# Patient Record
Sex: FEMALE | Race: WHITE | NOT HISPANIC OR LATINO | Employment: OTHER | URBAN - METROPOLITAN AREA
[De-identification: names, ages, dates, MRNs, and addresses within clinical notes are randomized per-mention and may not be internally consistent; named-entity substitution may affect disease eponyms.]

---

## 2019-06-26 ENCOUNTER — DOCUMENTATION ONLY (OUTPATIENT)
Dept: GYNECOLOGIC ONCOLOGY | Facility: CLINIC | Age: 74
End: 2019-06-26

## 2019-06-26 ENCOUNTER — TELEPHONE (OUTPATIENT)
Dept: GYNECOLOGIC ONCOLOGY | Facility: CLINIC | Age: 74
End: 2019-06-26

## 2019-06-26 DIAGNOSIS — C54.1 ENDOMETRIAL CA: ICD-10-CM

## 2019-06-26 PROCEDURE — 88321 CONSLTJ&REPRT SLD PREP ELSWR: CPT | Mod: ,,, | Performed by: PATHOLOGY

## 2019-06-26 PROCEDURE — 88321 TISSUE SPECIMEN TO PATHOLOGY: ICD-10-PCS | Mod: ,,, | Performed by: PATHOLOGY

## 2019-06-26 RX ORDER — RALOXIFENE HYDROCHLORIDE 60 MG/1
60 TABLET, FILM COATED ORAL NIGHTLY
COMMUNITY
Start: 2019-04-19

## 2019-06-26 RX ORDER — BISOPROLOL FUMARATE AND HYDROCHLOROTHIAZIDE 10; 6.25 MG/1; MG/1
1 TABLET ORAL DAILY
COMMUNITY
Start: 2019-04-19 | End: 2024-03-07 | Stop reason: DRUGHIGH

## 2019-06-26 RX ORDER — OMEPRAZOLE 40 MG/1
40 CAPSULE, DELAYED RELEASE ORAL DAILY
COMMUNITY
End: 2021-12-27 | Stop reason: CLARIF

## 2019-06-26 RX ORDER — ALPRAZOLAM 2 MG/1
0.25 TABLET ORAL NIGHTLY PRN
COMMUNITY
End: 2019-06-27 | Stop reason: CLARIF

## 2019-06-26 NOTE — PROGRESS NOTES
Referring physician: Kurtis Abdul MD  Reason for referral: endometrial cancer     June 18 2019:  Patient underwent diagnostic hysteroscopy MyoSure removal of endometrial polyps and curettage for postmenopausal bleeding. Uterus sounded to 7.5 cm.  This was performed by the referring physician.    Pathology shows high-grade serous endometrial carcinoma.

## 2019-06-26 NOTE — TELEPHONE ENCOUNTER
Left voice mail message for patient to call office to schedule appointment for consult. Per Dr. Carias patient can be seen 6/27 @ 8:45, 9:00, or 10:30 am. HARINDER

## 2019-06-27 ENCOUNTER — HOSPITAL ENCOUNTER (OUTPATIENT)
Dept: CARDIOLOGY | Facility: CLINIC | Age: 74
Discharge: HOME | End: 2019-06-27
Payer: MEDICARE

## 2019-06-27 ENCOUNTER — RESEARCH ENCOUNTER (OUTPATIENT)
Dept: RESEARCH | Facility: HOSPITAL | Age: 74
End: 2019-06-27

## 2019-06-27 ENCOUNTER — INITIAL CONSULT (OUTPATIENT)
Dept: GYNECOLOGIC ONCOLOGY | Facility: CLINIC | Age: 74
End: 2019-06-27
Payer: MEDICARE

## 2019-06-27 ENCOUNTER — HOSPITAL ENCOUNTER (OUTPATIENT)
Dept: RADIOLOGY | Facility: HOSPITAL | Age: 74
Discharge: HOME | End: 2019-06-27
Attending: OBSTETRICS & GYNECOLOGY
Payer: MEDICARE

## 2019-06-27 VITALS
DIASTOLIC BLOOD PRESSURE: 73 MMHG | SYSTOLIC BLOOD PRESSURE: 158 MMHG | HEIGHT: 65 IN | BODY MASS INDEX: 23.88 KG/M2 | HEART RATE: 58 BPM | WEIGHT: 143.31 LBS

## 2019-06-27 DIAGNOSIS — C54.1 ENDOMETRIAL CA: ICD-10-CM

## 2019-06-27 DIAGNOSIS — C54.1 ENDOMETRIAL CA: Primary | ICD-10-CM

## 2019-06-27 DIAGNOSIS — I10 ESSENTIAL HYPERTENSION: ICD-10-CM

## 2019-06-27 PROCEDURE — 3077F SYST BP >= 140 MM HG: CPT | Mod: CPTII,S$GLB,, | Performed by: OBSTETRICS & GYNECOLOGY

## 2019-06-27 PROCEDURE — 25500020 PHARM REV CODE 255: Performed by: OBSTETRICS & GYNECOLOGY

## 2019-06-27 PROCEDURE — 74177 CT ABD & PELVIS W/CONTRAST: CPT | Mod: TC

## 2019-06-27 PROCEDURE — 99205 OFFICE O/P NEW HI 60 MIN: CPT | Mod: S$GLB,,, | Performed by: OBSTETRICS & GYNECOLOGY

## 2019-06-27 PROCEDURE — 93005 EKG 12-LEAD: ICD-10-PCS | Mod: S$GLB,,, | Performed by: OBSTETRICS & GYNECOLOGY

## 2019-06-27 PROCEDURE — 3078F PR MOST RECENT DIASTOLIC BLOOD PRESSURE < 80 MM HG: ICD-10-PCS | Mod: CPTII,S$GLB,, | Performed by: OBSTETRICS & GYNECOLOGY

## 2019-06-27 PROCEDURE — 99999 PR PBB SHADOW E&M-EST. PATIENT-LVL III: CPT | Mod: PBBFAC,,, | Performed by: OBSTETRICS & GYNECOLOGY

## 2019-06-27 PROCEDURE — 71260 CT CHEST ABDOMEN PELVIS WITH CONTRAST (XPD): ICD-10-PCS | Mod: 26,,, | Performed by: RADIOLOGY

## 2019-06-27 PROCEDURE — 99205 PR OFFICE/OUTPT VISIT, NEW, LEVL V, 60-74 MIN: ICD-10-PCS | Mod: S$GLB,,, | Performed by: OBSTETRICS & GYNECOLOGY

## 2019-06-27 PROCEDURE — 99999 PR PBB SHADOW E&M-EST. PATIENT-LVL III: ICD-10-PCS | Mod: PBBFAC,,, | Performed by: OBSTETRICS & GYNECOLOGY

## 2019-06-27 PROCEDURE — 74177 CT ABD & PELVIS W/CONTRAST: CPT | Mod: 26,,, | Performed by: RADIOLOGY

## 2019-06-27 PROCEDURE — 71260 CT THORAX DX C+: CPT | Mod: 26,,, | Performed by: RADIOLOGY

## 2019-06-27 PROCEDURE — 93010 ELECTROCARDIOGRAM REPORT: CPT | Mod: S$GLB,,, | Performed by: INTERNAL MEDICINE

## 2019-06-27 PROCEDURE — 3077F PR MOST RECENT SYSTOLIC BLOOD PRESSURE >= 140 MM HG: ICD-10-PCS | Mod: CPTII,S$GLB,, | Performed by: OBSTETRICS & GYNECOLOGY

## 2019-06-27 PROCEDURE — 93005 ELECTROCARDIOGRAM TRACING: CPT | Mod: S$GLB,,, | Performed by: OBSTETRICS & GYNECOLOGY

## 2019-06-27 PROCEDURE — 1101F PT FALLS ASSESS-DOCD LE1/YR: CPT | Mod: CPTII,S$GLB,, | Performed by: OBSTETRICS & GYNECOLOGY

## 2019-06-27 PROCEDURE — 74177 CT CHEST ABDOMEN PELVIS WITH CONTRAST (XPD): ICD-10-PCS | Mod: 26,,, | Performed by: RADIOLOGY

## 2019-06-27 PROCEDURE — 93010 EKG 12-LEAD: ICD-10-PCS | Mod: S$GLB,,, | Performed by: INTERNAL MEDICINE

## 2019-06-27 PROCEDURE — 3078F DIAST BP <80 MM HG: CPT | Mod: CPTII,S$GLB,, | Performed by: OBSTETRICS & GYNECOLOGY

## 2019-06-27 PROCEDURE — 1101F PR PT FALLS ASSESS DOC 0-1 FALLS W/OUT INJ PAST YR: ICD-10-PCS | Mod: CPTII,S$GLB,, | Performed by: OBSTETRICS & GYNECOLOGY

## 2019-06-27 RX ORDER — LIDOCAINE HYDROCHLORIDE 10 MG/ML
1 INJECTION, SOLUTION EPIDURAL; INFILTRATION; INTRACAUDAL; PERINEURAL ONCE
Status: CANCELLED | OUTPATIENT
Start: 2019-06-27 | End: 2019-06-27

## 2019-06-27 RX ORDER — MUPIROCIN 20 MG/G
OINTMENT TOPICAL
Status: CANCELLED | OUTPATIENT
Start: 2019-06-27

## 2019-06-27 RX ORDER — ALPRAZOLAM 0.25 MG/1
0.25 TABLET ORAL 2 TIMES DAILY PRN
COMMUNITY

## 2019-06-27 RX ORDER — SODIUM CHLORIDE 0.9 % (FLUSH) 0.9 %
10 SYRINGE (ML) INJECTION
Status: CANCELLED | OUTPATIENT
Start: 2019-06-27

## 2019-06-27 RX ADMIN — IOHEXOL 75 ML: 350 INJECTION, SOLUTION INTRAVENOUS at 04:06

## 2019-06-27 NOTE — H&P (VIEW-ONLY)
Subjective:       Patient ID: Gita García is a 74 y.o. female.    Chief Complaint: Advice Only (Dr. Marshall) and Endometrial Cancer    HPI   Recent onset of PMB.   Referring physician: Kurtis Abdul MD  Reason for referral: endometrial cancer      June 18 2019:  Patient underwent diagnostic hysteroscopy MyoSure removal of endometrial polyps and curettage for postmenopausal bleeding. Uterus sounded to 7.5 cm.  This was performed by the referring physician.     Pathology shows high-grade serous endometrial carcinoma.    Colonoscopy: 2016: normal.  MMG: July 2018: normla     Past Medical History:   Diagnosis Date    Acid reflux     Endometrial ca 6/26/2019    Estrogen deficiency     Hormone deficiency     Hypertension     Osteopenia     Seizures     post partal. several days after delivery     Past Surgical History:   Procedure Laterality Date    DILATION AND CURETTAGE OF UTERUS      GI scope  2016     Family History   Problem Relation Age of Onset    Colon cancer Sister 53    Prostate cancer Brother     Breast cancer Sister 64    Kidney cancer Sister     Breast cancer Sister         in her 60s    Skin cancer Sister     Stroke Brother     Prostate cancer Brother     Heart disease Brother     Prostate cancer Brother     Heart disease Brother     Ovarian cancer Neg Hx     Uterine cancer Neg Hx      Social History     Tobacco Use    Smoking status: Never Smoker   Substance Use Topics    Alcohol use: Never     Frequency: Never    Drug use: Never     A.ll    Current Outpatient Medications:     ALPRAZolam (XANAX) 0.25 MG tablet, Take 0.25 mg by mouth as needed for Anxiety., Disp: , Rfl:     bisoprolol-hydrochlorothiazide (ZIAC) 10-6.25 mg per tablet, , Disp: , Rfl:     calcium carbonate/vitamin D3 (CALCIUM 600 + D,3, ORAL), Take by mouth., Disp: , Rfl:     multivitamin/iron/folic acid (CENTRUM ORAL), Take by mouth., Disp: , Rfl:     omeprazole (PRILOSEC) 40 MG capsule, Take 40 mg by mouth once  "daily., Disp: , Rfl:     raloxifene (EVISTA) 60 mg tablet, , Disp: , Rfl:     mv-min/FA/vit K/lycop/lut/zeax (OCUVITE EYE PLUS MULTI ORAL), Take by mouth., Disp: , Rfl:     Review of Systems   Constitutional: Negative for chills, fatigue and fever.   Eyes: Negative for visual disturbance.   Respiratory: Negative for cough, shortness of breath and wheezing.    Cardiovascular: Negative for chest pain, palpitations and leg swelling.   Gastrointestinal: Negative for abdominal distention, abdominal pain, constipation, diarrhea, nausea and vomiting.   Genitourinary: Negative for difficulty urinating, dysuria, frequency, genital sores, hematuria, pelvic pain, urgency, vaginal bleeding, vaginal discharge and vaginal pain.   Musculoskeletal: Positive for neck pain (arthritis). Negative for gait problem and neck stiffness.   Skin: Negative for rash.   Neurological: Negative for seizures and weakness.   Hematological: Negative for adenopathy. Does not bruise/bleed easily.   Psychiatric/Behavioral: The patient is not nervous/anxious.        Objective:   BP (!) 158/73   Pulse (!) 58   Ht 5' 5" (1.651 m)   Wt 65 kg (143 lb 4.8 oz)   BMI 23.85 kg/m²      Physical Exam   Constitutional: She is oriented to person, place, and time. She appears well-developed and well-nourished.   HENT:   Head: Normocephalic and atraumatic.   Eyes: No scleral icterus.   Neck: Neck supple. No tracheal deviation present. No thyroid mass and no thyromegaly present.   Cardiovascular: Normal rate and regular rhythm.   Pulmonary/Chest: Effort normal and breath sounds normal. She has no wheezes.   Abdominal: Soft. She exhibits no distension and no mass. There is no hepatosplenomegaly. There is no tenderness. There is no rebound and no guarding.   Genitourinary:   Genitourinary Comments: Bimanual exam:  Vulva: no lesions. Normal appearance  Urethra: Normal size and location. No lesions  Bladder: No masses or tenderness.  Vagina: normal mucosa. No " lesion  Cervix: normal   Uterus: normal   Adnexa: no masses.  Rectovaginal: No posterior cul de sac thickening or nodularity.  Rectal: no masses. Nontender. Normal tone.      Musculoskeletal: She exhibits no edema or tenderness.   Lymphadenopathy:     She has no cervical adenopathy.     She has no axillary adenopathy.        Right: No inguinal and no supraclavicular adenopathy present.        Left: No inguinal and no supraclavicular adenopathy present.   Neurological: She is alert and oriented to person, place, and time.   Skin: Skin is warm and dry.   Psychiatric: She has a normal mood and affect. Her behavior is normal. Judgment and thought content normal.       Assessment:       1. Endometrial ca        Plan:   Endometrial ca  Proceed with RALH/BSO/ LND and omenectomy for HG endometrial cancer.   Slides being reviewed-     ; Future; Expected date: 06/27/2019  -     CBC auto differential; Future; Expected date: 06/27/2019  -     Basic metabolic panel; Future; Expected date: 06/27/2019  -     EKG 12-lead; Future  -     CT Chest Abdomen Pelvis With Contrast; Future; Expected date: 06/27/2019    Consent forms were reviewed with patient. Questions were answered. Patient voiced understanding. Consents were signed.  Preop orders placed.

## 2019-06-27 NOTE — LETTER
June 27, 2019      Kurtis Abdul MD  [address]  [place] [address]           [facility] - GYN Oncology  [address]  [place] [address]  Phone: [phone]          Patient: Gita García   MR Number: [id number]   YOB: 1945   Date of Visit: 6/27/2019       Dear Dr. Kurtis Abdul:    Thank you for referring Gita García to me for evaluation. Attached you will find relevant portions of my assessment and plan of care.    If you have questions, please do not hesitate to call me. I look forward to following Gita García along with you.    Sincerely,    Lokesh Carias MD    Enclosure  CC:  No Recipients    If you would like to receive this communication electronically, please contact [email or web address] or [phone] to request more information on [facility] access.    For providers and/or their staff who would like to refer a patient to [facility], please contact us through our one-stop-shop provider referral line, [facility] , at [phone].    If you feel you have received this communication in error or would no longer like to receive these types of communications, please e-mail [email or web address]

## 2019-06-27 NOTE — PROGRESS NOTES
Pt was approached in Gyn Onc clinic regarding participation in IRB protocol #2015.101.C, endometrial cancer study. Pt was agreeable.     The Informed Consent Form (ICF) was reviewed with pt. The discussion included:   - participation is voluntary  - pt can change her mind about participating  - pt was informed that participation in this study would not preclude her from participating in any other research if offered  - specimens may be used by [facility] researchers, community researchers or research companies  - specimens collected include only those discussed with the patient at the time of consent and are indicated on the ICF   - specimens may be used for DNA, RNA or protein studies investigating biomarkers for diagnostic, prognostic or treatment purposes  - blood specimen will be collected from [facility] after routine collections have been conducted  - excess endometrial tumor tissue will be collected from Pathology after their approval  - participation in Biobank study will not change amount of tissue removed  - all specimens released to researchers will be stripped of identifiers  - no personal medical information will be released to any parties outside of this research study  - there will be no other physical risks outside of those involved in standard of care procedure     Dr. Carias approved of patient's participation in the Biobank study.  Pt did not have any questions. Pt willingly and independently signed ICF.    A copy of signed ICF was given to pt with instructions to call with any questions that may arise or if she should change her mind regarding participation in Biobank study.

## 2019-06-30 ENCOUNTER — PATIENT MESSAGE (OUTPATIENT)
Dept: GYNECOLOGIC ONCOLOGY | Facility: CLINIC | Age: 74
End: 2019-06-30

## 2019-07-01 ENCOUNTER — TELEPHONE (OUTPATIENT)
Dept: GYNECOLOGIC ONCOLOGY | Facility: CLINIC | Age: 74
End: 2019-07-01

## 2019-07-01 ENCOUNTER — HOSPITAL ENCOUNTER (OUTPATIENT)
Dept: RADIOLOGY | Facility: HOSPITAL | Age: 74
Discharge: HOME | End: 2019-07-01
Attending: OBSTETRICS & GYNECOLOGY
Payer: MEDICARE

## 2019-07-01 DIAGNOSIS — C54.1 ENDOMETRIAL CANCER: Primary | ICD-10-CM

## 2019-07-01 DIAGNOSIS — R16.0 LIVER MASS: ICD-10-CM

## 2019-07-01 PROCEDURE — 74160 CT ABDOMEN WITH CONTRAST: ICD-10-PCS | Mod: 26,,, | Performed by: RADIOLOGY

## 2019-07-01 PROCEDURE — 74160 CT ABDOMEN W/CONTRAST: CPT | Mod: 26,,, | Performed by: RADIOLOGY

## 2019-07-01 PROCEDURE — 25500020 PHARM REV CODE 255: Performed by: OBSTETRICS & GYNECOLOGY

## 2019-07-01 PROCEDURE — 74160 CT ABDOMEN W/CONTRAST: CPT | Mod: TC

## 2019-07-01 RX ADMIN — IOHEXOL 100 ML: 350 INJECTION, SOLUTION INTRAVENOUS at 07:07

## 2019-07-01 NOTE — TELEPHONE ENCOUNTER
----- Message from Lokesh Carias MD sent at 7/1/2019 10:57 AM CDT -----  Sudhir:   Please schedule for triple phase CT of liver/abdomen. Needs to be done this week. Has surgery on 7/8. Order has been placed. Please call patient with time/date. Thank you.

## 2019-07-01 NOTE — TELEPHONE ENCOUNTER
I called and discussed the CT scan findings with the patient and her daughter.  I have ordered a triple phase CT scan of the abdomen.

## 2019-07-02 ENCOUNTER — TELEPHONE (OUTPATIENT)
Dept: GYNECOLOGIC ONCOLOGY | Facility: CLINIC | Age: 74
End: 2019-07-02

## 2019-07-02 NOTE — TELEPHONE ENCOUNTER
Spoke with patient daughter informing her that the results from her mother test on yesterday is not resulted at this time. I inform her once resulted Dr. Carias will give them a call. Daughter voiced understanding. KJ

## 2019-07-02 NOTE — TELEPHONE ENCOUNTER
----- Message from Mauricio Barron sent at 7/2/2019  8:41 AM CDT -----  Contact: BRENT MERA [[id number]]  Name of Who is Calling: Pt  Daughter      What is the request in detail: Pt Daughter is requesting to speak with clinical staff.Pt is calling in regards to liver scan of patient .Please contact to further discuss and advise.       Can the clinic reply by [facility]:       What Number to Call Back if not in [facility]: [phone]

## 2019-07-05 ENCOUNTER — TELEPHONE (OUTPATIENT)
Dept: GYNECOLOGIC ONCOLOGY | Facility: CLINIC | Age: 74
End: 2019-07-05

## 2019-07-05 RX ORDER — POLYETHYLENE GLYCOL 3350 17 G/17G
POWDER, FOR SOLUTION ORAL EVERY MORNING
COMMUNITY
End: 2023-05-05

## 2019-07-05 NOTE — TELEPHONE ENCOUNTER
Reminder call to patient about surgery on 7/8 with Dr. Carias at [facility] with arrival time of 7:30 am. Patient voiced understanding. KJ

## 2019-07-07 ENCOUNTER — ANESTHESIA EVENT (OUTPATIENT)
Dept: SURGERY | Facility: HOSPITAL | Age: 74
DRG: 741 | End: 2019-07-07
Payer: MEDICARE

## 2019-07-07 NOTE — ANESTHESIA PREPROCEDURE EVALUATION
[facility]  Anesthesia Pre-Operative Evaluation         Patient Name: Gita García  YOB: 1945  MRN: [id number]    SUBJECTIVE:     Pre-operative evaluation for Procedure(s) (LRB):  XI ROBOTIC SALPINGO-OOPHORECTOMY (Bilateral)  XI ROBOTIC HYSTERECTOMY (N/A)  LYMPHADENECTOMY (N/A)  OMENTECTOMY (N/A)     07/07/2019    Gita García is a 74 y.o. female w/ a significant PMHx of endometrial cancer, seizures, osteopenia, hypertension, acid refulx.    June 18 2019:  Patient underwent diagnostic hysteroscopy MyoSure removal of endometrial polyps and curettage for postmenopausal bleeding on June 18, 2019. Uterus thickness was 7.5 cm.       Pathology shows high-grade serous endometrial carcinoma    Patient now presents for the above procedure(s).      LDA: None documented.       Peripheral IV - Single Lumen 06/27/19 1456 22 G Left Antecubital (Active)   Number of days: 10            Peripheral IV - Single Lumen 07/01/19 1831 20 G Left Wrist (Active)   Number of days: 5       Prev airway: None documented.    Drips: None documented.      Patient Active Problem List   Diagnosis    Endometrial ca    Essential hypertension    Liver mass       Review of patient's allergies indicates:   Allergen Reactions    Asa [aspirin] Other (See Comments)     Stomach upset    Sulfa (sulfonamide antibiotics) Itching       Current Inpatient Medications:      No current facility-administered medications on file prior to encounter.      Current Outpatient Medications on File Prior to Encounter   Medication Sig Dispense Refill    ALPRAZolam (XANAX) 0.25 MG tablet Take 0.25 mg by mouth as needed for Anxiety.      bisoprolol-hydrochlorothiazide (ZIAC) 10-6.25 mg per tablet       calcium carbonate/vitamin D3 (CALCIUM 600 + D,3, ORAL) Take by mouth.      multivitamin/iron/folic acid (CENTRUM ORAL) Take by mouth.      mv-min/FA/vit K/lycop/lut/zeax (OCUVITE EYE PLUS MULTI ORAL) Take by mouth.      omeprazole  (PRILOSEC) 40 MG capsule Take 40 mg by mouth once daily.      polyethylene glycol (MIRALAX) 17 gram PwPk Take by mouth every morning.      raloxifene (EVISTA) 60 mg tablet          Past Surgical History:   Procedure Laterality Date    DILATION AND CURETTAGE OF UTERUS      GI scope  2016       Social History     Socioeconomic History    Marital status:      Spouse name: Not on file    Number of children: Not on file    Years of education: Not on file    Highest education level: Not on file   Occupational History    Not on file   Social Needs    Financial resource strain: Not on file    Food insecurity:     Worry: Not on file     Inability: Not on file    Transportation needs:     Medical: Not on file     Non-medical: Not on file   Tobacco Use    Smoking status: Never Smoker   Substance and Sexual Activity    Alcohol use: Never     Frequency: Never    Drug use: Never    Sexual activity: Not on file   Lifestyle    Physical activity:     Days per week: Not on file     Minutes per session: Not on file    Stress: Not on file   Relationships    Social connections:     Talks on phone: Not on file     Gets together: Not on file     Attends Alevism service: Not on file     Active member of club or organization: Not on file     Attends meetings of clubs or organizations: Not on file     Relationship status: Not on file   Other Topics Concern    Not on file   Social History Narrative    Not on file       OBJECTIVE:     Vital Signs Range (Last 24H):         Significant Labs:  Lab Results   Component Value Date    WBC 9.16 06/27/2019    HGB 12.3 06/27/2019    HCT 37.6 06/27/2019     06/27/2019     06/27/2019    K 3.8 06/27/2019     06/27/2019    CREATININE 0.7 06/27/2019    BUN 12 06/27/2019    CO2 28 06/27/2019       Diagnostic Studies: Endometrial biopsy pathology shows high-grade serous endometrial carcinoma.    EKG:     Sinus bradycardia with occasional Premature ventricular  complexes  Cannot rule out Septal infarct  Abnormal ECG  No previous ECGs available  Confirmed by ELENI CEJA MD ([id number]) on 6/27/2019 6:03:03 PM    2D ECHO:  No results found for this or any previous visit.      ASSESSMENT/PLAN:                                                                                                                  07/07/2019  Gita García is a 74 y.o., female.    Anesthesia Evaluation    I have reviewed the Patient Summary Reports.    I have reviewed the Nursing Notes.   I have reviewed the Medications.     Review of Systems  Anesthesia Hx:  No problems with previous Anesthesia    Social:  Non-Smoker    Pulmonary:  Pulmonary Normal        Physical Exam  General:  Well nourished    Airway/Jaw/Neck:  Airway Findings: Mouth Opening: Normal Tongue: Normal  General Airway Assessment: Good  Mallampati: I  TM Distance: Normal, at least 6 cm  Jaw/Neck Findings:  Neck ROM: Normal ROM      Dental:  Dental Findings: In tact   Chest/Lungs:  Chest/Lungs Findings: Clear to auscultation, Normal Respiratory Rate     Heart/Vascular:  Heart Findings:       Mental Status:  Mental Status Findings:  Cooperative, Alert and Oriented         Anesthesia Plan  Type of Anesthesia, risks & benefits discussed:  Anesthesia Type:  general  Patient's Preference:   Intra-op Monitoring Plan: standard ASA monitors  Intra-op Monitoring Plan Comments:   Post Op Pain Control Plan: multimodal analgesia, IV/PO Opioids PRN and per primary service following discharge from PACU  Post Op Pain Control Plan Comments:   Induction:   IV  Beta Blocker:  Patient is not currently on a Beta-Blocker (No further documentation required).       Informed Consent: Patient understands risks and agrees with Anesthesia plan.  Questions answered.   ASA Score: 2     Day of Surgery Review of History & Physical:            Ready For Surgery From Anesthesia Perspective.

## 2019-07-08 ENCOUNTER — HOSPITAL ENCOUNTER (INPATIENT)
Facility: HOSPITAL | Age: 74
LOS: 2 days | Discharge: HOME | DRG: 741 | End: 2019-07-10
Attending: OBSTETRICS & GYNECOLOGY | Admitting: OBSTETRICS & GYNECOLOGY
Payer: MEDICARE

## 2019-07-08 ENCOUNTER — ANESTHESIA (OUTPATIENT)
Dept: SURGERY | Facility: HOSPITAL | Age: 74
DRG: 741 | End: 2019-07-08
Payer: MEDICARE

## 2019-07-08 DIAGNOSIS — C54.1 ENDOMETRIAL CA: ICD-10-CM

## 2019-07-08 DIAGNOSIS — Z98.890 S/P ROBOT-ASSISTED SURGICAL PROCEDURE: Primary | ICD-10-CM

## 2019-07-08 LAB
ABO + RH BLD: NORMAL
BLD GP AB SCN CELLS X3 SERPL QL: NORMAL

## 2019-07-08 PROCEDURE — 49255 PR REMOVAL OF OMENTUM: ICD-10-PCS | Mod: 59,,, | Performed by: OBSTETRICS & GYNECOLOGY

## 2019-07-08 PROCEDURE — 25000003 PHARM REV CODE 250: Performed by: STUDENT IN AN ORGANIZED HEALTH CARE EDUCATION/TRAINING PROGRAM

## 2019-07-08 PROCEDURE — 63600175 PHARM REV CODE 636 W HCPCS: Performed by: OBSTETRICS & GYNECOLOGY

## 2019-07-08 PROCEDURE — 58571 PR LAPAROSCOPY W TOT HYSTERECTUTERUS <=250 GRAM  W TUBE/OVARY: ICD-10-PCS | Mod: AS,51,, | Performed by: NURSE PRACTITIONER

## 2019-07-08 PROCEDURE — 88309 TISSUE EXAM BY PATHOLOGIST: CPT | Mod: 26,,, | Performed by: PATHOLOGY

## 2019-07-08 PROCEDURE — 37000008 HC ANESTHESIA 1ST 15 MINUTES: Performed by: OBSTETRICS & GYNECOLOGY

## 2019-07-08 PROCEDURE — 37000009 HC ANESTHESIA EA ADD 15 MINS: Performed by: OBSTETRICS & GYNECOLOGY

## 2019-07-08 PROCEDURE — 58571 TLH W/T/O 250 G OR LESS: CPT | Mod: 51,,, | Performed by: OBSTETRICS & GYNECOLOGY

## 2019-07-08 PROCEDURE — 20600001 HC STEP DOWN PRIVATE ROOM

## 2019-07-08 PROCEDURE — 25000003 PHARM REV CODE 250: Performed by: OBSTETRICS & GYNECOLOGY

## 2019-07-08 PROCEDURE — 88305 TISSUE EXAM BY PATHOLOGIST: CPT | Mod: 26,,, | Performed by: PATHOLOGY

## 2019-07-08 PROCEDURE — 58571 TLH W/T/O 250 G OR LESS: CPT | Mod: AS,51,, | Performed by: NURSE PRACTITIONER

## 2019-07-08 PROCEDURE — 27201423 OPTIME MED/SURG SUP & DEVICES STERILE SUPPLY: Performed by: OBSTETRICS & GYNECOLOGY

## 2019-07-08 PROCEDURE — 38572 LAPAROSCOPY LYMPHADENECTOMY: CPT | Mod: ,,, | Performed by: OBSTETRICS & GYNECOLOGY

## 2019-07-08 PROCEDURE — 63600175 PHARM REV CODE 636 W HCPCS: Performed by: STUDENT IN AN ORGANIZED HEALTH CARE EDUCATION/TRAINING PROGRAM

## 2019-07-08 PROCEDURE — 63600175 PHARM REV CODE 636 W HCPCS

## 2019-07-08 PROCEDURE — 36000712 HC OR TIME LEV V 1ST 15 MIN: Performed by: OBSTETRICS & GYNECOLOGY

## 2019-07-08 PROCEDURE — 88342 IMHCHEM/IMCYTCHM 1ST ANTB: CPT | Mod: 26,,, | Performed by: PATHOLOGY

## 2019-07-08 PROCEDURE — 88342 TISSUE SPECIMEN TO PATHOLOGY - SURGERY: ICD-10-PCS | Mod: 26,,, | Performed by: PATHOLOGY

## 2019-07-08 PROCEDURE — 49255 PR REMOVAL OF OMENTUM: ICD-10-PCS | Mod: AS,59,, | Performed by: NURSE PRACTITIONER

## 2019-07-08 PROCEDURE — 36000713 HC OR TIME LEV V EA ADD 15 MIN: Performed by: OBSTETRICS & GYNECOLOGY

## 2019-07-08 PROCEDURE — 88341 PR IHC OR ICC EACH ADD'L SINGLE ANTIBODY  STAINPR: ICD-10-PCS | Mod: 26,,, | Performed by: PATHOLOGY

## 2019-07-08 PROCEDURE — 88305 TISSUE EXAM BY PATHOLOGIST: CPT | Performed by: PATHOLOGY

## 2019-07-08 PROCEDURE — 38572 PR LAP,PELVIC LYMPHADENECTOMY/BX: ICD-10-PCS | Mod: AS,,, | Performed by: NURSE PRACTITIONER

## 2019-07-08 PROCEDURE — 88307 TISSUE EXAM BY PATHOLOGIST: CPT | Mod: 26,,, | Performed by: PATHOLOGY

## 2019-07-08 PROCEDURE — 88307 TISSUE SPECIMEN TO PATHOLOGY - SURGERY: ICD-10-PCS | Mod: 26,,, | Performed by: PATHOLOGY

## 2019-07-08 PROCEDURE — 27201040 HC RC 50 FILTER

## 2019-07-08 PROCEDURE — 58571 PR LAPAROSCOPY W TOT HYSTERECTUTERUS <=250 GRAM  W TUBE/OVARY: ICD-10-PCS | Mod: 51,,, | Performed by: OBSTETRICS & GYNECOLOGY

## 2019-07-08 PROCEDURE — D9220A PRA ANESTHESIA: Mod: ,,, | Performed by: ANESTHESIOLOGY

## 2019-07-08 PROCEDURE — 88341 IMHCHEM/IMCYTCHM EA ADD ANTB: CPT | Mod: 26,,, | Performed by: PATHOLOGY

## 2019-07-08 PROCEDURE — 38572 PR LAP,PELVIC LYMPHADENECTOMY/BX: ICD-10-PCS | Mod: ,,, | Performed by: OBSTETRICS & GYNECOLOGY

## 2019-07-08 PROCEDURE — 71000039 HC RECOVERY, EACH ADD'L HOUR: Performed by: OBSTETRICS & GYNECOLOGY

## 2019-07-08 PROCEDURE — 38572 LAPAROSCOPY LYMPHADENECTOMY: CPT | Mod: AS,,, | Performed by: NURSE PRACTITIONER

## 2019-07-08 PROCEDURE — 71000033 HC RECOVERY, INTIAL HOUR: Performed by: OBSTETRICS & GYNECOLOGY

## 2019-07-08 PROCEDURE — D9220A PRA ANESTHESIA: ICD-10-PCS | Mod: ,,, | Performed by: ANESTHESIOLOGY

## 2019-07-08 PROCEDURE — 86920 COMPATIBILITY TEST SPIN: CPT

## 2019-07-08 PROCEDURE — 88112 CYTOPATH CELL ENHANCE TECH: CPT | Mod: 26,,, | Performed by: PATHOLOGY

## 2019-07-08 PROCEDURE — 88112 PR  CYTOPATH, CELL ENHANCE TECH: ICD-10-PCS | Mod: 26,,, | Performed by: PATHOLOGY

## 2019-07-08 PROCEDURE — 86901 BLOOD TYPING SEROLOGIC RH(D): CPT

## 2019-07-08 PROCEDURE — 49255 REMOVAL OF OMENTUM: CPT | Mod: AS,59,, | Performed by: NURSE PRACTITIONER

## 2019-07-08 PROCEDURE — 94799 UNLISTED PULMONARY SVC/PX: CPT

## 2019-07-08 PROCEDURE — 49255 REMOVAL OF OMENTUM: CPT | Mod: 59,,, | Performed by: OBSTETRICS & GYNECOLOGY

## 2019-07-08 PROCEDURE — 88309 TISSUE SPECIMEN TO PATHOLOGY - SURGERY: ICD-10-PCS | Mod: 26,,, | Performed by: PATHOLOGY

## 2019-07-08 PROCEDURE — 88305 TISSUE SPECIMEN TO PATHOLOGY - SURGERY: ICD-10-PCS | Mod: 26,,, | Performed by: PATHOLOGY

## 2019-07-08 RX ORDER — PROPOFOL 10 MG/ML
VIAL (ML) INTRAVENOUS
Status: DISCONTINUED | OUTPATIENT
Start: 2019-07-08 | End: 2019-07-08

## 2019-07-08 RX ORDER — PANTOPRAZOLE SODIUM 40 MG/1
40 TABLET, DELAYED RELEASE ORAL DAILY
Status: DISCONTINUED | OUTPATIENT
Start: 2019-07-09 | End: 2019-07-10 | Stop reason: HOSPADM

## 2019-07-08 RX ORDER — FENTANYL CITRATE 50 UG/ML
25 INJECTION, SOLUTION INTRAMUSCULAR; INTRAVENOUS EVERY 5 MIN PRN
Status: COMPLETED | OUTPATIENT
Start: 2019-07-08 | End: 2019-07-08

## 2019-07-08 RX ORDER — SODIUM CHLORIDE 9 MG/ML
INJECTION, SOLUTION INTRAVENOUS CONTINUOUS
Status: DISCONTINUED | OUTPATIENT
Start: 2019-07-08 | End: 2019-07-10 | Stop reason: HOSPADM

## 2019-07-08 RX ORDER — ONDANSETRON 2 MG/ML
INJECTION INTRAMUSCULAR; INTRAVENOUS
Status: DISCONTINUED | OUTPATIENT
Start: 2019-07-08 | End: 2019-07-08

## 2019-07-08 RX ORDER — SUCCINYLCHOLINE CHLORIDE 20 MG/ML
INJECTION INTRAMUSCULAR; INTRAVENOUS
Status: DISCONTINUED | OUTPATIENT
Start: 2019-07-08 | End: 2019-07-08

## 2019-07-08 RX ORDER — SODIUM CHLORIDE 0.9 % (FLUSH) 0.9 %
10 SYRINGE (ML) INJECTION
Status: DISCONTINUED | OUTPATIENT
Start: 2019-07-08 | End: 2019-07-08 | Stop reason: HOSPADM

## 2019-07-08 RX ORDER — HYDROMORPHONE HYDROCHLORIDE 1 MG/ML
0.5 INJECTION, SOLUTION INTRAMUSCULAR; INTRAVENOUS; SUBCUTANEOUS ONCE
Status: COMPLETED | OUTPATIENT
Start: 2019-07-08 | End: 2019-07-08

## 2019-07-08 RX ORDER — OXYCODONE AND ACETAMINOPHEN 10; 325 MG/1; MG/1
1 TABLET ORAL EVERY 4 HOURS PRN
Status: DISCONTINUED | OUTPATIENT
Start: 2019-07-08 | End: 2019-07-09

## 2019-07-08 RX ORDER — HYDRALAZINE HYDROCHLORIDE 25 MG/1
25 TABLET, FILM COATED ORAL EVERY 8 HOURS PRN
Status: DISCONTINUED | OUTPATIENT
Start: 2019-07-08 | End: 2019-07-10 | Stop reason: HOSPADM

## 2019-07-08 RX ORDER — CEFAZOLIN SODIUM 1 G/3ML
2 INJECTION, POWDER, FOR SOLUTION INTRAMUSCULAR; INTRAVENOUS
Status: COMPLETED | OUTPATIENT
Start: 2019-07-08 | End: 2019-07-08

## 2019-07-08 RX ORDER — BISOPROLOL FUMARATE 5 MG/1
10 TABLET, FILM COATED ORAL DAILY
Status: DISCONTINUED | OUTPATIENT
Start: 2019-07-08 | End: 2019-07-08

## 2019-07-08 RX ORDER — LIDOCAINE HYDROCHLORIDE 10 MG/ML
1 INJECTION, SOLUTION EPIDURAL; INFILTRATION; INTRACAUDAL; PERINEURAL ONCE
Status: COMPLETED | OUTPATIENT
Start: 2019-07-08 | End: 2019-07-08

## 2019-07-08 RX ORDER — POLYETHYLENE GLYCOL 3350 17 G/17G
17 POWDER, FOR SOLUTION ORAL DAILY
Status: DISCONTINUED | OUTPATIENT
Start: 2019-07-09 | End: 2019-07-10 | Stop reason: HOSPADM

## 2019-07-08 RX ORDER — ONDANSETRON 8 MG/1
8 TABLET, ORALLY DISINTEGRATING ORAL EVERY 8 HOURS PRN
Status: DISCONTINUED | OUTPATIENT
Start: 2019-07-08 | End: 2019-07-10 | Stop reason: HOSPADM

## 2019-07-08 RX ORDER — MIDAZOLAM HYDROCHLORIDE 1 MG/ML
INJECTION, SOLUTION INTRAMUSCULAR; INTRAVENOUS
Status: DISCONTINUED | OUTPATIENT
Start: 2019-07-08 | End: 2019-07-08

## 2019-07-08 RX ORDER — MUPIROCIN 20 MG/G
OINTMENT TOPICAL
Status: DISCONTINUED | OUTPATIENT
Start: 2019-07-08 | End: 2019-07-08

## 2019-07-08 RX ORDER — IBUPROFEN 600 MG/1
600 TABLET ORAL EVERY 6 HOURS
Status: DISCONTINUED | OUTPATIENT
Start: 2019-07-08 | End: 2019-07-09

## 2019-07-08 RX ORDER — ACETAMINOPHEN 10 MG/ML
INJECTION, SOLUTION INTRAVENOUS
Status: DISCONTINUED | OUTPATIENT
Start: 2019-07-08 | End: 2019-07-08

## 2019-07-08 RX ORDER — GLYCOPYRROLATE 0.2 MG/ML
INJECTION INTRAMUSCULAR; INTRAVENOUS
Status: DISCONTINUED | OUTPATIENT
Start: 2019-07-08 | End: 2019-07-08

## 2019-07-08 RX ORDER — SODIUM CHLORIDE 0.9 % (FLUSH) 0.9 %
10 SYRINGE (ML) INJECTION
Status: DISCONTINUED | OUTPATIENT
Start: 2019-07-08 | End: 2019-07-08

## 2019-07-08 RX ORDER — HYDROMORPHONE HYDROCHLORIDE 1 MG/ML
1 INJECTION, SOLUTION INTRAMUSCULAR; INTRAVENOUS; SUBCUTANEOUS EVERY 4 HOURS PRN
Status: DISCONTINUED | OUTPATIENT
Start: 2019-07-08 | End: 2019-07-10 | Stop reason: HOSPADM

## 2019-07-08 RX ORDER — FENTANYL CITRATE 50 UG/ML
INJECTION, SOLUTION INTRAMUSCULAR; INTRAVENOUS
Status: DISCONTINUED | OUTPATIENT
Start: 2019-07-08 | End: 2019-07-08

## 2019-07-08 RX ORDER — EPHEDRINE SULFATE 50 MG/ML
INJECTION, SOLUTION INTRAVENOUS
Status: DISCONTINUED | OUTPATIENT
Start: 2019-07-08 | End: 2019-07-08

## 2019-07-08 RX ORDER — METOPROLOL TARTRATE 50 MG/1
100 TABLET ORAL 2 TIMES DAILY
Status: COMPLETED | OUTPATIENT
Start: 2019-07-08 | End: 2019-07-09

## 2019-07-08 RX ORDER — PHENYLEPHRINE HYDROCHLORIDE 10 MG/ML
INJECTION INTRAVENOUS
Status: DISCONTINUED | OUTPATIENT
Start: 2019-07-08 | End: 2019-07-08

## 2019-07-08 RX ORDER — KETAMINE HCL IN 0.9 % NACL 50 MG/5 ML
SYRINGE (ML) INTRAVENOUS
Status: DISCONTINUED | OUTPATIENT
Start: 2019-07-08 | End: 2019-07-08

## 2019-07-08 RX ORDER — OXYCODONE AND ACETAMINOPHEN 5; 325 MG/1; MG/1
1 TABLET ORAL EVERY 4 HOURS PRN
Status: DISCONTINUED | OUTPATIENT
Start: 2019-07-08 | End: 2019-07-09

## 2019-07-08 RX ORDER — SIMETHICONE 80 MG
80 TABLET,CHEWABLE ORAL EVERY 4 HOURS PRN
Status: DISCONTINUED | OUTPATIENT
Start: 2019-07-08 | End: 2019-07-10 | Stop reason: HOSPADM

## 2019-07-08 RX ORDER — LIDOCAINE HCL/PF 100 MG/5ML
SYRINGE (ML) INTRAVENOUS
Status: DISCONTINUED | OUTPATIENT
Start: 2019-07-08 | End: 2019-07-08

## 2019-07-08 RX ORDER — DIPHENHYDRAMINE HCL 25 MG
25 CAPSULE ORAL EVERY 4 HOURS PRN
Status: DISCONTINUED | OUTPATIENT
Start: 2019-07-08 | End: 2019-07-10 | Stop reason: HOSPADM

## 2019-07-08 RX ORDER — DEXAMETHASONE SODIUM PHOSPHATE 4 MG/ML
INJECTION, SOLUTION INTRA-ARTICULAR; INTRALESIONAL; INTRAMUSCULAR; INTRAVENOUS; SOFT TISSUE
Status: DISCONTINUED | OUTPATIENT
Start: 2019-07-08 | End: 2019-07-08

## 2019-07-08 RX ORDER — ALPRAZOLAM 0.25 MG/1
0.25 TABLET ORAL DAILY PRN
Status: DISCONTINUED | OUTPATIENT
Start: 2019-07-08 | End: 2019-07-10 | Stop reason: HOSPADM

## 2019-07-08 RX ORDER — NEOSTIGMINE METHYLSULFATE 1 MG/ML
INJECTION, SOLUTION INTRAVENOUS
Status: DISCONTINUED | OUTPATIENT
Start: 2019-07-08 | End: 2019-07-08

## 2019-07-08 RX ORDER — ROCURONIUM BROMIDE 10 MG/ML
INJECTION, SOLUTION INTRAVENOUS
Status: DISCONTINUED | OUTPATIENT
Start: 2019-07-08 | End: 2019-07-08

## 2019-07-08 RX ORDER — SODIUM CHLORIDE 9 MG/ML
INJECTION, SOLUTION INTRAVENOUS CONTINUOUS
Status: DISCONTINUED | OUTPATIENT
Start: 2019-07-08 | End: 2019-07-08

## 2019-07-08 RX ADMIN — ROCURONIUM BROMIDE 10 MG: 10 INJECTION, SOLUTION INTRAVENOUS at 12:07

## 2019-07-08 RX ADMIN — FENTANYL CITRATE 25 MCG: 50 INJECTION INTRAMUSCULAR; INTRAVENOUS at 02:07

## 2019-07-08 RX ADMIN — ONDANSETRON 8 MG: 8 TABLET, ORALLY DISINTEGRATING ORAL at 08:07

## 2019-07-08 RX ADMIN — LIDOCAINE HYDROCHLORIDE 50 MG: 20 INJECTION, SOLUTION INTRAVENOUS at 10:07

## 2019-07-08 RX ADMIN — ONDANSETRON 4 MG: 2 INJECTION INTRAMUSCULAR; INTRAVENOUS at 01:07

## 2019-07-08 RX ADMIN — SODIUM CHLORIDE, SODIUM GLUCONATE, SODIUM ACETATE, POTASSIUM CHLORIDE, MAGNESIUM CHLORIDE, SODIUM PHOSPHATE, DIBASIC, AND POTASSIUM PHOSPHATE: .53; .5; .37; .037; .03; .012; .00082 INJECTION, SOLUTION INTRAVENOUS at 10:07

## 2019-07-08 RX ADMIN — HYDROMORPHONE HYDROCHLORIDE 0.5 MG: 1 INJECTION, SOLUTION INTRAMUSCULAR; INTRAVENOUS; SUBCUTANEOUS at 05:07

## 2019-07-08 RX ADMIN — EPHEDRINE SULFATE 5 MG: 50 INJECTION, SOLUTION INTRAMUSCULAR; INTRAVENOUS; SUBCUTANEOUS at 12:07

## 2019-07-08 RX ADMIN — ROCURONIUM BROMIDE 50 MG: 10 INJECTION, SOLUTION INTRAVENOUS at 10:07

## 2019-07-08 RX ADMIN — OXYCODONE AND ACETAMINOPHEN 1 TABLET: 10; 325 TABLET ORAL at 02:07

## 2019-07-08 RX ADMIN — PROPOFOL 140 MG: 10 INJECTION, EMULSION INTRAVENOUS at 10:07

## 2019-07-08 RX ADMIN — FENTANYL CITRATE 50 MCG: 50 INJECTION, SOLUTION INTRAMUSCULAR; INTRAVENOUS at 10:07

## 2019-07-08 RX ADMIN — PHENYLEPHRINE HYDROCHLORIDE 100 MCG: 10 INJECTION INTRAVENOUS at 10:07

## 2019-07-08 RX ADMIN — Medication 10 MG: at 12:07

## 2019-07-08 RX ADMIN — MUPIROCIN: 20 OINTMENT TOPICAL at 08:07

## 2019-07-08 RX ADMIN — EPHEDRINE SULFATE 5 MG: 50 INJECTION, SOLUTION INTRAMUSCULAR; INTRAVENOUS; SUBCUTANEOUS at 01:07

## 2019-07-08 RX ADMIN — ACETAMINOPHEN 1000 MG: 10 INJECTION, SOLUTION INTRAVENOUS at 11:07

## 2019-07-08 RX ADMIN — HYDROMORPHONE HYDROCHLORIDE 0.5 MG: 1 INJECTION, SOLUTION INTRAMUSCULAR; INTRAVENOUS; SUBCUTANEOUS at 08:07

## 2019-07-08 RX ADMIN — GLYCOPYRROLATE 0.6 MG: 0.2 INJECTION, SOLUTION INTRAMUSCULAR; INTRAVENOUS at 01:07

## 2019-07-08 RX ADMIN — METOPROLOL TARTRATE 100 MG: 50 TABLET ORAL at 08:07

## 2019-07-08 RX ADMIN — DEXAMETHASONE SODIUM PHOSPHATE 4 MG: 4 INJECTION, SOLUTION INTRAMUSCULAR; INTRAVENOUS at 11:07

## 2019-07-08 RX ADMIN — CEFAZOLIN 2 G: 330 INJECTION, POWDER, FOR SOLUTION INTRAMUSCULAR; INTRAVENOUS at 10:07

## 2019-07-08 RX ADMIN — Medication 20 MG: at 10:07

## 2019-07-08 RX ADMIN — NEOSTIGMINE METHYLSULFATE 5 MG: 1 INJECTION INTRAVENOUS at 01:07

## 2019-07-08 RX ADMIN — SUCCINYLCHOLINE CHLORIDE 140 MG: 20 INJECTION, SOLUTION INTRAMUSCULAR; INTRAVENOUS at 10:07

## 2019-07-08 RX ADMIN — LIDOCAINE HYDROCHLORIDE 2 MG: 10 INJECTION, SOLUTION EPIDURAL; INFILTRATION; INTRACAUDAL; PERINEURAL at 08:07

## 2019-07-08 RX ADMIN — MIDAZOLAM HYDROCHLORIDE 1 MG: 1 INJECTION, SOLUTION INTRAMUSCULAR; INTRAVENOUS at 10:07

## 2019-07-08 RX ADMIN — OXYCODONE AND ACETAMINOPHEN 1 TABLET: 10; 325 TABLET ORAL at 08:07

## 2019-07-08 RX ADMIN — SODIUM CHLORIDE 1000 ML: 0.9 INJECTION, SOLUTION INTRAVENOUS at 08:07

## 2019-07-08 RX ADMIN — SODIUM CHLORIDE: 0.9 INJECTION, SOLUTION INTRAVENOUS at 02:07

## 2019-07-08 RX ADMIN — ROCURONIUM BROMIDE 5 MG: 10 INJECTION, SOLUTION INTRAVENOUS at 12:07

## 2019-07-08 NOTE — BRIEF OP NOTE
[facility]  Brief Operative Note    SUMMARY     Surgery Date: 7/8/2019     Surgeon(s) and Role:     * Lokesh Carias MD - Primary       Imelda Gallardo [facility],(no qualified resident for her part)     * Pamela Cruz MD - Resident - Assisting on console       Nu Tyler MD Resident Assisting.        Pre-op Diagnosis:  Endometrial cancer [C54.1]    Post-op Diagnosis:  Post-Op Diagnosis Codes:     * Endometrial cancer [C54.1]    Procedure(s) (LRB):  XI ROBOTIC SALPINGO-OOPHORECTOMY (Bilateral)  XI ROBOTIC HYSTERECTOMY (N/A)  LYMPHADENECTOMY (N/A)  OMENTECTOMY (N/A)    Anesthesia: General    Description of Procedure:  Removal of uterus, cervix, bilateral tubes and ovaries. Bilateral pelvic and PA nodes, omentectomy        Description of the findings of the procedure: no gross tumor in uterus. No suspicious nodes.    Estimated Blood Loss: 50 ml   Total Fluids: 1000 ml  Urine Output: 400 ml   Specimens:   Specimen (12h ago, onward)    Start     Ordered    07/08/19 1211  Specimen to Pathology - Surgery  Once     Comments:  Pre-op Diagnosis: Endometrial cancer [C54.1]Procedure(s):XI ROBOTIC SALPINGO-OOPHORECTOMYXI ROBOTIC HYSTERECTOMYLYMPHADENECTOMYOMENTECTOMY Number of specimens: 9Name of specimens: 1. Uterus, cervix, bilateral tubes and ovaries - permanent2. Right pelvic and obturator lymph nodes - permanent3. Left pelvic and obturator lymph nodes - permanent4. Aortocaval lymph nodes - permanent5. Omentum A - permanent6. Omentum B - permanent7. Omentum C - permanent8. Omentum D - permanent9. Omentum E - permanent     Start Status     07/08/19 1211 Needs to be Collected Order ID: [id number]       07/08/19 1309

## 2019-07-08 NOTE — INTERVAL H&P NOTE
The patient has been examined and the H&P has been reviewed:    I concur with the findings and no changes have occurred since H&P was written.    Surgery risks, benefits and alternative options discussed and understood by patient/family.          Active Hospital Problems    Diagnosis  POA    Endometrial ca [C54.1]  Yes      Resolved Hospital Problems   No resolved problems to display.

## 2019-07-08 NOTE — ANESTHESIA POSTPROCEDURE EVALUATION
Anesthesia Post Evaluation    Patient: Gita García    Procedure(s) Performed: Procedure(s) (LRB):  XI ROBOTIC SALPINGO-OOPHORECTOMY (Bilateral)  XI ROBOTIC HYSTERECTOMY (N/A)  XI ROBOTIC LYMPHADENECTOMY, PELVIC (Bilateral)  OMENTECTOMY (N/A)  XI ROBOTIC LYMPHADENECTOMY, RETROPERITONEUM (N/A)  LAPAROTOMY (N/A)    Final Anesthesia Type: general  Patient location during evaluation: PACU  Patient participation: Yes- Able to Participate  Level of consciousness: awake and alert and oriented  Post-procedure vital signs: reviewed and stable  Pain management: adequate  Airway patency: patent  PONV status at discharge: No PONV  Anesthetic complications: no      Cardiovascular status: hemodynamically stable  Respiratory status: unassisted, spontaneous ventilation and room air  Hydration status: euvolemic  Follow-up not needed.          Vitals Value Taken Time   /68 7/8/2019  3:47 PM   Temp 36.1 °C (97 °F) 7/8/2019  1:50 PM   Pulse 60 7/8/2019  4:00 PM   Resp 12 7/8/2019  4:00 PM   SpO2 100 % 7/8/2019  4:00 PM   Vitals shown include unvalidated device data.      No case tracking events are documented in the log.      Pain/Salma Score: Pain Rating Prior to Med Admin: 8 (7/8/2019  2:41 PM)

## 2019-07-08 NOTE — OP NOTE
[facility]  Surgery Department  Operative Note    SUMMARY     Patient: Gita García    Medical Record: [id number]    Date of Procedure: 7/8/2019     Surgeon: Surgeon(s) and Role:     * Lokesh Carias MD - Primary  Imelda Gallardo [facility],(no qualified resident for her part)     * Pamela Cruz MD - Resident - Assisting on console       Nu Tyler MD Resident Assisting.      Pre-Operative Diagnosis: Endometrial cancer [C54.1]    Post-Operative Diagnosis: Post-Op Diagnosis Codes:     * Endometrial cancer [C54.1]    Procedure: Procedure(s) (LRB):  XI ROBOTIC SALPINGO-OOPHORECTOMY (Bilateral)  XI ROBOTIC HYSTERECTOMY (N/A)  XI ROBOTIC LYMPHADENECTOMY, PELVIC (Bilateral)  OMENTECTOMY (N/A)  XI ROBOTIC LYMPHADENECTOMY, RETROPERITONEUM (N/A)  LAPAROTOMY (N/A)    EBL: 50 ml      Total Fluid: 1000 ml      Urine Output: 400 ml    Drains: none    Operative History: patient with PMB. 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 CT scan showed liver hemangioma    Operative Findings: normal uterus, cervix, tubes and ovaries. No suspicious nodes. Normal appearing omentum.    Procedure in Detail: The patient was brought to the Operating Room after induction of general anesthesia.  The arms were secured to the patient's side. A chest strap was placed.  The patient was placed in steep Trendelenburg without any movements.     The legs were placed in Shilo stirrups.  The vulva and vagina were then prepped with Betadine scrub and solution.  The abdomen was prepped with ChloraPrep and the patient was sterilely draped.     After timeout identifying patient and procedure, attention was then directed to the peritoneum.  The cervix was exposed with two right angle retractors.  Cervix was grasped with single-tooth tenaculum.  Uterus and cervix sounded 3.5 inches. The cervix was easily dilated to a 20 Hanks dilator.     We then placed a 0 Vicryl stitch at 6 o'clock and 12 o'clock.  A large VCare uterine  manipulator was inserted into the endometrial cavity.  Cervical cup was advanced over cervical sutures.  Vaginal occluder was advanced and the entire system was locked in place.     We changed gloves and attention was now directed to the abdomen.     Pneumoperitoneum was obtained with first pass of the Veress needle.  Opening pressure was -2.  The abdomen was insufflated to 15 mmHg.  An incision was made approximately 22 cm above the pubic symphysis.  The Xi trocar was introduced followed by the camera.  We had entered into the peritoneal cavity without injury.     We then placed 2 robotic arm trocar(s) on the left.  One approximately 10 cm lateral and slightly below the camera port.  The other just above the iliac crest.  A 3rd robotic arm was then placed on the right side 10 cm lateral and slightly below the camera port.  An 8 mm AirSeal trocar was placed in the right upper quadrant.  These four trocars were placed under direct visualization.     The patient was then placed in steep Trendelenburg and the robot was docked.     I began on the left hand side.  The left round ligament was transected with monopolar scissors.  The anterior leaf of the broad ligament was opened.  The infundibulopelvic ligament was identified.  The left ureter was identified.  We then began the salpingo-oophorectomy by identifying infundibulopelvic ligament which was cauterized by 3 and cut.      The anterior cul-de-sac peritoneum was opened.  The bladder was dissected downward off of the cervix.  The uterine vessels were cauterized and cut.        Attention was now directed to the right hand side. The right round ligament was transected with monopolar scissors.  The anterior leaf of the broad ligament was opened.  The infundibulopelvic ligament was identified.  The right ureter was identified.  We then began the salpingo-oophorectomy by identifying infundibulopelvic ligament which was cauterized by 3 and cut.          Attention was now  directed back anteriorly.  The bladder had been dissected downward off of the cervix.  A colpotomy was made at the cervical cup and followed circumferentially around the cervix.  Uterus, cervix, and bilateral tubes and ovaries were removed through the vagina.     The vaginal cuff was dry.     A lymphadenectomy was now begun on the right hand side.  The lymph nodes along the right external iliac artery, hypogastric artery and external iliac vein were removed from the bifurcation of the common iliac artery to the point where the deep circumflex iliac vein crosses over the external iliac artery.  The obturator space was developed and those nodes anterior to the obturator nerve were removed.    A lymphadenectomy was now begun on the left hand side.  The lymph nodes along the left external iliac artery, hypogastric artery and external iliac vein were removed from the bifurcation of the common iliac artery to the point where the deep circumflex iliac vein crosses over the external iliac artery.  The obturator space was developed and those nodes anterior to the obturator nerve were removed.    The periaortic lymphadenectomy was next undertaken.  The peritoneum overlying the bifurcation of the aorta was incised. The inferior mesenteric artery was identified. The nodes inferior to the VALENTIN were taken. The briana pad at the aortocaval area was removed. Sussy was sprayed in this area.     At this point, the vagina was then closed with interrupted 0 Vicryl sutures.  A Quentin stitch was placed on each side of each angle of the vagina and the intervening vagina was closed with interrupted 0 Vicryl suture.     The pelvis was irrigated.  There was no evidence of any bleeding.  The abdomen was desufflated and reinsufflated with no evidence for bleeding.     Sussy was now sprayed into the pelvis.     The robotic instruments were removed.  The robot was undocked.      While maintaining a pneumoperitoneum the camera port skin incision  was extended. The subcutaneous fat was incised with the Bovie. The trocar was removed and a Jada clamp was inserted and the fascial incision was extended. The pneumoperitoneum was released and the omentum and transverse colon was delivered. The omentum was dissected from the transverse colon and individual pedicles were taken with the Enseal device.   The fascia was closed with a number 1 look PDS. The subcutaneous tissues were irrigated and reapproximated with 2-0 plain cat gut.       The skin incisions were then closed with a running 4-0 Monocryl suture in a subcuticular closure.     The patient was then awakened and taken to Recovery Room in stable condition.   Lap, needle, sponge, and instrument count was correct.

## 2019-07-08 NOTE — TRANSFER OF CARE
"Anesthesia Transfer of Care Note    Patient: Gita García    Procedure(s) Performed: Procedure(s) (LRB):  XI ROBOTIC SALPINGO-OOPHORECTOMY (Bilateral)  XI ROBOTIC HYSTERECTOMY (N/A)  XI ROBOTIC LYMPHADENECTOMY, PELVIC (Bilateral)  OMENTECTOMY (N/A)  XI ROBOTIC LYMPHADENECTOMY, RETROPERITONEUM (N/A)  LAPAROTOMY (N/A)    Patient [facility]: [facility]    Anesthesia Type: general    Transport from OR: Transported from OR on 6-10 L/min O2 by face mask with adequate spontaneous ventilation    Post pain: adequate analgesia    Post assessment: no apparent anesthetic complications    Post vital signs: stable    Level of consciousness: awake    Nausea/Vomiting: no nausea/vomiting    Complications: none    Transfer of care protocol was followed      Last vitals:   Visit Vitals  BP (!) 158/74 (BP Location: Right arm, Patient Position: Lying)   Pulse 69   Temp 36.1 °C (97 °F) (Temporal)   Resp 20   Ht 5' 5" (1.651 m)   Wt 64.9 kg (143 lb)   SpO2 98%   BMI 23.80 kg/m²     "

## 2019-07-08 NOTE — OPERATIVE NOTE ADDENDUM
Certification of Assistant at Surgery       Surgery Date: 7/8/2019     Participating Surgeons:  Surgeon(s) and Role:     * Lokesh Carias MD - Primary     * Pamela Cruz MD - Resident - Assisting    Procedures:  Procedure(s) (LRB):  XI ROBOTIC SALPINGO-OOPHORECTOMY (Bilateral)  XI ROBOTIC HYSTERECTOMY (N/A)  LYMPHADENECTOMY (N/A)  OMENTECTOMY (N/A)    Assistant Surgeon's Certification of Necessity:  I understand that section 1842 (b) (6) (d) of the Social Security Act generally prohibits Medicare Part B reasonable charge payment for the services of assistants at surgery in teaching hospitals when qualified residents are available to furnish such services. I certify that the services for which payment is claimed were medically necessary, and that no qualified resident was available to perform the services. I further understand that these services are subject to post-payment review by the Medicare carrier.      Imelda Gallardo NP    07/08/2019  1:02 PM

## 2019-07-08 NOTE — NURSING TRANSFER
Nursing Transfer Note      7/8/2019     Transfer To: 821    Transfer via stretcher    Transfer with 02 2 liters    Transported by PCT    Medicines sent: None    Chart send with patient: Yes    Notified: spouse    Patient reassessed at: 7/8/19 (date, time)    Upon arrival to floor: patient oriented to room, call bell in reach and bed in lowest position

## 2019-07-08 NOTE — PLAN OF CARE
All DaVinci instruments inspected after case by Eulogio Waldron.  All Instruments appear intact.  Lives checked by Mena Toure

## 2019-07-08 NOTE — PLAN OF CARE
Robot time out completed with pre incision time out.  All DaVinci instruments inspected before case by  Elizabeth Waldron.  All instruments appear to be intact.

## 2019-07-09 LAB
BASOPHILS # BLD AUTO: 0.02 K/UL (ref 0–0.2)
BASOPHILS NFR BLD: 0.2 % (ref 0–1.9)
DIFFERENTIAL METHOD: ABNORMAL
EOSINOPHIL # BLD AUTO: 0 K/UL (ref 0–0.5)
EOSINOPHIL NFR BLD: 0 % (ref 0–8)
ERYTHROCYTE [DISTWIDTH] IN BLOOD BY AUTOMATED COUNT: 12.9 % (ref 11.5–14.5)
HCT VFR BLD AUTO: 33.5 % (ref 37–48.5)
HGB BLD-MCNC: 10.7 G/DL (ref 12–16)
IMM GRANULOCYTES # BLD AUTO: 0.05 K/UL (ref 0–0.04)
IMM GRANULOCYTES NFR BLD AUTO: 0.4 % (ref 0–0.5)
LYMPHOCYTES # BLD AUTO: 1.2 K/UL (ref 1–4.8)
LYMPHOCYTES NFR BLD: 10.4 % (ref 18–48)
MCH RBC QN AUTO: 30 PG (ref 27–31)
MCHC RBC AUTO-ENTMCNC: 31.9 G/DL (ref 32–36)
MCV RBC AUTO: 94 FL (ref 82–98)
MONOCYTES # BLD AUTO: 1 K/UL (ref 0.3–1)
MONOCYTES NFR BLD: 8.3 % (ref 4–15)
NEUTROPHILS # BLD AUTO: 9.4 K/UL (ref 1.8–7.7)
NEUTROPHILS NFR BLD: 80.7 % (ref 38–73)
NRBC BLD-RTO: 0 /100 WBC
PLATELET # BLD AUTO: 177 K/UL (ref 150–350)
PMV BLD AUTO: 10.8 FL (ref 9.2–12.9)
RBC # BLD AUTO: 3.57 M/UL (ref 4–5.4)
WBC # BLD AUTO: 11.6 K/UL (ref 3.9–12.7)

## 2019-07-09 PROCEDURE — 25000003 PHARM REV CODE 250: Performed by: STUDENT IN AN ORGANIZED HEALTH CARE EDUCATION/TRAINING PROGRAM

## 2019-07-09 PROCEDURE — 20600001 HC STEP DOWN PRIVATE ROOM

## 2019-07-09 PROCEDURE — 63600175 PHARM REV CODE 636 W HCPCS: Performed by: STUDENT IN AN ORGANIZED HEALTH CARE EDUCATION/TRAINING PROGRAM

## 2019-07-09 PROCEDURE — 99024 PR POST-OP FOLLOW-UP VISIT: ICD-10-PCS | Mod: GC,,, | Performed by: OBSTETRICS & GYNECOLOGY

## 2019-07-09 PROCEDURE — 99024 POSTOP FOLLOW-UP VISIT: CPT | Mod: GC,,, | Performed by: OBSTETRICS & GYNECOLOGY

## 2019-07-09 PROCEDURE — 36415 COLL VENOUS BLD VENIPUNCTURE: CPT

## 2019-07-09 PROCEDURE — 85025 COMPLETE CBC W/AUTO DIFF WBC: CPT

## 2019-07-09 RX ORDER — ACETAMINOPHEN 325 MG/1
650 TABLET ORAL EVERY 6 HOURS PRN
Status: DISCONTINUED | OUTPATIENT
Start: 2019-07-09 | End: 2019-07-10 | Stop reason: HOSPADM

## 2019-07-09 RX ORDER — OXYCODONE HYDROCHLORIDE 5 MG/1
5 TABLET ORAL EVERY 6 HOURS PRN
Status: DISCONTINUED | OUTPATIENT
Start: 2019-07-09 | End: 2019-07-10 | Stop reason: HOSPADM

## 2019-07-09 RX ADMIN — HYDROMORPHONE HYDROCHLORIDE 1 MG: 1 INJECTION, SOLUTION INTRAMUSCULAR; INTRAVENOUS; SUBCUTANEOUS at 03:07

## 2019-07-09 RX ADMIN — HYDROMORPHONE HYDROCHLORIDE 1 MG: 1 INJECTION, SOLUTION INTRAMUSCULAR; INTRAVENOUS; SUBCUTANEOUS at 01:07

## 2019-07-09 RX ADMIN — OXYCODONE HYDROCHLORIDE 5 MG: 5 TABLET ORAL at 10:07

## 2019-07-09 RX ADMIN — POLYETHYLENE GLYCOL 3350 17 G: 17 POWDER, FOR SOLUTION ORAL at 08:07

## 2019-07-09 RX ADMIN — OXYCODONE AND ACETAMINOPHEN 1 TABLET: 10; 325 TABLET ORAL at 01:07

## 2019-07-09 RX ADMIN — METOPROLOL TARTRATE 100 MG: 50 TABLET ORAL at 08:07

## 2019-07-09 RX ADMIN — SODIUM CHLORIDE: 0.9 INJECTION, SOLUTION INTRAVENOUS at 08:07

## 2019-07-09 RX ADMIN — ONDANSETRON 8 MG: 8 TABLET, ORALLY DISINTEGRATING ORAL at 03:07

## 2019-07-09 RX ADMIN — PANTOPRAZOLE SODIUM 40 MG: 40 TABLET, DELAYED RELEASE ORAL at 08:07

## 2019-07-09 RX ADMIN — ACETAMINOPHEN 650 MG: 325 TABLET ORAL at 12:07

## 2019-07-09 RX ADMIN — OXYCODONE HYDROCHLORIDE 5 MG: 5 TABLET ORAL at 09:07

## 2019-07-09 RX ADMIN — SODIUM CHLORIDE: 0.9 INJECTION, SOLUTION INTRAVENOUS at 07:07

## 2019-07-09 RX ADMIN — ACETAMINOPHEN 650 MG: 325 TABLET ORAL at 07:07

## 2019-07-09 RX ADMIN — SIMETHICONE CHEW TAB 80 MG 80 MG: 80 TABLET ORAL at 11:07

## 2019-07-09 RX ADMIN — ONDANSETRON 8 MG: 8 TABLET, ORALLY DISINTEGRATING ORAL at 07:07

## 2019-07-09 NOTE — PROGRESS NOTES
Progress Note  Gynecology Oncology     Admit Date: 7/8/2019  LOS: 1    Reason for Admission:  S/P robot-assisted surgical procedure    SUBJECTIVE:     Gita García is a 74 y.o.  who is POD#1 s/p robotic assisted hysterectomy, BSO, lymphadenectomy and omentectomy for the treatment of endometrial cancer.    Pt doing well this morning. Pain is well controlled. Patient refused ibuprofen as it upsets her stomach. Her pain is now controlled on narcotic pain medication. Ambulating slowly. Jama removed and patient voiding. Passing flatus. She is not yet having bowel movements. Patient complaining of some nausea and emesis this morning after getting up to the bathroom.     OBJECTIVE:     Vital Signs   Temp:  [97 °F (36.1 °C)-98.7 °F (37.1 °C)] 98.7 °F (37.1 °C)  Pulse:  [52-78] 62  Resp:  [10-20] 20  SpO2:  [94 %-100 %] 95 %  BP: (117-158)/(55-74) 117/57      Intake/Output Summary (Last 24 hours) at 7/9/2019 0703  Last data filed at 7/9/2019 0600  Gross per 24 hour   Intake 950.8 ml   Output 1600 ml   Net -649.2 ml       Physical Exam:  Gen: A&Ox3, NAD  CV: RRR  Pulm: LCTAB, normal respiratory effort  Abd: active bowel sounds, soft, non-distended, non-tender to palpation without rebound or guarding  Inc: robotic port sites intact with steris in place; mini lap site with steris in place   Ext: PPP, no peripheral edema, TEDs/SCDs in place      Laboratory:  Recent Results (from the past 24 hour(s))   Prepare RBC 1 Unit    Collection Time: 07/08/19  7:44 AM   Result Value Ref Range    UNIT NUMBER [id number]     Product Code [id number]     DISPENSE STATUS CROSSMATCHED     CODING SYSTEM VNJO188     Unit Blood Type Code 6200     Unit Blood Type A POS     Unit Expiration [id number]    Type & Screen    Collection Time: 07/08/19  8:22 AM   Result Value Ref Range    Group & Rh A POS     Indirect Gino NEG    CBC auto differential    Collection Time: 07/09/19  3:59 AM   Result Value Ref Range    WBC 11.60 3.90 - 12.70 K/uL     RBC 3.57 (L) 4.00 - 5.40 M/uL    Hemoglobin 10.7 (L) 12.0 - 16.0 g/dL    Hematocrit 33.5 (L) 37.0 - 48.5 %    Mean Corpuscular Volume 94 82 - 98 fL    Mean Corpuscular Hemoglobin 30.0 27.0 - 31.0 pg    Mean Corpuscular Hemoglobin Conc 31.9 (L) 32.0 - 36.0 g/dL    RDW 12.9 11.5 - 14.5 %    Platelets 177 150 - 350 K/uL    MPV 10.8 9.2 - 12.9 fL    Immature Granulocytes 0.4 0.0 - 0.5 %    Gran # (ANC) 9.4 (H) 1.8 - 7.7 K/uL    Immature Grans (Abs) 0.05 (H) 0.00 - 0.04 K/uL    Lymph # 1.2 1.0 - 4.8 K/uL    Mono # 1.0 0.3 - 1.0 K/uL    Eos # 0.0 0.0 - 0.5 K/uL    Baso # 0.02 0.00 - 0.20 K/uL    nRBC 0 0 /100 WBC    Gran% 80.7 (H) 38.0 - 73.0 %    Lymph% 10.4 (L) 18.0 - 48.0 %    Mono% 8.3 4.0 - 15.0 %    Eosinophil% 0.0 0.0 - 8.0 %    Basophil% 0.2 0.0 - 1.9 %    Differential Method Automated            ASSESSMENT/PLAN:     Active Hospital Problems    Diagnosis  POA    *s/p RA-TLH/BSO/BPLD, omentectomy [Z98.890]  Not Applicable    Endometrial ca [C54.1]  Yes      Resolved Hospital Problems   No resolved problems to display.       Assessment: 74 y.o. female POD#1 s/p RA-TLH/BSO/LNB/omentecomy    Plan:   1. Post-op   - Routine post-op advances  - Continue PRN pain medications  - voiding this am after hidalgo removed   - Encourage ambulation   - Encourage IS  - CBC stable at 10/33  - advance diet as tolerated  - Antiemetics prn nausea/vomiting  - ibuprofen discontinued; patient to continue on tylenol for mild pain and oxycodone IR for moderate to severe pain    2. HTN   - on viac combo pill at home   - continue beta blocker in house   - BP: (117-158)/(55-74) 117/57      Dispo: As patient meets appropriate post-op milestones, plan for discharge to home marcia.      Nu Tyler MD  OBGYN, PGY-2

## 2019-07-09 NOTE — PLAN OF CARE
Problem: Adult Inpatient Plan of Care  Goal: Plan of Care Review  Outcome: Ongoing (interventions implemented as appropriate)  Patient post-op day 1, AAOx4, VSS, afebrile, and without injury. Fall precautions maintained. Patient instructed on how to contact the nurse. Daughters at bedside. Patient on room air without distress; patient on regular diet with poor but improving tolerance for food. Jama catheter removed this AM. Patient up to void in the bathroom. Nausea and pain addressed with PRNs. Abdominal incision dressed with steri-strips; minimal dried drainage. Belching but no flatus. IVF continued.  Questions and concerns have been addressed; will continue to monitor.

## 2019-07-09 NOTE — PLAN OF CARE
Problem: Adult Inpatient Plan of Care  Goal: Plan of Care Review  Outcome: Ongoing (interventions implemented as appropriate)  POC reviewed w patient and daughters at beginning of shift, questions answered. 5 lap sites w steri strips C/D/I. IVF infusing to L arm. Patient refuses ibuprofen. Pain managed w PRN PO and IV  meds . Pt opiod naive and nauseous w pain meds.  Nausea managed w zofran w therapeutic effect. Evita Oglesby'karine this a.m . Ambulated to BR and voided. (-) for flatus, hypoactive BS, belching frequently. Bed locked in low position call light in reach. Will CTM

## 2019-07-09 NOTE — PLAN OF CARE
"POD#1 s/p robotic assisted hysterectomy, BSO, lymphadenectomy and omentectomy for the treatment of endometrial cancer. CM met with the patient and the patient's daughter at the bedside. Patient resting quietly in bed. Patient denies pain. Patient denies nausea. Patient reports ambulating to the bathroom and feeling "light headed" but steady on her feet. Patient denies any discharge needs. Patient states she has good family support. CM completed discharge assessment and planning with patient and family. Patient and family verbalized understanding. All questions and concerns addressed. White board updated. SW and CM will continue to follow for any additional needs.     Future Appointments   Date Time Provider Department Center   8/9/2019 10:30 AM Lokesh Carias MD [facility] GYN ONC [facility]        07/09/19 1350   Discharge Assessment   Assessment Type Discharge Planning Assessment   Confirmed/corrected address and phone number on facesheet? Yes   Assessment information obtained from? Patient;Caregiver;Medical Record   Expected Length of Stay (days) 3   Communicated expected length of stay with patient/caregiver yes   Prior to hospitilization cognitive status: Alert/Oriented   Prior to hospitalization functional status: Independent   Current cognitive status: Alert/Oriented   Current Functional Status: Independent   Lives With spouse   Able to Return to Prior Arrangements yes   Is patient able to care for self after discharge? Yes   Who are your caregiver(s) and their phone number(s)? Margarita Cooney (Daughter) #[phone]      Patient's perception of discharge disposition home or selfcare   Readmission Within the Last 30 Days no previous admission in last 30 days   Patient currently being followed by outpatient case management? No   Patient currently receives any other outside agency services? No   Equipment Currently Used at Home none   Do you have any problems affording any of your prescribed medications? TBD   Is " the patient taking medications as prescribed? yes   Does the patient have transportation home? Yes   Transportation Anticipated family or friend will provide   Does the patient receive services at the [facility]madin Clinic? No   Discharge Plan A Home with family   Discharge Plan B Home Health;Home with family   DME Needed Upon Discharge  none   Patient/Family in Agreement with Plan yes   Does the patient have transportation to healthcare appointments? Yes

## 2019-07-10 VITALS
BODY MASS INDEX: 23.82 KG/M2 | TEMPERATURE: 98 F | OXYGEN SATURATION: 94 % | HEART RATE: 76 BPM | DIASTOLIC BLOOD PRESSURE: 78 MMHG | HEIGHT: 65 IN | WEIGHT: 143 LBS | SYSTOLIC BLOOD PRESSURE: 160 MMHG | RESPIRATION RATE: 16 BRPM

## 2019-07-10 PROCEDURE — 99024 PR POST-OP FOLLOW-UP VISIT: ICD-10-PCS | Mod: GC,,, | Performed by: OBSTETRICS & GYNECOLOGY

## 2019-07-10 PROCEDURE — 25000003 PHARM REV CODE 250: Performed by: STUDENT IN AN ORGANIZED HEALTH CARE EDUCATION/TRAINING PROGRAM

## 2019-07-10 PROCEDURE — 99024 POSTOP FOLLOW-UP VISIT: CPT | Mod: GC,,, | Performed by: OBSTETRICS & GYNECOLOGY

## 2019-07-10 RX ORDER — ACETAMINOPHEN 325 MG/1
650 TABLET ORAL EVERY 6 HOURS PRN
Qty: 30 TABLET | Refills: 2 | COMMUNITY
Start: 2019-07-10 | End: 2019-07-10

## 2019-07-10 RX ORDER — ACETAMINOPHEN 325 MG/1
650 TABLET ORAL EVERY 6 HOURS PRN
Qty: 30 TABLET | Refills: 2 | COMMUNITY
Start: 2019-07-10 | End: 2021-03-31

## 2019-07-10 RX ORDER — OXYCODONE HYDROCHLORIDE 5 MG/1
5 TABLET ORAL EVERY 6 HOURS PRN
Qty: 15 TABLET | Refills: 0 | Status: SHIPPED | OUTPATIENT
Start: 2019-07-10 | End: 2021-03-31

## 2019-07-10 RX ADMIN — PANTOPRAZOLE SODIUM 40 MG: 40 TABLET, DELAYED RELEASE ORAL at 09:07

## 2019-07-10 RX ADMIN — ACETAMINOPHEN 650 MG: 325 TABLET ORAL at 01:07

## 2019-07-10 RX ADMIN — OXYCODONE HYDROCHLORIDE 5 MG: 5 TABLET ORAL at 10:07

## 2019-07-10 RX ADMIN — SIMETHICONE CHEW TAB 80 MG 80 MG: 80 TABLET ORAL at 01:07

## 2019-07-10 RX ADMIN — ALPRAZOLAM 0.25 MG: 0.25 TABLET ORAL at 08:07

## 2019-07-10 RX ADMIN — OXYCODONE HYDROCHLORIDE 5 MG: 5 TABLET ORAL at 04:07

## 2019-07-10 RX ADMIN — SODIUM CHLORIDE: 0.9 INJECTION, SOLUTION INTRAVENOUS at 06:07

## 2019-07-10 RX ADMIN — POLYETHYLENE GLYCOL 3350 17 G: 17 POWDER, FOR SOLUTION ORAL at 09:07

## 2019-07-10 RX ADMIN — ACETAMINOPHEN 650 MG: 325 TABLET ORAL at 08:07

## 2019-07-10 NOTE — PLAN OF CARE
Problem: Adult Inpatient Plan of Care  Goal: Plan of Care Review  Outcome: Ongoing (interventions implemented as appropriate)  Pt pain controlled with tylenol and oxycodone. IVF continued. No nausea overnight. Voiding, pt reports passing flatus. TEDS/SCDS on. Lap sites intact. Instructed patient to call for assistance, bed low and locked, call bell within reach, nonskid socks on, patient verbalized understanding.

## 2019-07-10 NOTE — DISCHARGE SUMMARY
[facility]  Obstetrics & Gynecology  Discharge Summary    Patient Name: Gita García  MRN: [id number]  Admission Date: 7/8/2019  Hospital Length of Stay: 2 days  Discharge Date and Time:  07/10/2019 8:17 AM  Attending Physician: Lokesh Carias MD   Discharging Provider: Nu Tyler MD  Primary Care Provider: ZAINAB Natarajan MD      Hospital Course:   Patient presented for scheduled procedure. Patient was passed back to OR for below procedure. Please see OP note for further details. Tolerated procedure well and patient was taken to recovery in a stable condition. Prior to discharge patient was able to void, ambulate, tolerate PO and pain was well controlled with PO meds. Patient was given routine post-op instructions for which patient voiced understanding. Patient was subsequently discharged home.      Procedure(s) (LRB):  XI ROBOTIC SALPINGO-OOPHORECTOMY (Bilateral)  XI ROBOTIC HYSTERECTOMY (N/A)  XI ROBOTIC LYMPHADENECTOMY, PELVIC (Bilateral)  OMENTECTOMY (N/A)  XI ROBOTIC LYMPHADENECTOMY, RETROPERITONEUM (N/A)  LAPAROTOMY (N/A)     Consults:     Significant Diagnostic Studies: Labs:   CBC   Recent Labs   Lab 07/09/19  0359   WBC 11.60   HGB 10.7*   HCT 33.5*          Pending Diagnostic Studies:     None        Final Active Diagnoses:    Diagnosis Date Noted POA    PRINCIPAL PROBLEM:  s/p RA-TLH/BSO/BPLD, omentectomy [Z98.890] 07/08/2019 Not Applicable    Endometrial ca [C54.1] 06/26/2019 Yes      Problems Resolved During this Admission:        Discharged Condition: good    Disposition: Home or Self Care    Follow Up:  Follow-up Information     Lokesh Carias MD.    Specialty:  Gynecologic Oncology  Why:  Follow-Up Appointment as scheduled by the clinic  Contact information:  [address] [facility] [address]  [phone]                 Patient Instructions:      Other restrictions (specify):   Order Comments: Nothing in the vagina for six weeks     Notify  your health care provider if you experience any of the following:  temperature >100.4     Notify your health care provider if you experience any of the following:  persistent nausea and vomiting or diarrhea     Notify your health care provider if you experience any of the following:  severe uncontrolled pain     Notify your health care provider if you experience any of the following:  redness, tenderness, or signs of infection (pain, swelling, redness, odor or green/yellow discharge around incision site)     Notify your health care provider if you experience any of the following:  difficulty breathing or increased cough     Notify your health care provider if you experience any of the following:  worsening rash     Notify your health care provider if you experience any of the following:  persistent dizziness, light-headedness, or visual disturbances     Notify your health care provider if you experience any of the following:  increased confusion or weakness     Notify your health care provider if you experience any of the following:   Order Comments: Heavy vaginal bleeding >1 pad/hour for greater than 2 hours     Activity as tolerated     Medications:  Reconciled Home Medications:      Medication List      START taking these medications    acetaminophen 325 MG tablet  Commonly known as:  TYLENOL  Take 2 tablets (650 mg total) by mouth every 6 (six) hours as needed.     oxyCODONE 5 MG immediate release tablet  Commonly known as:  ROXICODONE  Take 1 tablet (5 mg total) by mouth every 6 (six) hours as needed.        CONTINUE taking these medications    ALPRAZolam 0.25 MG tablet  Commonly known as:  XANAX  Take 0.25 mg by mouth as needed for Anxiety.     bisoprolol-hydrochlorothiazide 10-6.25 mg per tablet  Commonly known as:  ZIAC     CALCIUM 600 + D(3) ORAL  Take by mouth.     CENTRUM ORAL  Take by mouth.     MIRALAX 17 gram Pwpk  Generic drug:  polyethylene glycol  Take by mouth every morning.     OCUVITE EYE PLUS  MULTI ORAL  Take by mouth.     omeprazole 40 MG capsule  Commonly known as:  PRILOSEC  Take 40 mg by mouth once daily.     raloxifene 60 mg tablet  Commonly known as:  TURNER Tyler MD  Obstetrics & Gynecology  [facility]

## 2019-07-10 NOTE — PROGRESS NOTES
Progress Note  Gynecology Oncology     Admit Date: 7/8/2019  LOS: 2    Reason for Admission:  S/P robot-assisted surgical procedure    SUBJECTIVE:     Gita García is a 74 y.o.  who is POD#2 s/p robotic assisted hysterectomy, BSO, lymphadenectomy and omentectomy for the treatment of endometrial cancer.    Pt doing well this morning. Pain is well controlled on tylenol and oxy IR medication. Ambulating slowly. Jama removed and patient voiding without issue. Passing flatus. She is not yet having bowel movements. She has tolerated PO intake without continued nausea. She reports dilaudid medication yesterday was trigger for her nausea.     OBJECTIVE:     Vital Signs   Temp:  [97.7 °F (36.5 °C)-98.7 °F (37.1 °C)] 98.6 °F (37 °C)  Pulse:  [67-78] 78  Resp:  [16-18] 16  SpO2:  [91 %-96 %] 93 %  BP: (134-170)/(60-77) 170/77      Intake/Output Summary (Last 24 hours) at 7/10/2019 0622  Last data filed at 7/10/2019 0402  Gross per 24 hour   Intake 1443 ml   Output 1125 ml   Net 318 ml       Physical Exam:  Gen: A&Ox3, NAD  CV: RRR  Pulm: LCTAB, normal respiratory effort  Abd: active bowel sounds, soft, non-distended, non-tender to palpation without rebound or guarding  Inc: robotic port sites intact with steris in place; mini lap site with steris in place   Ext: PPP, no peripheral edema, TEDs/SCDs in place      Laboratory:  No results found for this or any previous visit (from the past 24 hour(s)).        ASSESSMENT/PLAN:     Active Hospital Problems    Diagnosis  POA    *s/p RA-TLH/BSO/BPLD, omentectomy [Z98.890]  Not Applicable    Endometrial ca [C54.1]  Yes      Resolved Hospital Problems   No resolved problems to display.       Assessment: 74 y.o. female POD#1 s/p RA-TLH/BSO/LNB/omentecomy    Plan:   1. Post-op   - Routine post-op advances  - Continue PRN pain medications  - voiding without issue  - Encourage ambulation   - Encourage IS  - CBC stable at 10/33  - tolerating diet  - Antiemetics prn nausea/vomiting  -  pain well controlled on current regimen with tylenol and oxy IR    2. HTN   - on viac combo pill at home   - continue beta blocker in house   - BP: (134-170)/(60-77) 170/77  - resume viac on discharge        Dispo: As patient meets appropriate post-op milestones, plan for discharge to home today.       Nu Tyler MD  OBGYN, PGY-2

## 2019-07-10 NOTE — PLAN OF CARE
POD#2 s/p robotic assisted hysterectomy, BSO, lymphadenectomy and omentectomy for the treatment of endometrial cancer. Plans for patient to discharge home with family today. Follow-up appt scheduled as listed below. No other discharge needs identified. Discharge and follow-up instructions to be completed by the bedside nurse.    Future Appointments   Date Time Provider Department Center   8/9/2019 10:30 AM Lkoesh Carias MD [facility] GYN ONC [facility]        07/10/19 0989   Final Note   Assessment Type Final Discharge Note   Anticipated Discharge Disposition Home   What phone number can be called within the next 1-3 days to see how you are doing after discharge?   ([phone])   Hospital Follow Up  Appt(s) scheduled? Yes   Discharge plans and expectations educations in teach back method with documentation complete? Yes  (per bedside nurse)

## 2019-07-10 NOTE — PROGRESS NOTES
Road Test  Oxygen-Patient tolerating room air, no distress.  Ambulation-Patient up with no assistance.  Devices-Patient going home with no devices  Tolerating-Regular diet.  Elimination-Patient voiding without difficulty.  Self Care-Performs self care without assistance.  Teaching-Verbal and written discharge teaching given.     Patient tolerates room air, ambulates with no assistance, regular diet, voiding without difficulty, and is going home with no devices. Peripheral IV removed, catheter intact, dressed with dry gauze and coban. No bleeding present. Patient going home. Discharge paperwork discussed. Medications reviewed. Patient has all belongings and has no questions at this time.

## 2019-07-12 LAB
BLD PROD TYP BPU: NORMAL
BLOOD UNIT EXPIRATION DATE: NORMAL
BLOOD UNIT TYPE CODE: 6200
BLOOD UNIT TYPE: NORMAL
CODING SYSTEM: NORMAL
DISPENSE STATUS: NORMAL
TRANS ERYTHROCYTES VOL PATIENT: NORMAL ML

## 2019-07-18 ENCOUNTER — TELEPHONE (OUTPATIENT)
Dept: GYNECOLOGIC ONCOLOGY | Facility: CLINIC | Age: 74
End: 2019-07-18

## 2019-07-18 DIAGNOSIS — C54.1 ENDOMETRIAL CA: Primary | ICD-10-CM

## 2019-07-18 NOTE — TELEPHONE ENCOUNTER
----- Message from Kathrine Webb sent at 7/18/2019 11:37 AM CDT -----  Contact: Margarita Cooney (Daughter)            Name of Who is Calling: Margarita Cooney (Daughter)      What is the request in detail: Pt's daughter is requesting a call back to discuss the pt's treatment plan details. Please contact to further discuss and advise.        Can the clinic reply by [facility]: N      What Number to Call Back if not in [facility]: [phone]

## 2019-07-18 NOTE — TELEPHONE ENCOUNTER
Spoke with patient daughter inform that I will mail out information on treatment. Patient daughter states her mother wants treatment in [place] inform her that I will speak with Dr. Carias regarding treatment. HARINDER

## 2019-07-31 ENCOUNTER — OFFICE VISIT (OUTPATIENT)
Dept: GYNECOLOGIC ONCOLOGY | Facility: CLINIC | Age: 74
End: 2019-07-31
Payer: MEDICARE

## 2019-07-31 ENCOUNTER — TELEPHONE (OUTPATIENT)
Dept: GYNECOLOGIC ONCOLOGY | Facility: CLINIC | Age: 74
End: 2019-07-31

## 2019-07-31 VITALS
SYSTOLIC BLOOD PRESSURE: 174 MMHG | BODY MASS INDEX: 23.32 KG/M2 | HEART RATE: 63 BPM | DIASTOLIC BLOOD PRESSURE: 83 MMHG | HEIGHT: 65 IN | WEIGHT: 140 LBS

## 2019-07-31 DIAGNOSIS — C54.1 ENDOMETRIAL CA: Primary | ICD-10-CM

## 2019-07-31 PROCEDURE — 99999 PR PBB SHADOW E&M-EST. PATIENT-LVL III: ICD-10-PCS | Mod: PBBFAC,,, | Performed by: OBSTETRICS & GYNECOLOGY

## 2019-07-31 PROCEDURE — 99999 PR PBB SHADOW E&M-EST. PATIENT-LVL III: CPT | Mod: PBBFAC,,, | Performed by: OBSTETRICS & GYNECOLOGY

## 2019-07-31 PROCEDURE — 99024 POSTOP FOLLOW-UP VISIT: CPT | Mod: S$GLB,,, | Performed by: OBSTETRICS & GYNECOLOGY

## 2019-07-31 PROCEDURE — 99024 PR POST-OP FOLLOW-UP VISIT: ICD-10-PCS | Mod: S$GLB,,, | Performed by: OBSTETRICS & GYNECOLOGY

## 2019-07-31 NOTE — TELEPHONE ENCOUNTER
----- Message from Chey Steen sent at 7/31/2019  9:38 AM CDT -----  Contact: Margarita ( daughter )  Name of Who is Calling: Margarita ( daughter )       What is the request in detail: Margarita ( daughter ) is requesting a call from staff states when the patient woke up this morning she is experiencing bleeding from surgery  .....Please contact to further discuss and advise.     Can the clinic reply by [facility]:  yes     What Number to Call Back if not in [facility]: [phone]

## 2019-07-31 NOTE — PROGRESS NOTES
"Subjective:       Patient ID: Gita García is a 74 y.o. female.    Chief Complaint: Follow-up (Vag bleeding this morning)    HPI   Patient comes in today because of vaginal bleeding.  She has undergone robotic assisted laparoscopic hysterectomy with staging on July 8, 2019.  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 Negative cytology.     She awoke this morning and felt that she had to have a bowel movement and passed blood from the vagina.  Since that time she has had no further bleeding.  Review of Systems   Constitutional: Negative for chills, fatigue and fever.   Gastrointestinal: Negative for abdominal pain.   Genitourinary: Positive for vaginal bleeding.   Neurological: Negative for weakness.       Objective:   BP (!) 174/83   Pulse 63   Ht 5' 5" (1.651 m)   Wt 63.5 kg (139 lb 15.9 oz)   BMI 23.30 kg/m²      Physical Exam   Constitutional: She is oriented to person, place, and time. She appears well-developed and well-nourished.   HENT:   Head: Normocephalic and atraumatic.   Eyes: No scleral icterus.   Abdominal: Soft. She exhibits no distension and no mass. There is no tenderness. There is no rebound and no guarding.   Healing trocar sites    Genitourinary:   Genitourinary Comments: Bimanual exam:  Vulva: no lesions. Normal appearance  Urethra: Normal size and location. No lesions  Bladder: No masses or tenderness.  Vagina: normal mucosa. No lesion. Cuff intact. No blood is in the vagina.  Cervix: absent.   Uterus: absent.  Adnexa: no masses.  Rectovaginal: No posterior cul de sac thickening or nodularity.  Rectal: no masses. Nontender. Normal tone.      Musculoskeletal: She exhibits no edema or tenderness.   Neurological: She is alert and oriented to person, place, and time.   Skin: Skin is warm and dry.   Psychiatric: She has a normal mood and affect. Her behavior is normal. Judgment and thought content normal.       Assessment:       1. Endometrial ca        Plan: "   Endometrial ca    I discussed with the patient and her daughter that she will need 6 cycles of Taxol and carboplatin and vaginal brachytherapy.    Patient would prefer to receive this in [facility].    Records have been faxed [facility].      Keep follow up appt with me.

## 2019-07-31 NOTE — TELEPHONE ENCOUNTER
Spoke with patient daughter Margarita inform her per Imelda MIN she should bring her mother in for evaluation. Margarita voiced understanding. HARINDER

## 2019-08-05 ENCOUNTER — TELEPHONE (OUTPATIENT)
Dept: GYNECOLOGIC ONCOLOGY | Facility: CLINIC | Age: 74
End: 2019-08-05

## 2019-08-05 NOTE — TELEPHONE ENCOUNTER
Spoke with Cheri at [facility] Dr. Cui will be seeing patient this morning at 10 am and would like office notes faxed over. [phone]. Inform her that I will speak with a provider regarding office notes and have them faxed. Cheri voiced understanding. KJ

## 2019-08-05 NOTE — TELEPHONE ENCOUNTER
----- Message from Miranda Dale sent at 8/5/2019  8:41 AM CDT -----  Contact: Leonela ([facility])  Name of Who is Calling: Leonela ([facility])    What is the request in detail: Would like to speak with staff in regards to receiving clinic notes for patient. The patient has an appointment at 10am so they are needed as soon as possible. Please contact to further discuss and advise      Can the clinic reply by [facility]: no    What Number [facility] Back [facility]:  [phone], fax: [phone]

## 2019-08-08 ENCOUNTER — TELEPHONE (OUTPATIENT)
Dept: ADMINISTRATIVE | Facility: OTHER | Age: 74
End: 2019-08-08

## 2019-08-09 ENCOUNTER — OFFICE VISIT (OUTPATIENT)
Dept: GYNECOLOGIC ONCOLOGY | Facility: CLINIC | Age: 74
End: 2019-08-09
Payer: MEDICARE

## 2019-08-09 VITALS
WEIGHT: 140.63 LBS | BODY MASS INDEX: 23.43 KG/M2 | DIASTOLIC BLOOD PRESSURE: 69 MMHG | SYSTOLIC BLOOD PRESSURE: 140 MMHG | HEIGHT: 65 IN | HEART RATE: 60 BPM

## 2019-08-09 DIAGNOSIS — C54.1 ENDOMETRIAL CA: Primary | ICD-10-CM

## 2019-08-09 DIAGNOSIS — R11.2 CHEMOTHERAPY INDUCED NAUSEA AND VOMITING: ICD-10-CM

## 2019-08-09 DIAGNOSIS — Z98.890 S/P ROBOT-ASSISTED SURGICAL PROCEDURE: ICD-10-CM

## 2019-08-09 DIAGNOSIS — T45.1X5A CHEMOTHERAPY INDUCED NAUSEA AND VOMITING: ICD-10-CM

## 2019-08-09 PROCEDURE — 99024 PR POST-OP FOLLOW-UP VISIT: ICD-10-PCS | Mod: S$GLB,,, | Performed by: OBSTETRICS & GYNECOLOGY

## 2019-08-09 PROCEDURE — 99024 POSTOP FOLLOW-UP VISIT: CPT | Mod: S$GLB,,, | Performed by: OBSTETRICS & GYNECOLOGY

## 2019-08-09 PROCEDURE — 99999 PR PBB SHADOW E&M-EST. PATIENT-LVL III: ICD-10-PCS | Mod: PBBFAC,,, | Performed by: OBSTETRICS & GYNECOLOGY

## 2019-08-09 PROCEDURE — 99999 PR PBB SHADOW E&M-EST. PATIENT-LVL III: CPT | Mod: PBBFAC,,, | Performed by: OBSTETRICS & GYNECOLOGY

## 2019-08-09 RX ORDER — ONDANSETRON HYDROCHLORIDE 8 MG/1
8 TABLET, FILM COATED ORAL EVERY 12 HOURS PRN
Qty: 30 TABLET | Refills: 2 | Status: SHIPPED | OUTPATIENT
Start: 2019-08-09 | End: 2020-08-08

## 2019-08-09 NOTE — LETTER
August 9, 2019        Stacey Cui MD  [address]  [facility] [address]             [facility] - GYN Oncology  [address]  [place] [address]  Phone: [phone]   Patient: Gita García   MR Number: [id number]   YOB: 1945   Date of Visit: 8/9/2019       Dear Dr. Cui:    Mrs. Friend has returned today for her postoperative visit.  The vaginal cuff is healing nicely.  She is now 4 weeks out from surgery.    I understand that she has been to see you.  I explained to her that I will leave the timing of the vaginal cuff radiation to your discretion in regards to the systemic chemotherapy that she will be receiving.  She will require 6 cycles of Taxol and carboplatin every 21 days because of a serous carcinoma of the endometrium.    I explained to her that the vaginal cuff radiation can be given between cycle 2 and 3 but that some radiation oncologist would wait until completion of chemotherapy.  I told her that I would leave the timing to your discretion.     I explained to her that I would like to see her back after she has completed all of her therapy.    If I can provide you with any additional information, please let me know.    Sincerely,      MD WILLIAM Jacome MD    [facility]

## 2019-08-09 NOTE — PROGRESS NOTES
Subjective:       Patient ID: Gita García is a 74 y.o. female.    Chief Complaint: Endometrial Cancer; Post-op Evaluation (RALH/BSO); Follow-up (4 wk ); and Vaginal Discharge (lite/clear )    HPI     Patient comes in today for her post op visit after robotic assisted laparoscopic hysterectomy with staging on July 8, 2019.  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 Negative cytology.     Saw Dr. Cui 8/5/2019 to discuss VBT.   Has appt with Dr. Simental on Aug 13.     Her oncologic history is:  She has undergone robotic assisted laparoscopic hysterectomy with staging on July 8, 2019.  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 Negative cytology.      Past Medical History:   Diagnosis Date    Acid reflux     Arthritis     Chemotherapy induced nausea and vomiting 8/9/2019    Endometrial ca 6/26/2019    Essential hypertension 6/27/2019    Estrogen deficiency     Hormone deficiency     Hypertension     Liver mass 7/1/2019    Osteopenia     Seizures     post partal. several days after delivery     Past Surgical History:   Procedure Laterality Date    DILATION AND CURETTAGE OF UTERUS      GI scope  2016    LAPAROTOMY N/A 7/8/2019    [facility] 2ND FLR    OMENTECTOMY N/A 7/8/2019    [facility] OR 2ND FLR    XI ROBOTIC HYSTERECTOMY N/A 7/8/2019    [facility] OR 2ND FLR    XI ROBOTIC LYMPHADENECTOMY, PELVIC Bilateral 7/8/2019    [facility] OR 2ND FLR    XI ROBOTIC LYMPHADENECTOMY, RETROPERITONEUM N/A 7/8/2019    [facility] OR 2ND FLR    XI ROBOTIC SALPINGO-OOPHORECTOMY Bilateral 7/8/2019    [facility]formed by [facility] at [facility] OR 2ND FLR     Family History   Problem Relation Age of Onset    Colon cancer Sister 53    Prostate cancer Brother     Breast cancer Sister 64    Kidney cancer Sister      Breast cancer Sister         in her 60s    Skin cancer Sister     Stroke Brother     Prostate cancer Brother     Heart disease Brother     Prostate cancer Brother     Heart disease Brother     Ovarian cancer Neg Hx     Uterine cancer Neg Hx      Social History     Tobacco Use    Smoking status: Never Smoker    Smokeless tobacco: Never Used   Substance Use Topics    Alcohol use: Never     Frequency: Never    Drug use: Never     Review of patient's allergies indicates:   Allergen Reactions    Asa [aspirin] Other (See Comments)     Stomach upset    Dilaudid (pf) [hydromorphone (pf)] Nausea Only    Sulfa (sulfonamide antibiotics) Itching       Current Outpatient Medications:     acetaminophen (TYLENOL) 325 MG tablet, Take 2 tablets (650 mg total) by mouth every 6 (six) hours as needed., Disp: 30 tablet, Rfl: 2    ALPRAZolam (XANAX) 0.25 MG tablet, Take 0.25 mg by mouth 2 (two) times daily as needed for Anxiety. , Disp: , Rfl:     bisoprolol-hydrochlorothiazide (ZIAC) 10-6.25 mg per tablet, Take 1 tablet by mouth once daily. , Disp: , Rfl:     calcium carbonate/vitamin D3 (CALCIUM 600 + D,3, ORAL), Take by mouth once daily. , Disp: , Rfl:     multivitamin/iron/folic acid (CENTRUM ORAL), Take by mouth once daily. , Disp: , Rfl:     mv-min/FA/vit K/lycop/lut/zeax (OCUVITE EYE PLUS MULTI ORAL), Take 1 tablet by mouth once daily. , Disp: , Rfl:     omeprazole (PRILOSEC) 40 MG capsule, Take 40 mg by mouth once daily., Disp: , Rfl:     oxyCODONE (ROXICODONE) 5 MG immediate release tablet, Take 1 tablet (5 mg total) by mouth every 6 (six) hours as needed., Disp: 15 tablet, Rfl: 0    polyethylene glycol (MIRALAX) 17 gram PwPk, Take by mouth every morning., Disp: , Rfl:     raloxifene (EVISTA) 60 mg tablet, Take 60 mg by mouth once daily. , Disp: , Rfl:     ondansetron (ZOFRAN) 8 MG tablet, Take 1 tablet (8 mg total) by mouth every 12 (twelve) hours as needed for Nausea., Disp: 30 tablet, Rfl:  "2    Review of Systems   Constitutional: Negative for chills, fatigue and fever.   Gastrointestinal: Negative for abdominal pain.   Genitourinary: Negative for vaginal bleeding.   Neurological: Negative for weakness.       Objective:   BP (!) 140/69   Pulse 60   Ht 5' 5" (1.651 m)   Wt 63.8 kg (140 lb 10.5 oz)   BMI 23.41 kg/m²      Physical Exam   Constitutional: She is oriented to person, place, and time. She appears well-developed and well-nourished.   HENT:   Head: Normocephalic and atraumatic.   Eyes: No scleral icterus.   Abdominal: Soft. She exhibits no distension and no mass. There is no tenderness. There is no rebound and no guarding.   Healing trocar sites    Genitourinary:   Genitourinary Comments: Bimanual exam:  Vulva: no lesions. Normal appearance  Urethra: Normal size and location. No lesions  Bladder: No masses or tenderness.  Vagina: normal mucosa. No lesion. Cuff intact.   Cervix: absent.   Uterus: absent.  Adnexa: no masses.  Rectovaginal: No posterior cul de sac thickening or nodularity.  Rectal: no masses. Nontender. Normal tone.      Musculoskeletal: She exhibits no edema or tenderness.   Neurological: She is alert and oriented to person, place, and time.   Skin: Skin is warm and dry.   Psychiatric: She has a normal mood and affect. Her behavior is normal. Judgment and thought content normal.       Assessment:       1. Endometrial ca    2. Chemotherapy induced nausea and vomiting    3. s/p RA-TLH/BSO/BPLD, omentectomy        Plan:   Endometrial ca  I discussed with the patient and her son the need for postoperative chemotherapy.  This should consist of 6 cycles of IV Taxol and carboplatin every 21 days.    I had also previously discussed the need for chemotherapy and vaginal brachytherapy given the serous histology.    She has seen Dr. Cui in regards to radiation therapy.  She will be seeing Dr. Simental for chemotherapy.    Patient is to return to see me after she has completed her " chemotherapy and radiation.    This note has been faxed to Dr. Cui and Dr. Simental.       Chemotherapy induced nausea and vomiting  -     ondansetron (ZOFRAN) 8 MG tablet; Take 1 tablet (8 mg total) by mouth every 12 (twelve) hours as needed for Nausea.  Dispense: 30 tablet; Refill: 2    s/p RA-TLH/BSO/BPLD, omentectomy

## 2020-01-06 ENCOUNTER — DOCUMENTATION ONLY (OUTPATIENT)
Dept: GYNECOLOGIC ONCOLOGY | Facility: CLINIC | Age: 75
End: 2020-01-06

## 2020-01-06 ENCOUNTER — TELEPHONE (OUTPATIENT)
Dept: ADMINISTRATIVE | Facility: OTHER | Age: 75
End: 2020-01-06

## 2020-01-06 NOTE — PROGRESS NOTES
Records received from Dr. Ruddy Simental.  Patient is completing 6 cycles of Taxol and carboplatin in December 2019.

## 2020-02-20 ENCOUNTER — TELEPHONE (OUTPATIENT)
Dept: GYNECOLOGIC ONCOLOGY | Facility: CLINIC | Age: 75
End: 2020-02-20

## 2020-02-20 NOTE — TELEPHONE ENCOUNTER
Spoke with our patient about her schedule appointment in gyn oncology she agreed she voiced understanding of the date, time and location. All questions answered appointment mail. MA/MARY BETH /Preceptor Sudhir LUGO

## 2020-02-20 NOTE — PROGRESS NOTES
"Subjective:       Patient ID: Gita García is a 74 y.o. female.    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    Her oncologic history is:  She has undergone robotic assisted laparoscopic hysterectomy with staging on July 8, 2019.  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 Negative cytology.   Aug 2019: VBT with Dr. Cui  Dec 2019: Records received from Dr. Ruddy Simental. copleted  6 cycles of Taxol and carboplatin.  Review of Systems   Constitutional: Negative for chills, fatigue and fever.   Respiratory: Negative for cough, shortness of breath and wheezing.    Cardiovascular: Negative for chest pain, palpitations and leg swelling.   Gastrointestinal: Negative for abdominal pain, constipation, diarrhea, nausea and vomiting.   Genitourinary: Negative for difficulty urinating, dysuria, frequency, genital sores, hematuria, urgency, vaginal bleeding, vaginal discharge and vaginal pain.   Neurological: Positive for numbness. Negative for weakness. Seizures: feet. grade 1.   Hematological: Negative for adenopathy. Does not bruise/bleed easily.   Psychiatric/Behavioral: The patient is not nervous/anxious.        Objective:   BP (!) 170/76 (BP Location: Left arm, Patient Position: Sitting, BP Method: Medium (Automatic))   Pulse (!) 58   Ht 5' 5" (1.651 m)   Wt 69.5 kg (153 lb 3.5 oz)   BMI 25.50 kg/m²      Physical Exam   Constitutional: She is oriented to person, place, and time. She appears well-developed and well-nourished.   HENT:   Head: Normocephalic and atraumatic.   Eyes: No scleral icterus.   Neck: Neck supple. No tracheal deviation present. No thyroid mass and no thyromegaly present.   Cardiovascular: Normal rate and regular rhythm.   Pulmonary/Chest: Effort normal and breath sounds normal. She has no wheezes.   Abdominal: Soft. She exhibits no distension and no mass. There is no " hepatosplenomegaly. There is no tenderness. There is no rebound and no guarding.   Genitourinary:   Genitourinary Comments: Bimanual exam:  Vulva: no lesions. Normal appearance  Urethra: Normal size and location. No lesions  Bladder: No masses or tenderness.  Vagina: normal mucosa. No lesion  Cervix: absent.   Uterus: absent.  Adnexa: no masses.  Rectovaginal: thickened area posterior vagina. Smooths out with palpation of RV septum. No vaginal mucosal lesion seen. Grade 1 rectocele. Nothing palpable on rectal mucosa.      Musculoskeletal: She exhibits no edema or tenderness.   Lymphadenopathy:     She has no cervical adenopathy.     She has no axillary adenopathy.        Right: No inguinal and no supraclavicular adenopathy present.        Left: No inguinal and no supraclavicular adenopathy present.   Neurological: She is alert and oriented to person, place, and time.   Skin: Skin is warm and dry.   Psychiatric: She has a normal mood and affect. Her behavior is normal. Judgment and thought content normal.       Assessment:       1. 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 If RV septum exam is normal then q 3 month follow up.    from today is normal.   I called with results of .     -     ; Future; Expected date: 02/21/2020  -     ; Future; Expected date: 05/21/2020    Neuropathy due to chemotherapeutic drug

## 2020-02-21 ENCOUNTER — TELEPHONE (OUTPATIENT)
Dept: GYNECOLOGIC ONCOLOGY | Facility: CLINIC | Age: 75
End: 2020-02-21

## 2020-02-21 ENCOUNTER — OFFICE VISIT (OUTPATIENT)
Dept: GYNECOLOGIC ONCOLOGY | Facility: CLINIC | Age: 75
End: 2020-02-21
Payer: MEDICARE

## 2020-02-21 ENCOUNTER — LAB VISIT (OUTPATIENT)
Dept: LAB | Facility: HOSPITAL | Age: 75
End: 2020-02-21
Payer: MEDICARE

## 2020-02-21 VITALS
WEIGHT: 153.25 LBS | BODY MASS INDEX: 25.53 KG/M2 | DIASTOLIC BLOOD PRESSURE: 76 MMHG | HEART RATE: 58 BPM | SYSTOLIC BLOOD PRESSURE: 170 MMHG | HEIGHT: 65 IN

## 2020-02-21 DIAGNOSIS — C54.1 ENDOMETRIAL CA: Primary | ICD-10-CM

## 2020-02-21 DIAGNOSIS — T45.1X5A NEUROPATHY DUE TO CHEMOTHERAPEUTIC DRUG: ICD-10-CM

## 2020-02-21 DIAGNOSIS — C54.1 ENDOMETRIAL CA: ICD-10-CM

## 2020-02-21 DIAGNOSIS — G62.0 NEUROPATHY DUE TO CHEMOTHERAPEUTIC DRUG: ICD-10-CM

## 2020-02-21 LAB — CANCER AG125 SERPL-ACNC: 17 U/ML (ref 0–30)

## 2020-02-21 PROCEDURE — 1126F PR PAIN SEVERITY QUANTIFIED, NO PAIN PRESENT: ICD-10-PCS | Mod: S$GLB,,, | Performed by: OBSTETRICS & GYNECOLOGY

## 2020-02-21 PROCEDURE — 1159F PR MEDICATION LIST DOCUMENTED IN MEDICAL RECORD: ICD-10-PCS | Mod: S$GLB,,, | Performed by: OBSTETRICS & GYNECOLOGY

## 2020-02-21 PROCEDURE — 86304 IMMUNOASSAY TUMOR CA 125: CPT

## 2020-02-21 PROCEDURE — 99999 PR PBB SHADOW E&M-EST. PATIENT-LVL III: ICD-10-PCS | Mod: PBBFAC,,, | Performed by: OBSTETRICS & GYNECOLOGY

## 2020-02-21 PROCEDURE — 3077F SYST BP >= 140 MM HG: CPT | Mod: CPTII,S$GLB,, | Performed by: OBSTETRICS & GYNECOLOGY

## 2020-02-21 PROCEDURE — 99214 PR OFFICE/OUTPT VISIT, EST, LEVL IV, 30-39 MIN: ICD-10-PCS | Mod: S$GLB,,, | Performed by: OBSTETRICS & GYNECOLOGY

## 2020-02-21 PROCEDURE — 3078F PR MOST RECENT DIASTOLIC BLOOD PRESSURE < 80 MM HG: ICD-10-PCS | Mod: CPTII,S$GLB,, | Performed by: OBSTETRICS & GYNECOLOGY

## 2020-02-21 PROCEDURE — 1126F AMNT PAIN NOTED NONE PRSNT: CPT | Mod: S$GLB,,, | Performed by: OBSTETRICS & GYNECOLOGY

## 2020-02-21 PROCEDURE — 99999 PR PBB SHADOW E&M-EST. PATIENT-LVL III: CPT | Mod: PBBFAC,,, | Performed by: OBSTETRICS & GYNECOLOGY

## 2020-02-21 PROCEDURE — 1159F MED LIST DOCD IN RCRD: CPT | Mod: S$GLB,,, | Performed by: OBSTETRICS & GYNECOLOGY

## 2020-02-21 PROCEDURE — 1101F PT FALLS ASSESS-DOCD LE1/YR: CPT | Mod: CPTII,S$GLB,, | Performed by: OBSTETRICS & GYNECOLOGY

## 2020-02-21 PROCEDURE — 1101F PR PT FALLS ASSESS DOC 0-1 FALLS W/OUT INJ PAST YR: ICD-10-PCS | Mod: CPTII,S$GLB,, | Performed by: OBSTETRICS & GYNECOLOGY

## 2020-02-21 PROCEDURE — 3077F PR MOST RECENT SYSTOLIC BLOOD PRESSURE >= 140 MM HG: ICD-10-PCS | Mod: CPTII,S$GLB,, | Performed by: OBSTETRICS & GYNECOLOGY

## 2020-02-21 PROCEDURE — 36415 COLL VENOUS BLD VENIPUNCTURE: CPT

## 2020-02-21 PROCEDURE — 3078F DIAST BP <80 MM HG: CPT | Mod: CPTII,S$GLB,, | Performed by: OBSTETRICS & GYNECOLOGY

## 2020-02-21 PROCEDURE — 99214 OFFICE O/P EST MOD 30 MIN: CPT | Mod: S$GLB,,, | Performed by: OBSTETRICS & GYNECOLOGY

## 2020-02-21 RX ORDER — GABAPENTIN 100 MG/1
CAPSULE ORAL
COMMUNITY
Start: 2020-01-21 | End: 2021-03-31

## 2020-02-21 RX ORDER — FLUCONAZOLE 150 MG/1
TABLET ORAL
COMMUNITY
Start: 2020-02-04 | End: 2020-02-21

## 2020-02-21 RX ORDER — ASCORBIC ACID 1000 MG
175 TABLET ORAL DAILY
COMMUNITY
End: 2023-05-05

## 2020-02-21 RX ORDER — PROPRANOLOL HYDROCHLORIDE AND HYDROCHLOROTHIAZIDE 40; 25 MG/1; MG/1
TABLET ORAL
COMMUNITY
End: 2021-03-31

## 2020-02-21 RX ORDER — UBIDECARENONE 30 MG
30 CAPSULE ORAL 3 TIMES DAILY
COMMUNITY
End: 2023-05-05

## 2020-02-21 RX ORDER — PREGABALIN 50 MG/1
CAPSULE ORAL
COMMUNITY
Start: 2020-02-18 | End: 2021-03-31

## 2020-04-17 ENCOUNTER — TELEPHONE (OUTPATIENT)
Dept: GYNECOLOGIC ONCOLOGY | Facility: CLINIC | Age: 75
End: 2020-04-17

## 2020-04-17 NOTE — TELEPHONE ENCOUNTER
Spoke with pt. Informed her that Dr Carias was ordering a ca125. Pt states she will talk with her insurance and denies any other needs.   ----- Message from Sailaja Ferreira sent at 4/17/2020  1:14 PM CDT -----  Contact: Pt  Pt called and wants someone from your office to call her back to know what kind of blood work she is having to tell the insurance     Pt said the insurance is asking her for a code for the blood work     Pt can be reached at cell: [phone] or  House phone: [phone]    Pt said either phone works for her

## 2020-05-13 ENCOUNTER — LAB VISIT (OUTPATIENT)
Dept: LAB | Facility: HOSPITAL | Age: 75
End: 2020-05-13
Attending: GENERAL PRACTICE
Payer: MEDICARE

## 2020-05-13 DIAGNOSIS — C54.1 ENDOMETRIAL CA: ICD-10-CM

## 2020-05-13 PROCEDURE — 36415 COLL VENOUS BLD VENIPUNCTURE: CPT | Mod: PO

## 2020-05-13 PROCEDURE — 86304 IMMUNOASSAY TUMOR CA 125: CPT | Mod: PO

## 2020-05-13 NOTE — PROGRESS NOTES
Subjective:       Patient ID: Gita García is a 75 y.o. female.    Chief Complaint: Endometrial Cancer (follow up)    HPI   Patient comes in today for follow-up for papillary serous carcinoma of the endometrium.  Last seen in February 2020.  Slight thickening in the rectovaginal septum that smoothed out with palpation.    Complains of fatigue at times. Took her BP this morning and it is low. See VS.     No other complaints.     May 2020: : 16.     Her oncologic history is:  She has undergone robotic assisted laparoscopic hysterectomy with staging on July 8, 2019.  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 Negative cytology.   Aug 2019: VBT with Dr. Cui  Dec 2019: Records received from Dr. Ruddy Simental. copleted  6 cycles of Taxol and carboplatin.  Review of Systems   Constitutional: Positive for fatigue. Negative for chills and fever.   Respiratory: Negative for cough, shortness of breath and wheezing.    Cardiovascular: Negative for chest pain, palpitations and leg swelling.   Gastrointestinal: Negative for abdominal pain, constipation, diarrhea, nausea and vomiting.   Genitourinary: Negative for difficulty urinating, dysuria, frequency, genital sores, hematuria, urgency, vaginal bleeding, vaginal discharge and vaginal pain.   Neurological: Positive for numbness (toes of both feet. ). Negative for weakness.   Hematological: Negative for adenopathy. Does not bruise/bleed easily.   Psychiatric/Behavioral: The patient is not nervous/anxious.        Objective:   /68   Pulse 70   Wt 69.4 kg (153 lb)   BMI 25.46 kg/m²      Physical Exam   Constitutional: She is oriented to person, place, and time. She appears well-developed and well-nourished.   HENT:   Head: Normocephalic and atraumatic.   Eyes: EOM are normal.   Neck: Normal range of motion.   Pulmonary/Chest: Effort normal.   Neurological: She is alert and oriented to person, place, and time.   Skin: Skin is dry.    Psychiatric: She has a normal mood and affect. Her behavior is normal. Judgment and thought content normal.       Assessment:       1. Endometrial ca        Plan:   Endometrial ca    According to the  it would appear that the patient is IKE.  I have asked her to return to see me in 1 month for a pelvic exam.    I also reviewed her blood pressure medicines with her.  Her blood pressure is low at home and she is taking 2 blood pressure medicines both of which have a beta blocker and diuretic in them.  I have asked her to contact her primary care physician so that her blood pressure medicines can be reviewed and adjusted.  I told her that this may be part of why she is fatigued.

## 2020-05-14 ENCOUNTER — OFFICE VISIT (OUTPATIENT)
Dept: GYNECOLOGIC ONCOLOGY | Facility: CLINIC | Age: 75
End: 2020-05-14
Payer: MEDICARE

## 2020-05-14 ENCOUNTER — TELEPHONE (OUTPATIENT)
Dept: GYNECOLOGIC ONCOLOGY | Facility: CLINIC | Age: 75
End: 2020-05-14

## 2020-05-14 ENCOUNTER — TELEPHONE (OUTPATIENT)
Dept: ADMINISTRATIVE | Facility: OTHER | Age: 75
End: 2020-05-14

## 2020-05-14 VITALS
BODY MASS INDEX: 25.46 KG/M2 | HEART RATE: 70 BPM | SYSTOLIC BLOOD PRESSURE: 102 MMHG | DIASTOLIC BLOOD PRESSURE: 68 MMHG | WEIGHT: 153 LBS

## 2020-05-14 DIAGNOSIS — C54.1 ENDOMETRIAL CA: Primary | ICD-10-CM

## 2020-05-14 PROBLEM — D18.03 LIVER HEMANGIOMA: Status: ACTIVE | Noted: 2019-07-01

## 2020-05-14 LAB — CANCER AG125 SERPL-ACNC: 16 U/ML (ref 0–30)

## 2020-05-14 PROCEDURE — 3078F PR MOST RECENT DIASTOLIC BLOOD PRESSURE < 80 MM HG: ICD-10-PCS | Mod: CPTII,95,, | Performed by: OBSTETRICS & GYNECOLOGY

## 2020-05-14 PROCEDURE — 1101F PT FALLS ASSESS-DOCD LE1/YR: CPT | Mod: CPTII,95,, | Performed by: OBSTETRICS & GYNECOLOGY

## 2020-05-14 PROCEDURE — 3074F PR MOST RECENT SYSTOLIC BLOOD PRESSURE < 130 MM HG: ICD-10-PCS | Mod: CPTII,95,, | Performed by: OBSTETRICS & GYNECOLOGY

## 2020-05-14 PROCEDURE — 1101F PR PT FALLS ASSESS DOC 0-1 FALLS W/OUT INJ PAST YR: ICD-10-PCS | Mod: CPTII,95,, | Performed by: OBSTETRICS & GYNECOLOGY

## 2020-05-14 PROCEDURE — 3078F DIAST BP <80 MM HG: CPT | Mod: CPTII,95,, | Performed by: OBSTETRICS & GYNECOLOGY

## 2020-05-14 PROCEDURE — 99214 PR OFFICE/OUTPT VISIT, EST, LEVL IV, 30-39 MIN: ICD-10-PCS | Mod: 95,,, | Performed by: OBSTETRICS & GYNECOLOGY

## 2020-05-14 PROCEDURE — 1159F MED LIST DOCD IN RCRD: CPT | Mod: 95,,, | Performed by: OBSTETRICS & GYNECOLOGY

## 2020-05-14 PROCEDURE — 3074F SYST BP LT 130 MM HG: CPT | Mod: CPTII,95,, | Performed by: OBSTETRICS & GYNECOLOGY

## 2020-05-14 PROCEDURE — 1159F PR MEDICATION LIST DOCUMENTED IN MEDICAL RECORD: ICD-10-PCS | Mod: 95,,, | Performed by: OBSTETRICS & GYNECOLOGY

## 2020-05-14 PROCEDURE — 99214 OFFICE O/P EST MOD 30 MIN: CPT | Mod: 95,,, | Performed by: OBSTETRICS & GYNECOLOGY

## 2020-06-17 ENCOUNTER — TELEPHONE (OUTPATIENT)
Dept: ADMINISTRATIVE | Facility: OTHER | Age: 75
End: 2020-06-17

## 2020-06-26 ENCOUNTER — OFFICE VISIT (OUTPATIENT)
Dept: GYNECOLOGIC ONCOLOGY | Facility: CLINIC | Age: 75
End: 2020-06-26
Payer: MEDICARE

## 2020-06-26 VITALS
HEART RATE: 63 BPM | DIASTOLIC BLOOD PRESSURE: 74 MMHG | SYSTOLIC BLOOD PRESSURE: 182 MMHG | WEIGHT: 152 LBS | BODY MASS INDEX: 25.29 KG/M2

## 2020-06-26 DIAGNOSIS — G62.0 NEUROPATHY DUE TO CHEMOTHERAPEUTIC DRUG: ICD-10-CM

## 2020-06-26 DIAGNOSIS — C54.1 ENDOMETRIAL CA: Primary | ICD-10-CM

## 2020-06-26 DIAGNOSIS — T45.1X5A NEUROPATHY DUE TO CHEMOTHERAPEUTIC DRUG: ICD-10-CM

## 2020-06-26 PROCEDURE — 1101F PR PT FALLS ASSESS DOC 0-1 FALLS W/OUT INJ PAST YR: ICD-10-PCS | Mod: CPTII,S$GLB,, | Performed by: OBSTETRICS & GYNECOLOGY

## 2020-06-26 PROCEDURE — 1126F PR PAIN SEVERITY QUANTIFIED, NO PAIN PRESENT: ICD-10-PCS | Mod: S$GLB,,, | Performed by: OBSTETRICS & GYNECOLOGY

## 2020-06-26 PROCEDURE — 99999 PR PBB SHADOW E&M-EST. PATIENT-LVL III: CPT | Mod: PBBFAC,,, | Performed by: OBSTETRICS & GYNECOLOGY

## 2020-06-26 PROCEDURE — 1159F PR MEDICATION LIST DOCUMENTED IN MEDICAL RECORD: ICD-10-PCS | Mod: S$GLB,,, | Performed by: OBSTETRICS & GYNECOLOGY

## 2020-06-26 PROCEDURE — 3078F PR MOST RECENT DIASTOLIC BLOOD PRESSURE < 80 MM HG: ICD-10-PCS | Mod: CPTII,S$GLB,, | Performed by: OBSTETRICS & GYNECOLOGY

## 2020-06-26 PROCEDURE — 1126F AMNT PAIN NOTED NONE PRSNT: CPT | Mod: S$GLB,,, | Performed by: OBSTETRICS & GYNECOLOGY

## 2020-06-26 PROCEDURE — 1101F PT FALLS ASSESS-DOCD LE1/YR: CPT | Mod: CPTII,S$GLB,, | Performed by: OBSTETRICS & GYNECOLOGY

## 2020-06-26 PROCEDURE — 3077F PR MOST RECENT SYSTOLIC BLOOD PRESSURE >= 140 MM HG: ICD-10-PCS | Mod: CPTII,S$GLB,, | Performed by: OBSTETRICS & GYNECOLOGY

## 2020-06-26 PROCEDURE — 3078F DIAST BP <80 MM HG: CPT | Mod: CPTII,S$GLB,, | Performed by: OBSTETRICS & GYNECOLOGY

## 2020-06-26 PROCEDURE — 99999 PR PBB SHADOW E&M-EST. PATIENT-LVL III: ICD-10-PCS | Mod: PBBFAC,,, | Performed by: OBSTETRICS & GYNECOLOGY

## 2020-06-26 PROCEDURE — 99214 PR OFFICE/OUTPT VISIT, EST, LEVL IV, 30-39 MIN: ICD-10-PCS | Mod: S$GLB,,, | Performed by: OBSTETRICS & GYNECOLOGY

## 2020-06-26 PROCEDURE — 1159F MED LIST DOCD IN RCRD: CPT | Mod: S$GLB,,, | Performed by: OBSTETRICS & GYNECOLOGY

## 2020-06-26 PROCEDURE — 3077F SYST BP >= 140 MM HG: CPT | Mod: CPTII,S$GLB,, | Performed by: OBSTETRICS & GYNECOLOGY

## 2020-06-26 PROCEDURE — 99214 OFFICE O/P EST MOD 30 MIN: CPT | Mod: S$GLB,,, | Performed by: OBSTETRICS & GYNECOLOGY

## 2020-06-26 NOTE — PROGRESS NOTES
Subjective:       Patient ID: Gita García is a 75 y.o. female.    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  Last seen in February 2020.  Slight thickening in the rectovaginal septum that smoothed out with palpation.  VV with me in May 2020.       Doing well, no complaints. Reports her blood pressures have been normal. Denies vaginal bleeding.      May 2020: : 16.      Her oncologic history is:  She has undergone robotic assisted laparoscopic hysterectomy with staging on July 8, 2019.  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 Negative cytology. 2 mm invasion.   Aug 2019: VBT with Dr. Cui  Dec 2019: Records received from Dr. Ruddy Simental. copleted  6 cycles of Taxol and carboplatin.  Review of Systems   Constitutional: Positive for fatigue. Negative for chills and fever.   Respiratory: Negative for cough, shortness of breath and wheezing.    Cardiovascular: Negative for chest pain, palpitations and leg swelling.   Gastrointestinal: Negative for abdominal pain, constipation, diarrhea, nausea and vomiting.   Genitourinary: Negative for difficulty urinating, dysuria, frequency, genital sores, hematuria, urgency, vaginal bleeding, vaginal discharge and vaginal pain.   Neurological: Positive for numbness (feet). Negative for weakness.   Hematological: Negative for adenopathy. Does not bruise/bleed easily.   Psychiatric/Behavioral: The patient is not nervous/anxious.        Objective:   BP (!) 182/74   Pulse 63   Wt 68.9 kg (152 lb)   BMI 25.29 kg/m²      Physical Exam  Vitals signs and nursing note reviewed.   Constitutional:       General: She is not in acute distress.     Appearance: She is well-developed. She is not diaphoretic.   HENT:      Head: Normocephalic and atraumatic.   Eyes:      Conjunctiva/sclera: Conjunctivae normal.      Pupils: Pupils are equal, round, and  reactive to light.   Neck:      Musculoskeletal: Normal range of motion and neck supple.   Cardiovascular:      Rate and Rhythm: Normal rate and regular rhythm.      Heart sounds: Normal heart sounds. No murmur.   Pulmonary:      Effort: Pulmonary effort is normal. No respiratory distress.      Breath sounds: Normal breath sounds.   Abdominal:      General: Bowel sounds are normal. There is no distension.      Palpations: Abdomen is soft. There is no mass.      Tenderness: There is no abdominal tenderness. There is no guarding or rebound.   Genitourinary:     Comments: Bimanual exam:  Vulva: no lesions. Normal appearance  Urethra: Normal size and location. No lesions  Bladder: No masses or tenderness.  Vagina: normal mucosa. No lesion  Cervix: absent.   Uterus: absent.  Adnexa: no masses.  Rectovaginal: No posterior cul de sac thickening or nodularity.  Rectal: no masses. Nontender. Normal tone.     Musculoskeletal: Normal range of motion.         General: No deformity.   Lymphadenopathy:      Lower Body: No right inguinal adenopathy. No left inguinal adenopathy.   Skin:     General: Skin is warm and dry.      Findings: No erythema or rash.   Neurological:      Mental Status: She is alert and oriented to person, place, and time.   Psychiatric:         Thought Content: Thought content normal.         Assessment:       1. Endometrial ca    2. Neuropathy due to chemotherapeutic drug        Plan:   Endometrial ca  IKE on today's exam.  RTC in 3 months with  for surveillance. She may get  with Dr. Simental. If she does, then she will bring the result with her.    -     ; Future; Expected date: 09/26/2020    Neuropathy due to chemotherapeutic drug  Stable

## 2020-09-29 NOTE — PROGRESS NOTES
Subjective:       Patient ID: Gita García is a 75 y.o. female.    Chief Complaint: Endometrial Cancer (3mth f/u)    HPI     Patient comes in today for follow-up for papillary serous carcinoma of the endometrium.       Doing well, no complaints. Reports her blood pressures have been normal. Denies vaginal bleeding.        Aug 2020: : 8.1.     MMG: Aug 2019: normal     Her oncologic history is:  Jul 2019: She has undergone robotic assisted laparoscopic hysterectomy with staging on July 8, 2019.  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 Negative cytology. 2 mm invasion.   Aug 2019: VBT with Dr. Cui  Dec 2019: Records received from Dr. Ruddy Simental. copleted  6 cycles of Taxol and carboplatin.  Review of Systems   Constitutional: Negative for chills, fatigue and fever.   Respiratory: Negative for cough, shortness of breath and wheezing.    Cardiovascular: Negative for chest pain, palpitations and leg swelling.   Gastrointestinal: Negative for abdominal pain, constipation, diarrhea, nausea and vomiting.   Genitourinary: Negative for difficulty urinating, dysuria, frequency, genital sores, hematuria, urgency, vaginal bleeding, vaginal discharge and vaginal pain.        1 episode of vaginal spotting.    Neurological: Positive for numbness (bilateral feet. Grade 1. ). Negative for weakness.   Hematological: Negative for adenopathy. Does not bruise/bleed easily.   Psychiatric/Behavioral: The patient is not nervous/anxious.        Objective:   BP (!) 168/73   Pulse 70   Wt 69.9 kg (154 lb)   BMI 25.63 kg/m²      Physical Exam  Constitutional:       Appearance: She is well-developed.   HENT:      Head: Normocephalic and atraumatic.   Eyes:      General: No scleral icterus.  Neck:      Musculoskeletal: Neck supple.      Thyroid: No thyroid mass or thyromegaly.      Trachea: No tracheal deviation.   Cardiovascular:      Rate and Rhythm: Normal rate and regular rhythm.   Pulmonary:       Effort: Pulmonary effort is normal.      Breath sounds: Normal breath sounds. No wheezing.   Abdominal:      General: There is no distension.      Palpations: Abdomen is soft. There is no mass.      Tenderness: There is no abdominal tenderness. There is no guarding or rebound.   Genitourinary:     Comments: Bimanual exam:  Vulva: no lesions. Normal appearance  Urethra: Normal size and location.appears to be a polyp at the meatus.   Bladder: No masses or tenderness.  Vagina: normal mucosa. No lesion  Cervix: absent.   Uterus: absent.  Adnexa: no masses.  Rectovaginal: No posterior cul de sac thickening or nodularity.  Rectal: no masses. Nontender. Normal tone.     Musculoskeletal:         General: No tenderness.   Lymphadenopathy:      Cervical: No cervical adenopathy.      Upper Body:      Right upper body: No supraclavicular adenopathy.      Left upper body: No supraclavicular adenopathy.   Skin:     General: Skin is warm and dry.   Neurological:      Mental Status: She is alert and oriented to person, place, and time.   Psychiatric:         Behavior: Behavior normal.         Thought Content: Thought content normal.         Judgment: Judgment normal.         Assessment:       1. Endometrial ca        Plan:   Endometrial ca      She will see Dr. Abdul for CBE and MMG  She will see a urologist in [place] for urethral polyp.   RTC in 3 months  She will bring  with her which will be done in Nov 2020.

## 2020-09-30 ENCOUNTER — OFFICE VISIT (OUTPATIENT)
Dept: GYNECOLOGIC ONCOLOGY | Facility: CLINIC | Age: 75
End: 2020-09-30
Payer: MEDICARE

## 2020-09-30 ENCOUNTER — TELEPHONE (OUTPATIENT)
Dept: GYNECOLOGIC ONCOLOGY | Facility: CLINIC | Age: 75
End: 2020-09-30

## 2020-09-30 VITALS
BODY MASS INDEX: 25.63 KG/M2 | DIASTOLIC BLOOD PRESSURE: 73 MMHG | SYSTOLIC BLOOD PRESSURE: 168 MMHG | HEART RATE: 70 BPM | WEIGHT: 154 LBS

## 2020-09-30 DIAGNOSIS — C54.1 ENDOMETRIAL CA: Primary | ICD-10-CM

## 2020-09-30 DIAGNOSIS — N36.2 URETHRAL POLYP: Primary | ICD-10-CM

## 2020-09-30 PROBLEM — R11.2 CHEMOTHERAPY INDUCED NAUSEA AND VOMITING: Status: RESOLVED | Noted: 2019-08-09 | Resolved: 2020-09-30

## 2020-09-30 PROBLEM — T45.1X5A CHEMOTHERAPY INDUCED NAUSEA AND VOMITING: Status: RESOLVED | Noted: 2019-08-09 | Resolved: 2020-09-30

## 2020-09-30 PROCEDURE — 99214 OFFICE O/P EST MOD 30 MIN: CPT | Mod: S$GLB,,, | Performed by: OBSTETRICS & GYNECOLOGY

## 2020-09-30 PROCEDURE — 1126F AMNT PAIN NOTED NONE PRSNT: CPT | Mod: S$GLB,,, | Performed by: OBSTETRICS & GYNECOLOGY

## 2020-09-30 PROCEDURE — 1159F PR MEDICATION LIST DOCUMENTED IN MEDICAL RECORD: ICD-10-PCS | Mod: S$GLB,,, | Performed by: OBSTETRICS & GYNECOLOGY

## 2020-09-30 PROCEDURE — 1101F PR PT FALLS ASSESS DOC 0-1 FALLS W/OUT INJ PAST YR: ICD-10-PCS | Mod: CPTII,S$GLB,, | Performed by: OBSTETRICS & GYNECOLOGY

## 2020-09-30 PROCEDURE — 99214 PR OFFICE/OUTPT VISIT, EST, LEVL IV, 30-39 MIN: ICD-10-PCS | Mod: S$GLB,,, | Performed by: OBSTETRICS & GYNECOLOGY

## 2020-09-30 PROCEDURE — 3077F SYST BP >= 140 MM HG: CPT | Mod: CPTII,S$GLB,, | Performed by: OBSTETRICS & GYNECOLOGY

## 2020-09-30 PROCEDURE — 1101F PT FALLS ASSESS-DOCD LE1/YR: CPT | Mod: CPTII,S$GLB,, | Performed by: OBSTETRICS & GYNECOLOGY

## 2020-09-30 PROCEDURE — 1159F MED LIST DOCD IN RCRD: CPT | Mod: S$GLB,,, | Performed by: OBSTETRICS & GYNECOLOGY

## 2020-09-30 PROCEDURE — 3078F PR MOST RECENT DIASTOLIC BLOOD PRESSURE < 80 MM HG: ICD-10-PCS | Mod: CPTII,S$GLB,, | Performed by: OBSTETRICS & GYNECOLOGY

## 2020-09-30 PROCEDURE — 99999 PR PBB SHADOW E&M-EST. PATIENT-LVL III: CPT | Mod: PBBFAC,,, | Performed by: OBSTETRICS & GYNECOLOGY

## 2020-09-30 PROCEDURE — 99999 PR PBB SHADOW E&M-EST. PATIENT-LVL III: ICD-10-PCS | Mod: PBBFAC,,, | Performed by: OBSTETRICS & GYNECOLOGY

## 2020-09-30 PROCEDURE — 3078F DIAST BP <80 MM HG: CPT | Mod: CPTII,S$GLB,, | Performed by: OBSTETRICS & GYNECOLOGY

## 2020-09-30 PROCEDURE — 3077F PR MOST RECENT SYSTOLIC BLOOD PRESSURE >= 140 MM HG: ICD-10-PCS | Mod: CPTII,S$GLB,, | Performed by: OBSTETRICS & GYNECOLOGY

## 2020-09-30 PROCEDURE — 1126F PR PAIN SEVERITY QUANTIFIED, NO PAIN PRESENT: ICD-10-PCS | Mod: S$GLB,,, | Performed by: OBSTETRICS & GYNECOLOGY

## 2020-09-30 RX ORDER — PANTOPRAZOLE SODIUM 40 MG/1
40 TABLET, DELAYED RELEASE ORAL 2 TIMES DAILY
COMMUNITY

## 2020-09-30 NOTE — TELEPHONE ENCOUNTER
----- Message from Eliza Pacheco sent at 9/30/2020 12:12 PM CDT -----  Regarding: Patient Advice  Name of Who is Calling:  Gita García      What is the request in detail:  Patient called back with the requested info where to fax referral. Please fax referral to Dr. Jennifer Carrion @ [phone] Attn : Celestina      Reply by [facility]: no      Call Back:  [phone]  or  [phone]

## 2020-12-31 ENCOUNTER — OFFICE VISIT (OUTPATIENT)
Dept: GYNECOLOGIC ONCOLOGY | Facility: CLINIC | Age: 75
End: 2020-12-31
Payer: MEDICARE

## 2020-12-31 VITALS
BODY MASS INDEX: 26.05 KG/M2 | WEIGHT: 156.5 LBS | SYSTOLIC BLOOD PRESSURE: 161 MMHG | HEART RATE: 60 BPM | DIASTOLIC BLOOD PRESSURE: 71 MMHG

## 2020-12-31 DIAGNOSIS — C54.1 ENDOMETRIAL CA: Primary | ICD-10-CM

## 2020-12-31 PROCEDURE — 1126F PR PAIN SEVERITY QUANTIFIED, NO PAIN PRESENT: ICD-10-PCS | Mod: S$GLB,,, | Performed by: OBSTETRICS & GYNECOLOGY

## 2020-12-31 PROCEDURE — 1101F PT FALLS ASSESS-DOCD LE1/YR: CPT | Mod: CPTII,S$GLB,, | Performed by: OBSTETRICS & GYNECOLOGY

## 2020-12-31 PROCEDURE — 1101F PR PT FALLS ASSESS DOC 0-1 FALLS W/OUT INJ PAST YR: ICD-10-PCS | Mod: CPTII,S$GLB,, | Performed by: OBSTETRICS & GYNECOLOGY

## 2020-12-31 PROCEDURE — 1126F AMNT PAIN NOTED NONE PRSNT: CPT | Mod: S$GLB,,, | Performed by: OBSTETRICS & GYNECOLOGY

## 2020-12-31 PROCEDURE — 3288F PR FALLS RISK ASSESSMENT DOCUMENTED: ICD-10-PCS | Mod: CPTII,S$GLB,, | Performed by: OBSTETRICS & GYNECOLOGY

## 2020-12-31 PROCEDURE — 99214 OFFICE O/P EST MOD 30 MIN: CPT | Mod: S$GLB,,, | Performed by: OBSTETRICS & GYNECOLOGY

## 2020-12-31 PROCEDURE — 3078F PR MOST RECENT DIASTOLIC BLOOD PRESSURE < 80 MM HG: ICD-10-PCS | Mod: CPTII,S$GLB,, | Performed by: OBSTETRICS & GYNECOLOGY

## 2020-12-31 PROCEDURE — 1159F MED LIST DOCD IN RCRD: CPT | Mod: S$GLB,,, | Performed by: OBSTETRICS & GYNECOLOGY

## 2020-12-31 PROCEDURE — 3288F FALL RISK ASSESSMENT DOCD: CPT | Mod: CPTII,S$GLB,, | Performed by: OBSTETRICS & GYNECOLOGY

## 2020-12-31 PROCEDURE — 3078F DIAST BP <80 MM HG: CPT | Mod: CPTII,S$GLB,, | Performed by: OBSTETRICS & GYNECOLOGY

## 2020-12-31 PROCEDURE — 1159F PR MEDICATION LIST DOCUMENTED IN MEDICAL RECORD: ICD-10-PCS | Mod: S$GLB,,, | Performed by: OBSTETRICS & GYNECOLOGY

## 2020-12-31 PROCEDURE — 3077F PR MOST RECENT SYSTOLIC BLOOD PRESSURE >= 140 MM HG: ICD-10-PCS | Mod: CPTII,S$GLB,, | Performed by: OBSTETRICS & GYNECOLOGY

## 2020-12-31 PROCEDURE — 99214 PR OFFICE/OUTPT VISIT, EST, LEVL IV, 30-39 MIN: ICD-10-PCS | Mod: S$GLB,,, | Performed by: OBSTETRICS & GYNECOLOGY

## 2020-12-31 PROCEDURE — 99999 PR PBB SHADOW E&M-EST. PATIENT-LVL III: ICD-10-PCS | Mod: PBBFAC,,, | Performed by: OBSTETRICS & GYNECOLOGY

## 2020-12-31 PROCEDURE — 99999 PR PBB SHADOW E&M-EST. PATIENT-LVL III: CPT | Mod: PBBFAC,,, | Performed by: OBSTETRICS & GYNECOLOGY

## 2020-12-31 PROCEDURE — 3077F SYST BP >= 140 MM HG: CPT | Mod: CPTII,S$GLB,, | Performed by: OBSTETRICS & GYNECOLOGY

## 2020-12-31 NOTE — PROGRESS NOTES
Subjective:       Patient ID: Gita García is a 75 y.o. female.    Chief Complaint: Endometrial Cancer (3mth f/u)    HPI   Patient comes in today for follow-up for papillary serous carcinoma of the endometrium.       Denies vaginal bleeding.        Nov 2020: : 8.8 in [place].   MMG: July 2019. Knows she is to get one scheduled.   Colonoscopy: doesn't remember but will call her GI doctor as she gets one every 5 years due FH colon ca in sister.      Her oncologic history is:  Jul 2019: She has undergone robotic assisted laparoscopic hysterectomy with staging on July 8, 2019.  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 Negative cytology. 2 mm invasion.   Aug 2019: VBT with Dr. Cui  Dec 2019: Records received from Dr. Ruddy Simental. Completed  6 cycles of Taxol and carboplatin.    Review of Systems   Constitutional: Negative for chills, fatigue and fever.   Respiratory: Negative for cough, shortness of breath and wheezing.    Cardiovascular: Negative for chest pain, palpitations and leg swelling.   Gastrointestinal: Negative for abdominal pain, constipation, diarrhea, nausea and vomiting.   Genitourinary: Negative for difficulty urinating, dysuria, frequency, genital sores, hematuria, urgency, vaginal bleeding, vaginal discharge and vaginal pain.   Neurological: Positive for numbness. Negative for weakness. Light-headedness: grade 1 bilateral feet.    Hematological: Negative for adenopathy. Does not bruise/bleed easily.   Psychiatric/Behavioral: The patient is not nervous/anxious.        Objective:   BP (!) 161/71   Pulse 60   Wt 71 kg (156 lb 8.4 oz)   BMI 26.05 kg/m²      Physical Exam  Constitutional:       Appearance: She is well-developed.   HENT:      Head: Normocephalic and atraumatic.   Eyes:      General: No scleral icterus.  Neck:      Musculoskeletal: Neck supple.      Thyroid: No thyroid mass or thyromegaly.      Trachea: No tracheal deviation.   Cardiovascular:       Rate and Rhythm: Normal rate and regular rhythm.   Pulmonary:      Effort: Pulmonary effort is normal.      Breath sounds: Normal breath sounds. No wheezing.   Abdominal:      General: There is no distension.      Palpations: Abdomen is soft. There is no mass.      Tenderness: There is no abdominal tenderness. There is no guarding or rebound.   Genitourinary:     Comments: Bimanual exam:  Vulva: no lesions. Normal appearance  Urethra: Normal size and location. No lesions  Bladder: No masses or tenderness.  Vagina: normal mucosa. No lesion  Cervix: absent.   Uterus: absent.  Adnexa: no masses.  Rectovaginal: Not performed    Musculoskeletal:         General: No tenderness.   Lymphadenopathy:      Cervical: No cervical adenopathy.      Upper Body:      Right upper body: No supraclavicular adenopathy.      Left upper body: No supraclavicular adenopathy.   Skin:     General: Skin is warm and dry.   Neurological:      Mental Status: She is alert and oriented to person, place, and time.   Psychiatric:         Behavior: Behavior normal.         Thought Content: Thought content normal.         Judgment: Judgment normal.         Assessment:       1. Endometrial ca        Plan:   Endometrial ca     IKE    RTC in 3 months. She will get  with Dr. Simental.

## 2021-03-31 ENCOUNTER — OFFICE VISIT (OUTPATIENT)
Dept: GYNECOLOGIC ONCOLOGY | Facility: CLINIC | Age: 76
End: 2021-03-31
Payer: MEDICARE

## 2021-03-31 VITALS
BODY MASS INDEX: 25.31 KG/M2 | WEIGHT: 152.13 LBS | DIASTOLIC BLOOD PRESSURE: 67 MMHG | HEART RATE: 61 BPM | SYSTOLIC BLOOD PRESSURE: 147 MMHG

## 2021-03-31 DIAGNOSIS — G62.0 NEUROPATHY DUE TO CHEMOTHERAPEUTIC DRUG: ICD-10-CM

## 2021-03-31 DIAGNOSIS — T45.1X5A NEUROPATHY DUE TO CHEMOTHERAPEUTIC DRUG: ICD-10-CM

## 2021-03-31 DIAGNOSIS — C54.1 ENDOMETRIAL CA: Primary | ICD-10-CM

## 2021-03-31 PROCEDURE — 3078F PR MOST RECENT DIASTOLIC BLOOD PRESSURE < 80 MM HG: ICD-10-PCS | Mod: CPTII,S$GLB,, | Performed by: OBSTETRICS & GYNECOLOGY

## 2021-03-31 PROCEDURE — 1126F AMNT PAIN NOTED NONE PRSNT: CPT | Mod: S$GLB,,, | Performed by: OBSTETRICS & GYNECOLOGY

## 2021-03-31 PROCEDURE — 3077F SYST BP >= 140 MM HG: CPT | Mod: CPTII,S$GLB,, | Performed by: OBSTETRICS & GYNECOLOGY

## 2021-03-31 PROCEDURE — 1101F PR PT FALLS ASSESS DOC 0-1 FALLS W/OUT INJ PAST YR: ICD-10-PCS | Mod: CPTII,S$GLB,, | Performed by: OBSTETRICS & GYNECOLOGY

## 2021-03-31 PROCEDURE — 3078F DIAST BP <80 MM HG: CPT | Mod: CPTII,S$GLB,, | Performed by: OBSTETRICS & GYNECOLOGY

## 2021-03-31 PROCEDURE — 1159F MED LIST DOCD IN RCRD: CPT | Mod: S$GLB,,, | Performed by: OBSTETRICS & GYNECOLOGY

## 2021-03-31 PROCEDURE — 3288F FALL RISK ASSESSMENT DOCD: CPT | Mod: CPTII,S$GLB,, | Performed by: OBSTETRICS & GYNECOLOGY

## 2021-03-31 PROCEDURE — 99999 PR PBB SHADOW E&M-EST. PATIENT-LVL III: CPT | Mod: PBBFAC,,, | Performed by: OBSTETRICS & GYNECOLOGY

## 2021-03-31 PROCEDURE — 3288F PR FALLS RISK ASSESSMENT DOCUMENTED: ICD-10-PCS | Mod: CPTII,S$GLB,, | Performed by: OBSTETRICS & GYNECOLOGY

## 2021-03-31 PROCEDURE — 99213 OFFICE O/P EST LOW 20 MIN: CPT | Mod: S$GLB,,, | Performed by: OBSTETRICS & GYNECOLOGY

## 2021-03-31 PROCEDURE — 3077F PR MOST RECENT SYSTOLIC BLOOD PRESSURE >= 140 MM HG: ICD-10-PCS | Mod: CPTII,S$GLB,, | Performed by: OBSTETRICS & GYNECOLOGY

## 2021-03-31 PROCEDURE — 1159F PR MEDICATION LIST DOCUMENTED IN MEDICAL RECORD: ICD-10-PCS | Mod: S$GLB,,, | Performed by: OBSTETRICS & GYNECOLOGY

## 2021-03-31 PROCEDURE — 99999 PR PBB SHADOW E&M-EST. PATIENT-LVL III: ICD-10-PCS | Mod: PBBFAC,,, | Performed by: OBSTETRICS & GYNECOLOGY

## 2021-03-31 PROCEDURE — 99213 PR OFFICE/OUTPT VISIT, EST, LEVL III, 20-29 MIN: ICD-10-PCS | Mod: S$GLB,,, | Performed by: OBSTETRICS & GYNECOLOGY

## 2021-03-31 PROCEDURE — 1101F PT FALLS ASSESS-DOCD LE1/YR: CPT | Mod: CPTII,S$GLB,, | Performed by: OBSTETRICS & GYNECOLOGY

## 2021-03-31 PROCEDURE — 1126F PR PAIN SEVERITY QUANTIFIED, NO PAIN PRESENT: ICD-10-PCS | Mod: S$GLB,,, | Performed by: OBSTETRICS & GYNECOLOGY

## 2021-07-16 ENCOUNTER — TELEPHONE (OUTPATIENT)
Dept: GYNECOLOGIC ONCOLOGY | Facility: CLINIC | Age: 76
End: 2021-07-16

## 2021-08-03 ENCOUNTER — DOCUMENTATION ONLY (OUTPATIENT)
Dept: GYNECOLOGIC ONCOLOGY | Facility: CLINIC | Age: 76
End: 2021-08-03

## 2021-08-03 ENCOUNTER — OFFICE VISIT (OUTPATIENT)
Dept: GYNECOLOGIC ONCOLOGY | Facility: CLINIC | Age: 76
End: 2021-08-03
Payer: MEDICARE

## 2021-08-03 VITALS
WEIGHT: 152.75 LBS | SYSTOLIC BLOOD PRESSURE: 161 MMHG | HEART RATE: 55 BPM | DIASTOLIC BLOOD PRESSURE: 77 MMHG | BODY MASS INDEX: 25.42 KG/M2

## 2021-08-03 DIAGNOSIS — C54.1 ENDOMETRIAL CA: Primary | ICD-10-CM

## 2021-08-03 PROCEDURE — 3077F SYST BP >= 140 MM HG: CPT | Mod: CPTII,S$GLB,, | Performed by: OBSTETRICS & GYNECOLOGY

## 2021-08-03 PROCEDURE — 99999 PR PBB SHADOW E&M-EST. PATIENT-LVL III: ICD-10-PCS | Mod: PBBFAC,,, | Performed by: OBSTETRICS & GYNECOLOGY

## 2021-08-03 PROCEDURE — 1159F MED LIST DOCD IN RCRD: CPT | Mod: CPTII,S$GLB,, | Performed by: OBSTETRICS & GYNECOLOGY

## 2021-08-03 PROCEDURE — 1159F PR MEDICATION LIST DOCUMENTED IN MEDICAL RECORD: ICD-10-PCS | Mod: CPTII,S$GLB,, | Performed by: OBSTETRICS & GYNECOLOGY

## 2021-08-03 PROCEDURE — 3078F DIAST BP <80 MM HG: CPT | Mod: CPTII,S$GLB,, | Performed by: OBSTETRICS & GYNECOLOGY

## 2021-08-03 PROCEDURE — 3288F FALL RISK ASSESSMENT DOCD: CPT | Mod: CPTII,S$GLB,, | Performed by: OBSTETRICS & GYNECOLOGY

## 2021-08-03 PROCEDURE — 1160F PR REVIEW ALL MEDS BY PRESCRIBER/CLIN PHARMACIST DOCUMENTED: ICD-10-PCS | Mod: CPTII,S$GLB,, | Performed by: OBSTETRICS & GYNECOLOGY

## 2021-08-03 PROCEDURE — 99999 PR PBB SHADOW E&M-EST. PATIENT-LVL III: CPT | Mod: PBBFAC,,, | Performed by: OBSTETRICS & GYNECOLOGY

## 2021-08-03 PROCEDURE — 3288F PR FALLS RISK ASSESSMENT DOCUMENTED: ICD-10-PCS | Mod: CPTII,S$GLB,, | Performed by: OBSTETRICS & GYNECOLOGY

## 2021-08-03 PROCEDURE — 1126F PR PAIN SEVERITY QUANTIFIED, NO PAIN PRESENT: ICD-10-PCS | Mod: CPTII,S$GLB,, | Performed by: OBSTETRICS & GYNECOLOGY

## 2021-08-03 PROCEDURE — 3078F PR MOST RECENT DIASTOLIC BLOOD PRESSURE < 80 MM HG: ICD-10-PCS | Mod: CPTII,S$GLB,, | Performed by: OBSTETRICS & GYNECOLOGY

## 2021-08-03 PROCEDURE — 3077F PR MOST RECENT SYSTOLIC BLOOD PRESSURE >= 140 MM HG: ICD-10-PCS | Mod: CPTII,S$GLB,, | Performed by: OBSTETRICS & GYNECOLOGY

## 2021-08-03 PROCEDURE — 99214 PR OFFICE/OUTPT VISIT, EST, LEVL IV, 30-39 MIN: ICD-10-PCS | Mod: S$GLB,,, | Performed by: OBSTETRICS & GYNECOLOGY

## 2021-08-03 PROCEDURE — 1101F PR PT FALLS ASSESS DOC 0-1 FALLS W/OUT INJ PAST YR: ICD-10-PCS | Mod: CPTII,S$GLB,, | Performed by: OBSTETRICS & GYNECOLOGY

## 2021-08-03 PROCEDURE — 99214 OFFICE O/P EST MOD 30 MIN: CPT | Mod: S$GLB,,, | Performed by: OBSTETRICS & GYNECOLOGY

## 2021-08-03 PROCEDURE — 1126F AMNT PAIN NOTED NONE PRSNT: CPT | Mod: CPTII,S$GLB,, | Performed by: OBSTETRICS & GYNECOLOGY

## 2021-08-03 PROCEDURE — 1101F PT FALLS ASSESS-DOCD LE1/YR: CPT | Mod: CPTII,S$GLB,, | Performed by: OBSTETRICS & GYNECOLOGY

## 2021-08-03 PROCEDURE — 1160F RVW MEDS BY RX/DR IN RCRD: CPT | Mod: CPTII,S$GLB,, | Performed by: OBSTETRICS & GYNECOLOGY

## 2021-11-05 ENCOUNTER — TELEPHONE (OUTPATIENT)
Dept: UROLOGY | Facility: CLINIC | Age: 76
End: 2021-11-05
Payer: MEDICARE

## 2021-11-08 ENCOUNTER — TELEPHONE (OUTPATIENT)
Dept: UROLOGY | Facility: CLINIC | Age: 76
End: 2021-11-08
Payer: MEDICARE

## 2021-11-11 ENCOUNTER — HOSPITAL ENCOUNTER (OUTPATIENT)
Dept: RADIOLOGY | Facility: HOSPITAL | Age: 76
Discharge: HOME | End: 2021-11-11
Attending: UROLOGY
Payer: MEDICARE

## 2021-11-11 ENCOUNTER — OFFICE VISIT (OUTPATIENT)
Dept: UROLOGY | Facility: CLINIC | Age: 76
End: 2021-11-11
Payer: MEDICARE

## 2021-11-11 VITALS
WEIGHT: 154.31 LBS | HEIGHT: 64 IN | HEART RATE: 63 BPM | DIASTOLIC BLOOD PRESSURE: 74 MMHG | BODY MASS INDEX: 26.34 KG/M2 | SYSTOLIC BLOOD PRESSURE: 144 MMHG

## 2021-11-11 DIAGNOSIS — C64.1 UROTHELIAL CARCINOMA OF KIDNEY, RIGHT: ICD-10-CM

## 2021-11-11 DIAGNOSIS — C64.1 UROTHELIAL CARCINOMA OF KIDNEY, RIGHT: Primary | ICD-10-CM

## 2021-11-11 PROCEDURE — 1101F PR PT FALLS ASSESS DOC 0-1 FALLS W/OUT INJ PAST YR: ICD-10-PCS | Mod: CPTII,S$GLB,, | Performed by: UROLOGY

## 2021-11-11 PROCEDURE — 1159F MED LIST DOCD IN RCRD: CPT | Mod: CPTII,S$GLB,, | Performed by: UROLOGY

## 2021-11-11 PROCEDURE — 3077F PR MOST RECENT SYSTOLIC BLOOD PRESSURE >= 140 MM HG: ICD-10-PCS | Mod: CPTII,S$GLB,, | Performed by: UROLOGY

## 2021-11-11 PROCEDURE — 3288F PR FALLS RISK ASSESSMENT DOCUMENTED: ICD-10-PCS | Mod: CPTII,S$GLB,, | Performed by: UROLOGY

## 2021-11-11 PROCEDURE — 1160F RVW MEDS BY RX/DR IN RCRD: CPT | Mod: CPTII,S$GLB,, | Performed by: UROLOGY

## 2021-11-11 PROCEDURE — 1160F PR REVIEW ALL MEDS BY PRESCRIBER/CLIN PHARMACIST DOCUMENTED: ICD-10-PCS | Mod: CPTII,S$GLB,, | Performed by: UROLOGY

## 2021-11-11 PROCEDURE — 1125F PR PAIN SEVERITY QUANTIFIED, PAIN PRESENT: ICD-10-PCS | Mod: CPTII,S$GLB,, | Performed by: UROLOGY

## 2021-11-11 PROCEDURE — 99204 OFFICE O/P NEW MOD 45 MIN: CPT | Mod: S$GLB,,, | Performed by: UROLOGY

## 2021-11-11 PROCEDURE — 3078F DIAST BP <80 MM HG: CPT | Mod: CPTII,S$GLB,, | Performed by: UROLOGY

## 2021-11-11 PROCEDURE — 99999 PR PBB SHADOW E&M-EST. PATIENT-LVL IV: CPT | Mod: PBBFAC,,, | Performed by: UROLOGY

## 2021-11-11 PROCEDURE — 71046 XR CHEST PA AND LATERAL: ICD-10-PCS | Mod: 26,,, | Performed by: RADIOLOGY

## 2021-11-11 PROCEDURE — 99204 PR OFFICE/OUTPT VISIT, NEW, LEVL IV, 45-59 MIN: ICD-10-PCS | Mod: S$GLB,,, | Performed by: UROLOGY

## 2021-11-11 PROCEDURE — 71046 X-RAY EXAM CHEST 2 VIEWS: CPT | Mod: 26,,, | Performed by: RADIOLOGY

## 2021-11-11 PROCEDURE — 71046 X-RAY EXAM CHEST 2 VIEWS: CPT | Mod: TC

## 2021-11-11 PROCEDURE — 3078F PR MOST RECENT DIASTOLIC BLOOD PRESSURE < 80 MM HG: ICD-10-PCS | Mod: CPTII,S$GLB,, | Performed by: UROLOGY

## 2021-11-11 PROCEDURE — 1101F PT FALLS ASSESS-DOCD LE1/YR: CPT | Mod: CPTII,S$GLB,, | Performed by: UROLOGY

## 2021-11-11 PROCEDURE — 3288F FALL RISK ASSESSMENT DOCD: CPT | Mod: CPTII,S$GLB,, | Performed by: UROLOGY

## 2021-11-11 PROCEDURE — 3077F SYST BP >= 140 MM HG: CPT | Mod: CPTII,S$GLB,, | Performed by: UROLOGY

## 2021-11-11 PROCEDURE — 1125F AMNT PAIN NOTED PAIN PRSNT: CPT | Mod: CPTII,S$GLB,, | Performed by: UROLOGY

## 2021-11-11 PROCEDURE — 1159F PR MEDICATION LIST DOCUMENTED IN MEDICAL RECORD: ICD-10-PCS | Mod: CPTII,S$GLB,, | Performed by: UROLOGY

## 2021-11-11 PROCEDURE — 99999 PR PBB SHADOW E&M-EST. PATIENT-LVL IV: ICD-10-PCS | Mod: PBBFAC,,, | Performed by: UROLOGY

## 2021-11-12 ENCOUNTER — TELEPHONE (OUTPATIENT)
Dept: HEMATOLOGY/ONCOLOGY | Facility: CLINIC | Age: 76
End: 2021-11-12
Payer: MEDICARE

## 2021-11-15 ENCOUNTER — TELEPHONE (OUTPATIENT)
Dept: UROLOGY | Facility: CLINIC | Age: 76
End: 2021-11-15
Payer: MEDICARE

## 2021-11-22 ENCOUNTER — PATIENT MESSAGE (OUTPATIENT)
Dept: UROLOGY | Facility: CLINIC | Age: 76
End: 2021-11-22
Payer: MEDICARE

## 2021-11-26 ENCOUNTER — TELEPHONE (OUTPATIENT)
Dept: UROLOGY | Facility: CLINIC | Age: 76
End: 2021-11-26
Payer: MEDICARE

## 2021-11-26 DIAGNOSIS — C64.1 UROTHELIAL CARCINOMA OF KIDNEY, RIGHT: Primary | ICD-10-CM

## 2021-12-27 RX ORDER — PHENAZOPYRIDINE HYDROCHLORIDE 100 MG/1
200 TABLET, FILM COATED ORAL 3 TIMES DAILY
COMMUNITY
Start: 2021-09-23 | End: 2021-12-27 | Stop reason: CLARIF

## 2021-12-27 RX ORDER — CIPROFLOXACIN 500 MG/1
500 TABLET ORAL 2 TIMES DAILY
COMMUNITY
Start: 2021-09-13 | End: 2021-12-27 | Stop reason: CLARIF

## 2021-12-27 RX ORDER — SOD SULF/POT CHLORIDE/MAG SULF 1.479 G
TABLET ORAL
COMMUNITY
Start: 2021-10-26 | End: 2021-12-27 | Stop reason: CLARIF

## 2021-12-27 RX ORDER — CIPROFLOXACIN 250 MG/1
250 TABLET, FILM COATED ORAL 2 TIMES DAILY
COMMUNITY
Start: 2021-09-23 | End: 2021-12-27 | Stop reason: CLARIF

## 2021-12-27 RX ORDER — SUCRALFATE 1 G/1
1 TABLET ORAL 3 TIMES DAILY
COMMUNITY
Start: 2021-10-22 | End: 2021-12-27 | Stop reason: CLARIF

## 2021-12-27 NOTE — ANESTHESIA PAT ROS NOTE
12/27/2021  Gita García is a 76 y.o., female.      Pre-op Assessment          Review of Systems  Anesthesia Hx:  No problems with previous Anesthesia  Denies Family Hx of Anesthesia complications.   Denies Personal Hx of Anesthesia complications.   Social:  No Alcohol Use, Non-Smoker    Hematology/Oncology:  Hematology Normal      Current/Recent Cancer. --  Cancer in past history:  Other (see Oncology comments) radiation, surgery and chemotherapy  Oncology Comments: Uterus-2019/ Right Kidney-pending surgery now     EENT/Dental:EENT/Dental Normal   Cardiovascular:   Exercise tolerance: good Hypertension Leg cramps sometimes at bedtime/ Housework/walking daily/gardening   Pulmonary:  Pulmonary Normal    Renal/:   Chronic Renal Disease Recent UTI-9/2021-treated with Cipro & resolved   Hepatic/GI:   GERD Liver Disease, Liver Hemangioma   Musculoskeletal:  Musculoskeletal Normal    Neurological:   Seizures History:x1 seizure after delivery of first born   Endocrine:  Endocrine Normal    Dermatological:  Skin Normal    Psych:  Psychiatric Normal       Mary Ann Calderon RN 12/27/21       Anesthesia Assessment: Preoperative EQUATION    Planned Procedure: Procedure(s) (LRB):  URETEROSCOPY (Right)  ABLATION, NEOPLASM, URETER, USING LASER (Right)  PLACEMENT-STENT (Right)  Requested Anesthesia Type:General  Surgeon: Shane Diaz MD  Service: Urology  Known or anticipated Date of Surgery:1/3/2022    Surgeon notes: reviewed and Urothelial carcinoma of kidney, right     Previous anesthesia records:7/8/19-XI Robotic Bwgqgkhk-Lqoazkgpefjt-Mgbnvhnxj/XI Robotic Hysterectomy/XI Robotic Lymphadenectomy, Pelvic-Bilateral/Omentectomy/XI Robotic Lymphadenectomy, Retroperitoneum/Laparotomy-General    Last PCP note: within 3 months , outside [facility] , [facility] PCP-Dr. JOHN Natarajan  Subspecialty notes:  Hematology/Oncology    Other important co-morbidities: HTN and Urothelial carcinoma of kidney,right    Medical History    Diagnosis Date Comment Source   Acid reflux      Arthritis      Chemotherapy induced nausea and vomiting 8/9/2019     Endometrial ca 6/26/2019     Essential hypertension 6/27/2019     Estrogen deficiency      Hormone deficiency      Hypertension      Liver mass 7/1/2019     Neuropathy due to chemotherapeutic drug 2/21/2020     Osteopenia      Seizures  post partal. 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         Tests already available:  Results have been reviewed.11/11/21-Labs-CBC/CMP/ CXR-11/11/21       Plan: Phone pending     Testing:  EKG      Patient  has previously scheduled Medical Appointment:None prior to the surgery date.    Navigation:Phone Completed                        Tests Scheduled. EKG-?-(LMx3 with OS PCP office requesting recent EKG)-pending CB from [facility] PCP office.               Consults scheduled.None needed at this time.             Results will be tracked by Preop [facility].    Reviewed plan with .              Mary Ann Calderon RN  12/27/21

## 2021-12-28 ENCOUNTER — PATIENT MESSAGE (OUTPATIENT)
Dept: UROLOGY | Facility: CLINIC | Age: 76
End: 2021-12-28
Payer: MEDICARE

## 2021-12-30 ENCOUNTER — TELEPHONE (OUTPATIENT)
Dept: UROLOGY | Facility: CLINIC | Age: 76
End: 2021-12-30
Payer: MEDICARE

## 2022-01-03 ENCOUNTER — ANESTHESIA EVENT (OUTPATIENT)
Dept: SURGERY | Facility: HOSPITAL | Age: 77
End: 2022-01-03
Payer: MEDICARE

## 2022-01-03 ENCOUNTER — HOSPITAL ENCOUNTER (OUTPATIENT)
Facility: HOSPITAL | Age: 77
Discharge: HOME | End: 2022-01-03
Attending: UROLOGY | Admitting: UROLOGY
Payer: MEDICARE

## 2022-01-03 ENCOUNTER — ANESTHESIA (OUTPATIENT)
Dept: SURGERY | Facility: HOSPITAL | Age: 77
End: 2022-01-03
Payer: MEDICARE

## 2022-01-03 VITALS
RESPIRATION RATE: 13 BRPM | BODY MASS INDEX: 25.83 KG/M2 | WEIGHT: 155 LBS | HEART RATE: 75 BPM | DIASTOLIC BLOOD PRESSURE: 70 MMHG | HEIGHT: 65 IN | OXYGEN SATURATION: 99 % | TEMPERATURE: 98 F | SYSTOLIC BLOOD PRESSURE: 159 MMHG

## 2022-01-03 DIAGNOSIS — Z85.50 ENCOUNTER FOR FOLLOW-UP SURVEILLANCE OF UROTHELIAL CARCINOMA OF UPPER URINARY TRACT: ICD-10-CM

## 2022-01-03 DIAGNOSIS — C68.9 UROTHELIAL CARCINOMA: Primary | ICD-10-CM

## 2022-01-03 DIAGNOSIS — Z08 ENCOUNTER FOR FOLLOW-UP SURVEILLANCE OF UROTHELIAL CARCINOMA OF UPPER URINARY TRACT: ICD-10-CM

## 2022-01-03 LAB — SARS-COV-2 RDRP RESP QL NAA+PROBE: NEGATIVE

## 2022-01-03 PROCEDURE — 88112 PR  CYTOPATH, CELL ENHANCE TECH: ICD-10-PCS | Mod: 26,,, | Performed by: PATHOLOGY

## 2022-01-03 PROCEDURE — 52332 CYSTOSCOPY AND TREATMENT: CPT | Mod: 51,RT,, | Performed by: UROLOGY

## 2022-01-03 PROCEDURE — 71000044 HC DOSC ROUTINE RECOVERY FIRST HOUR: Performed by: UROLOGY

## 2022-01-03 PROCEDURE — 63600175 PHARM REV CODE 636 W HCPCS: Performed by: NURSE ANESTHETIST, CERTIFIED REGISTERED

## 2022-01-03 PROCEDURE — 71000015 HC POSTOP RECOV 1ST HR: Performed by: UROLOGY

## 2022-01-03 PROCEDURE — D9220A PRA ANESTHESIA: Mod: ANES,,, | Performed by: ANESTHESIOLOGY

## 2022-01-03 PROCEDURE — 36000707: Performed by: UROLOGY

## 2022-01-03 PROCEDURE — 36000706: Performed by: UROLOGY

## 2022-01-03 PROCEDURE — 25000003 PHARM REV CODE 250: Performed by: NURSE ANESTHETIST, CERTIFIED REGISTERED

## 2022-01-03 PROCEDURE — 88112 CYTOPATH CELL ENHANCE TECH: CPT | Mod: 26,,, | Performed by: PATHOLOGY

## 2022-01-03 PROCEDURE — 27201423 OPTIME MED/SURG SUP & DEVICES STERILE SUPPLY: Performed by: UROLOGY

## 2022-01-03 PROCEDURE — C1769 GUIDE WIRE: HCPCS | Performed by: UROLOGY

## 2022-01-03 PROCEDURE — 88112 CYTOPATH CELL ENHANCE TECH: CPT | Performed by: PATHOLOGY

## 2022-01-03 PROCEDURE — 25000003 PHARM REV CODE 250: Performed by: STUDENT IN AN ORGANIZED HEALTH CARE EDUCATION/TRAINING PROGRAM

## 2022-01-03 PROCEDURE — U0002 COVID-19 LAB TEST NON-CDC: HCPCS | Performed by: UROLOGY

## 2022-01-03 PROCEDURE — 52354 CYSTOURETERO W/BIOPSY: CPT | Mod: RT,,, | Performed by: UROLOGY

## 2022-01-03 PROCEDURE — 52332 PR CYSTOSCOPY,INSERT URETERAL STENT: ICD-10-PCS | Mod: 51,RT,, | Performed by: UROLOGY

## 2022-01-03 PROCEDURE — 52354 PR CYSTO/URETERO/PYELOSC,BX &/OR FULG LESN: ICD-10-PCS | Mod: RT,,, | Performed by: UROLOGY

## 2022-01-03 PROCEDURE — C1758 CATHETER, URETERAL: HCPCS | Performed by: UROLOGY

## 2022-01-03 PROCEDURE — C1894 INTRO/SHEATH, NON-LASER: HCPCS | Performed by: UROLOGY

## 2022-01-03 PROCEDURE — D9220A PRA ANESTHESIA: Mod: CRNA,,, | Performed by: NURSE ANESTHETIST, CERTIFIED REGISTERED

## 2022-01-03 PROCEDURE — 63600175 PHARM REV CODE 636 W HCPCS: Mod: JG | Performed by: STUDENT IN AN ORGANIZED HEALTH CARE EDUCATION/TRAINING PROGRAM

## 2022-01-03 PROCEDURE — C2617 STENT, NON-COR, TEM W/O DEL: HCPCS | Performed by: UROLOGY

## 2022-01-03 PROCEDURE — D9220A PRA ANESTHESIA: ICD-10-PCS | Mod: CRNA,,, | Performed by: NURSE ANESTHETIST, CERTIFIED REGISTERED

## 2022-01-03 PROCEDURE — 71000016 HC POSTOP RECOV ADDL HR: Performed by: UROLOGY

## 2022-01-03 PROCEDURE — D9220A PRA ANESTHESIA: ICD-10-PCS | Mod: ANES,,, | Performed by: ANESTHESIOLOGY

## 2022-01-03 PROCEDURE — 37000008 HC ANESTHESIA 1ST 15 MINUTES: Performed by: UROLOGY

## 2022-01-03 PROCEDURE — 37000009 HC ANESTHESIA EA ADD 15 MINS: Performed by: UROLOGY

## 2022-01-03 DEVICE — STENT URETERAL UNIV 6FR 24CM: Type: IMPLANTABLE DEVICE | Site: URETER | Status: FUNCTIONAL

## 2022-01-03 RX ORDER — ROCURONIUM BROMIDE 10 MG/ML
INJECTION, SOLUTION INTRAVENOUS
Status: DISCONTINUED | OUTPATIENT
Start: 2022-01-03 | End: 2022-01-03

## 2022-01-03 RX ORDER — HYDROCODONE BITARTRATE AND ACETAMINOPHEN 5; 325 MG/1; MG/1
1 TABLET ORAL EVERY 4 HOURS PRN
Status: DISCONTINUED | OUTPATIENT
Start: 2022-01-03 | End: 2022-01-03 | Stop reason: HOSPADM

## 2022-01-03 RX ORDER — TAMSULOSIN HYDROCHLORIDE 0.4 MG/1
0.4 CAPSULE ORAL DAILY
Qty: 30 CAPSULE | Refills: 0 | Status: ON HOLD | OUTPATIENT
Start: 2022-01-03 | End: 2022-03-14

## 2022-01-03 RX ORDER — EPHEDRINE SULFATE 50 MG/ML
INJECTION, SOLUTION INTRAVENOUS
Status: DISCONTINUED | OUTPATIENT
Start: 2022-01-03 | End: 2022-01-03

## 2022-01-03 RX ORDER — PHENYLEPHRINE HCL IN 0.9% NACL 1 MG/10 ML
SYRINGE (ML) INTRAVENOUS
Status: DISCONTINUED | OUTPATIENT
Start: 2022-01-03 | End: 2022-01-03

## 2022-01-03 RX ORDER — CEFAZOLIN SODIUM 2 G/50ML
2 SOLUTION INTRAVENOUS
Status: DISCONTINUED | OUTPATIENT
Start: 2022-01-03 | End: 2022-01-03 | Stop reason: HOSPADM

## 2022-01-03 RX ORDER — FENTANYL CITRATE 50 UG/ML
25 INJECTION, SOLUTION INTRAMUSCULAR; INTRAVENOUS EVERY 5 MIN PRN
Status: DISCONTINUED | OUTPATIENT
Start: 2022-01-03 | End: 2022-01-03 | Stop reason: HOSPADM

## 2022-01-03 RX ORDER — ONDANSETRON 4 MG/1
8 TABLET, ORALLY DISINTEGRATING ORAL EVERY 8 HOURS PRN
Status: DISCONTINUED | OUTPATIENT
Start: 2022-01-03 | End: 2022-01-03 | Stop reason: HOSPADM

## 2022-01-03 RX ORDER — SODIUM CHLORIDE 9 MG/ML
INJECTION, SOLUTION INTRAVENOUS CONTINUOUS
Status: DISCONTINUED | OUTPATIENT
Start: 2022-01-03 | End: 2022-01-03 | Stop reason: HOSPADM

## 2022-01-03 RX ORDER — CEFAZOLIN SODIUM 1 G/3ML
INJECTION, POWDER, FOR SOLUTION INTRAMUSCULAR; INTRAVENOUS
Status: DISCONTINUED | OUTPATIENT
Start: 2022-01-03 | End: 2022-01-03

## 2022-01-03 RX ORDER — LIDOCAINE HYDROCHLORIDE 10 MG/ML
1 INJECTION, SOLUTION EPIDURAL; INFILTRATION; INTRACAUDAL; PERINEURAL ONCE
Status: DISCONTINUED | OUTPATIENT
Start: 2022-01-03 | End: 2022-01-03 | Stop reason: HOSPADM

## 2022-01-03 RX ORDER — PROPOFOL 10 MG/ML
VIAL (ML) INTRAVENOUS
Status: DISCONTINUED | OUTPATIENT
Start: 2022-01-03 | End: 2022-01-03

## 2022-01-03 RX ORDER — HYDROCODONE BITARTRATE AND ACETAMINOPHEN 10; 325 MG/1; MG/1
1 TABLET ORAL EVERY 4 HOURS PRN
Status: DISCONTINUED | OUTPATIENT
Start: 2022-01-03 | End: 2022-01-03 | Stop reason: HOSPADM

## 2022-01-03 RX ORDER — OXYBUTYNIN CHLORIDE 5 MG/1
5 TABLET ORAL 3 TIMES DAILY PRN
Qty: 30 TABLET | Refills: 0 | Status: ON HOLD | OUTPATIENT
Start: 2022-01-03 | End: 2022-03-14

## 2022-01-03 RX ORDER — KETOROLAC TROMETHAMINE 10 MG/1
10 TABLET, FILM COATED ORAL EVERY 6 HOURS PRN
Qty: 12 TABLET | Refills: 0 | Status: ON HOLD | OUTPATIENT
Start: 2022-01-03 | End: 2022-03-14

## 2022-01-03 RX ORDER — FENTANYL CITRATE 50 UG/ML
INJECTION, SOLUTION INTRAMUSCULAR; INTRAVENOUS
Status: DISCONTINUED | OUTPATIENT
Start: 2022-01-03 | End: 2022-01-03

## 2022-01-03 RX ORDER — ONDANSETRON 2 MG/ML
4 INJECTION INTRAMUSCULAR; INTRAVENOUS DAILY PRN
Status: DISCONTINUED | OUTPATIENT
Start: 2022-01-03 | End: 2022-01-03 | Stop reason: HOSPADM

## 2022-01-03 RX ORDER — LIDOCAINE HYDROCHLORIDE 10 MG/ML
INJECTION, SOLUTION INTRAVENOUS
Status: DISCONTINUED | OUTPATIENT
Start: 2022-01-03 | End: 2022-01-03

## 2022-01-03 RX ORDER — ACETAMINOPHEN 325 MG/1
650 TABLET ORAL EVERY 4 HOURS PRN
Status: DISCONTINUED | OUTPATIENT
Start: 2022-01-03 | End: 2022-01-03 | Stop reason: HOSPADM

## 2022-01-03 RX ORDER — HYDROCODONE BITARTRATE AND ACETAMINOPHEN 5; 325 MG/1; MG/1
1 TABLET ORAL EVERY 6 HOURS PRN
Qty: 7 TABLET | Refills: 0 | Status: ON HOLD | OUTPATIENT
Start: 2022-01-03 | End: 2022-03-14

## 2022-01-03 RX ADMIN — SODIUM CHLORIDE: 0.9 INJECTION, SOLUTION INTRAVENOUS at 12:01

## 2022-01-03 RX ADMIN — CEFAZOLIN 2 G: 330 INJECTION, POWDER, FOR SOLUTION INTRAMUSCULAR; INTRAVENOUS at 12:01

## 2022-01-03 RX ADMIN — LIDOCAINE HYDROCHLORIDE 40 MG: 10 INJECTION, SOLUTION INTRAVENOUS at 12:01

## 2022-01-03 RX ADMIN — SODIUM CHLORIDE: 0.9 INJECTION, SOLUTION INTRAVENOUS at 10:01

## 2022-01-03 RX ADMIN — MITOMYCIN 40 MG: 40 INJECTION, POWDER, LYOPHILIZED, FOR SOLUTION INTRAVENOUS at 03:01

## 2022-01-03 RX ADMIN — PROPOFOL 150 MG: 10 INJECTION, EMULSION INTRAVENOUS at 12:01

## 2022-01-03 RX ADMIN — EPHEDRINE SULFATE 10 MG: 50 INJECTION INTRAVENOUS at 12:01

## 2022-01-03 RX ADMIN — FENTANYL CITRATE 75 MCG: 50 INJECTION INTRAMUSCULAR; INTRAVENOUS at 12:01

## 2022-01-03 RX ADMIN — ROCURONIUM BROMIDE 20 MG: 10 INJECTION, SOLUTION INTRAVENOUS at 12:01

## 2022-01-03 RX ADMIN — Medication 100 MCG: at 12:01

## 2022-01-03 NOTE — DISCHARGE INSTRUCTIONS
Post Cystoscopy Instructions  Do not strain to have a bowel movement  No strenuous exercise x 7 days  No driving while you are on narcotic pain medications      Call the doctor if:  · Temperature is greater than 101F  · Persistent vomiting and inability to keep food down             Inability to void          Patient Education       General Anesthesia Discharge Instructions   About this topic   You may need general anesthesia if you need to be asleep during a procedure. Your doctor will use drugs to block the signals that go from your nerves to your brain. Doctors give general anesthesia during a surgery or procedure to:  · Allow you to sleep  · Help your body be still  · Relax your muscles  · Help you to relax and be pain free  · Keep you from remembering the surgery  · Let the doctor manage your airway, breathing, and blood flow  The doctor or nurse anesthetist gives general anesthesia by a shot into your vein. Sometimes, you may breathe in a gas through a mask placed over your face.  What care is needed at home?   · Ask your doctor what you need to do when you go home. Make sure you ask questions if you do not understand what the doctor says.  · Your doctor may give you drugs to prevent or treat an upset stomach from the anesthetic. Take them as ordered.  · If your throat is sore, suck on ice chips or popsicles to ease throat pain.  · Put 2 to 3 pillows under your head and back when you lie down to help you breathe easier.  · For the first 24 to 48 hours:  ? Do not operate heavy or dangerous machinery.  ? Do not make major decisions or sign important papers. You may not be able to think clearly.  ? Avoid beer, wine, or mixed drinks.  · You are at a higher risk of falling for at least 24 hours after general anesthesia.  ? Take extra care when you get up.  ? Do not change positions quickly.  ? Do not rush when you need to go to the bathroom or to answer the phone.  ? Ask for help if you feel unsteady when you try  to walk.  ? Wear shoes with non-slip soles and low heels.  What follow-up care is needed?   · Your doctor may ask you to come back to the office to check on your progress. Be sure to keep these visits.  · If you have stitches that do not dissolve or staples, you will need to have them removed. Your doctor will want to do this in 1 to 2 weeks. If the doctor used skin glue, the glue will fall off on its own.  What drugs may be needed?   The doctor may order drugs to:  · Help with pain  · Treat an upset stomach or throwing up  Will physical activity be limited?   · You will not be allowed to drive right away after the procedure. Ask a family member or a friend to drive you home.  · Avoid trying to get out of bed without help until you are sure of your balance.  · You may have to limit your activity. Talk to your doctor about if you need to limit how much you lift or limit exercise after your procedure.  What changes to diet are needed?   Start with a light diet when you are fully awake. This includes things that are easy to swallow like soups, pudding, jello, toast, and eggs. Slowly progress to your normal diet.  What problems could happen?   · Low blood pressure  · Breathing problems  · Upset stomach or throwing up  · Dizziness  · Blood clots  · Infection  When do I need to call the doctor?   · Trouble breathing  · Upset stomach or throwing up more than 3 times in the next 2 days  · Dizziness  Teach Back: Helping You Understand   The Teach Back Method helps you understand the information we are giving you. After you talk with the staff, tell them in your own words what you learned. This helps to make sure the staff has described each thing clearly. It also helps to explain things that may have been confusing. Before going home, make sure you can do these:  · I can tell you about my procedure.  · I can tell you if I need to follow up with my doctor.  · I can tell you what is good for me to eat and drink the next  day.  · I can tell you what I would do if I have trouble breathing, an upset stomach, or dizziness.  Where can I learn more?   [employer]  https://www.nigms.nih.gov/education/pages/factsheet_Anesthesia.aspx   NHS Choices  http://www.nhs.uk/conditions/Anaesthetic-general/Pages/Definition.aspx   Last Reviewed Date   2020-04-22  Consumer Information Use and Disclaimer   This information is not specific medical advice and does not replace information you receive from your health care provider. This is only a brief summary of general information. It does NOT include all information about conditions, illnesses, injuries, tests, procedures, treatments, therapies, discharge instructions or life-style choices that may apply to you. You must talk with your health care provider for complete information about your health and treatment options. This information should not be used to decide whether or not to accept your health care providers advice, instructions or recommendations. Only your health care provider has the knowledge and training to provide advice that is right for you.  Copyright   Copyright © 2021 [employer] and its affiliates and/or licensors. All rights reserved.

## 2022-01-03 NOTE — DISCHARGE SUMMARY
[facility] - [facility] (1st Fl)  Discharge Note  Short Stay    Procedure(s) (LRB):  URETEROSCOPY (Right)  ABLATION, NEOPLASM, URETER, USING LASER (Right)  PLACEMENT-STENT (Right)  CYSTOSCOPY (N/A)    OUTCOME: Patient tolerated treatment/procedure well without complication and is now ready for discharge.    DISPOSITION: Home or Self Care    FINAL DIAGNOSIS:  <principal problem not specified>    FOLLOWUP: In clinic    DISCHARGE INSTRUCTIONS:    Discharge Procedure Orders   Diet general     Call MD for:  temperature >100.4     Call MD for:  persistent nausea and vomiting     Call MD for:  severe uncontrolled pain     Call MD for:  difficulty breathing, headache or visual disturbances     Call MD for:  hives     Call MD for:  persistent dizziness or light-headedness     Call MD for:  extreme fatigue     Activity as tolerated        TIME SPENT ON DISCHARGE: 25 minutes

## 2022-01-03 NOTE — ANESTHESIA PREPROCEDURE EVALUATION
01/03/2022  Gita García is a 76 y.o., female.  Pre-operative evaluation for Procedure(s) (LRB):  URETEROSCOPY (Right)  ABLATION, NEOPLASM, URETER, USING LASER (Right)  PLACEMENT-STENT (Right)      Prev airway: Present Prior to Hospital Arrival?: Yes; Placement Date: 07/08/19; Placement Time: 1024; Method of Intubation: Direct laryngoscopy; Inserted by: Anesthesia Resident; Airway Device: Endotracheal Tube; Mask Ventilation: Easy - oral; Intubated: Postinduction; Blade: Isrrael #3; Airway Device Size: 7.0; Style: Cuffed; Cuff Inflation: Minimal occlusive pressure; Placement Verified By: Auscultation, Capnometry, Colorimetric EtCO2 device, ETT Condensation; Grade: Grade I; Complicating Factors: None; Intubation Findings: Positive EtCO2, Bilateral breath sounds, Atraumatic/Condition of teeth unchanged; Securment: Lips; Complications: None; Breath Sounds: Equal Bilateral; Insertion Attempts: 1; Removal Date: 07/08/19;  Removal Time: 1345      Patient Active Problem List   Diagnosis    Endometrial ca    Essential hypertension    Liver hemangioma    s/p RA-TLH/BSO/BPLD, omentectomy    Neuropathy due to chemotherapeutic drug    Urothelial carcinoma of kidney, right       Review of patient's allergies indicates:   Allergen Reactions    Asa [aspirin] Other (See Comments)     Stomach upset    Dilaudid (pf) [hydromorphone (pf)] Nausea Only    Sulfa (sulfonamide antibiotics) Itching        No current facility-administered medications on file prior to encounter.     Current Outpatient Medications on File Prior to Encounter   Medication Sig Dispense Refill    bisoprolol-hydrochlorothiazide (ZIAC) 10-6.25 mg per tablet Take 1 tablet by mouth once daily.       co-enzyme Q-10 30 mg capsule Take 30 mg by mouth 3 (three) times daily.      milk thistle 175 mg tablet Take 175 mg by mouth once daily.       multivitamin/iron/folic acid (CENTRUM ORAL) Take by mouth once daily.       mv-min/FA/vit K/lycop/lut/zeax (OCUVITE EYE PLUS MULTI ORAL) Take 1 tablet by mouth once daily.       pantoprazole (PROTONIX) 40 MG tablet Take 40 mg by mouth once daily.      polyethylene glycol (GLYCOLAX) 17 gram PwPk Take by mouth every morning.      raloxifene (EVISTA) 60 mg tablet Take 60 mg by mouth every evening.      ALPRAZolam (XANAX) 0.25 MG tablet Take 0.25 mg by mouth 2 (two) times daily as needed for Anxiety.       calcium carbonate/vitamin D3 (CALCIUM 600 + D,3, ORAL) Take by mouth once daily.          Past Surgical History:   Procedure Laterality Date    DILATION AND CURETTAGE OF UTERUS      GI scope  2016    LAPAROTOMY N/A 7/8/2019    Procedure: LAPAROTOMY;  Surgeon: Lokesh Carias MD;  [facility];  Service: OB/GYN;  Laterality: N/A;  mini    OMENTECTOMY N/A 7/8/2019    Procedure: OMENTECTOMY;  Surgeon: Lokesh Carias MD;  Location: [facility];  Service: OB/GYN;  Laterality: N/A;    ROBOT-ASSISTED LAPAROSCOPIC ABDOMINAL HYSTERECTOMY USING DA SIRISHA XI N/A 7/8/2019    Procedure: XI ROBOTIC HYSTERECTOMY;  Surgeon: Lokesh Carias MD;  Location: [facility];  Service: OB/GYN;  Laterality: N/A;    ROBOT-ASSISTED LAPAROSCOPIC PELVIC LYMPHADENECTOMY USING DA SIRISHA XI Bilateral 7/8/2019    Procedure: XI ROBOTIC LYMPHADENECTOMY, PELVIC;  Surgeon: Lokesh Carias MD;  Location: [facility];  Service: OB/GYN;  Laterality: Bilateral;    ROBOT-ASSISTED LAPAROSCOPIC RETROPERITONEAL LYMPHADENECTOMY USING DA SIRISHA XI N/A 7/8/2019    Procedure: XI ROBOTIC LYMPHADENECTOMY, RETROPERITONEUM;  Surgeon: Lokesh Carias MD;  Location: [facility];  Service: OB/GYN;  Laterality: N/A;    ROBOT-ASSISTED LAPAROSCOPIC SALPINGO-OOPHORECTOMY USING DA SIRISHA XI Bilateral 7/8/2019    Procedure: XI ROBOTIC SALPINGO-OOPHORECTOMY;  Surgeon: Lokesh Carias MD;  Location: [facility];  Service: OB/GYN;   Laterality: Bilateral;       Social History     Socioeconomic History    Marital status:    Tobacco Use    Smoking status: Never Smoker    Smokeless tobacco: Never Used   Substance and Sexual Activity    Alcohol use: Never    Drug use: Never         Vital Signs Range (Last 24H):  Temp:  [36.6 °C (97.9 °F)]   Pulse:  [65]   Resp:  [16]   BP: (182)/(84)   SpO2:  [100 %]       CBC: No results for input(s): WBC, RBC, HGB, HCT, PLT, MCV, MCH, MCHC in the last 72 hours.    CMP: No results for input(s): NA, K, CL, CO2, BUN, CREATININE, GLU, MG, PHOS, CALCIUM, ALBUMIN, PROT, ALKPHOS, ALT, AST, BILITOT in the last 72 hours.    INR  No results for input(s): PT, INR, PROTIME, APTT in the last 72 hours.        Diagnostic Studies:      EKG:    Sinus bradycardia with occasional Premature ventricular complexes   Cannot rule out Septal infarct   Abnormal ECG   No previous ECGs available   Confirmed by ELENI CEJA MD ([id number]) on 6/27/2019 6:03:03 PM     Anesthesia Evaluation    I have reviewed the Patient Summary Reports.    I have reviewed the Nursing Notes. I have reviewed the NPO Status.      Review of Systems  Anesthesia Hx:  No problems with previous Anesthesia  History of prior surgery of interest to airway management or planning: Denies Family Hx of Anesthesia complications.   Denies Personal Hx of Anesthesia complications.   Social:  No Alcohol Use, Non-Smoker    Hematology/Oncology:  Hematology Normal      Current/Recent Cancer. --  Cancer in past history:  Other (see Oncology comments) radiation, surgery and chemotherapy  Oncology Comments: Uterus-2019/ Right Kidney-pending surgery now     EENT/Dental:EENT/Dental Normal   Cardiovascular:   Exercise tolerance: good Hypertension Leg cramps sometimes at bedtime/ Housework/walking daily/gardening   Pulmonary:  Pulmonary Normal    Renal/:   Chronic Renal Disease Recent UTI-9/2021-treated with Cipro & resolved   Hepatic/GI:   GERD Liver Disease, Liver Hemangioma    Musculoskeletal:  Musculoskeletal Normal    Neurological:   Seizures History:x1 seizure after delivery of first born   Endocrine:  Endocrine Normal    Dermatological:  Skin Normal    Psych:  Psychiatric Normal       Mary Ann Calderon RN 12/27/21    Physical Exam  General:  Well nourished    Airway/Jaw/Neck:  Airway Findings: Mouth Opening: Normal Tongue: Normal  General Airway Assessment: Adult  Mallampati: III  Improves to II with phonation.  TM Distance: Normal, at least 6 cm      Dental:  Dental Findings: In tact   Chest/Lungs:  Chest/Lungs Findings: Clear to auscultation, Normal Respiratory Rate     Heart/Vascular:  Heart Findings: Rate: Normal  Rhythm: Regular Rhythm  Sounds: Normal        Mental Status:  Mental Status Findings:  Cooperative, Alert and Oriented         Anesthesia Assessment: Preoperative EQUATION    Planned Procedure: Procedure(s) (LRB):  URETEROSCOPY (Right)  ABLATION, NEOPLASM, URETER, USING LASER (Right)  PLACEMENT-STENT (Right)  Requested Anesthesia Type:General  Surgeon: Shane Diaz MD  Service: Urology  Known or anticipated Date of Surgery:1/3/2022    Surgeon notes: reviewed and Urothelial carcinoma of kidney, right     Previous anesthesia records:7/8/19-XI Robotic Uajlvlqm-Fmiuhmusmnwg-Tbnmurncl/XI Robotic Hysterectomy/XI Robotic Lymphadenectomy, Pelvic-Bilateral/Omentectomy/XI Robotic Lymphadenectomy, Retroperitoneum/Laparotomy-General    Last PCP note: within 3 months , outside [facility] , OS PCP-Dr. JOHN Natarajan  Subspecialty notes: Hematology/Oncology    Other important co-morbidities: HTN and Urothelial carcinoma of kidney,right    Medical History    Diagnosis Date Comment Source   Acid reflux      Arthritis      Chemotherapy induced nausea and vomiting 8/9/2019     Endometrial ca 6/26/2019     Essential hypertension 6/27/2019     Estrogen deficiency      Hormone deficiency      Hypertension      Liver mass 7/1/2019     Neuropathy due to chemotherapeutic drug 2/21/2020      Osteopenia      Seizures  post partal. 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         Tests already available:  Results have been reviewed.11/11/21-Labs-CBC/CMP/ CXR-11/11/21       Plan: Phone pending     Testing:  EKG      Patient  has previously scheduled Medical Appointment:None prior to the surgery date.    Navigation:Phone Completed                        Tests Scheduled. EKG-?-(LMx3 with OS PCP office requesting recent EKG)-pending CB from OS PCP office.               Consults scheduled.None needed at this time.             Results will be tracked by Preop Clinic.    Reviewed plan with .              Mary Ann Calderon RN  12/27/21      Anesthesia Plan  Type of Anesthesia, risks & benefits discussed:  Anesthesia Type:  general    Patient's Preference:   Plan Factors:          Intra-op Monitoring Plan: standard ASA monitors  Intra-op Monitoring Plan Comments:   Post Op Pain Control Plan: multimodal analgesia and per primary service following discharge from PACU  Post Op Pain Control Plan Comments:     Induction:   IV  Beta Blocker:  Patient is not currently on a Beta-Blocker (No further documentation required).       Informed Consent: Patient understands risks and agrees with Anesthesia plan.  Questions answered. Anesthesia consent signed with patient.  ASA Score: 3     Day of Surgery Review of History & Physical:    H&P update referred to the surgeon.         Ready For Surgery From Anesthesia Perspective.

## 2022-01-03 NOTE — ANESTHESIA PROCEDURE NOTES
Intubation    Date/Time: 1/3/2022 12:22 PM  Performed by: Marielena Child CRNA  Authorized by: Monica Romero MD     Intubation:     Induction:  Intravenous    Mask Ventilation:  Easy mask    Attempts:  1    Attempted By:  CRNA (G. St. kenneth CRNA)    Blade:  Badillo 2    Laryngeal View Grade: Grade I - full view of cords      Difficult Airway Encountered?: No      Complications:  None    Airway Device:  Oral endotracheal tube    Airway Device Size:  7.0    Style/Cuff Inflation:  Cuffed    Inflation Amount (mL):  4    Tube secured:  20    Secured at:  The teeth    Placement Verified By:  Capnometry    Complicating Factors:  None    Findings Post-Intubation:  BS equal bilateral

## 2022-01-03 NOTE — TRANSFER OF CARE
"Anesthesia Transfer of Care Note    Patient: Gita García    Procedure(s) Performed: Procedure(s) (LRB):  URETEROSCOPY (Right)  ABLATION, NEOPLASM, URETER, USING LASER (Right)  PLACEMENT-STENT (Right)  CYSTOSCOPY (N/A)    Patient location: OPS    Anesthesia Type: general    Transport from OR: Transported from OR on room air with adequate spontaneous ventilation    Post pain: adequate analgesia    Post assessment: no apparent anesthetic complications and tolerated procedure well    Post vital signs: stable    Level of consciousness: awake, alert and oriented    Nausea/Vomiting: no nausea/vomiting    Complications: none    Transfer of care protocol was followed      Last vitals:   Visit Vitals  BP (!) 182/84 (BP Location: Left arm, Patient Position: Lying)   Pulse 65   Temp 36.6 °C (97.9 °F) (Oral)   Resp 16   Ht 5' 4.5" (1.638 m)   Wt 70.3 kg (155 lb)   SpO2 100%   Breastfeeding No   BMI 26.19 kg/m²     "

## 2022-01-03 NOTE — OP NOTE
[facility]  Operative Note    Date: 01/03/2022    Pre-Op Diagnosis: Right Upper tract urothelial carcinoma    Post-Op Diagnosis: same    Procedure(s) Performed:   1.  Right ureteroscopy  2.  Cystoscopy  3.  Right ureteroscopic tumor ablation  4.  Placement of right JJ ureteral stent  5.  Right ureteral barbotage  6.  Fluoro < 1 h    Specimen(s):   Right ureteral barbotage for cytology    Staff Surgeon: Shane Diaz MD    Assistant Surgeon: Joon Baires MD, Mindi Vizcaino MD    Anesthesia: General endotracheal anesthesia    Indications: Gita García is a 76 y.o. female with a right sided low-grade UTUC, presenting for tumor ablation and evaluation.     Findings:   Large midline cystocele present in bladder  Large volume UTUC present in the pelvis and almost all upper and midpole calyces  Tumor almost entirely ablated using laser, likely some small volume tumor remains due to large volume involving almost all calyces  Renal pelvis barbotage sent for cytology  Right 6 Fr x 24 cm JJ ureteral stent without strings    Estimated Blood Loss: min    Drains: 6 Fr x 24 cm JJ ureteral stent without strings    Procedure in detail:       After informed consent was obtained, the patient was brought the the cystoscopy suite and placed in the supine position.  SCDs were applied and working.  Anesthesia was administered.  The patient was then placed in the dorsal lithotomy position and prepped and draped in the usual sterile fashion.      A rigid cystoscope in a 22 Fr sheath was introduced into the patient's urethra.  This passed easily.  The entire urethra was visualized which showed no strictures or masses.  Formal cystoscopy was performed which revealed no masses or lesions suspicious for malignancy, no bladder stones, no bladder diverticuli, no trabeculations. She had a large cystocele. The ureteral orifices were visualized in the normal anatomic position bilaterally.      A motion wire was passed up the  right ureteral orifice and up into the kidney.  This passed easily and placement was confirmed using fluoro.  A second stiff glide wire was then passed up the right ureteral orifice again confirmed using fluoro.  The cystoscope was removed keeping the guidewires in place.    A 10.7/12.7 ureteral access sheath was then passed over the free wire under fluoroscopic guidance.  Subsequently, the flexible ureteroscope was passed into the patient's ureter through the access sheath under direct vision. Flexible pyeloscopy was performed systematically.  A large papillary renal pelvis tumor was present extending into all the midpole and upper pole calyces. Ureteral barbotage was sent for cytology.    A  laser fiber was passed through the ureteroscope. The tumor was ablated systematically until no obvious resectable papillary tumor remained. This required extensive time and effort due to the size and extent of the tumor. The ureteroscope was removed keeping the wire in place.  The entire course of the ureter was visualized as the ureteroscope and access sheath were simultaneously removed.  There were no tumors present in the ureter.    A 6 Fr x 24 cm JJ ureteral stent without strings was passed over the wire and up into the renal pelvis using fluoro.  When the coil appeared to be in good position in the kidney, the wire was removed under continuous fluoro.  Good coils were seen in the kidney and the bladder using fluoro.      The patient tolerated the procedure well and was transferred to the recovery room in stable condition.      Disposition:  The patient will follow up with Dr. Diaz in 3 weeks to discuss Gelmyto. She will receive intravesical mitomycin in the PACU to prevent bladder recurrence.     [facility]

## 2022-01-03 NOTE — H&P
Clinic Note  1/3/2022      Subjective:         Chief Complaint:   HPI  Gita García is a 76 y.o. female with history of endometrial carcinoma. Recently had hematuria. CT scan 10/18/2021 showed right renal pelvis filling defect.  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 Not a complete ablation per notes.      Sister had kidney cancer 25 years ago. Nephrectomy.  Scoliosis. Hysterectomy, no other surgery.    She presents today for endoscopic ablation of right UTUC.     Past Medical History:   Diagnosis Date    Acid reflux     Arthritis     Chemotherapy induced nausea and vomiting 8/9/2019    Endometrial ca 6/26/2019    Essential hypertension 6/27/2019    Estrogen deficiency     Hormone deficiency     Hypertension     Liver mass 7/1/2019    Neuropathy due to chemotherapeutic drug 2/21/2020    Osteopenia     Seizures     post partal. several days after delivery    Urothelial carcinoma of kidney, right 11/11/2021     Family History   Problem Relation Age of Onset    Colon cancer Sister 53    Prostate cancer Brother     Breast cancer Sister 64    Kidney cancer Sister     Breast cancer Sister         in her 60s    Skin cancer Sister     Stroke Brother     Prostate cancer Brother     Heart disease Brother     Prostate cancer Brother     Heart disease Brother     Ovarian cancer Neg Hx     Uterine cancer Neg Hx     Anesthesia problems Neg Hx      Social History     Socioeconomic History    Marital status:    Tobacco Use    Smoking status: Never Smoker    Smokeless tobacco: Never Used   Substance and Sexual Activity    Alcohol use: Never    Drug use: Never     Past Surgical History:   Procedure Laterality Date    DILATION AND CURETTAGE OF UTERUS      GI scope  2016    LAPAROTOMY N/A 7/8/2019    Procedure: LAPAROTOMY;  Surgeon: Lokesh Carias MD;  Location: [facility] OR [address] [place] [address];  Service: OB/GYN;  Laterality: N/A;  mini    OMENTECTOMY  N/A 7/8/2019    Procedure: OMENTECTOMY;  Surgeon: Lokesh Carias MD;  [facility]: [facility] OR [facility];  Service: OB/GYN;  Laterality: N/A;    ROBOT-ASSISTED LAPAROSCOPIC ABDOMINAL HYSTERECTOMY USING DA SIRISHA XI N/A 7/8/2019    Procedure: XI ROBOTIC HYSTERECTOMY;  Surgeon: Lokesh Carias MD;  Location: [facility] OR [facility];  Service: OB/GYN;  Laterality: N/A;    ROBOT-ASSISTED LAPAROSCOPIC PELVIC LYMPHADENECTOMY USING DA SIRISHA XI Bilateral 7/8/2019    Procedure: XI ROBOTIC LYMPHADENECTOMY, PELVIC;  Surgeon: Lokesh Carias MD;  Location: [facility] OR [facility];  Service: OB/GYN;  Laterality: Bilateral;    ROBOT-ASSISTED LAPAROSCOPIC RETROPERITONEAL LYMPHADENECTOMY USING DA SIRISHA XI N/A 7/8/2019    Procedure: XI ROBOTIC LYMPHADENECTOMY, RETROPERITONEUM;  Surgeon: Lokesh Carias MD;  Location: [facility] OR [facility];  Service: OB/GYN;  Laterality: N/A;    ROBOT-ASSISTED LAPAROSCOPIC SALPINGO-OOPHORECTOMY USING DA SIRISHA XI Bilateral 7/8/2019    Procedure: XI ROBOTIC SALPINGO-OOPHORECTOMY;  Surgeon: Lokesh Carias MD;  [facility];  Service: OB/GYN;  Laterality: Bilateral;     Patient Active Problem List   Diagnosis    Endometrial ca    Essential hypertension    Liver hemangioma    s/p RA-TLH/BSO/BPLD, omentectomy    Neuropathy due to chemotherapeutic drug    Urothelial carcinoma of kidney, right     Review of Systems   Constitutional: Negative.  Negative for chills and fatigue.   HENT: Negative.    Eyes: Negative.    Respiratory: Negative.  Negative for cough and shortness of breath.    Cardiovascular: Negative.  Negative for chest pain.   Gastrointestinal: Negative for abdominal pain, blood in stool, nausea and vomiting.   Genitourinary: Positive for flank pain. Negative for decreased urine volume, difficulty urinating, frequency, hematuria, nocturia, pelvic pain and urgency.   Musculoskeletal: Positive for back pain.   Skin: Negative for color change.   Neurological: Negative.    Psychiatric/Behavioral:  "Negative.          Objective:      BP (!) 182/84 (BP Location: Left arm, Patient Position: Lying)   Pulse 65   Temp 97.9 °F (36.6 °C) (Oral)   Resp 16   Ht 5' 4.5" (1.638 m)   Wt 70.3 kg (155 lb)   SpO2 100%   Breastfeeding No   BMI 26.19 kg/m²   Estimated body mass index is 26.19 kg/m² as calculated from the following:    Height as of this encounter: 5' 4.5" (1.638 m).    Weight as of this encounter: 70.3 kg (155 lb).  Physical Exam  Vitals and nursing note reviewed.   Constitutional:       General: She is not in acute distress.  HENT:      Head: Atraumatic.      Nose: Nose normal.   Eyes:      Extraocular Movements: Extraocular movements intact.   Cardiovascular:      Rate and Rhythm: Normal rate.   Pulmonary:      Effort: Pulmonary effort is normal.   Abdominal:      General: Abdomen is flat. There is no distension.      Tenderness: There is no abdominal tenderness. There is no right CVA tenderness, left CVA tenderness or guarding.      Comments: Laparoscopic incisions well-healed.   Musculoskeletal:         General: Normal range of motion.      Cervical back: Normal range of motion.   Neurological:      General: No focal deficit present.      Mental Status: She is alert. Mental status is at baseline.   Psychiatric:         Mood and Affect: Mood normal.         Behavior: Behavior normal.         Thought Content: Thought content normal.         Judgment: Judgment normal.           Assessment and Plan:   - Discussed UTUC with patient and daughter        Problem List Items Addressed This Visit    None     Visit Diagnoses     Encounter for follow-up surveillance of urothelial carcinoma of upper urinary tract        Urothelial carcinoma        Relevant Orders    SURG FL Surgery Fluoro Usage          Follow up:     To OR today for right ureteroscopy, tumor ablation and possible right stent placement.   Consented and all questions answered.   Urine negative for infection.   On no blood thinners.       Adnan N " Charis

## 2022-01-04 NOTE — ANESTHESIA POSTPROCEDURE EVALUATION
Anesthesia Post Evaluation    Patient: Gita García    Procedure(s) Performed: Procedure(s) (LRB):  URETEROSCOPY (Right)  ABLATION, NEOPLASM, URETER, USING LASER (Right)  PLACEMENT-STENT (Right)  CYSTOSCOPY (N/A)    Final Anesthesia Type: general      Patient location during evaluation: PACU  Patient participation: Yes- Able to Participate  Level of consciousness: awake and alert  Post-procedure vital signs: reviewed and stable  Pain management: adequate  Airway patency: patent    PONV status at discharge: No PONV  Anesthetic complications: no      Cardiovascular status: blood pressure returned to baseline  Respiratory status: unassisted, spontaneous ventilation and room air  Hydration status: euvolemic  Follow-up not needed.          Vitals Value Taken Time   /70 01/03/22 1616   Temp 36.7 °C (98 °F) 01/03/22 1630   Pulse 80 01/03/22 1622   Resp 21 01/03/22 1621   SpO2 99 % 01/03/22 1622   Vitals shown include unvalidated device data.      No case tracking events are documented in the log.      Pain/Salma Score: Salma Score: 10 (1/3/2022  4:40 PM)

## 2022-01-05 LAB
FINAL PATHOLOGIC DIAGNOSIS: ABNORMAL
Lab: ABNORMAL

## 2022-01-24 NOTE — PROGRESS NOTES
Clinic Note  1/24/2022      Subjective:         Chief Complaint:   HPI  Gita García is a 76 y.o. female with his of endometrial carcinoma. Recently had hematuria. CT scan 10/18/2021 showed right renal pelvis filling defect.  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 Not a complete ablatation per notes.   treated for bladder ca by Dr. Diaz [phone].      6 rounds of chemo, 5 rounds of radiation Oct 2019-Jan 2020.     Denies hematuria. Some left abd pain. Urethral caruncle.     Sister had kidney cancer 25 years ago. Nephrectomy.  Scoliosis. Hysterectomy, no other surgery.  Extensive review and interpretation of imaging and laboratory studies. Explained findings to patient and the impact on treatment plan.    Had ureteroscopy 1/3 2022 with ablation of right upper tract tumor. Cytology from barbotage specimen HG.    Past Medical History:   Diagnosis Date    Acid reflux     Arthritis     Chemotherapy induced nausea and vomiting 8/9/2019    Endometrial ca 6/26/2019    Essential hypertension 6/27/2019    Estrogen deficiency     Hormone deficiency     Hypertension     Liver mass 7/1/2019    Neuropathy due to chemotherapeutic drug 2/21/2020    Osteopenia     Seizures     post partal. several days after delivery    Urothelial carcinoma of kidney, right 11/11/2021     Family History   Problem Relation Age of Onset    Colon cancer Sister 53    Prostate cancer Brother     Breast cancer Sister 64    Kidney cancer Sister     Breast cancer Sister         in her 60s    Skin cancer Sister     Stroke Brother     Prostate cancer Brother     Heart disease Brother     Prostate cancer Brother     Heart disease Brother     Ovarian cancer Neg Hx     Uterine cancer Neg Hx     Anesthesia problems Neg Hx      Social History     Socioeconomic History    Marital status:    Tobacco Use    Smoking status: Never Smoker    Smokeless tobacco: Never  Used   Substance and Sexual Activity    Alcohol use: Never    Drug use: Never     Past Surgical History:   Procedure Laterality Date    ABLATION OF NEOPLASM OF URETER USING LASER Right 1/3/2022    Procedure: ABLATION, NEOPLASM, URETER, USING LASER;  Surgeon: Shane Diaz MD;  [facility]ST FLR;  Service: Urology;  Laterality: Right;    CYSTOSCOPY N/A 1/3/2022    Procedure: CYSTOSCOPY;  Surgeon: Shane Diaz MD;  [facility] FLR;  Service: Urology;  Laterality: N/A;    DILATION AND CURETTAGE OF UTERUS      GI scope  2016    LAPAROTOMY N/A 7/8/2019    Procedure: LAPAROTOMY;  Surgeon: Lokesh Carias MD;  Location: [facility] 2ND FLR;  Service: OB/GYN;  Laterality: N/A;  mini    OMENTECTOMY N/A 7/8/2019    Procedure: OMENTECTOMY;  Surgeon: Lokesh Carias MD;  Location: [facility] OR 2ND FLR;  Service: OB/GYN;  Laterality: N/A;    ROBOT-ASSISTED LAPAROSCOPIC ABDOMINAL HYSTERECTOMY USING DA SIRISHA XI N/A 7/8/2019    Procedure: XI ROBOTIC HYSTERECTOMY;  Surgeon: Lokesh Carias MD;  Location: [facility] OR 2ND FLR;  Service: OB/GYN;  Laterality: N/A;    ROBOT-ASSISTED LAPAROSCOPIC PELVIC LYMPHADENECTOMY USING DA SIRISHA XI Bilateral 7/8/2019    Procedure: XI ROBOTIC LYMPHADENECTOMY, PELVIC;  Surgeon: Lokesh Carias MD;  Location: [facility] OR 2ND FLR;  Service: OB/GYN;  Laterality: Bilateral;    ROBOT-ASSISTED LAPAROSCOPIC RETROPERITONEAL LYMPHADENECTOMY USING DA SIRISHA XI N/A 7/8/2019    Procedure: XI ROBOTIC LYMPHADENECTOMY, RETROPERITONEUM;  Surgeon: Lokesh Carias MD;  Location: [facility] OR 2ND FLR;  Service: OB/GYN;  Laterality: N/A;    ROBOT-ASSISTED LAPAROSCOPIC SALPINGO-OOPHORECTOMY USING DA SIRISHA XI Bilateral 7/8/2019    Procedure: XI ROBOTIC SALPINGO-OOPHORECTOMY;  Surgeon: Lokesh Carias MD;  Location: [facility] OR 2ND FLR;  Service: OB/GYN;  Laterality: Bilateral;    URETEROSCOPY Right 1/3/2022    Procedure: URETEROSCOPY;  Surgeon: Shane Diaz MD;  Location: [facility] OR 1ST FLR;   "Service: Urology;  Laterality: Right;  2hrs     Patient Active Problem List   Diagnosis    Endometrial ca    Essential hypertension    Liver hemangioma    s/p RA-TLH/BSO/BPLD, omentectomy    Neuropathy due to chemotherapeutic drug    Urothelial carcinoma of kidney, right     Review of Systems      Objective:      There were no vitals taken for this visit.  Estimated body mass index is 26.19 kg/m² as calculated from the following:    Height as of 1/3/22: 5' 4.5" (1.638 m).    Weight as of 1/3/22: 70.3 kg (155 lb).  Physical Exam      Assessment and Plan:           Problem List Items Addressed This Visit    None         Follow up:     Discussed with HG disease that nephrourterectomy would be her best option for No evidence of disease status. Discussed NAC (neoadjuvant chemotherapy). Reviewed recent imaging studies.  Discussed surgery, recovery.  The patient will meet with our  Ele today to find a date for surgery and begin preparation for surgery. Will also receive our handout on NPO guidelines and preop hydration. Patient will also receive information and education on preoperative skin preparation and postop wound care.   Will review at  TB.  Shane Diaz        "

## 2022-01-25 ENCOUNTER — OFFICE VISIT (OUTPATIENT)
Dept: GYNECOLOGIC ONCOLOGY | Facility: CLINIC | Age: 77
End: 2022-01-25
Payer: MEDICARE

## 2022-01-25 ENCOUNTER — OFFICE VISIT (OUTPATIENT)
Dept: UROLOGY | Facility: CLINIC | Age: 77
End: 2022-01-25
Payer: MEDICARE

## 2022-01-25 ENCOUNTER — LAB VISIT (OUTPATIENT)
Dept: LAB | Facility: HOSPITAL | Age: 77
End: 2022-01-25
Attending: OBSTETRICS & GYNECOLOGY
Payer: MEDICARE

## 2022-01-25 VITALS
DIASTOLIC BLOOD PRESSURE: 70 MMHG | WEIGHT: 156 LBS | HEART RATE: 68 BPM | HEIGHT: 65 IN | RESPIRATION RATE: 15 BRPM | BODY MASS INDEX: 25.99 KG/M2 | SYSTOLIC BLOOD PRESSURE: 147 MMHG

## 2022-01-25 VITALS
BODY MASS INDEX: 26.38 KG/M2 | SYSTOLIC BLOOD PRESSURE: 152 MMHG | WEIGHT: 156.06 LBS | HEART RATE: 60 BPM | DIASTOLIC BLOOD PRESSURE: 67 MMHG

## 2022-01-25 DIAGNOSIS — C54.1 ENDOMETRIAL CA: Primary | ICD-10-CM

## 2022-01-25 DIAGNOSIS — C54.1 ENDOMETRIAL CA: ICD-10-CM

## 2022-01-25 DIAGNOSIS — C64.1 UROTHELIAL CARCINOMA OF KIDNEY, RIGHT: Primary | ICD-10-CM

## 2022-01-25 LAB — CANCER AG125 SERPL-ACNC: 20 U/ML (ref 0–30)

## 2022-01-25 PROCEDURE — 99215 OFFICE O/P EST HI 40 MIN: CPT | Mod: S$GLB,,, | Performed by: UROLOGY

## 2022-01-25 PROCEDURE — 1157F PR ADVANCE CARE PLAN OR EQUIV PRESENT IN MEDICAL RECORD: ICD-10-PCS | Mod: CPTII,S$GLB,, | Performed by: UROLOGY

## 2022-01-25 PROCEDURE — 3078F PR MOST RECENT DIASTOLIC BLOOD PRESSURE < 80 MM HG: ICD-10-PCS | Mod: CPTII,S$GLB,, | Performed by: OBSTETRICS & GYNECOLOGY

## 2022-01-25 PROCEDURE — 1101F PT FALLS ASSESS-DOCD LE1/YR: CPT | Mod: CPTII,S$GLB,, | Performed by: OBSTETRICS & GYNECOLOGY

## 2022-01-25 PROCEDURE — 99999 PR PBB SHADOW E&M-EST. PATIENT-LVL III: CPT | Mod: PBBFAC,,, | Performed by: OBSTETRICS & GYNECOLOGY

## 2022-01-25 PROCEDURE — 1159F PR MEDICATION LIST DOCUMENTED IN MEDICAL RECORD: ICD-10-PCS | Mod: CPTII,S$GLB,, | Performed by: UROLOGY

## 2022-01-25 PROCEDURE — 1157F ADVNC CARE PLAN IN RCRD: CPT | Mod: CPTII,S$GLB,, | Performed by: OBSTETRICS & GYNECOLOGY

## 2022-01-25 PROCEDURE — 99215 PR OFFICE/OUTPT VISIT, EST, LEVL V, 40-54 MIN: ICD-10-PCS | Mod: S$GLB,,, | Performed by: UROLOGY

## 2022-01-25 PROCEDURE — 1126F PR PAIN SEVERITY QUANTIFIED, NO PAIN PRESENT: ICD-10-PCS | Mod: CPTII,S$GLB,, | Performed by: UROLOGY

## 2022-01-25 PROCEDURE — 1159F MED LIST DOCD IN RCRD: CPT | Mod: CPTII,S$GLB,, | Performed by: UROLOGY

## 2022-01-25 PROCEDURE — 99214 PR OFFICE/OUTPT VISIT, EST, LEVL IV, 30-39 MIN: ICD-10-PCS | Mod: S$GLB,,, | Performed by: OBSTETRICS & GYNECOLOGY

## 2022-01-25 PROCEDURE — 86304 IMMUNOASSAY TUMOR CA 125: CPT | Performed by: OBSTETRICS & GYNECOLOGY

## 2022-01-25 PROCEDURE — 3078F DIAST BP <80 MM HG: CPT | Mod: CPTII,S$GLB,, | Performed by: OBSTETRICS & GYNECOLOGY

## 2022-01-25 PROCEDURE — 3078F DIAST BP <80 MM HG: CPT | Mod: CPTII,S$GLB,, | Performed by: UROLOGY

## 2022-01-25 PROCEDURE — 99999 PR PBB SHADOW E&M-EST. PATIENT-LVL III: ICD-10-PCS | Mod: PBBFAC,,, | Performed by: OBSTETRICS & GYNECOLOGY

## 2022-01-25 PROCEDURE — 3288F FALL RISK ASSESSMENT DOCD: CPT | Mod: CPTII,S$GLB,, | Performed by: UROLOGY

## 2022-01-25 PROCEDURE — 3288F FALL RISK ASSESSMENT DOCD: CPT | Mod: CPTII,S$GLB,, | Performed by: OBSTETRICS & GYNECOLOGY

## 2022-01-25 PROCEDURE — 1157F ADVNC CARE PLAN IN RCRD: CPT | Mod: CPTII,S$GLB,, | Performed by: UROLOGY

## 2022-01-25 PROCEDURE — 1126F AMNT PAIN NOTED NONE PRSNT: CPT | Mod: CPTII,S$GLB,, | Performed by: UROLOGY

## 2022-01-25 PROCEDURE — 3078F PR MOST RECENT DIASTOLIC BLOOD PRESSURE < 80 MM HG: ICD-10-PCS | Mod: CPTII,S$GLB,, | Performed by: UROLOGY

## 2022-01-25 PROCEDURE — 1126F AMNT PAIN NOTED NONE PRSNT: CPT | Mod: CPTII,S$GLB,, | Performed by: OBSTETRICS & GYNECOLOGY

## 2022-01-25 PROCEDURE — 3288F PR FALLS RISK ASSESSMENT DOCUMENTED: ICD-10-PCS | Mod: CPTII,S$GLB,, | Performed by: UROLOGY

## 2022-01-25 PROCEDURE — 3077F PR MOST RECENT SYSTOLIC BLOOD PRESSURE >= 140 MM HG: ICD-10-PCS | Mod: CPTII,S$GLB,, | Performed by: UROLOGY

## 2022-01-25 PROCEDURE — 99214 OFFICE O/P EST MOD 30 MIN: CPT | Mod: S$GLB,,, | Performed by: OBSTETRICS & GYNECOLOGY

## 2022-01-25 PROCEDURE — 3077F SYST BP >= 140 MM HG: CPT | Mod: CPTII,S$GLB,, | Performed by: OBSTETRICS & GYNECOLOGY

## 2022-01-25 PROCEDURE — 1101F PR PT FALLS ASSESS DOC 0-1 FALLS W/OUT INJ PAST YR: ICD-10-PCS | Mod: CPTII,S$GLB,, | Performed by: UROLOGY

## 2022-01-25 PROCEDURE — 36415 COLL VENOUS BLD VENIPUNCTURE: CPT | Performed by: OBSTETRICS & GYNECOLOGY

## 2022-01-25 PROCEDURE — 1101F PT FALLS ASSESS-DOCD LE1/YR: CPT | Mod: CPTII,S$GLB,, | Performed by: UROLOGY

## 2022-01-25 PROCEDURE — 3077F SYST BP >= 140 MM HG: CPT | Mod: CPTII,S$GLB,, | Performed by: UROLOGY

## 2022-01-25 PROCEDURE — 1157F PR ADVANCE CARE PLAN OR EQUIV PRESENT IN MEDICAL RECORD: ICD-10-PCS | Mod: CPTII,S$GLB,, | Performed by: OBSTETRICS & GYNECOLOGY

## 2022-01-25 PROCEDURE — 99999 PR PBB SHADOW E&M-EST. PATIENT-LVL III: CPT | Mod: PBBFAC,,, | Performed by: UROLOGY

## 2022-01-25 PROCEDURE — 1126F PR PAIN SEVERITY QUANTIFIED, NO PAIN PRESENT: ICD-10-PCS | Mod: CPTII,S$GLB,, | Performed by: OBSTETRICS & GYNECOLOGY

## 2022-01-25 PROCEDURE — 3077F PR MOST RECENT SYSTOLIC BLOOD PRESSURE >= 140 MM HG: ICD-10-PCS | Mod: CPTII,S$GLB,, | Performed by: OBSTETRICS & GYNECOLOGY

## 2022-01-25 PROCEDURE — 99999 PR PBB SHADOW E&M-EST. PATIENT-LVL III: ICD-10-PCS | Mod: PBBFAC,,, | Performed by: UROLOGY

## 2022-01-25 PROCEDURE — 1101F PR PT FALLS ASSESS DOC 0-1 FALLS W/OUT INJ PAST YR: ICD-10-PCS | Mod: CPTII,S$GLB,, | Performed by: OBSTETRICS & GYNECOLOGY

## 2022-01-25 PROCEDURE — 3288F PR FALLS RISK ASSESSMENT DOCUMENTED: ICD-10-PCS | Mod: CPTII,S$GLB,, | Performed by: OBSTETRICS & GYNECOLOGY

## 2022-01-25 NOTE — PROGRESS NOTES
Subjective:      Patient ID: Gita García is a 76 y.o. female.    Chief Complaint: Follow-up      HPI     Presents today for routine surveillance visit for UPSC. Followed by Dr. Carias and new to me. Recent diagnosis of urothelial carcinoma, followed by Dr. Diaz. S/p ablation and visit with him today to discuss ACT.     today: 20    MMG: Mar 2021: N per patient   CBE with Dr. Abdul  Colonoscopy: 2016: N  Repeat in 5 years due FH colon ca in sister.     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 Negative cytology. 2 mm invasion.   Aug 2019: VBT with Dr. Cui  Dec 2019: Records received from Dr. Ruddy Simental. Completed  6 cycles of Taxol and carboplatin.    2021: : 12.6 in [place].      Aug 2021.   in May. 2 sisters . One from Covid.       Review of Systems   Constitutional: Positive for fatigue. Negative for appetite change, chills, diaphoresis, fever and unexpected weight change.   Respiratory: Negative for cough, chest tightness, shortness of breath and wheezing.    Cardiovascular: Negative for chest pain, palpitations and leg swelling.   Gastrointestinal: Negative for abdominal distention, abdominal pain, blood in stool, constipation, diarrhea, nausea and vomiting.   Genitourinary: Positive for dysuria, frequency and hematuria (resolved). Negative for pelvic pain, vaginal bleeding, vaginal discharge and vaginal pain.   Musculoskeletal: Negative for arthralgias and back pain.   Skin: Negative for color change and rash.   Neurological: Negative for dizziness, weakness, numbness and headaches.   Hematological: Negative for adenopathy.   Psychiatric/Behavioral: Negative for confusion and sleep disturbance. The patient is not nervous/anxious.        Past Medical History:   Diagnosis Date    Acid reflux     Arthritis     Chemotherapy induced nausea and  vomiting 8/9/2019    Endometrial ca 6/26/2019    Essential hypertension 6/27/2019    Estrogen deficiency     Hormone deficiency     Hypertension     Liver mass 7/1/2019    Neuropathy due to chemotherapeutic drug 2/21/2020    Osteopenia     Seizures     post partal. several days after delivery    Urothelial carcinoma of kidney, right 11/11/2021     Past Surgical History:   Procedure Laterality Date    ABLATION OF NEOPLASM OF URETER USING LASER Right 1/3/2022    Procedure: ABLATION, NEOPLASM, URETER, USING LASER;  Surgeon: Shane Diaz MD;  Location: [facility] OR 1ST FLR;  Service: Urology;  Laterality: Right;    CYSTOSCOPY N/A 1/3/2022    Procedure: CYSTOSCOPY;  Surgeon: Shane Diaz MD;  [facility]ST FLR;  Service: Urology;  Laterality: N/A;    DILATION AND CURETTAGE OF UTERUS      GI scope  2016    LAPAROTOMY N/A 7/8/2019    Procedure: LAPAROTOMY;  Surgeon: Lokesh Carias MD;  Location: [facility] 2ND FLR;  Service: OB/GYN;  Laterality: N/A;  mini    OMENTECTOMY N/A 7/8/2019    Procedure: OMENTECTOMY;  Surgeon: Lokesh Carias MD;  Location: [facility] 2ND FLR;  Service: OB/GYN;  Laterality: N/A;    ROBOT-ASSISTED LAPAROSCOPIC ABDOMINAL HYSTERECTOMY USING DA SIRISHA XI N/A 7/8/2019    Procedure: XI ROBOTIC HYSTERECTOMY;  Surgeon: Lokesh Carias MD;  [facility] FLR;  Service: OB/GYN;  Laterality: N/A;    ROBOT-ASSISTED LAPAROSCOPIC PELVIC LYMPHADENECTOMY USING DA SIRISHA XI Bilateral 7/8/2019    Procedure: XI ROBOTIC LYMPHADENECTOMY, PELVIC;  Surgeon: Lokesh Carias MD;  Location: [facility] 2ND FLR;  Service: OB/GYN;  Laterality: Bilateral;    ROBOT-ASSISTED LAPAROSCOPIC RETROPERITONEAL LYMPHADENECTOMY USING DA SIRISHA XI N/A 7/8/2019    Procedure: XI ROBOTIC LYMPHADENECTOMY, RETROPERITONEUM;  Surgeon: Lokesh Carias MD;  [facility]: [facility] 2ND FLR;  Service: OB/GYN;  Laterality: N/A;    ROBOT-ASSISTED LAPAROSCOPIC SALPINGO-OOPHORECTOMY USING DA SIRISHA XI Bilateral 7/8/2019     Procedure: XI ROBOTIC SALPINGO-OOPHORECTOMY;  Surgeon: Lokesh Carias MD;  Location: [facility] OR 2ND FLR;  Service: OB/GYN;  Laterality: Bilateral;    URETEROSCOPY Right 1/3/2022    Procedure: URETEROSCOPY;  Surgeon: Shane Diaz MD;  Location: [facility] OR 1ST FLR;  Service: Urology;  Laterality: Right;  2hrs     Family History   Problem Relation Age of Onset    Colon cancer Sister 53    Prostate cancer Brother     Breast cancer Sister 64    Kidney cancer Sister     Breast cancer Sister         in her 60s    Skin cancer Sister     Stroke Brother     Prostate cancer Brother     Heart disease Brother     Prostate cancer Brother     Heart disease Brother     Ovarian cancer Neg Hx     Uterine cancer Neg Hx     Anesthesia problems Neg Hx      Social History     Socioeconomic History    Marital status:    Tobacco Use    Smoking status: Never Smoker    Smokeless tobacco: Never Used   Substance and Sexual Activity    Alcohol use: Never    Drug use: Never     Current Outpatient Medications   Medication Sig    bisoprolol-hydrochlorothiazide (ZIAC) 10-6.25 mg per tablet Take 1 tablet by mouth once daily.     pantoprazole (PROTONIX) 40 MG tablet Take 40 mg by mouth once daily.    raloxifene (EVISTA) 60 mg tablet Take 60 mg by mouth every evening.    ALPRAZolam (XANAX) 0.25 MG tablet Take 0.25 mg by mouth 2 (two) times daily as needed for Anxiety.     calcium carbonate/vitamin D3 (CALCIUM 600 + D,3, ORAL) Take by mouth once daily.     co-enzyme Q-10 30 mg capsule Take 30 mg by mouth 3 (three) times daily.    HYDROcodone-acetaminophen (NORCO) 5-325 mg per tablet Take 1 tablet by mouth every 6 (six) hours as needed. (Patient not taking: Reported on 1/25/2022)    ketorolac (TORADOL) 10 mg tablet Take 1 tablet (10 mg total) by mouth every 6 (six) hours as needed for Pain. (Patient not taking: Reported on 1/25/2022)    milk thistle 175 mg tablet Take 175 mg by mouth once daily.     multivitamin/iron/folic acid (CENTRUM ORAL) Take by mouth once daily.     mv-min/FA/vit K/lycop/lut/zeax (OCUVITE EYE PLUS MULTI ORAL) Take 1 tablet by mouth once daily.     oxybutynin (DITROPAN) 5 MG Tab Take 1 tablet (5 mg total) by mouth 3 (three) times daily as needed (bladder spasm). (Patient not taking: Reported on 1/25/2022)    polyethylene glycol (GLYCOLAX) 17 gram PwPk Take by mouth every morning.    tamsulosin (FLOMAX) 0.4 mg Cap Take 1 capsule (0.4 mg total) by mouth once daily. (Patient not taking: Reported on 1/25/2022)     No current facility-administered medications for this visit.     Review of patient's allergies indicates:   Allergen Reactions    Asa [aspirin] Other (See Comments)     Stomach upset    Dilaudid (pf) [hydromorphone (pf)] Nausea Only    Sulfa (sulfonamide antibiotics) Itching     BP (!) 152/67   Pulse 60   Wt 70.8 kg (156 lb 1.4 oz)   BMI 26.38 kg/m²     Objective:   Physical Exam:   Constitutional: She is oriented to person, place, and time. She appears well-developed and well-nourished. No distress.    HENT:   Head: Normocephalic and atraumatic.    Eyes: No scleral icterus.     Cardiovascular: Exam reveals no cyanosis and no edema.     Pulmonary/Chest: Effort normal. No respiratory distress. She exhibits no tenderness.        Abdominal: Soft. She exhibits no distension, no fluid wave, no ascites and no mass. There is no abdominal tenderness. There is no rebound and no guarding. No hernia.     Genitourinary:    Vagina and rectum normal.      Pelvic exam was performed with patient supine.   There is no rash or lesion on the right labia. There is no rash or lesion on the left labia. Right adnexum displays no mass, no tenderness and no fullness. Left adnexum displays no mass, no tenderness and no fullness. Vaginal cuff normal.  No  no vaginal discharge, tenderness or bleeding in the vagina. Cervix is absent.Uterus is absent.           Musculoskeletal: Normal range of motion  and moves all extremeties. No edema.      Lymphadenopathy:     She has no cervical adenopathy.        Right: No inguinal adenopathy present.        Left: No inguinal adenopathy present.    Neurological: She is alert and oriented to person, place, and time.    Skin: Skin is warm and dry. No rash noted. No cyanosis or erythema. No pallor.    Psychiatric: She has a normal mood and affect. Thought content normal.       Assessment:     1. Endometrial ca        Plan:       No evidence of disease on today's exam   20, normal and stable.   Plan for continued surveillance. RTC 4 months or sooner if needed.   Under the care of Dr. Diaz for newly diagnosed upper tract urothelial carcinoma.     I spent approximately 30 minutes reviewing the available records and evaluating the patient, out of which over 50% of the time was spent face to face with the patient in counseling and coordinating this patient's care.

## 2022-01-27 ENCOUNTER — PATIENT MESSAGE (OUTPATIENT)
Dept: UROLOGY | Facility: CLINIC | Age: 77
End: 2022-01-27
Payer: MEDICARE

## 2022-01-27 ENCOUNTER — TUMOR BOARD CONFERENCE (OUTPATIENT)
Dept: UROLOGY | Facility: CLINIC | Age: 77
End: 2022-01-27
Payer: MEDICARE

## 2022-02-03 ENCOUNTER — TELEPHONE (OUTPATIENT)
Dept: GYNECOLOGIC ONCOLOGY | Facility: CLINIC | Age: 77
End: 2022-02-03
Payer: MEDICARE

## 2022-02-03 ENCOUNTER — PATIENT MESSAGE (OUTPATIENT)
Dept: UROLOGY | Facility: CLINIC | Age: 77
End: 2022-02-03
Payer: MEDICARE

## 2022-02-03 DIAGNOSIS — C54.1 ENDOMETRIAL CA: Primary | ICD-10-CM

## 2022-02-03 NOTE — TELEPHONE ENCOUNTER
----- Message from Makenzie Neil sent at 2/3/2022  3:18 PM CST -----  Hey patient has a follow up in May. Need a  order. Please, thanks     Makenzie

## 2022-02-09 NOTE — PROGRESS NOTES
Oncology History   Endometrial ca   Urothelial carcinoma of kidney, right   1/27/2022 Tumor Conference    Presenting Hospital / Clinic: [facility] [address]  Virtual Tumor Board Conference: Virtual  Presenter: Edmond Manuel  Date Presented to Tumor Board: 1/27/2022  Specialties Present: Medical Oncology; Radiation Oncology; Pathology; Navigation; Urology  Presentation at Cancer Conference: Prospective  Cancer Type: Other  Other Cancer: right upper tract urothelial  Recommended Plan: Chemotherapy; Surgery  Recommended Plan Note: neoadjuvant split dose MVAC + open nephroureterectomy    Oncology History   Endometrial ca   Urothelial carcinoma of kidney, right   1/27/2022 Tumor Conference    Presenting Hospital / Clinic: [facility] [address]  Virtual Tumor Board Conference: Virtual  Presenter: Edmond Manuel  Date Presented to Tumor Board: 1/27/2022  Specialties Present: Medical Oncology; Radiation Oncology; Pathology; Navigation; Urology  Presentation at Cancer Conference: Prospective  Cancer Type: Other  Other Cancer: right upper tract urothelial  Recommended Plan: Chemotherapy; Surgery  Recommended Plan Note: neoadjuvant split dose MVAC + open nephroureterectomy                  PATIENT SUMMARY:   Gita García is a 76 y.o. female with HG right upper tract UCC. History of endometrial cancer s/p neoadjuvant taxol+cisplatin and vaginal brachytherapy followed by robotic hysterectomy.    DISCUSSION:  Pathology review  Staging review    PERFORMANCE STATUS:  ECOG 0    Estimated GFR/CKD Stage: >60 (cr 0.7)    Clinical/Pathologic Stage (TNM): T1 N0 M0    FACULTY IN ATTENDANCE:    Urologic Oncology: Shane Diaz MD [x], Michael Finley MD [x] , Hever Pollack MD []    CONSULT NEEDED:     [] Urologic Oncology    [] Hem/Onc    [] Rad/Onc     [x] Treatment Guidelines (NCCN and AUA) reviewed and care planned is consistent with guidelines.    TUMOR BOARD RECOMMENDATIONS/PLAN/CONSENSUS:     Case discussed among group. Pathology and  radiologic images were reviewed (if applicable).    Neoadjuvant split-dose DDMVAC + open nephroureterectomy  Referral to genetics           PATIENT SUMMARY:   Gita García is a 76 y.o. female with 77 yo female with history of endometrial cancer treated with robotic hysterectomy, 6 cycles of taxol and carboplatin, and vaginal brachytherapy ([id number]). Presented to urology clinic with hematuria. CT urogram shows right filling defect. Right URS (outside facility) with incomplete resection shows high volume, low grade UCC.    DISCUSSION:  Path review, neoadjuvant treatment, immunotherapy    PERFORMANCE STATUS:  ECOG 0    Estimated GFR/CKD Stage: >60    Clinical/Pathologic Stage (TNM): T1 N0 M0    FACULTY IN ATTENDANCE:    Urologic Oncology: Shane Diaz MD [x], Michael Finley MD [x] , Hever Pollack MD []    CONSULT NEEDED:     [] Urologic Oncology    [] Hem/Onc    [] Rad/Onc     [x] Treatment Guidelines (NCCN and AUA) reviewed and care planned is consistent with guidelines.    TUMOR BOARD RECOMMENDATIONS/PLAN/CONSENSUS:     Case discussed among group. Pathology and radiologic images were reviewed (if applicable).    - Will discuss with patient concern neoadjuvant with nephroureterectomy.  - Needs genetics consultation  - Candidate for keytruda

## 2022-02-16 ENCOUNTER — TELEPHONE (OUTPATIENT)
Dept: GASTROENTEROLOGY | Facility: CLINIC | Age: 77
End: 2022-02-16
Payer: MEDICARE

## 2022-02-16 ENCOUNTER — PATIENT MESSAGE (OUTPATIENT)
Dept: UROLOGY | Facility: CLINIC | Age: 77
End: 2022-02-16
Payer: MEDICARE

## 2022-02-16 ENCOUNTER — TELEPHONE (OUTPATIENT)
Dept: UROLOGY | Facility: CLINIC | Age: 77
End: 2022-02-16
Payer: MEDICARE

## 2022-02-16 DIAGNOSIS — C64.1 UROTHELIAL CARCINOMA OF KIDNEY, RIGHT: Primary | ICD-10-CM

## 2022-02-21 ENCOUNTER — TELEPHONE (OUTPATIENT)
Dept: GASTROENTEROLOGY | Facility: CLINIC | Age: 77
End: 2022-02-21
Payer: MEDICARE

## 2022-02-21 NOTE — TELEPHONE ENCOUNTER
Return call and spoke with patient. Inform patient that she will need to contact Dr. Diaz office to further advice on procedure.     Patient verbalized understanding.

## 2022-02-21 NOTE — TELEPHONE ENCOUNTER
----- Message from Nicole De Souza sent at 2/21/2022  8:50 AM CST -----  Regarding: pt  Pt is returning the nurses phone  call from last week can you please call pt at [phone] or cell phone [phone].    KAYLIN

## 2022-02-23 ENCOUNTER — TELEPHONE (OUTPATIENT)
Dept: PREADMISSION TESTING | Facility: HOSPITAL | Age: 77
End: 2022-02-23
Payer: MEDICARE

## 2022-02-23 NOTE — ANESTHESIA PAT ROS NOTE
02/23/2022  Gita García is a 76 y.o., female.      Pre-op Assessment          Review of Systems  Anesthesia Hx:  No problems with previous Anesthesia  History of prior surgery of interest to airway management or planning: Previous anesthesia: General 1/3/22 ureteroscopy with general anesthesia.    Social:  Non-Smoker, No Alcohol Use    Hematology/Oncology:  Hematology Normal      Current/Recent Cancer. --  Cancer in past history:  Oncology Comments: Urothelial cancer of rt kidney  Endometrial cancer   ? Liver mass     EENT/Dental:EENT/Dental Normal   Cardiovascular:   Exercise tolerance: good Hypertension    Pulmonary:  Pulmonary Normal    Renal/:   Chronic Renal Disease Urothelial carcinoma of rt. Kidney    Hepatic/GI:   GERD Liver Disease, Hx of liver hemangioma   Musculoskeletal:   Arthritis     Neurological:   Has not had any seizures since post partum   Endocrine:   Estrogen deficiency  Hormone deficiency   Dermatological:  Skin Normal    Psych:  Psychiatric Normal              Anesthesia Assessment: Preoperative EQUATION    Planned Procedure: Procedure(s) (LRB):  XI ROBOTIC NEPHROURETERECTOMY/ WITH BLADDER CUFF (Right)  Requested Anesthesia Type:General/Regional  Surgeon: Shane Diaz MD  Service: Urology  Known or anticipated Date of Surgery:3/14/2022    Surgeon notes: reviewed    Electronic QUestionnaire Assessment completed via nurse interview with patient.        Triage considerations:     )    Previous anesthesia records:GETA  1/3/22  Airway Placement Date: 01/03/22 Placement Time: 1222 , created via procedure documentation  Mask Ventilation: Easy Blade: [place] #2 Airway Device Size: 7.0 Placement Verified By: Capnometry Complicating Factors: None Findings Post-Intubation: Bilateral breath sounds Secured at: Teeth Complications: None Removal Date: 01/03/22 Removal Time: 1426      Airway Placement Date: 01/03/22 Placement Time: 1222 , created via procedure documentation  Airway Device Size: 7.0 Placement Verified By: Capnometry Removal          Last PCP note: Rigoberto Natarajan  Subspecialty notes: Gastroenterology, Gyn/ONC, Hematology/Oncology, Urology    Other important co-morbidities: GERD and HTN      Diagnosis Date    Acid reflux      Arthritis      Chemotherapy induced nausea and vomiting 8/9/2019    Endometrial ca 6/26/2019    Essential hypertension 6/27/2019    Estrogen deficiency      Hormone deficiency      Liver mass 7/1/2019    Neuropathy due to chemotherapeutic drug 2/21/2020    Osteopenia      Seizures       post partal. several days after delivery    Urothelial carcinoma of kidney, right        Tests already available:  No recent tests.             Instructions given. (See in Nurse's note)    Optimization:    Medical Opinion Indicated        Plan:    Testing:  CMP, EKG, Hematology Profile, T&S and covid   Pre-anesthesia  visit       Visit focus: none     Consultation:Patient's PCP for a statement of optimization       Navigation: Tests Scheduled.              Consults scheduled.             Results will be tracked by Preop Clinic.

## 2022-02-23 NOTE — TELEPHONE ENCOUNTER
----- Message from Vandana Tilley RN sent at 2/23/2022  3:25 PM CST -----  Please schedule labs & ekg 3/7 -- orders in

## 2022-03-06 NOTE — H&P (VIEW-ONLY)
Urology ([facility]) H&P for upcoming procedure  Staff:  Shane Diaz MD    CC: HG right upper tract UCC.    HPI:  Gita García is a 76 y.o. female with a right renal mass. Hx of HG right upper tract UCC. History of endometrial cancer s/p neoadjuvant taxol+cisplatin and vaginal brachytherapy followed by robotic hysterectomy.    Hx includes endometrial carcinoma. Recently had hematuria. CT scan 10/18/2021 showed right renal pelvis filling defect.  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 Not a complete ablatation per notes.   treated for bladder ca by Dr. Diaz [phone].      6 rounds of chemo, 5 rounds of radiation Oct 2019-Jan 2020.     Denies hematuria. Some left abd pain. Urethral caruncle.     Sister had kidney cancer 25 years ago. Nephrectomy.  Scoliosis. Hysterectomy, no other surgery.  Extensive review and interpretation of imaging and laboratory studies. Explained findings to patient and the impact on treatment plan.   Had ureteroscopy 1/3 2022 with ablation of right upper tract tumor. Cytology from barbotage specimen HG.      ROS: Negative except for as stated above    Past Medical History:   Diagnosis Date    Acid reflux     Arthritis     Chemotherapy induced nausea and vomiting 8/9/2019    Endometrial ca 6/26/2019    Essential hypertension 6/27/2019    Estrogen deficiency     Hormone deficiency     Hypertension     Liver mass 7/1/2019    Neuropathy due to chemotherapeutic drug 2/21/2020    Osteopenia     Seizures     post partal. several days after delivery    Urothelial carcinoma of kidney, right 11/11/2021       Past Surgical History:   Procedure Laterality Date    ABLATION OF NEOPLASM OF URETER USING LASER Right 1/3/2022    Procedure: ABLATION, NEOPLASM, URETER, USING LASER;  Surgeon: Shane Diaz MD;  Location: [facility] OR [address] [place] [address];  Service: Urology;  Laterality: Right;    CYSTOSCOPY N/A 1/3/2022    Procedure:  CYSTOSCOPY;  Surgeon: Shane Diaz MD;  Location: [facility] OR 1ST FLR;  Service: Urology;  Laterality: N/A;    DILATION AND CURETTAGE OF UTERUS      GI scope  2016    LAPAROTOMY N/A 7/8/2019    Procedure: LAPAROTOMY;  Surgeon: Lokesh Carias MD;  Location: [facility] OR 2ND FLR;  Service: OB/GYN;  Laterality: N/A;  mini    OMENTECTOMY N/A 7/8/2019    Procedure: OMENTECTOMY;  Surgeon: Lokesh Carias MD;  Location: [facility] OR 2ND FLR;  Service: OB/GYN;  Laterality: N/A;    ROBOT-ASSISTED LAPAROSCOPIC ABDOMINAL HYSTERECTOMY USING DA SIRISHA XI N/A 7/8/2019    Procedure: XI ROBOTIC HYSTERECTOMY;  Surgeon: Lokesh Carias MD;  Location: [facility] OR 2ND FLR;  Service: OB/GYN;  Laterality: N/A;    ROBOT-ASSISTED LAPAROSCOPIC PELVIC LYMPHADENECTOMY USING DA SIRISHA XI Bilateral 7/8/2019    Procedure: XI ROBOTIC LYMPHADENECTOMY, PELVIC;  Surgeon: Lokesh Carias MD;  Location: [facility] OR 2ND FLR;  Service: OB/GYN;  Laterality: Bilateral;    ROBOT-ASSISTED LAPAROSCOPIC RETROPERITONEAL LYMPHADENECTOMY USING DA SIRISHA XI N/A 7/8/2019    Procedure: XI ROBOTIC LYMPHADENECTOMY, RETROPERITONEUM;  Surgeon: Lokesh Carias MD;  Location: [facility] OR 2ND FLR;  Service: OB/GYN;  Laterality: N/A;    ROBOT-ASSISTED LAPAROSCOPIC SALPINGO-OOPHORECTOMY USING DA SIRISHA XI Bilateral 7/8/2019    Procedure: XI ROBOTIC SALPINGO-OOPHORECTOMY;  Surgeon: Lokesh Carias MD;  Location: [facility] OR 2ND FLR;  Service: OB/GYN;  Laterality: Bilateral;    URETEROSCOPY Right 1/3/2022    Procedure: URETEROSCOPY;  Surgeon: Shane Diaz MD;  Location: [facility] OR 1ST FLR;  Service: Urology;  Laterality: Right;  2hrs       Social History     Socioeconomic History    Marital status:    Tobacco Use    Smoking status: Never Smoker    Smokeless tobacco: Never Used   Substance and Sexual Activity    Alcohol use: Never    Drug use: Never       Family History   Problem Relation Age of Onset    Colon cancer Sister 53    Prostate cancer Brother     Breast cancer  Sister 64    Kidney cancer Sister     Breast cancer Sister         in her 60s    Skin cancer Sister     Stroke Brother     Prostate cancer Brother     Heart disease Brother     Prostate cancer Brother     Heart disease Brother     Ovarian cancer Neg Hx     Uterine cancer Neg Hx     Anesthesia problems Neg Hx        Review of patient's allergies indicates:   Allergen Reactions    Asa [aspirin] Other (See Comments)     Stomach upset    Dilaudid (pf) [hydromorphone (pf)] Nausea Only    Sulfa (sulfonamide antibiotics) Itching       Current Outpatient Medications on File Prior to Visit   Medication Sig Dispense Refill    ALPRAZolam (XANAX) 0.25 MG tablet Take 0.25 mg by mouth 2 (two) times daily as needed for Anxiety.       bisoprolol-hydrochlorothiazide (ZIAC) 10-6.25 mg per tablet Take 1 tablet by mouth once daily.       calcium carbonate/vitamin D3 (CALCIUM 600 + D,3, ORAL) Take by mouth once daily.       co-enzyme Q-10 30 mg capsule Take 30 mg by mouth 3 (three) times daily.      HYDROcodone-acetaminophen (NORCO) 5-325 mg per tablet Take 1 tablet by mouth every 6 (six) hours as needed. (Patient not taking: No sig reported) 7 tablet 0    ketorolac (TORADOL) 10 mg tablet Take 1 tablet (10 mg total) by mouth every 6 (six) hours as needed for Pain. (Patient not taking: No sig reported) 12 tablet 0    milk thistle 175 mg tablet Take 175 mg by mouth once daily.      multivitamin/iron/folic acid (CENTRUM ORAL) Take by mouth once daily.       mv-min/FA/vit K/lycop/lut/zeax (OCUVITE EYE PLUS MULTI ORAL) Take 1 tablet by mouth once daily.       oxybutynin (DITROPAN) 5 MG Tab Take 1 tablet (5 mg total) by mouth 3 (three) times daily as needed (bladder spasm). (Patient not taking: No sig reported) 30 tablet 0    pantoprazole (PROTONIX) 40 MG tablet Take 40 mg by mouth once daily.      polyethylene glycol (GLYCOLAX) 17 gram PwPk Take by mouth every morning.      raloxifene (EVISTA) 60 mg tablet Take 60  "mg by mouth every evening.      tamsulosin (FLOMAX) 0.4 mg Cap Take 1 capsule (0.4 mg total) by mouth once daily. (Patient not taking: No sig reported) 30 capsule 0     No current facility-administered medications on file prior to visit.       Anticoagulation:  No    Physical Exam:  Estimated body mass index is 26.36 kg/m² as calculated from the following:    Height as of 1/25/22: 5' 4.5" (1.638 m).    Weight as of 1/25/22: 70.8 kg (156 lb).     General: No acute distress, well developed. AAOx3  Head: Normocephalic, Atraumatic  Eyes: Extra-occular movements intact, No discharge  Neck: supple, symmetrical, trachea midline  Lungs: normal respiratory effort, no respiratory distress, no wheezes  CV: regular rate, 2+ pulses  Abdomen: soft, non-tender, non-distended, no organomegaly. Robotic port site incisions well healing. 3cm supraumbilical midline incision well healing.   MSK: no edema, no deformities, normal ROM  Skin: skin color, texture, turgor normal.  Neurologic: no focal deficits, sensation intact     Labs:    Urine dipstick today shows negative for all components.    Lab Results   Component Value Date    WBC 6.50 11/11/2021    HGB 12.1 11/11/2021    HCT 36.5 (L) 11/11/2021    MCV 93 11/11/2021     11/11/2021           BMP  Lab Results   Component Value Date     11/11/2021    K 3.8 11/11/2021     11/11/2021    CO2 28 11/11/2021    BUN 11 11/11/2021    CREATININE 0.7 11/11/2021    CALCIUM 9.1 11/11/2021    ANIONGAP 8 11/11/2021    ESTGFRAFRICA >60.0 11/11/2021    EGFRNONAA >60.0 11/11/2021       Imaging:   CT AP from 12/8/2021 -  Single artery and vein R kidney.      Chest imaging: CXR 11/11/21 - No evidence of masses suspicious for mets    Assessment: Gita García is a 76 y.o. female with HG right upper tract UCC.    Plan:      1. To OR on 3/14/22 for right nephroureterectomy with bladder cuff. Discussed the possibility of performing an open and robotic combination approach as previously " counseled by Dr. Diaz. The distal dissection may be challenging due to prior pelvic radiation.    2. Pre-op labs reviewed. WNL. Hbg 11.5  3. Type and screen ordered preoperatively. The risks, benefits, and indications of a blood transfusion were discussed. The patient was given a chance to ask questions and all questions answered to her satisfaction. Consent obtained.   4. PCP Dr. Natarajan cleared patient for surgery.     Richard Pimentel MD

## 2022-03-06 NOTE — PROGRESS NOTES
Urology ([facility]) H&P for upcoming procedure  Staff:  Shane Diaz MD    CC: HG right upper tract UCC.    HPI:  Gita García is a 76 y.o. female with a right renal mass. Hx of HG right upper tract UCC. History of endometrial cancer s/p neoadjuvant taxol+cisplatin and vaginal brachytherapy followed by robotic hysterectomy.    Hx includes endometrial carcinoma. Recently had hematuria. CT scan 10/18/2021 showed right renal pelvis filling defect.  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 Not a complete ablatation per notes.   treated for bladder ca by Dr. Diaz [phone].      6 rounds of chemo, 5 rounds of radiation Oct 2019-Jan 2020.     Denies hematuria. Some left abd pain. Urethral caruncle.     Sister had kidney cancer 25 years ago. Nephrectomy.  Scoliosis. Hysterectomy, no other surgery.  Extensive review and interpretation of imaging and laboratory studies. Explained findings to patient and the impact on treatment plan.   Had ureteroscopy 1/3 2022 with ablation of right upper tract tumor. Cytology from barbotage specimen HG.      ROS: Negative except for as stated above    Past Medical History:   Diagnosis Date    Acid reflux     Arthritis     Chemotherapy induced nausea and vomiting 8/9/2019    Endometrial ca 6/26/2019    Essential hypertension 6/27/2019    Estrogen deficiency     Hormone deficiency     Hypertension     Liver mass 7/1/2019    Neuropathy due to chemotherapeutic drug 2/21/2020    Osteopenia     Seizures     post partal. several days after delivery    Urothelial carcinoma of kidney, right 11/11/2021       Past Surgical History:   Procedure Laterality Date    ABLATION OF NEOPLASM OF URETER USING LASER Right 1/3/2022    Procedure: ABLATION, NEOPLASM, URETER, USING LASER;  Surgeon: Shane Diaz MD;  Location: [facility] OR [address] [place] [address];  Service: Urology;  Laterality: Right;    CYSTOSCOPY N/A 1/3/2022    Procedure:  CYSTOSCOPY;  Surgeon: Shane Diaz MD;  Location: [facility] OR 1ST FLR;  Service: Urology;  Laterality: N/A;    DILATION AND CURETTAGE OF UTERUS      GI scope  2016    LAPAROTOMY N/A 7/8/2019    Procedure: LAPAROTOMY;  Surgeon: Lokesh Carias MD;  Location: [facility] OR 2ND FLR;  Service: OB/GYN;  Laterality: N/A;  mini    OMENTECTOMY N/A 7/8/2019    Procedure: OMENTECTOMY;  Surgeon: Lokesh Carias MD;  Location: [facility] OR 2ND FLR;  Service: OB/GYN;  Laterality: N/A;    ROBOT-ASSISTED LAPAROSCOPIC ABDOMINAL HYSTERECTOMY USING DA SIRISHA XI N/A 7/8/2019    Procedure: XI ROBOTIC HYSTERECTOMY;  Surgeon: Lokesh Carias MD;  Location: [facility] OR 2ND FLR;  Service: OB/GYN;  Laterality: N/A;    ROBOT-ASSISTED LAPAROSCOPIC PELVIC LYMPHADENECTOMY USING DA SIRISHA XI Bilateral 7/8/2019    Procedure: XI ROBOTIC LYMPHADENECTOMY, PELVIC;  Surgeon: Lokesh Carias MD;  Location: [facility] OR 2ND FLR;  Service: OB/GYN;  Laterality: Bilateral;    ROBOT-ASSISTED LAPAROSCOPIC RETROPERITONEAL LYMPHADENECTOMY USING DA SIRISHA XI N/A 7/8/2019    Procedure: XI ROBOTIC LYMPHADENECTOMY, RETROPERITONEUM;  Surgeon: Lokesh Carias MD;  Location: [facility] OR 2ND FLR;  Service: OB/GYN;  Laterality: N/A;    ROBOT-ASSISTED LAPAROSCOPIC SALPINGO-OOPHORECTOMY USING DA SIRISHA XI Bilateral 7/8/2019    Procedure: XI ROBOTIC SALPINGO-OOPHORECTOMY;  Surgeon: Lokesh Carias MD;  Location: [facility] OR 2ND FLR;  Service: OB/GYN;  Laterality: Bilateral;    URETEROSCOPY Right 1/3/2022    Procedure: URETEROSCOPY;  Surgeon: Shane Diaz MD;  Location: [facility] OR 1ST FLR;  Service: Urology;  Laterality: Right;  2hrs       Social History     Socioeconomic History    Marital status:    Tobacco Use    Smoking status: Never Smoker    Smokeless tobacco: Never Used   Substance and Sexual Activity    Alcohol use: Never    Drug use: Never       Family History   Problem Relation Age of Onset    Colon cancer Sister 53    Prostate cancer Brother     Breast cancer  Sister 64    Kidney cancer Sister     Breast cancer Sister         in her 60s    Skin cancer Sister     Stroke Brother     Prostate cancer Brother     Heart disease Brother     Prostate cancer Brother     Heart disease Brother     Ovarian cancer Neg Hx     Uterine cancer Neg Hx     Anesthesia problems Neg Hx        Review of patient's allergies indicates:   Allergen Reactions    Asa [aspirin] Other (See Comments)     Stomach upset    Dilaudid (pf) [hydromorphone (pf)] Nausea Only    Sulfa (sulfonamide antibiotics) Itching       Current Outpatient Medications on File Prior to Visit   Medication Sig Dispense Refill    ALPRAZolam (XANAX) 0.25 MG tablet Take 0.25 mg by mouth 2 (two) times daily as needed for Anxiety.       bisoprolol-hydrochlorothiazide (ZIAC) 10-6.25 mg per tablet Take 1 tablet by mouth once daily.       calcium carbonate/vitamin D3 (CALCIUM 600 + D,3, ORAL) Take by mouth once daily.       co-enzyme Q-10 30 mg capsule Take 30 mg by mouth 3 (three) times daily.      HYDROcodone-acetaminophen (NORCO) 5-325 mg per tablet Take 1 tablet by mouth every 6 (six) hours as needed. (Patient not taking: No sig reported) 7 tablet 0    ketorolac (TORADOL) 10 mg tablet Take 1 tablet (10 mg total) by mouth every 6 (six) hours as needed for Pain. (Patient not taking: No sig reported) 12 tablet 0    milk thistle 175 mg tablet Take 175 mg by mouth once daily.      multivitamin/iron/folic acid (CENTRUM ORAL) Take by mouth once daily.       mv-min/FA/vit K/lycop/lut/zeax (OCUVITE EYE PLUS MULTI ORAL) Take 1 tablet by mouth once daily.       oxybutynin (DITROPAN) 5 MG Tab Take 1 tablet (5 mg total) by mouth 3 (three) times daily as needed (bladder spasm). (Patient not taking: No sig reported) 30 tablet 0    pantoprazole (PROTONIX) 40 MG tablet Take 40 mg by mouth once daily.      polyethylene glycol (GLYCOLAX) 17 gram PwPk Take by mouth every morning.      raloxifene (EVISTA) 60 mg tablet Take 60  "mg by mouth every evening.      tamsulosin (FLOMAX) 0.4 mg Cap Take 1 capsule (0.4 mg total) by mouth once daily. (Patient not taking: No sig reported) 30 capsule 0     No current facility-administered medications on file prior to visit.       Anticoagulation:  No    Physical Exam:  Estimated body mass index is 26.36 kg/m² as calculated from the following:    Height as of 1/25/22: 5' 4.5" (1.638 m).    Weight as of 1/25/22: 70.8 kg (156 lb).     General: No acute distress, well developed. AAOx3  Head: Normocephalic, Atraumatic  Eyes: Extra-occular movements intact, No discharge  Neck: supple, symmetrical, trachea midline  Lungs: normal respiratory effort, no respiratory distress, no wheezes  CV: regular rate, 2+ pulses  Abdomen: soft, non-tender, non-distended, no organomegaly. Robotic port site incisions well healing. 3cm supraumbilical midline incision well healing.   MSK: no edema, no deformities, normal ROM  Skin: skin color, texture, turgor normal.  Neurologic: no focal deficits, sensation intact     Labs:    Urine dipstick today shows negative for all components.    Lab Results   Component Value Date    WBC 6.50 11/11/2021    HGB 12.1 11/11/2021    HCT 36.5 (L) 11/11/2021    MCV 93 11/11/2021     11/11/2021           BMP  Lab Results   Component Value Date     11/11/2021    K 3.8 11/11/2021     11/11/2021    CO2 28 11/11/2021    BUN 11 11/11/2021    CREATININE 0.7 11/11/2021    CALCIUM 9.1 11/11/2021    ANIONGAP 8 11/11/2021    ESTGFRAFRICA >60.0 11/11/2021    EGFRNONAA >60.0 11/11/2021       Imaging:   CT AP from 12/8/2021 -  Single artery and vein R kidney.      Chest imaging: CXR 11/11/21 - No evidence of masses suspicious for mets    Assessment: Gita García is a 76 y.o. female with HG right upper tract UCC.    Plan:      1. To OR on 3/14/22 for right nephroureterectomy with bladder cuff. Discussed the possibility of performing an open and robotic combination approach as previously " counseled by Dr. Diaz. The distal dissection may be challenging due to prior pelvic radiation.    2. Pre-op labs reviewed. WNL. Hbg 11.5  3. Type and screen ordered preoperatively. The risks, benefits, and indications of a blood transfusion were discussed. The patient was given a chance to ask questions and all questions answered to her satisfaction. Consent obtained.   4. PCP Dr. Natarajan cleared patient for surgery.     Richard Pimentel MD

## 2022-03-07 ENCOUNTER — OFFICE VISIT (OUTPATIENT)
Dept: UROLOGY | Facility: CLINIC | Age: 77
End: 2022-03-07
Payer: MEDICARE

## 2022-03-07 ENCOUNTER — HOSPITAL ENCOUNTER (OUTPATIENT)
Dept: CARDIOLOGY | Facility: CLINIC | Age: 77
Discharge: HOME | End: 2022-03-07
Payer: MEDICARE

## 2022-03-07 ENCOUNTER — LAB VISIT (OUTPATIENT)
Dept: LAB | Facility: HOSPITAL | Age: 77
End: 2022-03-07
Attending: ANESTHESIOLOGY
Payer: MEDICARE

## 2022-03-07 DIAGNOSIS — C68.9 UROTHELIAL CARCINOMA: ICD-10-CM

## 2022-03-07 DIAGNOSIS — Z01.818 PRE-OP TESTING: ICD-10-CM

## 2022-03-07 DIAGNOSIS — C64.1 UROTHELIAL CARCINOMA OF KIDNEY, RIGHT: Primary | ICD-10-CM

## 2022-03-07 LAB
ABO + RH BLD: NORMAL
ALBUMIN SERPL BCP-MCNC: 3.6 G/DL (ref 3.5–5.2)
ALP SERPL-CCNC: 52 U/L (ref 55–135)
ALT SERPL W/O P-5'-P-CCNC: 11 U/L (ref 10–44)
ANION GAP SERPL CALC-SCNC: 10 MMOL/L (ref 8–16)
AST SERPL-CCNC: 16 U/L (ref 10–40)
BILIRUB SERPL-MCNC: 0.9 MG/DL (ref 0.1–1)
BLD GP AB SCN CELLS X3 SERPL QL: NORMAL
BUN SERPL-MCNC: 11 MG/DL (ref 8–23)
CALCIUM SERPL-MCNC: 9.2 MG/DL (ref 8.7–10.5)
CHLORIDE SERPL-SCNC: 104 MMOL/L (ref 95–110)
CO2 SERPL-SCNC: 27 MMOL/L (ref 23–29)
CREAT SERPL-MCNC: 0.7 MG/DL (ref 0.5–1.4)
ERYTHROCYTE [DISTWIDTH] IN BLOOD BY AUTOMATED COUNT: 12.5 % (ref 11.5–14.5)
EST. GFR  (AFRICAN AMERICAN): >60 ML/MIN/1.73 M^2
EST. GFR  (NON AFRICAN AMERICAN): >60 ML/MIN/1.73 M^2
GLUCOSE SERPL-MCNC: 93 MG/DL (ref 70–110)
HCT VFR BLD AUTO: 36.4 % (ref 37–48.5)
HGB BLD-MCNC: 11.5 G/DL (ref 12–16)
MCH RBC QN AUTO: 29.1 PG (ref 27–31)
MCHC RBC AUTO-ENTMCNC: 31.6 G/DL (ref 32–36)
MCV RBC AUTO: 92 FL (ref 82–98)
PLATELET # BLD AUTO: 210 K/UL (ref 150–450)
PMV BLD AUTO: 9.6 FL (ref 9.2–12.9)
POTASSIUM SERPL-SCNC: 3.9 MMOL/L (ref 3.5–5.1)
PROT SERPL-MCNC: 6.4 G/DL (ref 6–8.4)
RBC # BLD AUTO: 3.95 M/UL (ref 4–5.4)
SODIUM SERPL-SCNC: 141 MMOL/L (ref 136–145)
WBC # BLD AUTO: 6.79 K/UL (ref 3.9–12.7)

## 2022-03-07 PROCEDURE — 80053 COMPREHEN METABOLIC PANEL: CPT | Performed by: ANESTHESIOLOGY

## 2022-03-07 PROCEDURE — 93010 ELECTROCARDIOGRAM REPORT: CPT | Mod: S$GLB,,, | Performed by: INTERNAL MEDICINE

## 2022-03-07 PROCEDURE — 93005 ELECTROCARDIOGRAM TRACING: CPT | Mod: S$GLB,,, | Performed by: ANESTHESIOLOGY

## 2022-03-07 PROCEDURE — 85027 COMPLETE CBC AUTOMATED: CPT | Performed by: ANESTHESIOLOGY

## 2022-03-07 PROCEDURE — 86850 RBC ANTIBODY SCREEN: CPT | Performed by: ANESTHESIOLOGY

## 2022-03-07 PROCEDURE — 93010 EKG 12-LEAD: ICD-10-PCS | Mod: S$GLB,,, | Performed by: INTERNAL MEDICINE

## 2022-03-07 PROCEDURE — 99499 NO LOS: ICD-10-PCS | Mod: S$GLB,,, | Performed by: UROLOGY

## 2022-03-07 PROCEDURE — 93005 EKG 12-LEAD: ICD-10-PCS | Mod: S$GLB,,, | Performed by: ANESTHESIOLOGY

## 2022-03-07 PROCEDURE — 99499 UNLISTED E&M SERVICE: CPT | Mod: S$GLB,,, | Performed by: UROLOGY

## 2022-03-07 RX ORDER — CEFAZOLIN SODIUM 2 G/50ML
2 SOLUTION INTRAVENOUS
Status: CANCELLED | OUTPATIENT
Start: 2022-03-07

## 2022-03-07 RX ORDER — PREGABALIN 75 MG/1
75 CAPSULE ORAL
Status: CANCELLED | OUTPATIENT
Start: 2022-03-07 | End: 2022-03-07

## 2022-03-07 RX ORDER — HEPARIN SODIUM 5000 [USP'U]/ML
5000 INJECTION, SOLUTION INTRAVENOUS; SUBCUTANEOUS
Status: CANCELLED | OUTPATIENT
Start: 2022-03-07 | End: 2022-03-07

## 2022-03-07 RX ORDER — SODIUM CHLORIDE 9 MG/ML
INJECTION, SOLUTION INTRAVENOUS CONTINUOUS
Status: CANCELLED | OUTPATIENT
Start: 2022-03-07

## 2022-03-07 RX ORDER — ACETAMINOPHEN 500 MG
1000 TABLET ORAL
Status: CANCELLED | OUTPATIENT
Start: 2022-03-07

## 2022-03-07 RX ORDER — LIDOCAINE HYDROCHLORIDE 10 MG/ML
1 INJECTION, SOLUTION EPIDURAL; INFILTRATION; INTRACAUDAL; PERINEURAL ONCE
Status: CANCELLED | OUTPATIENT
Start: 2022-03-07 | End: 2022-03-07

## 2022-03-08 ENCOUNTER — ANESTHESIA EVENT (OUTPATIENT)
Dept: SURGERY | Facility: HOSPITAL | Age: 77
DRG: 655 | End: 2022-03-08
Payer: MEDICARE

## 2022-03-08 NOTE — PRE-PROCEDURE INSTRUCTIONS
Spoke to patient's son states patient was cleared and that is was shown to urology yesterday // calling PCP office again // had to leave a message to please fax anesthesia a copy of clearance

## 2022-03-08 NOTE — PRE-PROCEDURE INSTRUCTIONS
Attempted to check on medical clearance /// dr. Natarajan's office states  Pt had appointment but canceled it// have been attempting to reach patient

## 2022-03-11 RX ORDER — FENTANYL CITRATE 50 UG/ML
25-200 INJECTION, SOLUTION INTRAMUSCULAR; INTRAVENOUS
Status: CANCELLED | OUTPATIENT
Start: 2022-03-11

## 2022-03-13 NOTE — ANESTHESIA PREPROCEDURE EVALUATION
[facility]  Anesthesia Pre-Operative Evaluation         Patient Name: Gita García  YOB: 1945  MRN: [id number]    SUBJECTIVE:     Pre-operative evaluation for Procedure(s) (LRB):  XI ROBOTIC NEPHROURETERECTOMY/ WITH BLADDER CUFF (Right)     03/13/2022    Gita García is a 76 y.o. female w/ a significant PMHx of HTN, GERD, seizures, and endometrial cancer (status post chemo/radiation therapy). Imaging initially identified a right renal pelvic filling defect and subsequent biopsy showed upper tract urothelial carcinoma    Patient now presents for the above procedure(s).       Prev airway: 01/03/22; Placement Time: 1222 (created via procedure documentation); Mask Ventilation: Easy; Blade: Badillo #2; Airway Device Size: 7.0      Patient Active Problem List   Diagnosis    Endometrial ca    Essential hypertension    Liver hemangioma    s/p RA-TLH/BSO/BPLD, omentectomy    Neuropathy due to chemotherapeutic drug    Urothelial carcinoma of kidney, right       Review of patient's allergies indicates:   Allergen Reactions    Asa [aspirin] Other (See Comments)     Stomach upset    Dilaudid (pf) [hydromorphone (pf)] Nausea Only    Sulfa (sulfonamide antibiotics) Itching       Current Inpatient Medications:      No current facility-administered medications on file prior to encounter.     Current Outpatient Medications on File Prior to Encounter   Medication Sig Dispense Refill    ALPRAZolam (XANAX) 0.25 MG tablet Take 0.25 mg by mouth 2 (two) times daily as needed for Anxiety.       bisoprolol-hydrochlorothiazide (ZIAC) 10-6.25 mg per tablet Take 1 tablet by mouth once daily.       calcium carbonate/vitamin D3 (CALCIUM 600 + D,3, ORAL) Take by mouth once daily.       co-enzyme Q-10 30 mg capsule Take 30 mg by mouth 3 (three) times daily.      HYDROcodone-acetaminophen (NORCO) 5-325 mg per tablet Take 1 tablet by mouth every 6 (six) hours as needed. (Patient not taking: No  sig reported) 7 tablet 0    ketorolac (TORADOL) 10 mg tablet Take 1 tablet (10 mg total) by mouth every 6 (six) hours as needed for Pain. (Patient not taking: No sig reported) 12 tablet 0    milk thistle 175 mg tablet Take 175 mg by mouth once daily.      multivitamin/iron/folic acid (CENTRUM ORAL) Take by mouth once daily.       mv-min/FA/vit K/lycop/lut/zeax (OCUVITE EYE PLUS MULTI ORAL) Take 1 tablet by mouth once daily.       oxybutynin (DITROPAN) 5 MG Tab Take 1 tablet (5 mg total) by mouth 3 (three) times daily as needed (bladder spasm). (Patient not taking: No sig reported) 30 tablet 0    pantoprazole (PROTONIX) 40 MG tablet Take 40 mg by mouth once daily.      polyethylene glycol (GLYCOLAX) 17 gram PwPk Take by mouth every morning.      raloxifene (EVISTA) 60 mg tablet Take 60 mg by mouth every evening.      tamsulosin (FLOMAX) 0.4 mg Cap Take 1 capsule (0.4 mg total) by mouth once daily. (Patient not taking: No sig reported) 30 capsule 0       Past Surgical History:   Procedure Laterality Date    ABLATION OF NEOPLASM OF URETER USING LASER Right 1/3/2022    Procedure: ABLATION, NEOPLASM, URETER, USING LASER;  Surgeon: Shane Diaz MD;  Location: [facility] OR [address] [place] [address];  Service: Urology;  Laterality: Right;    CYSTOSCOPY N/A 1/3/2022    Procedure: CYSTOSCOPY;  Surgeon: Shane Diaz MD;  [facility];  Service: Urology;  Laterality: N/A;    DILATION AND CURETTAGE OF UTERUS      GI scope  2016    LAPAROTOMY N/A 7/8/2019    Procedure: LAPAROTOMY;  Surgeon: Lokesh Carias MD;  Location: [facility] OR 2ND FLR;  Service: OB/GYN;  Laterality: N/A;  mini    OMENTECTOMY N/A 7/8/2019    Procedure: OMENTECTOMY;  Surgeon: Lokesh Carias MD;  Location: [facility] OR 2ND FLR;  Service: OB/GYN;  Laterality: N/A;    ROBOT-ASSISTED LAPAROSCOPIC ABDOMINAL HYSTERECTOMY USING DA SIRISHA XI N/A 7/8/2019    Procedure: XI ROBOTIC HYSTERECTOMY;  Surgeon: Lokesh Carias MD;  Location: [facility] OR 2ND FLR;   Service: OB/GYN;  Laterality: N/A;    ROBOT-ASSISTED LAPAROSCOPIC PELVIC LYMPHADENECTOMY USING DA SIRISHA XI Bilateral 7/8/2019    Procedure: XI ROBOTIC LYMPHADENECTOMY, PELVIC;  Surgeon: Lokesh Carias MD;  Location: [facility] OR 2ND FLR;  Service: OB/GYN;  Laterality: Bilateral;    ROBOT-ASSISTED LAPAROSCOPIC RETROPERITONEAL LYMPHADENECTOMY USING DA SIRISHA XI N/A 7/8/2019    Procedure: XI ROBOTIC LYMPHADENECTOMY, RETROPERITONEUM;  Surgeon: Lokesh Carias MD;  Location: [facility] OR 2ND FLR;  Service: OB/GYN;  Laterality: N/A;    ROBOT-ASSISTED LAPAROSCOPIC SALPINGO-OOPHORECTOMY USING DA SIRISHA XI Bilateral 7/8/2019    Procedure: XI ROBOTIC SALPINGO-OOPHORECTOMY;  Surgeon: Lokesh Carias MD;  Location: [facility] OR 2ND FLR;  Service: OB/GYN;  Laterality: Bilateral;    URETEROSCOPY Right 1/3/2022    Procedure: URETEROSCOPY;  Surgeon: Shane Diaz MD;  Location: [facility] OR 1ST FLR;  Service: Urology;  Laterality: Right;  2hrs       Social History:  Tobacco Use: Low Risk     Smoking Tobacco Use: Never Smoker    Smokeless Tobacco Use: Never Used      Alcohol Use: Not on file        OBJECTIVE:     Vital Signs Range (Last 24H):         Significant Labs:  Lab Results   Component Value Date    WBC 6.79 03/07/2022    HGB 11.5 (L) 03/07/2022    HCT 36.4 (L) 03/07/2022     03/07/2022    ALT 11 03/07/2022    AST 16 03/07/2022     03/07/2022    K 3.9 03/07/2022     03/07/2022    CREATININE 0.7 03/07/2022    BUN 11 03/07/2022    CO2 27 03/07/2022       Diagnostic Studies: No relevant studies.    EKG:   Results for orders placed or performed during the hospital encounter of 03/07/22   EKG 12-lead    Collection Time: 03/07/22 12:53 PM    Narrative    Test Reason : Z01.818,    Vent. Rate : 057 BPM     Atrial Rate : 057 BPM     P-R Int : 206 ms          QRS Dur : 068 ms      QT Int : 440 ms       P-R-T Axes : 018 027 021 degrees     QTc Int : 428 ms    Sinus bradycardia with 1st degree A-V block  Nonspecific ST and/or  T wave abnormalities  Abnormal ECG  When compared with ECG of 27-JUN-2019 11:35,  Premature ventricular complexes are no longer Present  Nonspecific ST and/or T wave abnormalities Now present laterally  Confirmed by Scot Ivory MD ([id number]) on 3/7/2022 2:50:24 PM    Referred By: NARINDER WONG           Confirmed By:Scot Ivory MD       2D ECHO:  TTE:  No results found for this or any previous visit.    RICARDO:  No results found for this or any previous visit.    ASSESSMENT/PLAN:           Pre-op Assessment    I have reviewed the Patient Summary Reports.     I have reviewed the Nursing Notes. I have reviewed the NPO Status.   I have reviewed the Medications.     Review of Systems  Anesthesia Hx:  Denies Hx of Anesthetic complications  History of prior surgery of interest to airway management or planning: Denies Family Hx of Anesthesia complications.   Denies Personal Hx of Anesthesia complications.   Social:  Non-Smoker    Hematology/Oncology:        Current/Recent Cancer. chemotherapy and radiation   Cardiovascular:   Hypertension    Pulmonary:  Pulmonary Normal    Renal/:   Chronic Renal Disease    Hepatic/GI:   GERD    Neurological:   Seizures    Endocrine:  Endocrine Normal        Physical Exam  General: Well nourished, Alert and Oriented    Airway:  Mallampati: I   Mouth Opening: Normal  TM Distance: Normal  Tongue: Normal  Neck ROM: Normal ROM    Dental:  Intact    Chest/Lungs:  Clear to auscultation, Normal Respiratory Rate    Heart:  Rate: Normal  Rhythm: Regular Rhythm  Sounds: Normal        Anesthesia Plan  Type of Anesthesia, risks & benefits discussed:    Anesthesia Type: Gen ETT  Intra-op Monitoring Plan: Standard ASA Monitors  Post Op Pain Control Plan: multimodal analgesia and IV/PO Opioids PRN  Induction:  IV  Airway Plan: Direct, Post-Induction  Informed Consent: Informed consent signed with the Patient and all parties understand the risks and agree with anesthesia plan.  All questions answered.    ASA Score: 3  Day of Surgery Review of History & Physical: H&P Update referred to the surgeon/provider.    Ready For Surgery From Anesthesia Perspective.     .

## 2022-03-14 ENCOUNTER — HOSPITAL ENCOUNTER (INPATIENT)
Facility: HOSPITAL | Age: 77
LOS: 1 days | Discharge: HOME | DRG: 655 | End: 2022-03-15
Attending: UROLOGY | Admitting: UROLOGY
Payer: MEDICARE

## 2022-03-14 ENCOUNTER — ANESTHESIA (OUTPATIENT)
Dept: SURGERY | Facility: HOSPITAL | Age: 77
DRG: 655 | End: 2022-03-14
Payer: MEDICARE

## 2022-03-14 DIAGNOSIS — C68.9 UROTHELIAL CARCINOMA: ICD-10-CM

## 2022-03-14 DIAGNOSIS — C64.1 UROTHELIAL CARCINOMA OF KIDNEY, RIGHT: Primary | ICD-10-CM

## 2022-03-14 PROCEDURE — 37000008 HC ANESTHESIA 1ST 15 MINUTES: Performed by: UROLOGY

## 2022-03-14 PROCEDURE — 63600175 PHARM REV CODE 636 W HCPCS: Performed by: STUDENT IN AN ORGANIZED HEALTH CARE EDUCATION/TRAINING PROGRAM

## 2022-03-14 PROCEDURE — 25000003 PHARM REV CODE 250: Performed by: STUDENT IN AN ORGANIZED HEALTH CARE EDUCATION/TRAINING PROGRAM

## 2022-03-14 PROCEDURE — 76942 ECHO GUIDE FOR BIOPSY: CPT | Performed by: STUDENT IN AN ORGANIZED HEALTH CARE EDUCATION/TRAINING PROGRAM

## 2022-03-14 PROCEDURE — 99900035 HC TECH TIME PER 15 MIN (STAT)

## 2022-03-14 PROCEDURE — 50548 LAPARO REMOVE W/URETER: CPT | Mod: RT,,, | Performed by: UROLOGY

## 2022-03-14 PROCEDURE — 88307 TISSUE EXAM BY PATHOLOGIST: CPT | Mod: 26,,, | Performed by: PATHOLOGY

## 2022-03-14 PROCEDURE — 63600175 PHARM REV CODE 636 W HCPCS

## 2022-03-14 PROCEDURE — 88342 IMHCHEM/IMCYTCHM 1ST ANTB: CPT | Performed by: PATHOLOGY

## 2022-03-14 PROCEDURE — D9220A PRA ANESTHESIA: Mod: ,,, | Performed by: ANESTHESIOLOGY

## 2022-03-14 PROCEDURE — 27000221 HC OXYGEN, UP TO 24 HOURS

## 2022-03-14 PROCEDURE — 37000009 HC ANESTHESIA EA ADD 15 MINS: Performed by: UROLOGY

## 2022-03-14 PROCEDURE — 88307 TISSUE EXAM BY PATHOLOGIST: CPT | Performed by: PATHOLOGY

## 2022-03-14 PROCEDURE — 71000033 HC RECOVERY, INTIAL HOUR: Performed by: UROLOGY

## 2022-03-14 PROCEDURE — 25000003 PHARM REV CODE 250

## 2022-03-14 PROCEDURE — 88307 PR  SURG PATH,LEVEL V: ICD-10-PCS | Mod: 26,,, | Performed by: PATHOLOGY

## 2022-03-14 PROCEDURE — D9220A PRA ANESTHESIA: ICD-10-PCS | Mod: ,,, | Performed by: ANESTHESIOLOGY

## 2022-03-14 PROCEDURE — 50548 PR NEPHRECTOMY, W/PART. URETECTOMY: ICD-10-PCS | Mod: RT,,, | Performed by: UROLOGY

## 2022-03-14 PROCEDURE — 64461 PVB THORACIC SINGLE INJ SITE: CPT | Performed by: STUDENT IN AN ORGANIZED HEALTH CARE EDUCATION/TRAINING PROGRAM

## 2022-03-14 PROCEDURE — 25000003 PHARM REV CODE 250: Performed by: ANESTHESIOLOGY

## 2022-03-14 PROCEDURE — 94761 N-INVAS EAR/PLS OXIMETRY MLT: CPT

## 2022-03-14 PROCEDURE — 71000015 HC POSTOP RECOV 1ST HR: Performed by: UROLOGY

## 2022-03-14 PROCEDURE — 88341 IMHCHEM/IMCYTCHM EA ADD ANTB: CPT | Performed by: PATHOLOGY

## 2022-03-14 PROCEDURE — 20600001 HC STEP DOWN PRIVATE ROOM

## 2022-03-14 PROCEDURE — A4216 STERILE WATER/SALINE, 10 ML: HCPCS | Performed by: STUDENT IN AN ORGANIZED HEALTH CARE EDUCATION/TRAINING PROGRAM

## 2022-03-14 PROCEDURE — 27201423 OPTIME MED/SURG SUP & DEVICES STERILE SUPPLY: Performed by: UROLOGY

## 2022-03-14 PROCEDURE — 36000712 HC OR TIME LEV V 1ST 15 MIN: Performed by: UROLOGY

## 2022-03-14 PROCEDURE — 36000713 HC OR TIME LEV V EA ADD 15 MIN: Performed by: UROLOGY

## 2022-03-14 RX ORDER — ONDANSETRON 2 MG/ML
4 INJECTION INTRAMUSCULAR; INTRAVENOUS EVERY 6 HOURS PRN
Status: DISCONTINUED | OUTPATIENT
Start: 2022-03-14 | End: 2022-03-15 | Stop reason: HOSPADM

## 2022-03-14 RX ORDER — NEOSTIGMINE METHYLSULFATE 0.5 MG/ML
INJECTION, SOLUTION INTRAVENOUS
Status: DISCONTINUED | OUTPATIENT
Start: 2022-03-14 | End: 2022-03-14

## 2022-03-14 RX ORDER — KETOROLAC TROMETHAMINE 30 MG/ML
15 INJECTION, SOLUTION INTRAMUSCULAR; INTRAVENOUS EVERY 6 HOURS
Status: DISCONTINUED | OUTPATIENT
Start: 2022-03-14 | End: 2022-03-15 | Stop reason: HOSPADM

## 2022-03-14 RX ORDER — METHOCARBAMOL 500 MG/1
1000 TABLET, FILM COATED ORAL 4 TIMES DAILY
Status: DISCONTINUED | OUTPATIENT
Start: 2022-03-14 | End: 2022-03-15 | Stop reason: HOSPADM

## 2022-03-14 RX ORDER — BUPIVACAINE HYDROCHLORIDE 5 MG/ML
INJECTION, SOLUTION EPIDURAL; INTRACAUDAL
Status: COMPLETED | OUTPATIENT
Start: 2022-03-14 | End: 2022-03-14

## 2022-03-14 RX ORDER — CEFAZOLIN SODIUM/WATER 2 G/20 ML
2 SYRINGE (ML) INTRAVENOUS
Status: COMPLETED | OUTPATIENT
Start: 2022-03-14 | End: 2022-03-14

## 2022-03-14 RX ORDER — HEPARIN SODIUM 5000 [USP'U]/ML
5000 INJECTION, SOLUTION INTRAVENOUS; SUBCUTANEOUS EVERY 8 HOURS
Status: DISCONTINUED | OUTPATIENT
Start: 2022-03-14 | End: 2022-03-15 | Stop reason: HOSPADM

## 2022-03-14 RX ORDER — PHENYLEPHRINE HCL IN 0.9% NACL 1 MG/10 ML
SYRINGE (ML) INTRAVENOUS
Status: DISCONTINUED | OUTPATIENT
Start: 2022-03-14 | End: 2022-03-14

## 2022-03-14 RX ORDER — HEPARIN SODIUM 5000 [USP'U]/ML
5000 INJECTION, SOLUTION INTRAVENOUS; SUBCUTANEOUS
Status: COMPLETED | OUTPATIENT
Start: 2022-03-14 | End: 2022-03-14

## 2022-03-14 RX ORDER — DEXMEDETOMIDINE HYDROCHLORIDE 100 UG/ML
INJECTION, SOLUTION INTRAVENOUS
Status: DISCONTINUED | OUTPATIENT
Start: 2022-03-14 | End: 2022-03-14

## 2022-03-14 RX ORDER — SODIUM CHLORIDE 9 MG/ML
INJECTION, SOLUTION INTRAVENOUS CONTINUOUS
Status: DISCONTINUED | OUTPATIENT
Start: 2022-03-14 | End: 2022-03-14

## 2022-03-14 RX ORDER — FENTANYL CITRATE 50 UG/ML
25 INJECTION, SOLUTION INTRAMUSCULAR; INTRAVENOUS EVERY 5 MIN PRN
Status: DISCONTINUED | OUTPATIENT
Start: 2022-03-14 | End: 2022-03-14 | Stop reason: HOSPADM

## 2022-03-14 RX ORDER — ACETAMINOPHEN 500 MG
500 TABLET ORAL EVERY 6 HOURS PRN
COMMUNITY

## 2022-03-14 RX ORDER — SODIUM CHLORIDE 0.9 % (FLUSH) 0.9 %
10 SYRINGE (ML) INJECTION
Status: DISCONTINUED | OUTPATIENT
Start: 2022-03-14 | End: 2022-03-15 | Stop reason: HOSPADM

## 2022-03-14 RX ORDER — VECURONIUM BROMIDE FOR INJECTION 1 MG/ML
INJECTION, POWDER, LYOPHILIZED, FOR SOLUTION INTRAVENOUS
Status: DISCONTINUED | OUTPATIENT
Start: 2022-03-14 | End: 2022-03-14

## 2022-03-14 RX ORDER — DEXAMETHASONE SODIUM PHOSPHATE 4 MG/ML
INJECTION, SOLUTION INTRA-ARTICULAR; INTRALESIONAL; INTRAMUSCULAR; INTRAVENOUS; SOFT TISSUE
Status: DISCONTINUED | OUTPATIENT
Start: 2022-03-14 | End: 2022-03-14

## 2022-03-14 RX ORDER — LIDOCAINE HYDROCHLORIDE 10 MG/ML
1 INJECTION, SOLUTION EPIDURAL; INFILTRATION; INTRACAUDAL; PERINEURAL ONCE
Status: DISCONTINUED | OUTPATIENT
Start: 2022-03-14 | End: 2022-03-14

## 2022-03-14 RX ORDER — TRAMADOL HYDROCHLORIDE 50 MG/1
50 TABLET ORAL EVERY 4 HOURS PRN
Status: DISCONTINUED | OUTPATIENT
Start: 2022-03-14 | End: 2022-03-15 | Stop reason: HOSPADM

## 2022-03-14 RX ORDER — MIDAZOLAM HYDROCHLORIDE 1 MG/ML
INJECTION, SOLUTION INTRAMUSCULAR; INTRAVENOUS
Status: DISCONTINUED | OUTPATIENT
Start: 2022-03-14 | End: 2022-03-14

## 2022-03-14 RX ORDER — FENTANYL CITRATE 50 UG/ML
25-200 INJECTION, SOLUTION INTRAMUSCULAR; INTRAVENOUS EVERY 5 MIN PRN
Status: DISCONTINUED | OUTPATIENT
Start: 2022-03-14 | End: 2022-03-14

## 2022-03-14 RX ORDER — MIDAZOLAM HYDROCHLORIDE 1 MG/ML
.5-4 INJECTION INTRAMUSCULAR; INTRAVENOUS
Status: DISCONTINUED | OUTPATIENT
Start: 2022-03-14 | End: 2022-03-14

## 2022-03-14 RX ORDER — ACETAMINOPHEN 500 MG
1000 TABLET ORAL
Status: DISCONTINUED | OUTPATIENT
Start: 2022-03-14 | End: 2022-03-15 | Stop reason: HOSPADM

## 2022-03-14 RX ORDER — PROCHLORPERAZINE EDISYLATE 5 MG/ML
5 INJECTION INTRAMUSCULAR; INTRAVENOUS EVERY 6 HOURS PRN
Status: DISCONTINUED | OUTPATIENT
Start: 2022-03-14 | End: 2022-03-15 | Stop reason: HOSPADM

## 2022-03-14 RX ORDER — PROPOFOL 10 MG/ML
VIAL (ML) INTRAVENOUS
Status: DISCONTINUED | OUTPATIENT
Start: 2022-03-14 | End: 2022-03-14

## 2022-03-14 RX ORDER — PREGABALIN 75 MG/1
75 CAPSULE ORAL
Status: COMPLETED | OUTPATIENT
Start: 2022-03-14 | End: 2022-03-14

## 2022-03-14 RX ORDER — ONDANSETRON 2 MG/ML
INJECTION INTRAMUSCULAR; INTRAVENOUS
Status: DISCONTINUED | OUTPATIENT
Start: 2022-03-14 | End: 2022-03-14

## 2022-03-14 RX ORDER — ACETAMINOPHEN 500 MG
1000 TABLET ORAL
Status: COMPLETED | OUTPATIENT
Start: 2022-03-14 | End: 2022-03-14

## 2022-03-14 RX ORDER — ONDANSETRON 2 MG/ML
4 INJECTION INTRAMUSCULAR; INTRAVENOUS DAILY PRN
Status: DISCONTINUED | OUTPATIENT
Start: 2022-03-14 | End: 2022-03-14 | Stop reason: HOSPADM

## 2022-03-14 RX ORDER — OXYBUTYNIN CHLORIDE 5 MG/1
5 TABLET, EXTENDED RELEASE ORAL DAILY
Status: DISCONTINUED | OUTPATIENT
Start: 2022-03-14 | End: 2022-03-15 | Stop reason: HOSPADM

## 2022-03-14 RX ORDER — OXYCODONE HYDROCHLORIDE 5 MG/1
5 TABLET ORAL EVERY 4 HOURS PRN
Status: DISCONTINUED | OUTPATIENT
Start: 2022-03-14 | End: 2022-03-15 | Stop reason: HOSPADM

## 2022-03-14 RX ORDER — LIDOCAINE HYDROCHLORIDE 20 MG/ML
INJECTION, SOLUTION EPIDURAL; INFILTRATION; INTRACAUDAL; PERINEURAL
Status: DISCONTINUED | OUTPATIENT
Start: 2022-03-14 | End: 2022-03-14

## 2022-03-14 RX ORDER — SODIUM CHLORIDE, SODIUM LACTATE, POTASSIUM CHLORIDE, CALCIUM CHLORIDE 600; 310; 30; 20 MG/100ML; MG/100ML; MG/100ML; MG/100ML
INJECTION, SOLUTION INTRAVENOUS CONTINUOUS
Status: DISCONTINUED | OUTPATIENT
Start: 2022-03-14 | End: 2022-03-15

## 2022-03-14 RX ORDER — PANTOPRAZOLE SODIUM 40 MG/1
40 TABLET, DELAYED RELEASE ORAL DAILY
Status: DISCONTINUED | OUTPATIENT
Start: 2022-03-15 | End: 2022-03-15 | Stop reason: HOSPADM

## 2022-03-14 RX ORDER — RALOXIFENE HYDROCHLORIDE 60 MG/1
60 TABLET, FILM COATED ORAL NIGHTLY
Status: DISCONTINUED | OUTPATIENT
Start: 2022-03-14 | End: 2022-03-15 | Stop reason: HOSPADM

## 2022-03-14 RX ORDER — KETAMINE HCL IN 0.9 % NACL 50 MG/5 ML
SYRINGE (ML) INTRAVENOUS
Status: DISCONTINUED | OUTPATIENT
Start: 2022-03-14 | End: 2022-03-14

## 2022-03-14 RX ORDER — HALOPERIDOL 5 MG/ML
0.5 INJECTION INTRAMUSCULAR EVERY 10 MIN PRN
Status: DISCONTINUED | OUTPATIENT
Start: 2022-03-14 | End: 2022-03-14 | Stop reason: HOSPADM

## 2022-03-14 RX ADMIN — PROPOFOL 30 MG: 10 INJECTION, EMULSION INTRAVENOUS at 12:03

## 2022-03-14 RX ADMIN — MIDAZOLAM 0.5 MG: 1 INJECTION INTRAMUSCULAR; INTRAVENOUS at 11:03

## 2022-03-14 RX ADMIN — VECURONIUM BROMIDE 6 MG: 10 INJECTION, POWDER, LYOPHILIZED, FOR SOLUTION INTRAVENOUS at 11:03

## 2022-03-14 RX ADMIN — Medication 200 MCG: at 12:03

## 2022-03-14 RX ADMIN — Medication 100 MCG: at 03:03

## 2022-03-14 RX ADMIN — Medication 10 MG: at 03:03

## 2022-03-14 RX ADMIN — PROPOFOL 100 MG: 10 INJECTION, EMULSION INTRAVENOUS at 11:03

## 2022-03-14 RX ADMIN — VECURONIUM BROMIDE 1 MG: 10 INJECTION, POWDER, LYOPHILIZED, FOR SOLUTION INTRAVENOUS at 02:03

## 2022-03-14 RX ADMIN — SODIUM CHLORIDE: 0.9 INJECTION, SOLUTION INTRAVENOUS at 10:03

## 2022-03-14 RX ADMIN — GLYCOPYRROLATE 0.1 MG: 0.2 INJECTION INTRAMUSCULAR; INTRAVENOUS at 12:03

## 2022-03-14 RX ADMIN — SODIUM CHLORIDE, SODIUM LACTATE, POTASSIUM CHLORIDE, AND CALCIUM CHLORIDE: .6; .31; .03; .02 INJECTION, SOLUTION INTRAVENOUS at 06:03

## 2022-03-14 RX ADMIN — DEXMEDETOMIDINE HYDROCHLORIDE 0.7 MCG/KG/HR: 100 INJECTION, SOLUTION, CONCENTRATE INTRAVENOUS at 11:03

## 2022-03-14 RX ADMIN — DEXMEDETOMIDINE HYDROCHLORIDE 4 MCG: 100 INJECTION, SOLUTION INTRAVENOUS at 12:03

## 2022-03-14 RX ADMIN — ACETAMINOPHEN 1000 MG: 500 TABLET ORAL at 05:03

## 2022-03-14 RX ADMIN — VECURONIUM BROMIDE 1 MG: 10 INJECTION, POWDER, LYOPHILIZED, FOR SOLUTION INTRAVENOUS at 01:03

## 2022-03-14 RX ADMIN — KETOROLAC TROMETHAMINE 15 MG: 30 INJECTION, SOLUTION INTRAMUSCULAR at 05:03

## 2022-03-14 RX ADMIN — BUPIVACAINE HYDROCHLORIDE 30 ML: 5 INJECTION, SOLUTION EPIDURAL; INTRACAUDAL; PERINEURAL at 12:03

## 2022-03-14 RX ADMIN — OXYCODONE 5 MG: 5 TABLET ORAL at 06:03

## 2022-03-14 RX ADMIN — Medication 10 MG: at 01:03

## 2022-03-14 RX ADMIN — ONDANSETRON 4 MG: 2 INJECTION INTRAMUSCULAR; INTRAVENOUS at 04:03

## 2022-03-14 RX ADMIN — NEOSTIGMINE METHYLSULFATE 3 MG: 0.5 INJECTION INTRAVENOUS at 04:03

## 2022-03-14 RX ADMIN — ONDANSETRON 4 MG: 2 INJECTION INTRAMUSCULAR; INTRAVENOUS at 05:03

## 2022-03-14 RX ADMIN — HEPARIN SODIUM 5000 UNITS: 5000 INJECTION INTRAVENOUS; SUBCUTANEOUS at 09:03

## 2022-03-14 RX ADMIN — Medication 100 MCG: at 02:03

## 2022-03-14 RX ADMIN — SODIUM CHLORIDE: 0.9 INJECTION, SOLUTION INTRAVENOUS at 11:03

## 2022-03-14 RX ADMIN — ACETAMINOPHEN 1000 MG: 500 TABLET ORAL at 10:03

## 2022-03-14 RX ADMIN — SODIUM CHLORIDE, SODIUM GLUCONATE, SODIUM ACETATE, POTASSIUM CHLORIDE, MAGNESIUM CHLORIDE, SODIUM PHOSPHATE, DIBASIC, AND POTASSIUM PHOSPHATE: .53; .5; .37; .037; .03; .012; .00082 INJECTION, SOLUTION INTRAVENOUS at 12:03

## 2022-03-14 RX ADMIN — DEXAMETHASONE SODIUM PHOSPHATE 8 MG: 4 INJECTION INTRA-ARTICULAR; INTRALESIONAL; INTRAMUSCULAR; INTRAVENOUS; SOFT TISSUE at 12:03

## 2022-03-14 RX ADMIN — HEPARIN SODIUM 5000 UNITS: 5000 INJECTION INTRAVENOUS; SUBCUTANEOUS at 10:03

## 2022-03-14 RX ADMIN — Medication 100 MCG: at 12:03

## 2022-03-14 RX ADMIN — PROCHLORPERAZINE EDISYLATE 5 MG: 5 INJECTION INTRAMUSCULAR; INTRAVENOUS at 08:03

## 2022-03-14 RX ADMIN — LIDOCAINE HYDROCHLORIDE 80 MG: 20 INJECTION, SOLUTION EPIDURAL; INFILTRATION; INTRACAUDAL at 12:03

## 2022-03-14 RX ADMIN — OXYCODONE 5 MG: 5 TABLET ORAL at 10:03

## 2022-03-14 RX ADMIN — GLYCOPYRROLATE 0.6 MG: 0.2 INJECTION INTRAMUSCULAR; INTRAVENOUS at 04:03

## 2022-03-14 RX ADMIN — Medication 30 MG: at 12:03

## 2022-03-14 RX ADMIN — PREGABALIN 75 MG: 75 CAPSULE ORAL at 10:03

## 2022-03-14 RX ADMIN — METHOCARBAMOL 1000 MG: 500 TABLET ORAL at 05:03

## 2022-03-14 RX ADMIN — Medication 2 G: at 12:03

## 2022-03-14 RX ADMIN — OXYBUTYNIN CHLORIDE 5 MG: 5 TABLET, EXTENDED RELEASE ORAL at 06:03

## 2022-03-14 RX ADMIN — FENTANYL CITRATE 25 MCG: 50 INJECTION INTRAMUSCULAR; INTRAVENOUS at 04:03

## 2022-03-14 RX ADMIN — FENTANYL CITRATE 25 MCG: 50 INJECTION INTRAMUSCULAR; INTRAVENOUS at 05:03

## 2022-03-14 RX ADMIN — DEXMEDETOMIDINE HYDROCHLORIDE 4 MCG: 100 INJECTION, SOLUTION INTRAVENOUS at 11:03

## 2022-03-14 NOTE — TRANSFER OF CARE
Anesthesia Transfer of Care Note    Patient: Gita García    Procedure(s) Performed: Procedure(s) (LRB):  XI ROBOTIC NEPHROURETERECTOMY/ WITH BLADDER CUFF (Right)    Patient location: PACU    Anesthesia Type: general    Transport from OR: Transported from OR on 6-10 L/min O2 by face mask with adequate spontaneous ventilation    Post pain: adequate analgesia    Post assessment: no apparent anesthetic complications and tolerated procedure well    Post vital signs: stable    Level of consciousness: awake    Nausea/Vomiting: no nausea/vomiting    Complications: none    Transfer of care protocol was followed      Last vitals:   Visit Vitals  BP (!) 188/84 (BP Location: Left arm, Patient Position: Lying)   Pulse 65   Temp 36.7 °C (98 °F) (Oral)   Resp 18   Wt 70.3 kg (155 lb)   SpO2 100%   Breastfeeding No   BMI 26.19 kg/m²

## 2022-03-14 NOTE — ANESTHESIA PROCEDURE NOTES
R JACINTO Single Injection    Patient location during procedure: pre-op   Block not for primary anesthetic.  Reason for block: at surgeon's request and post-op pain management   Post-op Pain Location: R abdominal pain   Start time: 3/14/2022 12:05 PM  Timeout: 3/14/2022 12:04 PM   End time: 3/14/2022 12:08 PM    Staffing  Authorizing Provider: Dimple Young MD  Performing Provider: Velvet Kumar MD    Preanesthetic Checklist  Completed: patient identified, IV checked, site marked, risks and benefits discussed, surgical consent, monitors and equipment checked, pre-op evaluation and timeout performed  Peripheral Block  Patient position: sitting  Prep: ChloraPrep  Patient monitoring: heart rate, cardiac monitor, continuous pulse ox, continuous capnometry and frequent blood pressure checks  Block type: erector spinae plane  Laterality: right  Injection technique: single shot  Interspace: T7-8    Needle  Needle type: Tuohy   Needle gauge: 17 G  Needle length: 3.5 in  Needle localization: anatomical landmarks and ultrasound guidance   -ultrasound image captured on disc.  Assessment  Injection assessment: negative aspiration, negative parasthesia and local visualized surrounding nerve  Paresthesia pain: none  Heart rate change: no  Slow fractionated injection: yes  Pain Tolerance: comfortable throughout block and no complaints  Medications:    Medications: bupivacaine (pf) (MARCAINE) injection 0.5% - Perineural   30 mL - 3/14/2022 12:08:00 PM    Additional Notes  Patient tolerated well.  See Anesthesia record for vitals.    30cc of 0.5% Bupivacaine w 1:300k Epi, 50 mcg Clonidine, and 1mg Decadron given.

## 2022-03-14 NOTE — NURSING TRANSFER
Nursing Transfer Note      3/14/2022     Reason patient is being transferred: to room post op    Transfer to 1059    Transfer via stretcher    Transfer with IV fluids    Transported by PCT    Medicines sent: N/A    Any special needs or follow-up needed: N/A    Chart send with patient: Yes    Notified: family    Patient reassessed at: 3/14/22 @ 1800    Upon arrival to floor: WAN[facility] on [facility]

## 2022-03-14 NOTE — OP NOTE
[facility]  Operative Note        Date:  3/14/2022      Pre-Op Diagnosis:   1. Right upper tract urothelial carcinoma     Post-Op Diagnosis: same     Procedure(s) Performed:   1.  Right robotic nephroureterectomy   2.  Intraoperative administration of chemotherapeutic agent      Specimen(s):   1. Right kidney, ureter, and bladder cuff    Surgeon:  Shane Diaz MD     : Eulogio Fernandez MD, Mary Solis MD      Bedside Assistant: KELSEY Ceron (no qualified resident available for bedside assistance)      Anesthesia: General endotracheal anesthesia     Indications:  Gita García is a 76 y.o. female with right upper tract urothelial carcinoma. Initial biopsies showed a large volume low grade tumor, but repeat ureteroscopy demonstrated high grade features. After risks, benefits, and alternatives were discussed, the patient elected to proceed with robotic nephroureterectomy.     Findings:   - Nephrectomy performed in standard fashion  - bladder cuff with good margin to include ureteral orifice  - water-tight closure confirmed with leak test  - JJ stent removed with specimen     EBL: <50 cc     Drains:   1. 18 Fr hidalgo catheter  2.  15 fr Garth drain     Anesthesia: General endotracheal anesthesia      Procedure in Detail:      After discussion of risks and benefits of the procedure, informed consent was obtained. The patient was brought to the operating room and placed supine on the operating table. SCDs were applied and working prior to induction of anesthesia. General anesthesia was administered.  A time out was performed and pre-operative antibiotics were confirmed. An OG tube was placed. The patient was then moved into the modified flank position with the right side up. The patient was appropriately padded and secured to the table. The patient was then prepped and draped in the usual sterile fashion. Timeout was performed and preoperative antibiotics were  confirmed.      A 22 fr 3 way hidalgo catheter was inserted prior to draping but was prepped onto the operative field in sterile fashion. A Veress needle was introduced into the abdomen. Entry into the peritoneal cavity was confirmed with aspiration revealing no blood or succus and subsequent normal drop test. We connected the insufflation and low pressures were noted confirming Veress placement. The four 8 mm robotic ports were placed in a diagonal line starting 8 cm left of midline and 2 finger breadths inferior to the costal margin in the mid-clavicular line, and ending in the midline inferior to the umbilicus approximately care home between the pubis and umbilicus.  A 12 mm assistant port was placed in the midline just superior to the umbilicus.  A 5 mm port was placed just inferior to the xiphoid for a liver retractor.     Dissection began along the white line of Toldt, reflecting the colon medially away from the kidney. A kocher maneuver was performed. The psoas muscle was identified. Once the bowel was reflected, dissection was carried out just below the kidney, and the ureter was identified. The ureter was then freed from its surrounding attachments, clipped distal to the level of the tumor, and elevated. The ureter was elevated and we continued our dissection toward the lower pole of the kidney proximally until we identified the renal hilum. There was one renal artery and one renal vein with an early branch. The hilum was carefully dissected until the vessels were isolated. A robotic stapler was used to divide the artery, and a subsequent load to divide the vein. The remainder of the superior and lateral attachments to the kidney were released.  The resection bed and hilum were inspected and found to be hemostatic.      The ureter was dissected distally until the bladder was identified.  The obliterated umbilical artery was ligated, and divided. The superior vesical artery was spared. The bladder cuff was then  excised.  The bladder was then closed with a 3-0 V-loc suture in a running fashion. The bladder was leak tested to 150 cc, and confirmed water-tight.     The specimen along with the ureteral stent was the retrieved from and placed in an endo-catch bag placed through the assistant port. A 15 fr Garth drain was placed through a lower port incision and secured with 2-0 Nylon after the trocar was removed. The robot was undocked and all remaining trocars were removed. The 12 mm assistant port incision was extended to allow removal of the specimen.      Fascia in the midline was closed with #1 PDS in running fashion. All skin incisions were closed using 4-0 monocryl. Dermabond was applied to the incisions.      The patient tolerated the procedure well and was transferred to the recovery room in stable condition.     Disposition: The patient will remain on the urology service for postoperative monitoring.      Eulogio Fernandez MD

## 2022-03-14 NOTE — ANESTHESIA POSTPROCEDURE EVALUATION
Anesthesia Post Evaluation    Patient: Gita García    Procedure(s) Performed: Procedure(s) (LRB):  XI ROBOTIC NEPHROURETERECTOMY/ WITH BLADDER CUFF (Right)    Final Anesthesia Type: general      Patient location during evaluation: PACU  Patient participation: Yes- Able to Participate  Level of consciousness: awake and alert and oriented  Post-procedure vital signs: reviewed and stable  Pain management: adequate  Airway patency: patent    PONV status at discharge: No PONV  Anesthetic complications: no      Cardiovascular status: hemodynamically stable  Respiratory status: unassisted, spontaneous ventilation and room air  Hydration status: euvolemic  Follow-up not needed.          Vitals Value Taken Time   /58 03/14/22 1819   Temp 36.1 °C (97 °F) 03/14/22 1819   Pulse 61 03/14/22 1819   Resp 16 03/14/22 1838   SpO2 96 % 03/14/22 1819         Event Time   Out of Recovery 17:20:00         Pain/Salma Score: Pain Rating Prior to Med Admin: 8 (3/14/2022  6:38 PM)  Pain Rating Post Med Admin: 4 (3/14/2022  6:00 PM)  Salma Score: 9 (3/14/2022  5:20 PM)

## 2022-03-14 NOTE — INTERVAL H&P NOTE
The patient has been examined and the H&P has been reviewed:    I concur with the findings and no changes have occurred since H&P was written.    Surgery risks, benefits and alternative options discussed and understood by patient/family.      There are no hospital problems to display for this patient.

## 2022-03-14 NOTE — ANESTHESIA PROCEDURE NOTES
Intubation    Date/Time: 3/14/2022 12:15 PM  Performed by: Destin Joel MD  Authorized by: Gerry Dhaliwal MD     Intubation:     Induction:  Intravenous    Intubated:  Postinduction    Mask Ventilation:  Easy mask    Attempts:  1    Attempted By:  Resident anesthesiologist    Method of Intubation:  Direct    Blade:  Isrrael 3    Laryngeal View Grade: Grade I - full view of cords      Difficult Airway Encountered?: No      Complications:  None    Airway Device:  Oral endotracheal tube    Airway Device Size:  7.0    Style/Cuff Inflation:  Cuffed (inflated to minimal occlusive pressure)    Secured at:  The lips    Placement Verified By:  Capnometry    Complicating Factors:  None    Findings Post-Intubation:  BS equal bilateral

## 2022-03-15 ENCOUNTER — TELEPHONE (OUTPATIENT)
Dept: UROLOGY | Facility: CLINIC | Age: 77
End: 2022-03-15
Payer: MEDICARE

## 2022-03-15 VITALS
HEART RATE: 79 BPM | DIASTOLIC BLOOD PRESSURE: 65 MMHG | RESPIRATION RATE: 16 BRPM | BODY MASS INDEX: 26.39 KG/M2 | WEIGHT: 154.56 LBS | TEMPERATURE: 98 F | HEIGHT: 64 IN | SYSTOLIC BLOOD PRESSURE: 145 MMHG | OXYGEN SATURATION: 95 %

## 2022-03-15 LAB
ANION GAP SERPL CALC-SCNC: 9 MMOL/L (ref 8–16)
BASOPHILS # BLD AUTO: 0.03 K/UL (ref 0–0.2)
BASOPHILS NFR BLD: 0.3 % (ref 0–1.9)
BUN SERPL-MCNC: 17 MG/DL (ref 8–23)
CALCIUM SERPL-MCNC: 8.6 MG/DL (ref 8.7–10.5)
CHLORIDE SERPL-SCNC: 106 MMOL/L (ref 95–110)
CO2 SERPL-SCNC: 25 MMOL/L (ref 23–29)
CREAT SERPL-MCNC: 0.8 MG/DL (ref 0.5–1.4)
DIFFERENTIAL METHOD: ABNORMAL
EOSINOPHIL # BLD AUTO: 0 K/UL (ref 0–0.5)
EOSINOPHIL NFR BLD: 0.1 % (ref 0–8)
ERYTHROCYTE [DISTWIDTH] IN BLOOD BY AUTOMATED COUNT: 12.7 % (ref 11.5–14.5)
EST. GFR  (AFRICAN AMERICAN): >60 ML/MIN/1.73 M^2
EST. GFR  (NON AFRICAN AMERICAN): >60 ML/MIN/1.73 M^2
GLUCOSE SERPL-MCNC: 130 MG/DL (ref 70–110)
HCT VFR BLD AUTO: 31.5 % (ref 37–48.5)
HGB BLD-MCNC: 10.4 G/DL (ref 12–16)
IMM GRANULOCYTES # BLD AUTO: 0.05 K/UL (ref 0–0.04)
IMM GRANULOCYTES NFR BLD AUTO: 0.5 % (ref 0–0.5)
LYMPHOCYTES # BLD AUTO: 0.7 K/UL (ref 1–4.8)
LYMPHOCYTES NFR BLD: 6.7 % (ref 18–48)
MCH RBC QN AUTO: 30.7 PG (ref 27–31)
MCHC RBC AUTO-ENTMCNC: 33 G/DL (ref 32–36)
MCV RBC AUTO: 93 FL (ref 82–98)
MONOCYTES # BLD AUTO: 0.4 K/UL (ref 0.3–1)
MONOCYTES NFR BLD: 4.1 % (ref 4–15)
NEUTROPHILS # BLD AUTO: 9.2 K/UL (ref 1.8–7.7)
NEUTROPHILS NFR BLD: 88.3 % (ref 38–73)
NRBC BLD-RTO: 0 /100 WBC
PLATELET # BLD AUTO: 180 K/UL (ref 150–450)
PMV BLD AUTO: 10.1 FL (ref 9.2–12.9)
POTASSIUM SERPL-SCNC: 4.7 MMOL/L (ref 3.5–5.1)
RBC # BLD AUTO: 3.39 M/UL (ref 4–5.4)
SODIUM SERPL-SCNC: 140 MMOL/L (ref 136–145)
WBC # BLD AUTO: 10.37 K/UL (ref 3.9–12.7)

## 2022-03-15 PROCEDURE — 25000003 PHARM REV CODE 250: Performed by: STUDENT IN AN ORGANIZED HEALTH CARE EDUCATION/TRAINING PROGRAM

## 2022-03-15 PROCEDURE — 80048 BASIC METABOLIC PNL TOTAL CA: CPT | Performed by: STUDENT IN AN ORGANIZED HEALTH CARE EDUCATION/TRAINING PROGRAM

## 2022-03-15 PROCEDURE — 36415 COLL VENOUS BLD VENIPUNCTURE: CPT | Performed by: STUDENT IN AN ORGANIZED HEALTH CARE EDUCATION/TRAINING PROGRAM

## 2022-03-15 PROCEDURE — 85025 COMPLETE CBC W/AUTO DIFF WBC: CPT | Performed by: STUDENT IN AN ORGANIZED HEALTH CARE EDUCATION/TRAINING PROGRAM

## 2022-03-15 PROCEDURE — 63600175 PHARM REV CODE 636 W HCPCS: Performed by: STUDENT IN AN ORGANIZED HEALTH CARE EDUCATION/TRAINING PROGRAM

## 2022-03-15 RX ORDER — IBUPROFEN 800 MG/1
800 TABLET ORAL 3 TIMES DAILY
Qty: 90 TABLET | Refills: 0 | Status: SHIPPED | OUTPATIENT
Start: 2022-03-15 | End: 2022-04-14

## 2022-03-15 RX ORDER — POLYETHYLENE GLYCOL 3350 17 G/17G
17 POWDER, FOR SOLUTION ORAL DAILY
Qty: 510 G | Refills: 0 | Status: SHIPPED | OUTPATIENT
Start: 2022-03-15 | End: 2022-04-14

## 2022-03-15 RX ORDER — OXYCODONE HYDROCHLORIDE 5 MG/1
5 TABLET ORAL EVERY 6 HOURS PRN
Qty: 10 TABLET | Refills: 0 | Status: SHIPPED | OUTPATIENT
Start: 2022-03-15 | End: 2023-05-05

## 2022-03-15 RX ORDER — DEXTROMETHORPHAN HYDROBROMIDE, GUAIFENESIN 5; 100 MG/5ML; MG/5ML
650 LIQUID ORAL EVERY 8 HOURS
Qty: 90 TABLET | Refills: 0 | Status: SHIPPED | OUTPATIENT
Start: 2022-03-15 | End: 2022-04-14

## 2022-03-15 RX ADMIN — SODIUM CHLORIDE, SODIUM LACTATE, POTASSIUM CHLORIDE, AND CALCIUM CHLORIDE: .6; .31; .03; .02 INJECTION, SOLUTION INTRAVENOUS at 04:03

## 2022-03-15 RX ADMIN — OXYBUTYNIN CHLORIDE 5 MG: 5 TABLET, EXTENDED RELEASE ORAL at 08:03

## 2022-03-15 RX ADMIN — HEPARIN SODIUM 5000 UNITS: 5000 INJECTION INTRAVENOUS; SUBCUTANEOUS at 04:03

## 2022-03-15 RX ADMIN — KETOROLAC TROMETHAMINE 15 MG: 30 INJECTION, SOLUTION INTRAMUSCULAR at 04:03

## 2022-03-15 RX ADMIN — ACETAMINOPHEN 1000 MG: 500 TABLET ORAL at 04:03

## 2022-03-15 RX ADMIN — KETOROLAC TROMETHAMINE 15 MG: 30 INJECTION, SOLUTION INTRAMUSCULAR at 12:03

## 2022-03-15 RX ADMIN — PANTOPRAZOLE SODIUM 40 MG: 40 TABLET, DELAYED RELEASE ORAL at 08:03

## 2022-03-15 RX ADMIN — METHOCARBAMOL 1000 MG: 500 TABLET ORAL at 08:03

## 2022-03-15 NOTE — DISCHARGE SUMMARY
[facility]  Urology  Discharge Summary      Patient Name: Gita García  MRN: [id number]  Admission Date: 3/14/2022  Hospital Length of Stay: 1 days  Discharge Date and Time: 3/15/2022 10:15 AM  Attending Physician: Shane Diaz MD   Discharging Provider: Richard Pimentel MD  Primary Care Physician: ZAINAB Natarajan MD    HPI:   Gita García is a 76 y.o. female with a right renal mass. Hx of HG right upper tract UCC. Now s/p right nephroureterectomy with bladder cuff on 3/14/22.        Procedure(s) (LRB):  XI ROBOTIC NEPHROURETERECTOMY/ WITH BLADDER CUFF (Right)     Indwelling Lines/Drains at time of discharge:   Lines/Drains/Airways       Drain  Duration                  Closed/Suction Drain 03/14/22 1540 Right RLQ Bulb 15 Fr. <1 day         Urethral Catheter 03/14/22 1220 Latex 18 Fr. <1 day                    Hospital Course (synopsis of major diagnoses, care, treatment, and services provided during the course of the hospital stay):    The patient was admitted to [facility] for the above procedure. Patient tolerated the procedure well in its entirety without issue. For more details, please refer to the complete operative note. she was transferred to recovery post-op and then to the floor. Once on the floor her diet was advanced and she ambulated the night of and day after surgery. On POD 1 the patient was tolerating a regular diet, ambulating without difficulty.Her pain was well controlled.    Physical exam was appropriate for post operative state. The incisions were clean, dry and intact. The hidalgo was draining clear yellow urine. RAÚL drain output was 45. This was removed prior to discharge. The patient was deemed stable for discharge with her hidalgo on 03/15/2022. She will f/u with Dr. Diaz on 3/22 for a VT with prior cystogram.   .    Medications and instructions as below.  For more thorough information, please refer to the hospital record and operative report.    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    Specialty: Urology  Why: Post-op, For hidalgo removal following cystogram.  Contact information:  [address]  [facility] [address] [phone]  Additional information:  Please park in the [facility] surface lot on the [place] side and check in on the 2nd floor             T Rigoberto Natarajan MD Follow up on 3/18/2022.    Specialty: Internal Medicine  Why: hospital follow up appointment 11:30 am  Contact information:  [address]  [address]  [place] [address] [phone]                           Patient Instructions:      FL Cystogram Minimum 3 Views (xpd) - Rad Performed   Standing Status: Future Standing Exp. Date: 03/15/23     Order Specific Question Answer Comments   May the Radiologist modify the order per protocol to meet the clinical needs of the patient? Yes    Is the patient allergic to iodine or contrast? No    Release to patient Immediate      Lifting restrictions   Order Comments: Do not drive while taking pain medications. No strenuous activity or lifting greater than 10 pounds for 4 weeks.     No dressing needed   Order Comments: Do not soak incisions in tub or bath and do not scrub incisions. You may shower over incisions. If you have surgical glue  in place, the glue will come off over the next few weeks.     Notify your health care provider if you experience any of the following:  temperature >100.4     Notify your health care provider if you experience any of the following:  persistent nausea and vomiting or diarrhea     Notify your health care provider if you experience any of the following:  severe uncontrolled pain     Notify your health care provider if you experience any of the following:  redness, tenderness, or signs of infection (pain, swelling, redness, odor or green/yellow discharge around incision site)     Notify your health care provider if you experience any of the following:  difficulty breathing or increased cough     Notify your health care provider if you experience any of the following:  persistent dizziness, light-headedness, or visual disturbances     Medications:  Reconciled Home Medications:      Medication List        START taking these medications      ibuprofen 800 MG tablet  Commonly known as: ADVIL,MOTRIN  Take 1 tablet (800 mg total) by mouth 3 (three) times daily.     oxyCODONE 5 MG immediate release tablet  Commonly known as: ROXICODONE  Take 1 tablet (5 mg total) by mouth every 6 (six) hours as needed for Pain.            CHANGE how you take these medications      * 8 HOUR PAIN RELIEVER 650 MG Tbsr  Generic drug: acetaminophen  Take 1 tablet (650 mg total) by mouth every 8 (eight) hours.  What changed: You were already taking a medication with the same name, and this prescription was added. Make sure you understand how and when to take each.     * acetaminophen 500 MG tablet  Commonly known as: TYLENOL  Take 500 mg by mouth every 6 (six) hours as needed for Pain.  What changed: Another medication with the same name was added. Make sure you understand how and when to take each.     * polyethylene glycol 17 gram Pwpk  Commonly known as: GLYCOLAX  Take by mouth every morning.  What changed: Another medication with the same name  was added. Make sure you understand how and when to take each.     * polyethylene glycol 17 gram/dose powder  Commonly known as: GLYCOLAX  Dissolve one capful (17 g) in liquid and take by mouth once daily.  What changed: You were already taking a medication with the same name, and this prescription was added. Make sure you understand how and when to take each.           * This list has 4 medication(s) that are the same as other medications prescribed for you. Read the directions carefully, and ask your doctor or other care provider to review them with you.                CONTINUE taking these medications      ALPRAZolam 0.25 MG tablet  Commonly known as: XANAX  Take 0.25 mg by mouth 2 (two) times daily as needed for Anxiety.     bisoproloL-hydrochlorothiazide 10-6.25 mg per tablet  Commonly known as: ZIAC  Take 1 tablet by mouth once daily.     CALCIUM 600 + D(3) ORAL  Take by mouth once daily.     CENTRUM ORAL  Take by mouth once daily.     co-enzyme Q-10 30 mg capsule  Take 30 mg by mouth 3 (three) times daily.     milk thistle 175 mg tablet  Take 175 mg by mouth once daily.     OCUVITE EYE PLUS MULTI ORAL  Take 1 tablet by mouth once daily.     pantoprazole 40 MG tablet  Commonly known as: PROTONIX  Take 40 mg by mouth once daily.     raloxifene 60 mg tablet  Commonly known as: EVISTA  Take 60 mg by mouth every evening.            STOP taking these medications      HYDROcodone-acetaminophen 5-325 mg per tablet  Commonly known as: NORCO     ketorolac 10 mg tablet  Commonly known as: TORADOL     oxybutynin 5 MG Tab  Commonly known as: DITROPAN     tamsulosin 0.4 mg Cap  Commonly known as: FLOMAX              Time spent on the discharge of patient: 15 minutes    Richard Pimentel MD  Urology  [facility]

## 2022-03-15 NOTE — PROGRESS NOTES
[facility]  Urology  Progress Note    Patient Name: Gita García  MRN: [id number]  Admission Date: 3/14/2022  Hospital Length of Stay: 1 days  Code Status: Prior   Attending Provider: Shane Diaz MD   Primary Care Physician: ZAINAB Natarajan MD    Subjective:     HPI:  No notes on file    Interval History: NAEON. VSS. OOBAH and tolerating diet. Pain is well controlled. Urine clear yellow without clots. Likely d/c today with hidalgo. Will CTM.       Objective:     Temp:  [97 °F (36.1 °C)-98 °F (36.7 °C)] 97.5 °F (36.4 °C)  Pulse:  [51-77] 77  Resp:  [14-19] 16  SpO2:  [91 %-100 %] 93 %  BP: ()/(51-84) 146/61     Body mass index is 26.53 kg/m².           Drains       Drain  Duration                  Closed/Suction Drain 03/14/22 1540 Right RLQ Bulb 15 Fr. <1 day         Urethral Catheter 03/14/22 1220 Latex 18 Fr. <1 day                    Physical Exam  Vitals and nursing note reviewed.   Constitutional:       General: She is not in acute distress.  HENT:      Head: Atraumatic.      Nose: Nose normal.   Eyes:      Extraocular Movements: Extraocular movements intact.   Cardiovascular:      Rate and Rhythm: Normal rate.   Pulmonary:      Effort: Pulmonary effort is normal.   Abdominal:      General: Abdomen is flat.      Tenderness: There is no right CVA tenderness or left CVA tenderness.      Comments: Incisions c/d/i. Appropriately tender. RAÚL in place in RLQ with SS drainage.    Genitourinary:     Comments: Hidalgo in place, urine clear yellow without clots.   Musculoskeletal:         General: Normal range of motion.      Cervical back: Normal range of motion.   Neurological:      General: No focal deficit present.      Mental Status: She is alert. Mental status is at baseline.   Psychiatric:         Mood and Affect: Mood normal.         Behavior: Behavior normal.         Thought Content: Thought content normal.         Judgment: Judgment normal.       Significant Labs:    BMP:  Recent Labs   Lab  03/15/22  0224      K 4.7      CO2 25   BUN 17   CREATININE 0.8   CALCIUM 8.6*       CBC:   Recent Labs   Lab 03/15/22  0224   WBC 10.37   HGB 10.4*   HCT 31.5*          All pertinent labs results from the past 24 hours have been reviewed.    Significant Imaging:  All pertinent imaging results/findings from the past 24 hours have been reviewed.                    Assessment/Plan:     * Urothelial carcinoma of kidney, right  - Tylenol, Ketorolac, PO oxycodone PRN for pain control  - regular diet  - Ambulate qid  - Drains: Go home with Jama, RLQ RAÚL in place.   - Prophylaxis: IS, SCDs, GI ppx  - Possible d/c today, will CTM        VTE Risk Mitigation (From admission, onward)         Ordered     heparin (porcine) injection 5,000 Units  Every 8 hours         03/14/22 1626     IP VTE HIGH RISK PATIENT  Once         03/14/22 1626     Place LINDA hose  Until discontinued         03/14/22 1626     Place sequential compression device  Until discontinued         03/14/22 1626                Richard Pimentel MD  Urology  [facility]

## 2022-03-15 NOTE — DISCHARGE INSTRUCTIONS
What to expect with your Nephroureterectomy.  [facility]    After surgery  You may or may not have a drain that is shaped like a grenade and put to suction  This drain usually may or may not come out on Post op day 1. If you go home with a drain, the nurses will teach you how to record the output and you will come back 3-5 days after you leave to have the drain removed in clinic.  You will have a catheter after your surgery which you will go home with. You will have a cystogram performed outpatient prior to your post-op clinic visit in order to assess for a bladder leak.  The night of surgery we expect and hope that you will:  Walk - walking helps get the bowels moving. Also after your surgery, you are at a risk for a deep venous thrombosis (which is a clot in the legs that can form by remaining inactive or still for extended periods of time) and this can travel to your lungs and make you feel short of breath. This is a very serious condition. Walking helps prevent a DVT from occurring.  Eat - you do not have to eat a whole meal, but we want to make sure you can tolerate liquid and/or solid food without nausea and vomiting  Use your incentive spirometer - this is the breathing apparatus that helps you expand your lungs. If and when you have pain you will not want to take deep breaths. But if you dont take deep breaths, you are at risk for pneumonia. The incentive spirometer will help prevent this from occurring by expanding your lungs.  Symptoms you may experience immediately post-op:  Bloating and/or shoulder pain if you had a laparoscopic procedure - when we do this operation, we fill up your abdominal cavity with gas to better help us visualize the organs and allow our instruments to fit. After the surgery, not all the air can be removed and your body will eventually absorb this small amount of air. However this can make you feel bloated. In addition, when you sit up, the air can sit right under a muscle  (the diaphragm) which has connecting nerves to the shoulders, which could explain why you have shoulder pain.  Do not expect necessarily to have gas or to have a bowel movement - this goes along with the bloating, you may feel like you want to pass gas or have a bowel movement but you cant. This is normal and you will feel like this for a couple days. There are no pills to help with this. Small walks throughout the day should help with this.  Pain - your pain should be able to be controlled with medicines by mouth that we prescribe. It is important for you to tell us if you are on any pain medications at home before the surgery as you may need stronger pain meds while in the hospital.  You can go home when:  Pain is controlled with medicines by mouth  You are able to walk without difficulty or pain  You are tolerating a regulating diet  When you go home:  Activity  Continue to walk - small walks throughout the day are better than one long walk.   Do not lift anything greater than 10 pounds for 6 weeks - we want your abdominal wall muscles to heal.  Bowel Movements - Do not strain to have a bowel movement - the pain medicines will make you constipated. That is why we also ask you to take colace 2-3 x per day to help keep your bowels regular. If you are still having trouble, then you can also add Miralax once a day. Do not take any stool softeners if you are having diarrhea.  Drain - If you have a drain (not your catheter, but a separate drain) then record the output and bring it with you to your next appointment  Smoking - If you smoke, we encourage you STOP. Smoking interferes with the healing process and your prolong your healing with continued smoking.  Driving - Do not drive while you are on pain meds or with your catheter in place.  Bathing - If you do not have a drain, you can shower 48 hours after your surgery. If you do have a drain, sponge bathe only until the drain is out.  Dressing - you can remove the  dressings if there is no drainage or change them as needed if there is. The little sterile band-aid strips will fall off on their own in 10-14 days. If they have not fallen off then you can remove them yourself.  Restarting medicines -especially blood thinners (Aspirin, Plavix, Coumadin), Fish Oil. Discuss this with your physician prior to discharge.  When to return to the ER  Fever - If you have a fever >101.5, this could be due to a number of reasons such as infection of the urine or incision. If your catheter has been removed, you could possibly have a leak. It would be best to come to the ER so they can better evaluate you.  Severe pain - pain is expected, but severe or new onset of pain is not normal.   Inability to tolerate food or liquid with nausea and vomiting - it would be best to go to the ER for them to better evaluate you.   Catheter stops draining

## 2022-03-15 NOTE — TELEPHONE ENCOUNTER
I spoke with patient's daughter , who confirmed her appt on 3-22-22 for cystogram and voiding trial.    ----- Message from Richrad Pimentel MD sent at 3/15/2022 10:11 AM CDT -----  Regarding: PO f/u  Please schedule Gita García for a 1 week PO f/u with Dr. Diaz for VT with hidalgo removal following cystogram (order placed).     Thank you,   NM

## 2022-03-15 NOTE — NURSING
Patient has a regular diet order. She received a tray at the change of shift. She consumed a small amount of chicken and later experienced nausea and vomiting. Patient had previously received Zofran for nausea. Second line treatment for nausea and vomiting was given at this time. Patient was educated on the importance of walking after surgery once she is feeling better. She was able to perform return demonstration with the incentive spirometer. Pillow splinting education and return demonstration also performed.

## 2022-03-15 NOTE — PLAN OF CARE
Patient vital signs stable during the night. Pain was controlled with prn pain medications. Patient experienced nausea that was resolved with second round of anti-nausea medication. Patient was compliant with plan of care. She demonstrated the proper use of the incentive spirometer, pillow splinting, turn/cough/deep breath, and since her nausea has resolved. Patient was able to ambulate in the hallway with standby assistance this morning. Discussed with patient her next plan is to sit up in the chair for breakfast this morning. She has agreed.       Problem: Adult Inpatient Plan of Care  Goal: Optimal Comfort and Wellbeing  Outcome: Ongoing, Progressing     Problem: Infection  Goal: Absence of Infection Signs and Symptoms  Outcome: Ongoing, Progressing     Problem: Fall Injury Risk  Goal: Absence of Fall and Fall-Related Injury  Outcome: Ongoing, Progressing     Problem: Pain Acute  Goal: Acceptable Pain Control and Functional Ability  Outcome: Ongoing, Progressing     Problem: Impaired Wound Healing  Goal: Optimal Wound Healing  Outcome: Ongoing, Progressing

## 2022-03-15 NOTE — ASSESSMENT & PLAN NOTE
- Tylenol, Ketorolac, PO oxycodone PRN for pain control  - regular diet  - Ambulate qid  - Drains: Go home with MARIBELL Jama JP in place.   - Prophylaxis: IS, SCDs, GI ppx  - Possible d/c today, will CTM

## 2022-03-15 NOTE — NURSING
Patient arrived to unit via stretcher alert/oriented x4 on room air. Plan of care reviewed. Jama patent and draining. Abdominal incisions intact and closed with dermabond. RAÚL drain patent. Patient has no complaints of pain at this time. Will follow plan of care and continue to monitor.

## 2022-03-15 NOTE — SUBJECTIVE & OBJECTIVE
Interval History: NAEON. VSS. OOBAH and tolerating diet. Pain is well controlled. Urine clear yellow without clots. Likely d/c today with hidalgo. Will CTM.       Objective:     Temp:  [97 °F (36.1 °C)-98 °F (36.7 °C)] 97.5 °F (36.4 °C)  Pulse:  [51-77] 77  Resp:  [14-19] 16  SpO2:  [91 %-100 %] 93 %  BP: ()/(51-84) 146/61     Body mass index is 26.53 kg/m².           Drains       Drain  Duration                  Closed/Suction Drain 03/14/22 1540 Right RLQ Bulb 15 Fr. <1 day         Urethral Catheter 03/14/22 1220 Latex 18 Fr. <1 day                    Physical Exam  Vitals and nursing note reviewed.   Constitutional:       General: She is not in acute distress.  HENT:      Head: Atraumatic.      Nose: Nose normal.   Eyes:      Extraocular Movements: Extraocular movements intact.   Cardiovascular:      Rate and Rhythm: Normal rate.   Pulmonary:      Effort: Pulmonary effort is normal.   Abdominal:      General: Abdomen is flat.      Tenderness: There is no right CVA tenderness or left CVA tenderness.      Comments: Incisions c/d/i. Appropriately tender. RAÚL in place in RLQ with SS drainage.    Genitourinary:     Comments: Hidalgo in place, urine clear yellow without clots.   Musculoskeletal:         General: Normal range of motion.      Cervical back: Normal range of motion.   Neurological:      General: No focal deficit present.      Mental Status: She is alert. Mental status is at baseline.   Psychiatric:         Mood and Affect: Mood normal.         Behavior: Behavior normal.         Thought Content: Thought content normal.         Judgment: Judgment normal.       Significant Labs:    BMP:  Recent Labs   Lab 03/15/22  0224      K 4.7      CO2 25   BUN 17   CREATININE 0.8   CALCIUM 8.6*       CBC:   Recent Labs   Lab 03/15/22  0224   WBC 10.37   HGB 10.4*   HCT 31.5*          All pertinent labs results from the past 24 hours have been reviewed.    Significant Imaging:  All pertinent imaging  results/findings from the past 24 hours have been reviewed.

## 2022-03-15 NOTE — PLAN OF CARE
[facility]  Discharge Final Note    Primary Care Provider: ZAINAB Natarajan MD    Expected Discharge Date: 3/15/2022    Final Discharge Note (most recent)     Final Note - 03/15/22 1041        Final Note    Assessment Type Final Discharge Note     Anticipated Discharge Disposition Home or Self Care     Hospital Resources/Appts/Education Provided Appointments scheduled and added to AVS        Post-Acute Status    Post-Acute Authorization Other     Other Status No Post-Acute Service Needs                 Important Message from Medicare  Important Message from Medicare regarding Discharge Appeal Rights: Given to patient/caregiver, Explained to patient/caregiver, Signed/date by patient/caregiver     Date IMM was signed: 03/15/22  Time IMM was signed: 0831    Contact Info     [facility] 2nd Fl   Specialty: Urology    [address]  [facility] [address]   Phone: [phone]       Next Steps: Follow up in 1 week(s)    Instructions: Post-op, For hidalgo removal following cystogram.    ZAINAB Natarajan MD   Specialty: Internal Medicine   Relationship: PCP - [facility]...  [address] [place] [address]  [address]  [place] [address]   Phone: [phone]       Next Steps: Follow up on 3/18/2022    Instructions: hospital follow up appointment 11:30 am        Future Appointments   Date Time Provider Department Center   3/22/2022  8:00 AM [facility] [id number] 350 LB LIMIT [facility] XRAY OP [facility]   3/22/2022 10:30 AM Shane Diaz MD [facility] UROLOG [facility]   4/5/2022 10:00 AM Shane Diaz MD [facility] UROLOG [facility]   4/14/2022  2:30 PM Venu Riggins DNP [facility]   5/24/2022 10:30 AM LAB, HEMONC SAME DAY [facility]   5/24/2022 10:45 AM Edwina Joe MD [facility] GYN ONC [facility] Kramer RN, CM   Ext: [phone]

## 2022-03-15 NOTE — HPI
Gita García is a 76 y.o. female with a right renal mass. Hx of HG right upper tract UCC. Now s/p right nephroureterectomy with bladder cuff on 3/14/22.

## 2022-03-21 LAB
FINAL PATHOLOGIC DIAGNOSIS: NORMAL
GROSS: NORMAL
Lab: NORMAL

## 2022-03-22 ENCOUNTER — HOSPITAL ENCOUNTER (OUTPATIENT)
Dept: RADIOLOGY | Facility: HOSPITAL | Age: 77
Discharge: HOME | End: 2022-03-22
Payer: MEDICARE

## 2022-03-22 ENCOUNTER — OFFICE VISIT (OUTPATIENT)
Dept: UROLOGY | Facility: CLINIC | Age: 77
End: 2022-03-22
Payer: MEDICARE

## 2022-03-22 VITALS
DIASTOLIC BLOOD PRESSURE: 77 MMHG | WEIGHT: 154.31 LBS | BODY MASS INDEX: 26.34 KG/M2 | HEART RATE: 61 BPM | SYSTOLIC BLOOD PRESSURE: 158 MMHG | HEIGHT: 64 IN

## 2022-03-22 DIAGNOSIS — C54.1 ENDOMETRIAL CA: ICD-10-CM

## 2022-03-22 DIAGNOSIS — C64.1 UROTHELIAL CARCINOMA OF KIDNEY, RIGHT: Primary | ICD-10-CM

## 2022-03-22 DIAGNOSIS — C64.1 UROTHELIAL CARCINOMA OF KIDNEY, RIGHT: ICD-10-CM

## 2022-03-22 PROCEDURE — 1159F MED LIST DOCD IN RCRD: CPT | Mod: CPTII,S$GLB,, | Performed by: UROLOGY

## 2022-03-22 PROCEDURE — 1101F PT FALLS ASSESS-DOCD LE1/YR: CPT | Mod: CPTII,S$GLB,, | Performed by: UROLOGY

## 2022-03-22 PROCEDURE — 74430 CONTRAST X-RAY BLADDER: CPT | Mod: 26,,, | Performed by: RADIOLOGY

## 2022-03-22 PROCEDURE — 1125F AMNT PAIN NOTED PAIN PRSNT: CPT | Mod: CPTII,S$GLB,, | Performed by: UROLOGY

## 2022-03-22 PROCEDURE — 51600 INJECTION FOR BLADDER X-RAY: CPT | Mod: ,,, | Performed by: RADIOLOGY

## 2022-03-22 PROCEDURE — 25500020 PHARM REV CODE 255

## 2022-03-22 PROCEDURE — 51600 INJECTION FOR BLADDER X-RAY: CPT

## 2022-03-22 PROCEDURE — 1157F ADVNC CARE PLAN IN RCRD: CPT | Mod: CPTII,S$GLB,, | Performed by: UROLOGY

## 2022-03-22 PROCEDURE — 51600 FL CYSTOGRAM MIN 3 VIEWS RADIOLOGIST PERFORMED: ICD-10-PCS | Mod: ,,, | Performed by: RADIOLOGY

## 2022-03-22 PROCEDURE — 99499 NO LOS: ICD-10-PCS | Mod: S$GLB,,, | Performed by: UROLOGY

## 2022-03-22 PROCEDURE — 1101F PR PT FALLS ASSESS DOC 0-1 FALLS W/OUT INJ PAST YR: ICD-10-PCS | Mod: CPTII,S$GLB,, | Performed by: UROLOGY

## 2022-03-22 PROCEDURE — 99999 PR PBB SHADOW E&M-EST. PATIENT-LVL III: ICD-10-PCS | Mod: PBBFAC,,, | Performed by: UROLOGY

## 2022-03-22 PROCEDURE — 1159F PR MEDICATION LIST DOCUMENTED IN MEDICAL RECORD: ICD-10-PCS | Mod: CPTII,S$GLB,, | Performed by: UROLOGY

## 2022-03-22 PROCEDURE — 3077F SYST BP >= 140 MM HG: CPT | Mod: CPTII,S$GLB,, | Performed by: UROLOGY

## 2022-03-22 PROCEDURE — 3078F PR MOST RECENT DIASTOLIC BLOOD PRESSURE < 80 MM HG: ICD-10-PCS | Mod: CPTII,S$GLB,, | Performed by: UROLOGY

## 2022-03-22 PROCEDURE — 1157F PR ADVANCE CARE PLAN OR EQUIV PRESENT IN MEDICAL RECORD: ICD-10-PCS | Mod: CPTII,S$GLB,, | Performed by: UROLOGY

## 2022-03-22 PROCEDURE — 1125F PR PAIN SEVERITY QUANTIFIED, PAIN PRESENT: ICD-10-PCS | Mod: CPTII,S$GLB,, | Performed by: UROLOGY

## 2022-03-22 PROCEDURE — 99999 PR PBB SHADOW E&M-EST. PATIENT-LVL III: CPT | Mod: PBBFAC,,, | Performed by: UROLOGY

## 2022-03-22 PROCEDURE — 74430 FL CYSTOGRAM MIN 3 VIEWS RADIOLOGIST PERFORMED: ICD-10-PCS | Mod: 26,,, | Performed by: RADIOLOGY

## 2022-03-22 PROCEDURE — 3078F DIAST BP <80 MM HG: CPT | Mod: CPTII,S$GLB,, | Performed by: UROLOGY

## 2022-03-22 PROCEDURE — 99499 UNLISTED E&M SERVICE: CPT | Mod: S$GLB,,, | Performed by: UROLOGY

## 2022-03-22 PROCEDURE — 3288F PR FALLS RISK ASSESSMENT DOCUMENTED: ICD-10-PCS | Mod: CPTII,S$GLB,, | Performed by: UROLOGY

## 2022-03-22 PROCEDURE — 3077F PR MOST RECENT SYSTOLIC BLOOD PRESSURE >= 140 MM HG: ICD-10-PCS | Mod: CPTII,S$GLB,, | Performed by: UROLOGY

## 2022-03-22 PROCEDURE — 3288F FALL RISK ASSESSMENT DOCD: CPT | Mod: CPTII,S$GLB,, | Performed by: UROLOGY

## 2022-03-22 RX ADMIN — IOTHALAMATE MEGLUMINE 400 ML: 172 INJECTION URETERAL at 08:03

## 2022-03-22 NOTE — PROGRESS NOTES
Clinic Note  3/22/2022      Subjective:         Chief Complaint:   HPI  Gita García is a 76 y.o. female with his of endometrial carcinoma. Recently had hematuria. CT scan 10/18/2021 showed right renal pelvis filling defect.  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 Not a complete ablatation per notes.   treated for bladder ca by Dr. Diaz [id number].      6 rounds of chemo, 5 rounds of radiation Oct 2019-Jan 2020.     Denies hematuria. Some left abd pain. Urethral caruncle.     Sister had kidney cancer 25 years ago. Nephrectomy.  Scoliosis. Hysterectomy, no other surgery.  Extensive review and interpretation of imaging and laboratory studies. Explained findings to patient and the impact on treatment plan.    Had ureteroscopy 1/3 2022 with ablation of right upper tract tumor. Cytology from barbotage specimen HG.       3/22/2022- Robotic nephroureterectomy with bladder cuff + intravesical mitomycin.  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 [id number].  Cystogram shows no leak.    Past Medical History:   Diagnosis Date    Acid reflux     Arthritis     Chemotherapy induced nausea and vomiting 8/9/2019    Endometrial ca 6/26/2019    Essential hypertension 6/27/2019    Estrogen deficiency     Hormone deficiency     Hypertension     Liver mass 7/1/2019    Neuropathy due to chemotherapeutic drug 2/21/2020    Osteopenia     Seizures     post partal. several days after delivery    Urothelial carcinoma of kidney, right 11/11/2021     Family History   Problem Relation Age of Onset    Colon cancer Sister 53    Prostate cancer Brother     Breast cancer Sister 64    Kidney cancer Sister     Breast cancer Sister         in her 60s    Skin cancer Sister     Stroke Brother     Prostate cancer Brother     Heart disease Brother     Prostate cancer Brother     Heart disease  Brother     Ovarian cancer Neg Hx     Uterine cancer Neg Hx     Anesthesia problems Neg Hx      Social History     Socioeconomic History    Marital status:    Tobacco Use    Smoking status: Never Smoker    Smokeless tobacco: Never Used   Substance and Sexual Activity    Alcohol use: Never    Drug use: Never     Past Surgical History:   Procedure Laterality Date    ABLATION OF NEOPLASM OF URETER USING LASER Right 1/3/2022    Procedure: ABLATION, NEOPLASM, URETER, USING LASER;  Surgeon: Shane Diaz MD;  Location: [facility] OR 1ST FLR;  Service: Urology;  Laterality: Right;    CYSTOSCOPY N/A 1/3/2022    Procedure: CYSTOSCOPY;  Surgeon: Shane Diaz MD;  Location: [facility] OR 1ST FLR;  Service: Urology;  Laterality: N/A;    DILATION AND CURETTAGE OF UTERUS      GI scope  2016    LAPAROTOMY N/A 7/8/2019    Procedure: LAPAROTOMY;  Surgeon: Lokesh Carias MD;  Location: [facility] OR 2ND FLR;  Service: OB/GYN;  Laterality: N/A;  mini    OMENTECTOMY N/A 7/8/2019    Procedure: OMENTECTOMY;  Surgeon: Lokesh Carias MD;  Location: [facility] OR 2ND FLR;  Service: OB/GYN;  Laterality: N/A;    ROBOT-ASSISTED LAPAROSCOPIC ABDOMINAL HYSTERECTOMY USING DA SIRISHA XI N/A 7/8/2019    Procedure: XI ROBOTIC HYSTERECTOMY;  Surgeon: Lokesh Carias MD;  Location: [facility] OR 2ND FLR;  Service: OB/GYN;  Laterality: N/A;    ROBOT-ASSISTED LAPAROSCOPIC PELVIC LYMPHADENECTOMY USING DA SIRISHA XI Bilateral 7/8/2019    Procedure: XI ROBOTIC LYMPHADENECTOMY, PELVIC;  Surgeon: Lokesh Carias MD;  Location: [facility] OR 2ND FLR;  Service: OB/GYN;  Laterality: Bilateral;    ROBOT-ASSISTED LAPAROSCOPIC RETROPERITONEAL LYMPHADENECTOMY USING DA SIRISHA XI N/A 7/8/2019    Procedure: XI ROBOTIC LYMPHADENECTOMY, RETROPERITONEUM;  Surgeon: Lokesh Carias MD;  Location: [facility] OR 2ND FLR;  Service: OB/GYN;  Laterality: N/A;    ROBOT-ASSISTED LAPAROSCOPIC SALPINGO-OOPHORECTOMY USING DA SIRISHA XI Bilateral 7/8/2019    Procedure: XI ROBOTIC  "SALPINGO-OOPHORECTOMY;  Surgeon: Lokesh Carias MD;  Location: [facility] OR [address] [place] [address];  Service: OB/GYN;  Laterality: Bilateral;    ROBOT-ASSISTED LAPAROSCOPIC SURGICAL REMOVAL OF KIDNEY AND URETER USING DA SIRISHA XI Right 3/14/2022    Procedure: XI ROBOTIC NEPHROURETERECTOMY/ WITH BLADDER CUFF;  Surgeon: Shane Diaz MD;  [facility];  Service: Urology;  Laterality: Right;  4hrs    URETEROSCOPY Right 1/3/2022    Procedure: URETEROSCOPY;  Surgeon: Shane Diaz MD;  Location: [facility] OR 1ST FLR;  Service: Urology;  Laterality: Right;  2hrs     Patient Active Problem List   Diagnosis    Endometrial ca    Essential hypertension    Liver hemangioma    s/p RA-TLH/BSO/BPLD, omentectomy    Neuropathy due to chemotherapeutic drug    Urothelial carcinoma of kidney, right     Review of Systems      Objective:      There were no vitals taken for this visit.  Estimated body mass index is 26.53 kg/m² as calculated from the following:    Height as of 3/14/22: 5' 4" (1.626 m).    Weight as of 3/14/22: 70.1 kg (154 lb 8.7 oz).  Physical Exam      Assessment and Plan:           Problem List Items Addressed This Visit    None         Follow up:   Reviewed path report.  Discussed surveillance, adjuvant platinum-based chemo, nivolumab.  Will review at [facility].  Will need cystoscopy +/- cytology 3 months postop.   Jama removed.    Shane Diaz"

## 2022-03-25 NOTE — ADDENDUM NOTE
Addendum  created 03/25/22 1243 by Dimple Young MD    Attestation recorded in Intraprocedure, Intraprocedure Attestations filed

## 2022-04-05 ENCOUNTER — OFFICE VISIT (OUTPATIENT)
Dept: UROLOGY | Facility: CLINIC | Age: 77
End: 2022-04-05
Payer: MEDICARE

## 2022-04-05 VITALS
HEIGHT: 64 IN | DIASTOLIC BLOOD PRESSURE: 76 MMHG | SYSTOLIC BLOOD PRESSURE: 150 MMHG | HEART RATE: 69 BPM | BODY MASS INDEX: 26.72 KG/M2 | WEIGHT: 156.5 LBS

## 2022-04-05 DIAGNOSIS — C54.1 ENDOMETRIAL CA: ICD-10-CM

## 2022-04-05 DIAGNOSIS — C64.1 UROTHELIAL CARCINOMA OF KIDNEY, RIGHT: Primary | ICD-10-CM

## 2022-04-05 DIAGNOSIS — N28.89 OTHER SPECIFIED DISORDERS OF KIDNEY AND URETER: ICD-10-CM

## 2022-04-05 PROCEDURE — 1126F AMNT PAIN NOTED NONE PRSNT: CPT | Mod: CPTII,S$GLB,, | Performed by: UROLOGY

## 2022-04-05 PROCEDURE — 3077F SYST BP >= 140 MM HG: CPT | Mod: CPTII,S$GLB,, | Performed by: UROLOGY

## 2022-04-05 PROCEDURE — 3078F PR MOST RECENT DIASTOLIC BLOOD PRESSURE < 80 MM HG: ICD-10-PCS | Mod: CPTII,S$GLB,, | Performed by: UROLOGY

## 2022-04-05 PROCEDURE — 1101F PR PT FALLS ASSESS DOC 0-1 FALLS W/OUT INJ PAST YR: ICD-10-PCS | Mod: CPTII,S$GLB,, | Performed by: UROLOGY

## 2022-04-05 PROCEDURE — 3078F DIAST BP <80 MM HG: CPT | Mod: CPTII,S$GLB,, | Performed by: UROLOGY

## 2022-04-05 PROCEDURE — 1157F PR ADVANCE CARE PLAN OR EQUIV PRESENT IN MEDICAL RECORD: ICD-10-PCS | Mod: CPTII,S$GLB,, | Performed by: UROLOGY

## 2022-04-05 PROCEDURE — 3288F FALL RISK ASSESSMENT DOCD: CPT | Mod: CPTII,S$GLB,, | Performed by: UROLOGY

## 2022-04-05 PROCEDURE — 99999 PR PBB SHADOW E&M-EST. PATIENT-LVL II: ICD-10-PCS | Mod: PBBFAC,,, | Performed by: UROLOGY

## 2022-04-05 PROCEDURE — 3077F PR MOST RECENT SYSTOLIC BLOOD PRESSURE >= 140 MM HG: ICD-10-PCS | Mod: CPTII,S$GLB,, | Performed by: UROLOGY

## 2022-04-05 PROCEDURE — 3288F PR FALLS RISK ASSESSMENT DOCUMENTED: ICD-10-PCS | Mod: CPTII,S$GLB,, | Performed by: UROLOGY

## 2022-04-05 PROCEDURE — 99499 NO LOS: ICD-10-PCS | Mod: S$GLB,,, | Performed by: UROLOGY

## 2022-04-05 PROCEDURE — 1126F PR PAIN SEVERITY QUANTIFIED, NO PAIN PRESENT: ICD-10-PCS | Mod: CPTII,S$GLB,, | Performed by: UROLOGY

## 2022-04-05 PROCEDURE — 99999 PR PBB SHADOW E&M-EST. PATIENT-LVL II: CPT | Mod: PBBFAC,,, | Performed by: UROLOGY

## 2022-04-05 PROCEDURE — 1101F PT FALLS ASSESS-DOCD LE1/YR: CPT | Mod: CPTII,S$GLB,, | Performed by: UROLOGY

## 2022-04-05 PROCEDURE — 1157F ADVNC CARE PLAN IN RCRD: CPT | Mod: CPTII,S$GLB,, | Performed by: UROLOGY

## 2022-04-05 PROCEDURE — 99499 UNLISTED E&M SERVICE: CPT | Mod: S$GLB,,, | Performed by: UROLOGY

## 2022-04-05 NOTE — PROGRESS NOTES
Clinic Note  4/5/2022      Subjective:         Chief Complaint:   MAR García is a 76 y.o. female with his of endometrial carcinoma. Recently had hematuria. CT scan 10/18/2021 showed right renal pelvis filling defect.  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 Not a complete ablatation per notes.   treated for bladder ca by Dr. Diaz [id number].      6 rounds of chemo, 5 rounds of radiation Oct 2019-Jan 2020.     Denies hematuria. Some left abd pain. Urethral caruncle.     Sister had kidney cancer 25 years ago. Nephrectomy.  Scoliosis. Hysterectomy, no other surgery.  Extensive review and interpretation of imaging and laboratory studies. Explained findings to patient and the impact on treatment plan.    Had ureteroscopy 1/3 2022 with ablation of right upper tract tumor. Cytology from barbotage specimen HG.        3/22/2022- Robotic nephroureterectomy with bladder cuff + intravesical mitomycin.  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 [id number].  Cystogram shows no leak.    4/5/2022- walking more daily. Appetite good. She can resume driving. No lifting over 15 pounds for 3 more weeks.  Has used no opioids postop.  Past Medical History:   Diagnosis Date    Acid reflux     Arthritis     Chemotherapy induced nausea and vomiting 8/9/2019    Endometrial ca 6/26/2019    Essential hypertension 6/27/2019    Estrogen deficiency     Hormone deficiency     Hypertension     Liver mass 7/1/2019    Neuropathy due to chemotherapeutic drug 2/21/2020    Osteopenia     Seizures     post partal. several days after delivery    Urothelial carcinoma of kidney, right 11/11/2021     Family History   Problem Relation Age of Onset    Colon cancer Sister 53    Prostate cancer Brother     Breast cancer Sister 64    Kidney cancer Sister     Breast cancer Sister         in her 60s    Skin  cancer Sister     Stroke Brother     Prostate cancer Brother     Heart disease Brother     Prostate cancer Brother     Heart disease Brother     Ovarian cancer Neg Hx     Uterine cancer Neg Hx     Anesthesia problems Neg Hx      Social History     Socioeconomic History    Marital status:    Tobacco Use    Smoking status: Never Smoker    Smokeless tobacco: Never Used   Substance and Sexual Activity    Alcohol use: Never    Drug use: Never     Past Surgical History:   Procedure Laterality Date    ABLATION OF NEOPLASM OF URETER USING LASER Right 1/3/2022    Procedure: ABLATION, NEOPLASM, URETER, USING LASER;  Surgeon: Shane Diaz MD;  Location: [facility] OR 1ST FLR;  Service: Urology;  Laterality: Right;    CYSTOSCOPY N/A 1/3/2022    Procedure: CYSTOSCOPY;  Surgeon: Shane Diaz MD;  Location: [facility] OR 1ST FLR;  Service: Urology;  Laterality: N/A;    DILATION AND CURETTAGE OF UTERUS      GI scope  2016    LAPAROTOMY N/A 7/8/2019    Procedure: LAPAROTOMY;  Surgeon: Lokesh Carias MD;  Location: [facility] OR 2ND FLR;  Service: OB/GYN;  Laterality: N/A;  mini    OMENTECTOMY N/A 7/8/2019    Procedure: OMENTECTOMY;  Surgeon: Lokesh Carias MD;  Location: [facility] OR 2ND FLR;  Service: OB/GYN;  Laterality: N/A;    ROBOT-ASSISTED LAPAROSCOPIC ABDOMINAL HYSTERECTOMY USING DA SIRISHA XI N/A 7/8/2019    Procedure: XI ROBOTIC HYSTERECTOMY;  Surgeon: Lokesh Carias MD;  Location: [facility] OR 2ND FLR;  Service: OB/GYN;  Laterality: N/A;    ROBOT-ASSISTED LAPAROSCOPIC PELVIC LYMPHADENECTOMY USING DA SIRISHA XI Bilateral 7/8/2019    Procedure: XI ROBOTIC LYMPHADENECTOMY, PELVIC;  Surgeon: Lokesh Carias MD;  Location: [facility] OR 2ND FLR;  Service: OB/GYN;  Laterality: Bilateral;    ROBOT-ASSISTED LAPAROSCOPIC RETROPERITONEAL LYMPHADENECTOMY USING DA SIRISHA XI N/A 7/8/2019    Procedure: XI ROBOTIC LYMPHADENECTOMY, RETROPERITONEUM;  Surgeon: Lokesh Carias MD;  [facility];  Service: OB/GYN;   "Laterality: N/A;    ROBOT-ASSISTED LAPAROSCOPIC SALPINGO-OOPHORECTOMY USING DA SIRISHA XI Bilateral 7/8/2019    Procedure: XI ROBOTIC SALPINGO-OOPHORECTOMY;  Surgeon: Lokesh Carias MD;  Location: [facility] OR [address] [place] [address];  Service: OB/GYN;  Laterality: Bilateral;    ROBOT-ASSISTED LAPAROSCOPIC SURGICAL REMOVAL OF KIDNEY AND URETER USING DA SIRISHA XI Right 3/14/2022    Procedure: XI ROBOTIC NEPHROURETERECTOMY/ WITH BLADDER CUFF;  Surgeon: Shane Diaz MD;  Location: [facility] OR [address] [place] [address];  Service: Urology;  Laterality: Right;  4hrs    URETEROSCOPY Right 1/3/2022    Procedure: URETEROSCOPY;  Surgeon: Shane Diaz MD;  Location: [facility] OR [address] [place] [address];  Service: Urology;  Laterality: Right;  2hrs     Patient Active Problem List   Diagnosis    Endometrial ca    Essential hypertension    Liver hemangioma    s/p RA-TLH/BSO/BPLD, omentectomy    Neuropathy due to chemotherapeutic drug    Urothelial carcinoma of kidney, right     Review of Systems      Objective:      There were no vitals taken for this visit.  Estimated body mass index is 26.49 kg/m² as calculated from the following:    Height as of 3/22/22: 5' 4" (1.626 m).    Weight as of 3/22/22: 70 kg (154 lb 5.2 oz).  Physical Exam      Assessment and Plan:           Problem List Items Addressed This Visit     Endometrial ca    Urothelial carcinoma of kidney, right - Primary          Follow up:   Incisions healing well.  3 months with cystoscopy, CT scan.    4/7/2022- Presented at  tumor board. Recommendation was for hem onc consult to discuss nivolumab.    Shane Diaz        "

## 2022-04-07 ENCOUNTER — TUMOR BOARD CONFERENCE (OUTPATIENT)
Dept: UROLOGY | Facility: CLINIC | Age: 77
End: 2022-04-07
Payer: MEDICARE

## 2022-04-07 NOTE — PROGRESS NOTES
Presenting Hospital / Clinic: [facility]  Virtual Tumor Board Conference: Virtual  Presenter: Edmond Mar  Date Presented to Tumor Board: 4/7/2022  Specialties Present: Medical Oncology; Radiation Oncology; Pathology; Urology  Presentation at Cancer Conference: Prospective  Cancer Type: Other  Other Cancer: right upper tract urothelial  Recommended Plan Note: adjuvant immunotherapy       PATIENT SUMMARY:   Gita García is a 76 y.o. female with history of endometrial cancer treated with robotic hysterectomy, 6 cycles of taxol and carboplatin, and vaginal brachytherapy ([id number]). Initially referred to urology for gross hematuria, was found to have large burden right renal pelvis tumor. Biopsy showed HG UTUC, underwent right nephroureterectomy on 3/14/22 with intra-op mitomycin (declined neoadjuvant chemo).    DISCUSSION:  Adjuvant chemotherapy vs adjuvant immunotherapy vs observation    PERFORMANCE STATUS:  ECOG 1    Estimated GFR/CKD Stage: >60    Clinical/Pathologic Stage (TNM): T3 N0 M0    FACULTY IN ATTENDANCE:    Urologic Oncology: Shane Diaz MD [x], Michael Finley MD [x] , Hever Pollack MD []    CONSULT NEEDED:     [] Urologic Oncology    [x] Hem/Onc    [] Rad/Onc     [x] Treatment Guidelines (NCCN and AUA) reviewed and care planned is consistent with guidelines.    TUMOR BOARD RECOMMENDATIONS/PLAN/CONSENSUS:     Case discussed among group. Pathology and radiologic images were reviewed (if applicable).    - Referral to local heme/onc  - Recommend adjuvant immunotherapy (Opdivo)

## 2022-04-08 ENCOUNTER — TELEPHONE (OUTPATIENT)
Dept: UROLOGY | Facility: CLINIC | Age: 77
End: 2022-04-08
Payer: MEDICARE

## 2022-04-08 ENCOUNTER — PATIENT MESSAGE (OUTPATIENT)
Dept: HEMATOLOGY/ONCOLOGY | Facility: CLINIC | Age: 77
End: 2022-04-08
Payer: MEDICARE

## 2022-04-08 NOTE — TELEPHONE ENCOUNTER
I sent a secure message to Jada Joseph RN as well to coordinate appt with  as well.    ----- Message from Richa Bauer RN sent at 4/8/2022  2:36 PM CDT -----    ----- Message -----  From: Shane Diaz MD  Sent: 4/7/2022   6:13 PM CDT  To: Richa Bauer RN, Jada Joseph RN    Needs appointment. With Dr. Cancino to discuss adjuvant nivolumab.  Presented at [facility].

## 2022-04-13 ENCOUNTER — TELEPHONE (OUTPATIENT)
Dept: HEMATOLOGY/ONCOLOGY | Facility: CLINIC | Age: 77
End: 2022-04-13
Payer: MEDICARE

## 2022-04-13 NOTE — TELEPHONE ENCOUNTER
"Called and spoke with Gita, and explained the billing process and possible OOP cost. She voiced thanks and understanding. She questioned what exactly the appointment would entail, walked her through the genetic testing consult and testing process. She did need to reschedule the appointment due to other treatments. Rescheduled to see Natali for Thursday 5/26 at 11:30am. She voiced thanks and understanding.    ----- Message from Nina Joseph sent at 4/13/2022  9:05 AM CDT -----  Regarding: Test  Contact: Gita  Consult/Advisory:       Name Of Caller: Gita      Contact Preference?: [phone]       Does patient feel the need to be seen today? No      What is the nature of the call?: Pt is calling to found out cost of testing. Pt also wants to cxl appt.       Additional Notes:  "Thank you for all that you do for our patients'"          "

## 2022-04-18 ENCOUNTER — OFFICE VISIT (OUTPATIENT)
Dept: HEMATOLOGY/ONCOLOGY | Facility: CLINIC | Age: 77
End: 2022-04-18
Payer: MEDICARE

## 2022-04-18 VITALS
HEART RATE: 62 BPM | WEIGHT: 156.06 LBS | DIASTOLIC BLOOD PRESSURE: 76 MMHG | TEMPERATURE: 99 F | SYSTOLIC BLOOD PRESSURE: 143 MMHG | OXYGEN SATURATION: 97 % | BODY MASS INDEX: 26 KG/M2 | RESPIRATION RATE: 16 BRPM | HEIGHT: 65 IN

## 2022-04-18 DIAGNOSIS — T45.1X5A CHEMOTHERAPY-INDUCED NEUROPATHY: ICD-10-CM

## 2022-04-18 DIAGNOSIS — C64.1 UROTHELIAL CARCINOMA OF KIDNEY, RIGHT: Primary | ICD-10-CM

## 2022-04-18 DIAGNOSIS — G62.0 CHEMOTHERAPY-INDUCED NEUROPATHY: ICD-10-CM

## 2022-04-18 DIAGNOSIS — Z90.5 SOLITARY KIDNEY, ACQUIRED: ICD-10-CM

## 2022-04-18 DIAGNOSIS — E03.9 HYPOTHYROIDISM, UNSPECIFIED TYPE: ICD-10-CM

## 2022-04-18 DIAGNOSIS — C54.1 ENDOMETRIAL CA: ICD-10-CM

## 2022-04-18 PROCEDURE — 1126F PR PAIN SEVERITY QUANTIFIED, NO PAIN PRESENT: ICD-10-PCS | Mod: CPTII,S$GLB,, | Performed by: INTERNAL MEDICINE

## 2022-04-18 PROCEDURE — 1157F ADVNC CARE PLAN IN RCRD: CPT | Mod: CPTII,S$GLB,, | Performed by: INTERNAL MEDICINE

## 2022-04-18 PROCEDURE — 1101F PT FALLS ASSESS-DOCD LE1/YR: CPT | Mod: CPTII,S$GLB,, | Performed by: INTERNAL MEDICINE

## 2022-04-18 PROCEDURE — 3077F SYST BP >= 140 MM HG: CPT | Mod: CPTII,S$GLB,, | Performed by: INTERNAL MEDICINE

## 2022-04-18 PROCEDURE — 3078F PR MOST RECENT DIASTOLIC BLOOD PRESSURE < 80 MM HG: ICD-10-PCS | Mod: CPTII,S$GLB,, | Performed by: INTERNAL MEDICINE

## 2022-04-18 PROCEDURE — 99999 PR PBB SHADOW E&M-EST. PATIENT-LVL IV: ICD-10-PCS | Mod: PBBFAC,,, | Performed by: INTERNAL MEDICINE

## 2022-04-18 PROCEDURE — 1159F MED LIST DOCD IN RCRD: CPT | Mod: CPTII,S$GLB,, | Performed by: INTERNAL MEDICINE

## 2022-04-18 PROCEDURE — 1159F PR MEDICATION LIST DOCUMENTED IN MEDICAL RECORD: ICD-10-PCS | Mod: CPTII,S$GLB,, | Performed by: INTERNAL MEDICINE

## 2022-04-18 PROCEDURE — 1157F PR ADVANCE CARE PLAN OR EQUIV PRESENT IN MEDICAL RECORD: ICD-10-PCS | Mod: CPTII,S$GLB,, | Performed by: INTERNAL MEDICINE

## 2022-04-18 PROCEDURE — 1101F PR PT FALLS ASSESS DOC 0-1 FALLS W/OUT INJ PAST YR: ICD-10-PCS | Mod: CPTII,S$GLB,, | Performed by: INTERNAL MEDICINE

## 2022-04-18 PROCEDURE — 3077F PR MOST RECENT SYSTOLIC BLOOD PRESSURE >= 140 MM HG: ICD-10-PCS | Mod: CPTII,S$GLB,, | Performed by: INTERNAL MEDICINE

## 2022-04-18 PROCEDURE — 99205 PR OFFICE/OUTPT VISIT, NEW, LEVL V, 60-74 MIN: ICD-10-PCS | Mod: S$GLB,,, | Performed by: INTERNAL MEDICINE

## 2022-04-18 PROCEDURE — 99999 PR PBB SHADOW E&M-EST. PATIENT-LVL IV: CPT | Mod: PBBFAC,,, | Performed by: INTERNAL MEDICINE

## 2022-04-18 PROCEDURE — 3288F FALL RISK ASSESSMENT DOCD: CPT | Mod: CPTII,S$GLB,, | Performed by: INTERNAL MEDICINE

## 2022-04-18 PROCEDURE — 3288F PR FALLS RISK ASSESSMENT DOCUMENTED: ICD-10-PCS | Mod: CPTII,S$GLB,, | Performed by: INTERNAL MEDICINE

## 2022-04-18 PROCEDURE — 3078F DIAST BP <80 MM HG: CPT | Mod: CPTII,S$GLB,, | Performed by: INTERNAL MEDICINE

## 2022-04-18 PROCEDURE — 99205 OFFICE O/P NEW HI 60 MIN: CPT | Mod: S$GLB,,, | Performed by: INTERNAL MEDICINE

## 2022-04-18 PROCEDURE — 1126F AMNT PAIN NOTED NONE PRSNT: CPT | Mod: CPTII,S$GLB,, | Performed by: INTERNAL MEDICINE

## 2022-04-18 NOTE — Clinical Note
- schedule CT CAP 4/28 or whenever patient can come. - start auth for Opdivo. - RTC 5/5 for consent with CBC, CMP, TSH, free T4 prior to C1 of Opdivo

## 2022-04-18 NOTE — PLAN OF CARE
START ON PATHWAY REGIMEN - Bladder    [id number]        Nivolumab (Opdivo)           Additional Orders: Serious immune-mediated adverse events can occur with   nivolumab. Please monitor your patient and refer to the linked immune-mediated   adverse reaction management materials for more information.    **Always confirm dose/schedule in your pharmacy ordering system**    Patient Characteristics:  Post-Cystectomy without Neoadjuvant Therapy (Pathologic Staging), pT3-4a, pN0,   M0  Therapeutic Status: Post-Cystectomy without Neoadjuvant Therapy (Pathologic   Staging)  AJCC M Category: cM0  AJCC 8 Stage Grouping: IIIA  AJCC T Category: pT3  AJCC N Category: pN0  Intent of Therapy:  Curative Intent, Discussed with Patient

## 2022-04-18 NOTE — PROGRESS NOTES
NEW ONCOLOGY VISIT-ESTABLISH CARE.     Reason for visit: Treatment planning for stage IIIA urothelial carcinoma    Cancer/Stage/TNM:   Cancer Staging  Endometrial ca  Staging form: Corpus Uteri - Carcinoma and Carcinosarcoma, AJCC 8th Edition  - Clinical: No stage assigned - Unsigned  - Pathologic stage from 7/18/2019: FIGO Stage IA (pT1a, pN0, cM0) - Signed by Lokesh Carias MD on 7/18/2019       Oncology History   Endometrial ca   6/26/2019 Initial Diagnosis    Endometrial ca     7/18/2019 Cancer Staged    Staging form: Corpus Uteri - Carcinoma and Carcinosarcoma, AJCC 8th Edition  - Pathologic stage from 7/18/2019: FIGO Stage IA (pT1a, pN0, cM0) - Signed by Lokesh Carias MD on 7/18/2019 7/18/2019 - 7/18/2019 Chemotherapy    Treatment Summary   Plan Name: OP GYN PACLITAXEL CARBOPLATIN (AUC 6) Q3W  Treatment Goal: Curative  Status: Inactive  Start Date:   End Date:   Provider: Lokesh Carias MD  Chemotherapy: CARBOplatin (PARAPLATIN) in sodium chloride 0.9% 250 mL chemo infusion, , Intravenous, Clinic/HOD 1 time, 0 of 6 cycles  PACLitaxel (TAXOL) 175 mg/m2 = 300 mg in sodium chloride 0.9% 500 mL chemo infusion, 175 mg/m2, Intravenous, Clinic/HOD 1 time, 0 of 6 cycles     Urothelial carcinoma of kidney, right   11/11/2021 Initial Diagnosis    Urothelial carcinoma of kidney, right     1/27/2022 Tumor Conference    Presenting Hospital / Clinic: [facility] [address]  Virtual Tumor Board Conference: Virtual  Presenter: Edmond Manuel  Date Presented to Tumor Board: 1/27/2022  Specialties Present: Medical Oncology; Radiation Oncology; Pathology; Navigation; Urology  Presentation at Cancer Conference: Prospective  Cancer Type: Other  Other Cancer: right upper tract urothelial  Recommended Plan: Chemotherapy; Surgery  Recommended Plan Note: neoadjuvant split dose MVAC + open nephroureterectomy    Oncology History   Endometrial ca   Urothelial carcinoma of kidney, right   1/27/2022 Tumor Conference    Presenting  Hospital / Clinic: [facility]  Virtual Tumor Board Conference: Virtual  Presenter: Edmond Manuel  Date Presented to Tumor Board: 1/27/2022  Specialties Present: Medical Oncology; Radiation Oncology; Pathology; Navigation; Urology  Presentation at Cancer Conference: Prospective  Cancer Type: Other  Other Cancer: right upper tract urothelial  Recommended Plan: Chemotherapy; Surgery  Recommended Plan Note: neoadjuvant split dose MVAC + open nephroureterectomy                  PATIENT SUMMARY:   Gita García is a 76 y.o. female with HG right upper tract UCC. History of endometrial cancer s/p neoadjuvant taxol+cisplatin and vaginal brachytherapy followed by robotic hysterectomy.    DISCUSSION:  Pathology review  Staging review    PERFORMANCE STATUS:  ECOG 0    Estimated GFR/CKD Stage: >60 (cr 0.7)    Clinical/Pathologic Stage (TNM): T1 N0 M0    FACULTY IN ATTENDANCE:    Urologic Oncology: Shane Diaz MD [x], Michael Finley MD [x] , Hever Pollack MD []    CONSULT NEEDED:     [] Urologic Oncology    [] Hem/Onc    [] Rad/Onc     [x] Treatment Guidelines (NCCN and AUA) reviewed and care planned is consistent with guidelines.    TUMOR BOARD RECOMMENDATIONS/PLAN/CONSENSUS:     Case discussed among group. Pathology and radiologic images were reviewed (if applicable).    Neoadjuvant split-dose DDMVAC + open nephroureterectomy  Referral to genetics      5/5/2022 -  Chemotherapy    Treatment Summary   Plan Name: OP NIVOLUMAB Q4W  Treatment Goal: Curative  Status: Active  Start Date: 5/5/2022 (Planned)  End Date: 9/22/2022 (Planned)  Provider: Urban Cancino MD  Chemotherapy: nivolumab 480 mg in sodium chloride 0.9% 148 mL infusion, 480 mg, Intravenous, Clinic/HOD 1 time, 0 of 6 cycles          HPI:   76 y.o. female who presents for evaluation and treatment recommendations. I have reviewed the patient's chart dating back the past 3 years, and this is detailed in oncologic history as above.  Patient currently is  s/p nephrectomy for upper tract urothelial carcinoma recovering well from surgery. She had prior chemo with carbo taxol and radiation for endometrial carcinoma in 2019. She still has residual neuropathy from this.  She also has pruritic rash near prior surgical sites, but denies other symptoms.    Practical Problems Physical Problems   : No Appearance: No   Housing: No Bathing / Dressing: No   Insurance / Financial: No Breathing: No    Transportation: No  Changes in Urination: No    Work / School: No  Constipation: No   Treatment Decisions: No  Diarrhea: No     Eating: No    Family Problems Fatigue: No    Dealing with Children: No Feeling Swollen: No    Dealing with Partner: No Fevers: No    Ability to Have Children: No  Getting Around: No    Family Health Issues: No  Indigestion: No     Memory / Concentration: No   Emotional Problems Mouth Sores: No    Depression: No  Nausea: No    Fears: No  Nose Dry / Congested: No    Nervousness: No  Pain: No    Sadness: No Sexual: No    Worry: No Skin Dry / Itchy: No    Loss of Interest in Usual Activities: No Sleep: No     Substance Abuse: No    Spiritual/Religions Concerns Tingling in Hands / Feet: No   Spritual / Scientologist Concerns: No         Other Problems            ROS:   Review of Systems   Constitutional: Negative for activity change, chills, fatigue, fever and unexpected weight change.   HENT: Negative for mouth sores, nosebleeds and sore throat.    Respiratory: Negative for cough, shortness of breath and wheezing.    Cardiovascular: Negative for chest pain, palpitations and leg swelling.   Gastrointestinal: Negative for abdominal distention, abdominal pain and blood in stool.   Endocrine: Negative for cold intolerance and heat intolerance.   Genitourinary: Negative for dysuria, flank pain and frequency.   Musculoskeletal: Negative for arthralgias, joint swelling and myalgias.   Skin: Positive for rash. Negative for color change and wound.   Neurological:  Positive for numbness. Negative for dizziness, light-headedness and headaches.   Hematological: Negative for adenopathy. Does not bruise/bleed easily.        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 several days after delivery    Urothelial carcinoma of kidney, right 11/11/2021        Past Surgical History:   Past Surgical History:   Procedure Laterality Date    ABLATION OF NEOPLASM OF URETER USING LASER Right 1/3/2022    Procedure: ABLATION, NEOPLASM, URETER, USING LASER;  Surgeon: Shane Diaz MD;  Location: [facility] OR 1ST FLR;  Service: Urology;  Laterality: Right;    CYSTOSCOPY N/A 1/3/2022    Procedure: CYSTOSCOPY;  Surgeon: Shane Diaz MD;  Location: [facility] OR 1ST FLR;  Service: Urology;  Laterality: N/A;    DILATION AND CURETTAGE OF UTERUS      GI scope  2016    LAPAROTOMY N/A 7/8/2019    Procedure: LAPAROTOMY;  Surgeon: Lokesh Carias MD;  Location: [facility] OR 2ND FLR;  Service: OB/GYN;  Laterality: N/A;  mini    OMENTECTOMY N/A 7/8/2019    Procedure: OMENTECTOMY;  Surgeon: Lokesh Carias MD;  Location: [facility] OR 2ND FLR;  Service: OB/GYN;  Laterality: N/A;    ROBOT-ASSISTED LAPAROSCOPIC ABDOMINAL HYSTERECTOMY USING DA SIRISHA XI N/A 7/8/2019    Procedure: XI ROBOTIC HYSTERECTOMY;  Surgeon: Lokesh Carias MD;  Location: [facility] OR 2ND FLR;  Service: OB/GYN;  Laterality: N/A;    ROBOT-ASSISTED LAPAROSCOPIC PELVIC LYMPHADENECTOMY USING DA SIRISHA XI Bilateral 7/8/2019    Procedure: XI ROBOTIC LYMPHADENECTOMY, PELVIC;  Surgeon: Lokesh Carias MD;  Location: [facility] OR 2ND FLR;  Service: OB/GYN;  Laterality: Bilateral;    ROBOT-ASSISTED  LAPAROSCOPIC RETROPERITONEAL LYMPHADENECTOMY USING DA SIRISHA XI N/A 7/8/2019    Procedure: XI ROBOTIC LYMPHADENECTOMY, RETROPERITONEUM;  Surgeon: Lokesh Carias MD;  Location: [facility] OR 2ND FLR;  Service: OB/GYN;  Laterality: N/A;    ROBOT-ASSISTED LAPAROSCOPIC SALPINGO-OOPHORECTOMY USING DA SIRISHA XI Bilateral 7/8/2019    Procedure: XI ROBOTIC SALPINGO-OOPHORECTOMY;  Surgeon: Lokesh Carias MD;  Location: [facility] OR 2ND FLR;  Service: OB/GYN;  Laterality: Bilateral;    ROBOT-ASSISTED LAPAROSCOPIC SURGICAL REMOVAL OF KIDNEY AND URETER USING DA SIRISHA XI Right 3/14/2022    Procedure: XI ROBOTIC NEPHROURETERECTOMY/ WITH BLADDER CUFF;  Surgeon: Shane Diaz MD;  Location: [facility] OR 2ND FLR;  Service: Urology;  Laterality: Right;  4hrs    URETEROSCOPY Right 1/3/2022    Procedure: URETEROSCOPY;  Surgeon: Shane Diaz MD;  Location: [facility] OR 1ST FLR;  Service: Urology;  Laterality: Right;  2hrs        Family History:   Family History   Problem Relation Age of Onset    Colon cancer Sister 53    Prostate cancer Brother     Breast cancer Sister 64    Kidney cancer Sister     Breast cancer Sister         in her 60s    Skin cancer Sister     Stroke Brother     Prostate cancer Brother     Heart disease Brother     Prostate cancer Brother     Heart disease Brother     Ovarian cancer Neg Hx     Uterine cancer Neg Hx     Anesthesia problems Neg Hx         Social History:   Social History     Tobacco Use    Smoking status: Never Smoker    Smokeless tobacco: Never Used   Substance Use Topics    Alcohol use: Never        Allergies:   Review of patient's allergies indicates:   Allergen Reactions    Asa [aspirin] Other (See Comments)     Stomach upset    Dilaudid (pf) [hydromorphone (pf)] Nausea Only    Sulfa (sulfonamide antibiotics) Itching        Medications:   Current Outpatient Medications   Medication Sig Dispense Refill    acetaminophen (TYLENOL) 500 MG tablet Take 500 mg by mouth every 6 (six) hours  "as needed for Pain.      bisoprolol-hydrochlorothiazide (ZIAC) 10-6.25 mg per tablet Take 1 tablet by mouth once daily.       pantoprazole (PROTONIX) 40 MG tablet Take 40 mg by mouth once daily.      polyethylene glycol (GLYCOLAX) 17 gram PwPk Take by mouth every morning.      raloxifene (EVISTA) 60 mg tablet Take 60 mg by mouth every evening.      ALPRAZolam (XANAX) 0.25 MG tablet Take 0.25 mg by mouth 2 (two) times daily as needed for Anxiety.       calcium carbonate/vitamin D3 (CALCIUM 600 + D,3, ORAL) Take by mouth once daily.       co-enzyme Q-10 30 mg capsule Take 30 mg by mouth 3 (three) times daily.      milk thistle 175 mg tablet Take 175 mg by mouth once daily.      multivitamin/iron/folic acid (CENTRUM ORAL) Take by mouth once daily.       mv-min/FA/vit K/lycop/lut/zeax (OCUVITE EYE PLUS MULTI ORAL) Take 1 tablet by mouth once daily.       oxyCODONE (ROXICODONE) 5 MG immediate release tablet Take 1 tablet (5 mg total) by mouth every 6 (six) hours as needed for Pain. (Patient not taking: Reported on 4/18/2022) 10 tablet 0     No current facility-administered medications for this visit.        Physical Exam:   BP (!) 143/76 (BP Location: Left arm, Patient Position: Sitting, BP Method: Medium (Automatic))   Pulse 62   Temp 98.5 °F (36.9 °C) (Oral)   Resp 16   Ht 5' 4.5" (1.638 m)   Wt 70.8 kg (156 lb 1.4 oz)   SpO2 97%   BMI 26.38 kg/m²      ECOG Performance Status: (foot note - ECOG PS provided by Eastern Cooperative Oncology Group) 0 - Asymptomatic    Physical Exam  Constitutional:       Appearance: She is well-developed.   HENT:      Head: Normocephalic and atraumatic.   Eyes:      Conjunctiva/sclera: Conjunctivae normal.      Pupils: Pupils are equal, round, and reactive to light.   Pulmonary:      Effort: Pulmonary effort is normal. No respiratory distress.   Abdominal:      General: There is no distension.      Palpations: Abdomen is soft.   Musculoskeletal:         General: No " swelling. Normal range of motion.      Cervical back: Normal range of motion and neck supple.   Lymphadenopathy:      Cervical: No cervical adenopathy.   Skin:     General: Skin is warm and dry.   Neurological:      General: No focal deficit present.      Mental Status: She is alert and oriented to person, place, and time.   Psychiatric:         Mood and Affect: Mood normal.         Behavior: Behavior normal.           Labs:   No results found for this or any previous visit (from the past 48 hour(s)).     Imaging:  FL Cystogram Minimum 3 Views (xpd) - Rad Performed  Narrative: EXAMINATION:  FL CYSTOGRAM MIN 3 VIEWS RADIOLOGIST PERFORMED    CLINICAL HISTORY:  Malignant neoplasm of right kidney, except renal pelvis    Status post right nephro ureterectomy.    TECHNIQUE:  Fluoroscopy with spot imaging was performed with administration of contrast through patient's pre-existing bladder catheter into the bladder. Images were also taken prior to contrast administration and following bladder drainage.    Contrast material: 400 cc Cysto-Conray    Fluoroscopy time: 03:51 minutes    COMPARISON:  None    FINDINGS:  Prior to contrast administration, the  view shows pre-existing urinary catheter in place..    The bladder filled readily with contrast.  It demonstrated no evidence of leakage. Bladder contours were normal.  There was no evidence of vesicoureteral reflux. Subsequently the bladder was drained, emptying incompletely with a small amount of residual contrast.  Impression: No evidence of bladder leak.    Electronically signed by resident: Tashi Graf  Date:    03/22/2022  Time:    09:47    Electronically signed by: Willie Norton MD  Date:    03/22/2022  Time:    10:26            Diagnoses:       1. Urothelial carcinoma of kidney, right    2. Solitary kidney, acquired    3. Chemotherapy-induced neuropathy    4. Endometrial ca    5. Hypothyroidism, unspecified type          Assessment and Plan:         1. Stage IIIA  Urothelial Carcinoma:   Discussed stage, adjuvant treatment options, and prognosis in detail with patient and daughter.  Due to below problems, I am recommending against cisplatin based adjuvant chemotherapy.  She would be a candidate for adjuvant Opdivo and discussed this treatment plan and the DFS benefit, though data is immature.  Patient and daughter agree to move forward with Opdivo adjuvant x 1 year per Checkmate 274 clinical trial.  - start auth for opdivo  - need staging scans with CT CAP, schedule next available  - RTC prior to C1 for consent with labs prior.    2,3 Not a candidate for cisplatin chemotherapy due to these issues.    4 Continue to follow with Dr. Joe every 4 months.            60 minutes total time spent with patient, 40 minutes were spent face to face with the patient and her family to discuss the disease, natural history, treatment options and survival statistics. Greater than 50% of this time was for counseling.  20 minutes of chart review and coordination of care. I have provided the patient with an opportunity to ask questions and have all questions answered to his satisfaction.     she will return to clinic prior to C1, but knows to call in the interim if symptoms change or should a problem arise.    Urban Cancino MD  Medical Oncology   [facility]  [facility]

## 2022-04-19 ENCOUNTER — TELEPHONE (OUTPATIENT)
Dept: HEMATOLOGY/ONCOLOGY | Facility: CLINIC | Age: 77
End: 2022-04-19
Payer: MEDICARE

## 2022-04-19 NOTE — TELEPHONE ENCOUNTER
----- Message from Urban Cancino MD sent at 4/18/2022 10:54 AM CDT -----  - schedule CT CAP 4/28 or whenever patient can come.  - start auth for Opdivo.  - RTC 5/5 for consent with CBC, CMP, TSH, free T4 prior to C1 of Opdivo

## 2022-04-21 ENCOUNTER — HOSPITAL ENCOUNTER (OUTPATIENT)
Dept: RADIOLOGY | Facility: HOSPITAL | Age: 77
Discharge: HOME | End: 2022-04-21
Attending: INTERNAL MEDICINE
Payer: MEDICARE

## 2022-04-21 DIAGNOSIS — C64.1 UROTHELIAL CARCINOMA OF KIDNEY, RIGHT: ICD-10-CM

## 2022-04-21 LAB
CREAT SERPL-MCNC: 1 MG/DL (ref 0.5–1.4)
SAMPLE: NORMAL

## 2022-04-21 PROCEDURE — 74177 CT ABD & PELVIS W/CONTRAST: CPT | Mod: 26,,, | Performed by: RADIOLOGY

## 2022-04-21 PROCEDURE — 71260 CT THORAX DX C+: CPT | Mod: 26,,, | Performed by: RADIOLOGY

## 2022-04-21 PROCEDURE — 71260 CT CHEST ABDOMEN PELVIS WITH CONTRAST (XPD): ICD-10-PCS | Mod: 26,,, | Performed by: RADIOLOGY

## 2022-04-21 PROCEDURE — 74177 CT ABD & PELVIS W/CONTRAST: CPT | Mod: TC

## 2022-04-21 PROCEDURE — 71260 CT THORAX DX C+: CPT | Mod: TC

## 2022-04-21 PROCEDURE — 25500020 PHARM REV CODE 255: Performed by: INTERNAL MEDICINE

## 2022-04-21 PROCEDURE — 74177 CT CHEST ABDOMEN PELVIS WITH CONTRAST (XPD): ICD-10-PCS | Mod: 26,,, | Performed by: RADIOLOGY

## 2022-04-21 RX ADMIN — IOHEXOL 75 ML: 350 INJECTION, SOLUTION INTRAVENOUS at 08:04

## 2022-04-22 ENCOUNTER — TELEPHONE (OUTPATIENT)
Dept: HEMATOLOGY/ONCOLOGY | Facility: CLINIC | Age: 77
End: 2022-04-22
Payer: MEDICARE

## 2022-05-02 ENCOUNTER — TELEPHONE (OUTPATIENT)
Dept: HEMATOLOGY/ONCOLOGY | Facility: CLINIC | Age: 77
End: 2022-05-02
Payer: MEDICARE

## 2022-05-02 ENCOUNTER — TELEPHONE (OUTPATIENT)
Dept: UROLOGY | Facility: CLINIC | Age: 77
End: 2022-05-02
Payer: MEDICARE

## 2022-05-02 NOTE — TELEPHONE ENCOUNTER
"----- Message from Jada Joseph RN sent at 5/2/2022  8:52 AM CDT -----  Good morning June,  This patient is established with Dr. Diaz. She had surgery back in March but is still having discomfort and a "burning" pain in the perineum. When she initially contacted me last week she mentioned Tylenol seemed to help and she had been more active in the previous day so she thought it would subside. However, she is now stating that this burning is not going away at all and she needs advice on what next steps may be. Can you contact her for further eval and perhaps f/u in office to ensure healing is appropriate? Thanks for your help.    Jada"

## 2022-05-02 NOTE — TELEPHONE ENCOUNTER
Per Dr. Diaz should contact PCP and her checked for UTI pt notified appears to comprehend information.  AISHWARYA Damon LPN

## 2022-05-02 NOTE — TELEPHONE ENCOUNTER
"----- Message from Shane Diaz MD sent at 5/2/2022  9:47 AM CDT -----  She should see primary care physician and get checked for UTI.  ----- Message -----  From: Edi Damon LPN  Sent: 5/2/2022   9:27 AM CDT  To: Shane Diaz MD    Spoke to pt. Pain is worse at night no fever or hematuria.  Will be starting treatments this Friday and wants to know if this is normal or should she need to be seen or can wait.  She asked for a return call  [phone]  thanks Jatin  ----- Message -----  From: Jada Joseph RN  Sent: 5/2/2022   9:00 AM CDT  To: Richa Bauer RN, Joe Alatorre    Good morning June,  This patient is established with Dr. Diaz. She had surgery back in March but is still having discomfort and a "burning" pain in the perineum. When she initially contacted me last week she mentioned Tylenol seemed to help and she had been more active in the previous day so she thought it would subside. However, she is now stating that this burning is not going away at all and she needs advice on what next steps may be. Can you contact her for further eval and perhaps f/u in office to ensure healing is appropriate? Thanks for your help.    Jada"

## 2022-05-03 ENCOUNTER — TELEPHONE (OUTPATIENT)
Dept: UROLOGY | Facility: CLINIC | Age: 77
End: 2022-05-03
Payer: MEDICARE

## 2022-05-03 NOTE — TELEPHONE ENCOUNTER
"Received call from pt stating that she was returning my call.from yesterday. Pt stated that she has on and off twinges of pain and some feelings of pulling around her incisional areas. States they are healing without any problems and denies any drainage or fever. She just wanted to know if that was normal. I told her that it was normal and that she could experience little "twinges" of pain and pulling" for 6 months to a year. I instructed her to use an extra bed pillow for side sleeping and also instruced her on gentle scar massaging to help with adhesions and that feeling of pulling. All of pt's questions were answered and pt verbalized understanding to all.  "

## 2022-05-06 ENCOUNTER — OFFICE VISIT (OUTPATIENT)
Dept: HEMATOLOGY/ONCOLOGY | Facility: CLINIC | Age: 77
End: 2022-05-06
Payer: MEDICARE

## 2022-05-06 ENCOUNTER — LAB VISIT (OUTPATIENT)
Dept: LAB | Facility: HOSPITAL | Age: 77
End: 2022-05-06
Attending: INTERNAL MEDICINE
Payer: MEDICARE

## 2022-05-06 ENCOUNTER — INFUSION (OUTPATIENT)
Dept: INFUSION THERAPY | Facility: HOSPITAL | Age: 77
End: 2022-05-06
Attending: INTERNAL MEDICINE
Payer: MEDICARE

## 2022-05-06 VITALS
SYSTOLIC BLOOD PRESSURE: 186 MMHG | OXYGEN SATURATION: 98 % | RESPIRATION RATE: 20 BRPM | BODY MASS INDEX: 26.23 KG/M2 | WEIGHT: 157.44 LBS | HEIGHT: 65 IN | DIASTOLIC BLOOD PRESSURE: 85 MMHG | TEMPERATURE: 98 F | HEART RATE: 56 BPM

## 2022-05-06 VITALS — SYSTOLIC BLOOD PRESSURE: 158 MMHG | HEART RATE: 65 BPM | DIASTOLIC BLOOD PRESSURE: 75 MMHG

## 2022-05-06 DIAGNOSIS — C54.1 ENDOMETRIAL CA: ICD-10-CM

## 2022-05-06 DIAGNOSIS — Z90.5 SOLITARY KIDNEY, ACQUIRED: ICD-10-CM

## 2022-05-06 DIAGNOSIS — C64.1 UROTHELIAL CARCINOMA OF KIDNEY, RIGHT: Primary | ICD-10-CM

## 2022-05-06 DIAGNOSIS — C64.1 UROTHELIAL CARCINOMA OF KIDNEY, RIGHT: ICD-10-CM

## 2022-05-06 DIAGNOSIS — G62.0 CHEMOTHERAPY-INDUCED NEUROPATHY: ICD-10-CM

## 2022-05-06 DIAGNOSIS — T45.1X5A CHEMOTHERAPY-INDUCED NEUROPATHY: ICD-10-CM

## 2022-05-06 DIAGNOSIS — E03.9 HYPOTHYROIDISM, UNSPECIFIED TYPE: ICD-10-CM

## 2022-05-06 LAB
ALBUMIN SERPL BCP-MCNC: 3.9 G/DL (ref 3.5–5.2)
ALP SERPL-CCNC: 61 U/L (ref 55–135)
ALT SERPL W/O P-5'-P-CCNC: 12 U/L (ref 10–44)
ANION GAP SERPL CALC-SCNC: 7 MMOL/L (ref 8–16)
AST SERPL-CCNC: 15 U/L (ref 10–40)
BILIRUB SERPL-MCNC: 1 MG/DL (ref 0.1–1)
BUN SERPL-MCNC: 20 MG/DL (ref 8–23)
CALCIUM SERPL-MCNC: 9.6 MG/DL (ref 8.7–10.5)
CHLORIDE SERPL-SCNC: 105 MMOL/L (ref 95–110)
CO2 SERPL-SCNC: 29 MMOL/L (ref 23–29)
CREAT SERPL-MCNC: 1.2 MG/DL (ref 0.5–1.4)
ERYTHROCYTE [DISTWIDTH] IN BLOOD BY AUTOMATED COUNT: 13.2 % (ref 11.5–14.5)
EST. GFR  (AFRICAN AMERICAN): 50 ML/MIN/1.73 M^2
EST. GFR  (NON AFRICAN AMERICAN): 43.4 ML/MIN/1.73 M^2
GLUCOSE SERPL-MCNC: 99 MG/DL (ref 70–110)
HCT VFR BLD AUTO: 35.9 % (ref 37–48.5)
HGB BLD-MCNC: 11.4 G/DL (ref 12–16)
IMM GRANULOCYTES # BLD AUTO: 0.02 K/UL (ref 0–0.04)
MCH RBC QN AUTO: 29.5 PG (ref 27–31)
MCHC RBC AUTO-ENTMCNC: 31.8 G/DL (ref 32–36)
MCV RBC AUTO: 93 FL (ref 82–98)
NEUTROPHILS # BLD AUTO: 3.7 K/UL (ref 1.8–7.7)
PLATELET # BLD AUTO: 189 K/UL (ref 150–450)
PMV BLD AUTO: 9.9 FL (ref 9.2–12.9)
POTASSIUM SERPL-SCNC: 4.3 MMOL/L (ref 3.5–5.1)
PROT SERPL-MCNC: 6.5 G/DL (ref 6–8.4)
RBC # BLD AUTO: 3.86 M/UL (ref 4–5.4)
SODIUM SERPL-SCNC: 141 MMOL/L (ref 136–145)
T4 FREE SERPL-MCNC: 0.84 NG/DL (ref 0.71–1.51)
TSH SERPL DL<=0.005 MIU/L-ACNC: 1.31 UIU/ML (ref 0.4–4)
WBC # BLD AUTO: 6.33 K/UL (ref 3.9–12.7)

## 2022-05-06 PROCEDURE — 3077F SYST BP >= 140 MM HG: CPT | Mod: CPTII,S$GLB,, | Performed by: NURSE PRACTITIONER

## 2022-05-06 PROCEDURE — 1157F ADVNC CARE PLAN IN RCRD: CPT | Mod: CPTII,S$GLB,, | Performed by: NURSE PRACTITIONER

## 2022-05-06 PROCEDURE — 63600175 PHARM REV CODE 636 W HCPCS: Mod: JG | Performed by: INTERNAL MEDICINE

## 2022-05-06 PROCEDURE — 1101F PR PT FALLS ASSESS DOC 0-1 FALLS W/OUT INJ PAST YR: ICD-10-PCS | Mod: CPTII,S$GLB,, | Performed by: NURSE PRACTITIONER

## 2022-05-06 PROCEDURE — 1101F PT FALLS ASSESS-DOCD LE1/YR: CPT | Mod: CPTII,S$GLB,, | Performed by: NURSE PRACTITIONER

## 2022-05-06 PROCEDURE — 3288F PR FALLS RISK ASSESSMENT DOCUMENTED: ICD-10-PCS | Mod: CPTII,S$GLB,, | Performed by: NURSE PRACTITIONER

## 2022-05-06 PROCEDURE — 1126F AMNT PAIN NOTED NONE PRSNT: CPT | Mod: CPTII,S$GLB,, | Performed by: NURSE PRACTITIONER

## 2022-05-06 PROCEDURE — 3288F FALL RISK ASSESSMENT DOCD: CPT | Mod: CPTII,S$GLB,, | Performed by: NURSE PRACTITIONER

## 2022-05-06 PROCEDURE — 1159F PR MEDICATION LIST DOCUMENTED IN MEDICAL RECORD: ICD-10-PCS | Mod: CPTII,S$GLB,, | Performed by: NURSE PRACTITIONER

## 2022-05-06 PROCEDURE — 1159F MED LIST DOCD IN RCRD: CPT | Mod: CPTII,S$GLB,, | Performed by: NURSE PRACTITIONER

## 2022-05-06 PROCEDURE — 84439 ASSAY OF FREE THYROXINE: CPT | Performed by: INTERNAL MEDICINE

## 2022-05-06 PROCEDURE — 85027 COMPLETE CBC AUTOMATED: CPT | Performed by: INTERNAL MEDICINE

## 2022-05-06 PROCEDURE — 84443 ASSAY THYROID STIM HORMONE: CPT | Performed by: INTERNAL MEDICINE

## 2022-05-06 PROCEDURE — 3077F PR MOST RECENT SYSTOLIC BLOOD PRESSURE >= 140 MM HG: ICD-10-PCS | Mod: CPTII,S$GLB,, | Performed by: NURSE PRACTITIONER

## 2022-05-06 PROCEDURE — 25000003 PHARM REV CODE 250: Performed by: INTERNAL MEDICINE

## 2022-05-06 PROCEDURE — 1160F PR REVIEW ALL MEDS BY PRESCRIBER/CLIN PHARMACIST DOCUMENTED: ICD-10-PCS | Mod: CPTII,S$GLB,, | Performed by: NURSE PRACTITIONER

## 2022-05-06 PROCEDURE — 99215 PR OFFICE/OUTPT VISIT, EST, LEVL V, 40-54 MIN: ICD-10-PCS | Mod: S$GLB,,, | Performed by: NURSE PRACTITIONER

## 2022-05-06 PROCEDURE — 99215 OFFICE O/P EST HI 40 MIN: CPT | Mod: S$GLB,,, | Performed by: NURSE PRACTITIONER

## 2022-05-06 PROCEDURE — 99999 PR PBB SHADOW E&M-EST. PATIENT-LVL IV: ICD-10-PCS | Mod: PBBFAC,,, | Performed by: NURSE PRACTITIONER

## 2022-05-06 PROCEDURE — 1160F RVW MEDS BY RX/DR IN RCRD: CPT | Mod: CPTII,S$GLB,, | Performed by: NURSE PRACTITIONER

## 2022-05-06 PROCEDURE — 99999 PR PBB SHADOW E&M-EST. PATIENT-LVL IV: CPT | Mod: PBBFAC,,, | Performed by: NURSE PRACTITIONER

## 2022-05-06 PROCEDURE — 3079F PR MOST RECENT DIASTOLIC BLOOD PRESSURE 80-89 MM HG: ICD-10-PCS | Mod: CPTII,S$GLB,, | Performed by: NURSE PRACTITIONER

## 2022-05-06 PROCEDURE — 96413 CHEMO IV INFUSION 1 HR: CPT

## 2022-05-06 PROCEDURE — 36415 COLL VENOUS BLD VENIPUNCTURE: CPT | Performed by: INTERNAL MEDICINE

## 2022-05-06 PROCEDURE — 1126F PR PAIN SEVERITY QUANTIFIED, NO PAIN PRESENT: ICD-10-PCS | Mod: CPTII,S$GLB,, | Performed by: NURSE PRACTITIONER

## 2022-05-06 PROCEDURE — 80053 COMPREHEN METABOLIC PANEL: CPT | Performed by: INTERNAL MEDICINE

## 2022-05-06 PROCEDURE — 3079F DIAST BP 80-89 MM HG: CPT | Mod: CPTII,S$GLB,, | Performed by: NURSE PRACTITIONER

## 2022-05-06 PROCEDURE — 1157F PR ADVANCE CARE PLAN OR EQUIV PRESENT IN MEDICAL RECORD: ICD-10-PCS | Mod: CPTII,S$GLB,, | Performed by: NURSE PRACTITIONER

## 2022-05-06 RX ORDER — SODIUM CHLORIDE 0.9 % (FLUSH) 0.9 %
10 SYRINGE (ML) INJECTION
Status: DISCONTINUED | OUTPATIENT
Start: 2022-05-06 | End: 2022-05-06 | Stop reason: HOSPADM

## 2022-05-06 RX ORDER — HEPARIN 100 UNIT/ML
500 SYRINGE INTRAVENOUS
Status: CANCELLED | OUTPATIENT
Start: 2022-05-06

## 2022-05-06 RX ORDER — HEPARIN 100 UNIT/ML
500 SYRINGE INTRAVENOUS
Status: DISCONTINUED | OUTPATIENT
Start: 2022-05-06 | End: 2022-05-06 | Stop reason: HOSPADM

## 2022-05-06 RX ORDER — SODIUM CHLORIDE 0.9 % (FLUSH) 0.9 %
10 SYRINGE (ML) INJECTION
Status: CANCELLED | OUTPATIENT
Start: 2022-05-06

## 2022-05-06 RX ADMIN — SODIUM CHLORIDE 480 MG: 9 INJECTION, SOLUTION INTRAVENOUS at 01:05

## 2022-05-06 RX ADMIN — SODIUM CHLORIDE: 9 INJECTION, SOLUTION INTRAVENOUS at 12:05

## 2022-05-06 NOTE — PLAN OF CARE
Pt arrived AAOx3, VSS, consent and orders signed in LAISHA Valerio appt. Pt tolerated opdivo without incident. Pt will take forgotten BP medicine right away at home. Pt discharged with son in stable, ambulatory condition.

## 2022-05-06 NOTE — PROGRESS NOTES
ONCOLOGY FOLLOW UP VISIT    Reason for visit: Immunotherapy consent for stage IIIA urothelial carcinoma    Cancer/Stage/TNM:   Cancer Staging  Endometrial ca  Staging form: Corpus Uteri - Carcinoma and Carcinosarcoma, AJCC 8th Edition  - Clinical: No stage assigned - Unsigned  - Pathologic stage from 7/18/2019: FIGO Stage IA (pT1a, pN0, cM0) - Signed by Lokesh Carias MD on 7/18/2019       Oncology History   Endometrial ca   6/26/2019 Initial Diagnosis    Endometrial ca     7/18/2019 Cancer Staged    Staging form: Corpus Uteri - Carcinoma and Carcinosarcoma, AJCC 8th Edition  - Pathologic stage from 7/18/2019: FIGO Stage IA (pT1a, pN0, cM0) - Signed by Lokesh Carias MD on 7/18/2019 7/18/2019 - 7/18/2019 Chemotherapy    Treatment Summary   Plan Name: OP GYN PACLITAXEL CARBOPLATIN (AUC 6) Q3W  Treatment Goal: Curative  Status: Inactive  Start Date:   End Date:   Provider: Lokesh Carias MD  Chemotherapy: CARBOplatin (PARAPLATIN) in sodium chloride 0.9% 250 mL chemo infusion, , Intravenous, Clinic/HOD 1 time, 0 of 6 cycles  PACLitaxel (TAXOL) 175 mg/m2 = 300 mg in sodium chloride 0.9% 500 mL chemo infusion, 175 mg/m2, Intravenous, Clinic/HOD 1 time, 0 of 6 cycles     Urothelial carcinoma of kidney, right   11/11/2021 Initial Diagnosis    Urothelial carcinoma of kidney, right     1/27/2022 Tumor Conference    Presenting Hospital / Clinic: [facility] [address]  Virtual Tumor Board Conference: Virtual  Presenter: Edmond Manuel  Date Presented to Tumor Board: 1/27/2022  Specialties Present: Medical Oncology; Radiation Oncology; Pathology; Navigation; Urology  Presentation at Cancer Conference: Prospective  Cancer Type: Other  Other Cancer: right upper tract urothelial  Recommended Plan: Chemotherapy; Surgery  Recommended Plan Note: neoadjuvant split dose MVAC + open nephroureterectomy    Oncology History   Endometrial ca   Urothelial carcinoma of kidney, right   1/27/2022 Tumor Conference    Presenting Hospital /  Clinic: [facility] [address]  Virtual Tumor Board Conference: Virtual  Presenter: Edmond Manuel  Date Presented to Tumor Board: 1/27/2022  Specialties Present: Medical Oncology; Radiation Oncology; Pathology; Navigation; Urology  Presentation at Cancer Conference: Prospective  Cancer Type: Other  Other Cancer: right upper tract urothelial  Recommended Plan: Chemotherapy; Surgery  Recommended Plan Note: neoadjuvant split dose MVAC + open nephroureterectomy                  PATIENT SUMMARY:   Gita García is a 76 y.o. female with HG right upper tract UCC. History of endometrial cancer s/p neoadjuvant taxol+cisplatin and vaginal brachytherapy followed by robotic hysterectomy.    DISCUSSION:  Pathology review  Staging review    PERFORMANCE STATUS:  ECOG 0    Estimated GFR/CKD Stage: >60 (cr 0.7)    Clinical/Pathologic Stage (TNM): T1 N0 M0    FACULTY IN ATTENDANCE:    Urologic Oncology: Shane Diaz MD [x], Michael Finley MD [x] , Hever Pollack MD []    CONSULT NEEDED:     [] Urologic Oncology    [] Hem/Onc    [] Rad/Onc     [x] Treatment Guidelines (NCCN and AUA) reviewed and care planned is consistent with guidelines.    TUMOR BOARD RECOMMENDATIONS/PLAN/CONSENSUS:     Case discussed among group. Pathology and radiologic images were reviewed (if applicable).    Neoadjuvant split-dose DDMVAC + open nephroureterectomy  Referral to genetics      5/5/2022 -  Chemotherapy    Treatment Summary   Plan Name: OP NIVOLUMAB Q4W  Treatment Goal: Curative  Status: Active  Start Date: 5/5/2022 (Planned)  End Date: 9/22/2022 (Planned)  Provider: Urban Cancino MD  Chemotherapy: nivolumab 480 mg in sodium chloride 0.9% 148 mL infusion, 480 mg, Intravenous, Clinic/HOD 1 time, 0 of 6 cycles          HPI:   76 y.o. female who presents to clinic prior to C1 of Opdivo. RLQ pain near drain site has improved and likely related to muscle strain. Rash has resolved. Patient reports no other symptoms. Neuropathy at baseline from prior  chemotherapy.       ROS:   Review of Systems   Constitutional: Negative for activity change, chills, fatigue, fever and unexpected weight change.   HENT: Negative for mouth sores, nosebleeds and sore throat.    Respiratory: Negative for cough, shortness of breath and wheezing.    Cardiovascular: Negative for chest pain, palpitations and leg swelling.   Gastrointestinal: Negative for abdominal distention, abdominal pain and blood in stool.   Endocrine: Negative for cold intolerance and heat intolerance.   Genitourinary: Negative for dysuria, flank pain and frequency.   Musculoskeletal: Negative for arthralgias, joint swelling and myalgias.   Skin: Negative for color change, rash and wound.   Neurological: Positive for numbness (feet). Negative for dizziness, light-headedness and headaches.   Hematological: Negative for adenopathy. Does not bruise/bleed easily.        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 several days after delivery    Urothelial carcinoma of kidney, right 11/11/2021        Past Surgical History:   Past Surgical History:   Procedure Laterality Date    ABLATION OF NEOPLASM OF URETER USING LASER Right 1/3/2022    Procedure: ABLATION, NEOPLASM, URETER, USING LASER;  Surgeon: Shane Diaz MD;  Location: [facility] OR [address] [place] [address];  Service: Urology;  Laterality: Right;    CYSTOSCOPY N/A 1/3/2022    Procedure: CYSTOSCOPY;  Surgeon: Shane Diaz MD;  Location: [facility] OR [address] [place] [address];  Service: Urology;  Laterality: N/A;    DILATION AND CURETTAGE OF UTERUS      GI scope  2016    LAPAROTOMY N/A 7/8/2019    Procedure: LAPAROTOMY;  Surgeon: Lokesh BIRMINGHAM  MD Jv;  Location: [facility] OR 2ND FLR;  Service: OB/GYN;  Laterality: N/A;  mini    OMENTECTOMY N/A 7/8/2019    Procedure: OMENTECTOMY;  Surgeon: Lokesh Carias MD;  Location: [facility] OR 2ND FLR;  Service: OB/GYN;  Laterality: N/A;    ROBOT-ASSISTED LAPAROSCOPIC ABDOMINAL HYSTERECTOMY USING DA SIRISHA XI N/A 7/8/2019    Procedure: XI ROBOTIC HYSTERECTOMY;  Surgeon: Lokesh Carias MD;  Location: [facility] OR 2ND FLR;  Service: OB/GYN;  Laterality: N/A;    ROBOT-ASSISTED LAPAROSCOPIC PELVIC LYMPHADENECTOMY USING DA SIRISHA XI Bilateral 7/8/2019    Procedure: XI ROBOTIC LYMPHADENECTOMY, PELVIC;  Surgeon: Lokesh Carias MD;  Location: [facility] OR 2ND FLR;  Service: OB/GYN;  Laterality: Bilateral;    ROBOT-ASSISTED LAPAROSCOPIC RETROPERITONEAL LYMPHADENECTOMY USING DA SIRISHA XI N/A 7/8/2019    Procedure: XI ROBOTIC LYMPHADENECTOMY, RETROPERITONEUM;  Surgeon: Lokesh Carias MD;  Location: [facility] OR 2ND FLR;  Service: OB/GYN;  Laterality: N/A;    ROBOT-ASSISTED LAPAROSCOPIC SALPINGO-OOPHORECTOMY USING DA SIRISHA XI Bilateral 7/8/2019    Procedure: XI ROBOTIC SALPINGO-OOPHORECTOMY;  Surgeon: Lokesh Carias MD;  Location: [facility] OR 2ND FLR;  Service: OB/GYN;  Laterality: Bilateral;    ROBOT-ASSISTED LAPAROSCOPIC SURGICAL REMOVAL OF KIDNEY AND URETER USING DA SIRISHA XI Right 3/14/2022    Procedure: XI ROBOTIC NEPHROURETERECTOMY/ WITH BLADDER CUFF;  Surgeon: Shane Diaz MD;  Location: [facility] OR 2ND FLR;  Service: Urology;  Laterality: Right;  4hrs    URETEROSCOPY Right 1/3/2022    Procedure: URETEROSCOPY;  Surgeon: Shane Diaz MD;  Location: [facility] OR 1ST FLR;  Service: Urology;  Laterality: Right;  2hrs        Family History:   Family History   Problem Relation Age of Onset    Colon cancer Sister 53    Prostate cancer Brother     Breast cancer Sister 64    Kidney cancer Sister     Breast cancer Sister         in her 60s    Skin cancer Sister     Stroke Brother     Prostate cancer Brother     Heart disease Brother      Prostate cancer Brother     Heart disease Brother     Ovarian cancer Neg Hx     Uterine cancer Neg Hx     Anesthesia problems Neg Hx         Social History:   Social History     Tobacco Use    Smoking status: Never Smoker    Smokeless tobacco: Never Used   Substance Use Topics    Alcohol use: Never        Allergies:   Review of patient's allergies indicates:   Allergen Reactions    Asa [aspirin] Other (See Comments)     Stomach upset    Dilaudid (pf) [hydromorphone (pf)] Nausea Only    Sulfa (sulfonamide antibiotics) Itching        Medications:   Current Outpatient Medications   Medication Sig Dispense Refill    acetaminophen (TYLENOL) 500 MG tablet Take 500 mg by mouth every 6 (six) hours as needed for Pain.      ALPRAZolam (XANAX) 0.25 MG tablet Take 0.25 mg by mouth 2 (two) times daily as needed for Anxiety.       bisoprolol-hydrochlorothiazide (ZIAC) 10-6.25 mg per tablet Take 1 tablet by mouth once daily.       calcium carbonate/vitamin D3 (CALCIUM 600 + D,3, ORAL) Take by mouth once daily.       co-enzyme Q-10 30 mg capsule Take 30 mg by mouth 3 (three) times daily.      milk thistle 175 mg tablet Take 175 mg by mouth once daily.      multivitamin/iron/folic acid (CENTRUM ORAL) Take by mouth once daily.       mv-min/FA/vit K/lycop/lut/zeax (OCUVITE EYE PLUS MULTI ORAL) Take 1 tablet by mouth once daily.       oxyCODONE (ROXICODONE) 5 MG immediate release tablet Take 1 tablet (5 mg total) by mouth every 6 (six) hours as needed for Pain. (Patient not taking: Reported on 4/18/2022) 10 tablet 0    pantoprazole (PROTONIX) 40 MG tablet Take 40 mg by mouth once daily.      polyethylene glycol (GLYCOLAX) 17 gram PwPk Take by mouth every morning.      raloxifene (EVISTA) 60 mg tablet Take 60 mg by mouth every evening.       No current facility-administered medications for this visit.        Physical Exam:   BP (!) 186/85 (BP Location: Left arm, Patient Position: Sitting, BP Method: Medium  "(Automatic))   Pulse (!) 56   Temp 98.3 °F (36.8 °C) (Oral)   Resp 20   Ht 5' 4.5" (1.638 m)   Wt 71.4 kg (157 lb 6.5 oz)   SpO2 98%   BMI 26.60 kg/m²      ECOG Performance Status: (foot note - ECOG PS provided by [employer]cology [employer]) 0 - Asymptomatic    Physical Exam  Constitutional:       Appearance: She is well-developed.   HENT:      Head: Normocephalic and atraumatic.   Eyes:      Conjunctiva/sclera: Conjunctivae normal.      Pupils: Pupils are equal, round, and reactive to light.   Pulmonary:      Effort: Pulmonary effort is normal. No respiratory distress.   Abdominal:      General: There is no distension.      Palpations: Abdomen is soft.   Musculoskeletal:         General: No swelling. Normal range of motion.      Cervical back: Normal range of motion and neck supple.   Lymphadenopathy:      Cervical: No cervical adenopathy.   Skin:     General: Skin is warm and dry.   Neurological:      General: No focal deficit present.      Mental Status: She is alert and oriented to person, place, and time.   Psychiatric:         Mood and Affect: Mood normal.         Behavior: Behavior normal.           Labs:   Recent Results (from the past 48 hour(s))   CBC Oncology    Collection Time: 05/06/22 10:31 AM   Result Value Ref Range    WBC 6.33 3.90 - 12.70 K/uL    RBC 3.86 (L) 4.00 - 5.40 M/uL    Hemoglobin 11.4 (L) 12.0 - 16.0 g/dL    Hematocrit 35.9 (L) 37.0 - 48.5 %    MCV 93 82 - 98 fL    MCH 29.5 27.0 - 31.0 pg    MCHC 31.8 (L) 32.0 - 36.0 g/dL    RDW 13.2 11.5 - 14.5 %    Platelets 189 150 - 450 K/uL    MPV 9.9 9.2 - 12.9 fL    Gran # (ANC) 3.7 1.8 - 7.7 K/uL    Immature Grans (Abs) 0.02 0.00 - 0.04 K/uL        Imaging:  CT Chest Abdomen Pelvis With Contrast  Narrative: EXAMINATION:  CT CHEST ABDOMEN PELVIS WITH CONTRAST (XPD)    CLINICAL HISTORY:  Re-staging for T3 urothelial carcinoma; Malignant neoplasm of right kidney, except renal pelvis    TECHNIQUE:  Axial images of the chest, abdomen, " and pelvis were acquired  after the use of 75 cc [id number] IV contrast.  Coronal and sagittal reconstructions were also obtained    COMPARISON:  Outside CT abdomen pelvis 10/18/2021    Outside CT chest abdomen pelvis 01/30/2020    CT abdomen 07/02/2019    CT chest abdomen pelvis 06/27/2019    FINDINGS:  Thoracic soft tissues: Subcentimeter right thyroid hypodensity, stable from CT 01/30/2020. No axillary lymphadenopathy.    Aorta: Thoracic aorta is normal in caliber and contour with mild scattered calcific atherosclerosis.    Heart: Normal in size. No pericardial effusion. No significant calcific coronary atherosclerosis.    Nelida/Mediastinum: No mediastinal or hilar lymphadenopathy.    Lungs: Trachea and bronchi are patent.  Lung symmetrically expanded without consolidation.  Stable mild biapical fibrotic change.  Mild interlobular septal thickening at the lung apices, nonspecific and stable over multiple prior exams.  Stable bands of subsegmental atelectasis in the left lower lobe.  No pleural fluid or pneumothorax.  Few calcified granulomas in the right lower lobe.  Few right upper lobe ground-glass nodules, the largest of which measures 0.6 cm (series 6, image 98).  Bilateral solid micro nodules, largest of which measures 0.3 cm and is located within the left upper lobe (series 6, image 138).    Liver: Normal in size and contour.  1.6 and 3.6 cm lesions within the right (series 2, image 103) and left (series 2, image 102) hepatic lobes respectively that demonstrate peripheral discontinuous nodular enhancement and are stable when compared to the previous exams.  No new hepatic lesions.    Gallbladder: No calcified gallstones.    Bile Ducts: No evidence of dilated ducts.    Pancreas: No mass or peripancreatic fat stranding.    Spleen: Stable splenic calcifications.    Esophagus: Unremarkable.    Stomach and duodenum: Unremarkable.    Adrenals: Unremarkable.    Kidneys/Ureters: Postoperative change of right  nephroureterectomy.  Left kidney normal in size and location with normal enhancement.  Subcentimeter cortical hypodensities, too small to characterize but likely cysts.  No hydronephrosis or nephrolithiasis. No left hydroureter.    Bladder: Abnormal contour of the right posterolateral bladder and adjacent tubular soft tissue thickening (axial series 3, image 149), presumed postoperative change related to partial cystectomy.  No evidence of bladder wall thickening elsewhere.    Reproductive organs: Uterus surgically absent.    Bowel/Mesentery: Small bowel is normal in caliber with no evidence of obstruction.  No evidence of inflammation or wall thickening.  Appendix is visualized and appears normal.  Colon demonstrates no focal wall thickening.  Diverticulosis coli without evidence of acute diverticulitis.  Moderate amount of stool throughout the colon suggestive of constipation.    Peritoneum: No intraperitoneal free air or fluid.    Lymph nodes: Few prominent nonenlarged periaortic/retroperitoneal lymph nodes.  Prominent nonenlarged left external iliac chain lymph node measuring 0.7 cm in short axis (axial series 3, image 136), stable over prior multiple exams.    Abdominal wall:  Soft tissue thickening underlying upper midline incision and few small lower abdominal incisions.  Right inguinal fat containing hernia.    Vasculature: No aneurysm. Moderate calcific atherosclerosis with significant calcifications at the origin of the celiac and left renal artery.  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    Bones: Pectus excavatum.  Degenerative change of the spine.  No acute fracture. No suspicious osseous lesions.  Impression: 1. In this patient with papillary urothelial carcinoma status post right nephroureterectomy and uterine carcinoma status post hysterectomy, there is irregular contour of the right posterolateral bladder wall with an adjacent area of tubular soft tissue  attenuation, presumably postoperative in nature.  No evidence of intraabdominal/pelvis metastatic disease.  No measurable lesions per RECIST criteria.  2. Few bilateral solid and ground glass pulmonary nodules, indeterminate.  Attention on follow up CT recommended.  3. Two hepatic lesions with imaging characteristics consistent with hemangiomas and stable over multiple prior exams.  4. Diverticulosis coli without evidence of acute diverticulitis.  5. Additional findings as above.    Electronically signed by resident: Chintan Scott  Date:    04/21/2022  Time:    08:48    Electronically signed by: Jeff Steele MD  Date:    04/21/2022  Time:    10:17            Diagnoses:       1. Urothelial carcinoma of kidney, right    2. Solitary kidney, acquired    3. Chemotherapy-induced neuropathy    4. Endometrial ca          Assessment and Plan:         1. Stage IIIA Urothelial Carcinoma:   Discussed stage, adjuvant treatment options, and prognosis in detail with patient and daughter.  Due to below problems, I am recommending against cisplatin based adjuvant chemotherapy.  She would be a candidate for adjuvant Opdivo and discussed this treatment plan and the DFS benefit, though data is immature.  Patient and daughter agree to move forward with Opdivo adjuvant x 1 year per Checkmate 274 clinical trial.  - Consented for Opdivo Q4W. Labs acceptable to proceed with C1.    Patient consented for immunotherapy 05/06/2022 . An extensive discussion was had which included a thorough discussion of the risk and benefits of treatment and alternatives.  Risks, including but not limited to, fatigue, diarrhea, nausea/vomiting, loss of appetite, skin reactions and rashes, flu-like symptoms, fever, infusion-related reactions including allergic reactions, inflammation of stomach or intestines, inflammation of liver, inflammation of brain or nervous system, inflammation of lungs, inflammation of pancreas, inflammation of kidneys, inflammation of  the eyes, inflammation of hormone producing glands, inflammation of the joints including activation of arthritis, impaired kidney function, impaired liver function, problems with sleep, unstable blood sugars, blood pressure changes, infertility, bone marrow damage (anemia, thrombocytopenia, immune suppression, neutropenia), sterility, local damage at possible injection sites, and rarely death were all discussed.  The patient agrees with the plan, and all questions have been answered to their satisfaction.  Consent was signed the patient, provider, and a third party witness.      2,3 Not a candidate for cisplatin chemotherapy due to these issues.    4 Continue to follow with Dr. Joe every 4 months.      40 minutes total time spent with patient, 25 minutes were spent face to face with the patient and her family to discuss the disease, natural history, treatment options and survival statistics. Greater than 50% of this time was for counseling. 15 minutes of chart review and coordination of care. I have provided the patient with an opportunity to ask questions and have all questions answered to his satisfaction.     she will return to clinic prior to C2, but knows to call in the interim if symptoms change or should a problem arise.    Patient is in agreement with the proposed treatment plan. All questions were answered to the patient's satisfaction. Pt knows to call clinic if anything is needed before the next clinic visit.    Joellen Valerio, MSN, APRN, FNP-C  Hematology and Medical Oncology  Nurse Practitioner to Dr. Urban Cancino  Nurse Practitioner, [facility]        Route Chart for Scheduling    Med Onc Chart Routing      Follow up with physician    Follow up with EVELYN 4 weeks. Prior to Opdivo C2   Labs CBC, CMP, free T4 and TSH   Lab interval: every 4 weeks  Prior to infusions   Imaging    Pharmacy appointment    Other referrals          Treatment Plan Information   OP NIVOLUMAB Q4W    Urban Cancino MD   Upcoming Treatment Dates - OP NIVOLUMAB Q4W    6/2/2022       Chemotherapy       nivolumab 480 mg in sodium chloride 0.9% 148 mL infusion  6/30/2022       Chemotherapy       nivolumab 480 mg in sodium chloride 0.9% 148 mL infusion  7/28/2022       Chemotherapy       nivolumab 480 mg in sodium chloride 0.9% 148 mL infusion  8/25/2022       Chemotherapy       nivolumab 480 mg in sodium chloride 0.9% 148 mL infusion    Therapy Plan Information  mitoMYcin (MUTAMYCIN) 40 mg in sodium chloride 0.9% 40 mL bladder instillation  40 mg, Intravesical, 1 time a week, at least 5 days apart

## 2022-05-06 NOTE — Clinical Note
Can we clarify need for CT and cystoscope in June. Patient started adjuvant Opdivo today and would get re-staging scans 3 months from now.   Thank you, Joellen

## 2022-05-11 ENCOUNTER — TELEPHONE (OUTPATIENT)
Dept: HEMATOLOGY/ONCOLOGY | Facility: CLINIC | Age: 77
End: 2022-05-11
Payer: MEDICARE

## 2022-05-20 ENCOUNTER — PATIENT MESSAGE (OUTPATIENT)
Dept: HEMATOLOGY/ONCOLOGY | Facility: CLINIC | Age: 77
End: 2022-05-20
Payer: MEDICARE

## 2022-05-24 ENCOUNTER — OFFICE VISIT (OUTPATIENT)
Dept: GYNECOLOGIC ONCOLOGY | Facility: CLINIC | Age: 77
End: 2022-05-24
Payer: MEDICARE

## 2022-05-24 ENCOUNTER — LAB VISIT (OUTPATIENT)
Dept: LAB | Facility: HOSPITAL | Age: 77
End: 2022-05-24
Attending: OBSTETRICS & GYNECOLOGY
Payer: MEDICARE

## 2022-05-24 VITALS
WEIGHT: 157.63 LBS | HEART RATE: 62 BPM | DIASTOLIC BLOOD PRESSURE: 71 MMHG | SYSTOLIC BLOOD PRESSURE: 167 MMHG | BODY MASS INDEX: 26.64 KG/M2

## 2022-05-24 DIAGNOSIS — C54.1 ENDOMETRIAL CA: Primary | ICD-10-CM

## 2022-05-24 DIAGNOSIS — C54.1 ENDOMETRIAL CA: ICD-10-CM

## 2022-05-24 LAB — CANCER AG125 SERPL-ACNC: 22 U/ML (ref 0–30)

## 2022-05-24 PROCEDURE — 1101F PR PT FALLS ASSESS DOC 0-1 FALLS W/OUT INJ PAST YR: ICD-10-PCS | Mod: CPTII,S$GLB,, | Performed by: OBSTETRICS & GYNECOLOGY

## 2022-05-24 PROCEDURE — 1126F AMNT PAIN NOTED NONE PRSNT: CPT | Mod: CPTII,S$GLB,, | Performed by: OBSTETRICS & GYNECOLOGY

## 2022-05-24 PROCEDURE — 3288F PR FALLS RISK ASSESSMENT DOCUMENTED: ICD-10-PCS | Mod: CPTII,S$GLB,, | Performed by: OBSTETRICS & GYNECOLOGY

## 2022-05-24 PROCEDURE — 99214 PR OFFICE/OUTPT VISIT, EST, LEVL IV, 30-39 MIN: ICD-10-PCS | Mod: S$GLB,,, | Performed by: OBSTETRICS & GYNECOLOGY

## 2022-05-24 PROCEDURE — 1126F PR PAIN SEVERITY QUANTIFIED, NO PAIN PRESENT: ICD-10-PCS | Mod: CPTII,S$GLB,, | Performed by: OBSTETRICS & GYNECOLOGY

## 2022-05-24 PROCEDURE — 1159F PR MEDICATION LIST DOCUMENTED IN MEDICAL RECORD: ICD-10-PCS | Mod: CPTII,S$GLB,, | Performed by: OBSTETRICS & GYNECOLOGY

## 2022-05-24 PROCEDURE — 1157F ADVNC CARE PLAN IN RCRD: CPT | Mod: CPTII,S$GLB,, | Performed by: OBSTETRICS & GYNECOLOGY

## 2022-05-24 PROCEDURE — 3077F PR MOST RECENT SYSTOLIC BLOOD PRESSURE >= 140 MM HG: ICD-10-PCS | Mod: CPTII,S$GLB,, | Performed by: OBSTETRICS & GYNECOLOGY

## 2022-05-24 PROCEDURE — 3077F SYST BP >= 140 MM HG: CPT | Mod: CPTII,S$GLB,, | Performed by: OBSTETRICS & GYNECOLOGY

## 2022-05-24 PROCEDURE — 3288F FALL RISK ASSESSMENT DOCD: CPT | Mod: CPTII,S$GLB,, | Performed by: OBSTETRICS & GYNECOLOGY

## 2022-05-24 PROCEDURE — 1157F PR ADVANCE CARE PLAN OR EQUIV PRESENT IN MEDICAL RECORD: ICD-10-PCS | Mod: CPTII,S$GLB,, | Performed by: OBSTETRICS & GYNECOLOGY

## 2022-05-24 PROCEDURE — 99214 OFFICE O/P EST MOD 30 MIN: CPT | Mod: S$GLB,,, | Performed by: OBSTETRICS & GYNECOLOGY

## 2022-05-24 PROCEDURE — 99999 PR PBB SHADOW E&M-EST. PATIENT-LVL III: ICD-10-PCS | Mod: PBBFAC,,, | Performed by: OBSTETRICS & GYNECOLOGY

## 2022-05-24 PROCEDURE — 3078F DIAST BP <80 MM HG: CPT | Mod: CPTII,S$GLB,, | Performed by: OBSTETRICS & GYNECOLOGY

## 2022-05-24 PROCEDURE — 1159F MED LIST DOCD IN RCRD: CPT | Mod: CPTII,S$GLB,, | Performed by: OBSTETRICS & GYNECOLOGY

## 2022-05-24 PROCEDURE — 1101F PT FALLS ASSESS-DOCD LE1/YR: CPT | Mod: CPTII,S$GLB,, | Performed by: OBSTETRICS & GYNECOLOGY

## 2022-05-24 PROCEDURE — 99999 PR PBB SHADOW E&M-EST. PATIENT-LVL III: CPT | Mod: PBBFAC,,, | Performed by: OBSTETRICS & GYNECOLOGY

## 2022-05-24 PROCEDURE — 3078F PR MOST RECENT DIASTOLIC BLOOD PRESSURE < 80 MM HG: ICD-10-PCS | Mod: CPTII,S$GLB,, | Performed by: OBSTETRICS & GYNECOLOGY

## 2022-05-24 PROCEDURE — 36415 COLL VENOUS BLD VENIPUNCTURE: CPT | Performed by: OBSTETRICS & GYNECOLOGY

## 2022-05-24 PROCEDURE — 86304 IMMUNOASSAY TUMOR CA 125: CPT | Performed by: OBSTETRICS & GYNECOLOGY

## 2022-05-24 NOTE — PROGRESS NOTES
Subjective:      Patient ID: Gita García is a 77 y.o. female.    Chief Complaint: Endometrial Cancer      HPI  Presents today for routine surveillance visit for [employer]. Previously followed by Dr. Carias.  Diagnosis of urothelial carcinoma, followed by Dr. Diaz 2021. S/p right robotic nephroureterectomy and intraoperative administration of chemotherapeutic agent. Now undergoing adjuvant Opdivo.      pending   CT 2022 shows no evidence of disease.   Disease free interval from endometrial cancer 2.5 years.   Scheduled for genetic testing.      MMG: Mar 2021: N per patient   CBE with Dr. Abdul  Colonoscopy: 2016: N  Repeat in 5 years due FH colon ca in sister.      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 Negative cytology. 2 mm invasion.   Aug 2019: VBT with Dr. Cui  Dec 2019: Records received from Dr. Rudyd Simental. Completed  6 cycles of Taxol and carboplatin.     2021: : 12.6 in [place].      Aug 2021.   in May. 2 sisters . One from Covid.   Review of Systems   Constitutional: Negative for appetite change, chills, fatigue and fever.   HENT: Negative for mouth sores.    Respiratory: Negative for cough and shortness of breath.    Cardiovascular: Negative for leg swelling.   Gastrointestinal: Negative for abdominal pain, blood in stool, constipation and diarrhea.   Endocrine: Negative for cold intolerance.   Genitourinary: Negative for dysuria and vaginal bleeding.   Musculoskeletal: Negative for myalgias.   Skin: Negative for rash.   Allergic/Immunologic: Negative.    Neurological: Negative for weakness and numbness.   Hematological: Negative for adenopathy. Does not bruise/bleed easily.   Psychiatric/Behavioral: Negative for confusion.       Objective:   Physical Exam:   Constitutional: She is oriented to person, place, and time. She appears  well-developed and well-nourished.    HENT:   Head: Normocephalic and atraumatic.    Eyes: Pupils are equal, round, and reactive to light. EOM are normal.    Neck: No thyromegaly present.    Cardiovascular: Normal rate, regular rhythm and intact distal pulses.     Pulmonary/Chest: Effort normal and breath sounds normal. No respiratory distress. She has no wheezes.        Abdominal: Soft. Bowel sounds are normal. She exhibits no distension and no mass. There is no abdominal tenderness.     Genitourinary:    Vagina and rectum normal.      Pelvic exam was performed with patient supine.   There is no lesion on the right labia. There is no lesion on the left labia. Right adnexum displays no mass. Left adnexum displays no mass. Vaginal cuff normal.  Cervix is absent.Uterus is absent.           Musculoskeletal: Normal range of motion and moves all extremeties.      Lymphadenopathy:     She has no cervical adenopathy.        Right: No supraclavicular adenopathy present.        Left: No supraclavicular adenopathy present.    Neurological: She is alert and oriented to person, place, and time.    Skin: Skin is warm and dry. No rash noted.    Psychiatric: She has a normal mood and affect.       Assessment:     1. Endometrial ca        Plan:     Orders Placed This Encounter   Procedures         No evidence of disease on today's exam   pending   Plan for continued surveillance. RTC 6 months with  or sooner if needed.         I spent approximately 30 minutes reviewing the available records and evaluating the patient, out of which over 50% of the time was spent face to face with the patient in counseling and coordinating this patient's care.

## 2022-06-03 ENCOUNTER — INFUSION (OUTPATIENT)
Dept: INFUSION THERAPY | Facility: HOSPITAL | Age: 77
End: 2022-06-03
Attending: INTERNAL MEDICINE
Payer: MEDICARE

## 2022-06-03 ENCOUNTER — OFFICE VISIT (OUTPATIENT)
Dept: HEMATOLOGY/ONCOLOGY | Facility: CLINIC | Age: 77
End: 2022-06-03
Payer: MEDICARE

## 2022-06-03 VITALS
BODY MASS INDEX: 26.6 KG/M2 | SYSTOLIC BLOOD PRESSURE: 146 MMHG | DIASTOLIC BLOOD PRESSURE: 74 MMHG | SYSTOLIC BLOOD PRESSURE: 155 MMHG | TEMPERATURE: 98 F | RESPIRATION RATE: 18 BRPM | DIASTOLIC BLOOD PRESSURE: 68 MMHG | BODY MASS INDEX: 26.23 KG/M2 | HEART RATE: 62 BPM | HEART RATE: 58 BPM | OXYGEN SATURATION: 98 % | WEIGHT: 157.44 LBS | WEIGHT: 157.44 LBS | HEIGHT: 65 IN | RESPIRATION RATE: 18 BRPM | TEMPERATURE: 98 F

## 2022-06-03 DIAGNOSIS — C64.1 UROTHELIAL CARCINOMA OF KIDNEY, RIGHT: Primary | ICD-10-CM

## 2022-06-03 DIAGNOSIS — T45.1X5A CHEMOTHERAPY-INDUCED NEUROPATHY: ICD-10-CM

## 2022-06-03 DIAGNOSIS — Z90.5 SOLITARY KIDNEY, ACQUIRED: ICD-10-CM

## 2022-06-03 DIAGNOSIS — C54.1 ENDOMETRIAL CA: ICD-10-CM

## 2022-06-03 DIAGNOSIS — G62.0 CHEMOTHERAPY-INDUCED NEUROPATHY: ICD-10-CM

## 2022-06-03 PROCEDURE — 1126F PR PAIN SEVERITY QUANTIFIED, NO PAIN PRESENT: ICD-10-PCS | Mod: CPTII,S$GLB,, | Performed by: INTERNAL MEDICINE

## 2022-06-03 PROCEDURE — 3078F PR MOST RECENT DIASTOLIC BLOOD PRESSURE < 80 MM HG: ICD-10-PCS | Mod: CPTII,S$GLB,, | Performed by: INTERNAL MEDICINE

## 2022-06-03 PROCEDURE — 3077F PR MOST RECENT SYSTOLIC BLOOD PRESSURE >= 140 MM HG: ICD-10-PCS | Mod: CPTII,S$GLB,, | Performed by: INTERNAL MEDICINE

## 2022-06-03 PROCEDURE — 63600175 PHARM REV CODE 636 W HCPCS: Mod: JG | Performed by: INTERNAL MEDICINE

## 2022-06-03 PROCEDURE — 3288F FALL RISK ASSESSMENT DOCD: CPT | Mod: CPTII,S$GLB,, | Performed by: INTERNAL MEDICINE

## 2022-06-03 PROCEDURE — 99999 PR PBB SHADOW E&M-EST. PATIENT-LVL IV: ICD-10-PCS | Mod: PBBFAC,,, | Performed by: INTERNAL MEDICINE

## 2022-06-03 PROCEDURE — 1101F PT FALLS ASSESS-DOCD LE1/YR: CPT | Mod: CPTII,S$GLB,, | Performed by: INTERNAL MEDICINE

## 2022-06-03 PROCEDURE — 1157F PR ADVANCE CARE PLAN OR EQUIV PRESENT IN MEDICAL RECORD: ICD-10-PCS | Mod: CPTII,S$GLB,, | Performed by: INTERNAL MEDICINE

## 2022-06-03 PROCEDURE — 96413 CHEMO IV INFUSION 1 HR: CPT

## 2022-06-03 PROCEDURE — 1126F AMNT PAIN NOTED NONE PRSNT: CPT | Mod: CPTII,S$GLB,, | Performed by: INTERNAL MEDICINE

## 2022-06-03 PROCEDURE — 1157F ADVNC CARE PLAN IN RCRD: CPT | Mod: CPTII,S$GLB,, | Performed by: INTERNAL MEDICINE

## 2022-06-03 PROCEDURE — 3288F PR FALLS RISK ASSESSMENT DOCUMENTED: ICD-10-PCS | Mod: CPTII,S$GLB,, | Performed by: INTERNAL MEDICINE

## 2022-06-03 PROCEDURE — 3078F DIAST BP <80 MM HG: CPT | Mod: CPTII,S$GLB,, | Performed by: INTERNAL MEDICINE

## 2022-06-03 PROCEDURE — 3077F SYST BP >= 140 MM HG: CPT | Mod: CPTII,S$GLB,, | Performed by: INTERNAL MEDICINE

## 2022-06-03 PROCEDURE — 1101F PR PT FALLS ASSESS DOC 0-1 FALLS W/OUT INJ PAST YR: ICD-10-PCS | Mod: CPTII,S$GLB,, | Performed by: INTERNAL MEDICINE

## 2022-06-03 PROCEDURE — 99215 PR OFFICE/OUTPT VISIT, EST, LEVL V, 40-54 MIN: ICD-10-PCS | Mod: S$GLB,,, | Performed by: INTERNAL MEDICINE

## 2022-06-03 PROCEDURE — 99215 OFFICE O/P EST HI 40 MIN: CPT | Mod: S$GLB,,, | Performed by: INTERNAL MEDICINE

## 2022-06-03 PROCEDURE — 99999 PR PBB SHADOW E&M-EST. PATIENT-LVL IV: CPT | Mod: PBBFAC,,, | Performed by: INTERNAL MEDICINE

## 2022-06-03 PROCEDURE — 25000003 PHARM REV CODE 250: Performed by: INTERNAL MEDICINE

## 2022-06-03 RX ORDER — HEPARIN 100 UNIT/ML
500 SYRINGE INTRAVENOUS
Status: DISCONTINUED | OUTPATIENT
Start: 2022-06-03 | End: 2022-06-03 | Stop reason: HOSPADM

## 2022-06-03 RX ORDER — SODIUM CHLORIDE 0.9 % (FLUSH) 0.9 %
10 SYRINGE (ML) INJECTION
Status: DISCONTINUED | OUTPATIENT
Start: 2022-06-03 | End: 2022-06-03 | Stop reason: HOSPADM

## 2022-06-03 RX ORDER — SODIUM CHLORIDE 0.9 % (FLUSH) 0.9 %
10 SYRINGE (ML) INJECTION
Status: CANCELLED | OUTPATIENT
Start: 2022-06-03

## 2022-06-03 RX ORDER — BENZONATATE 100 MG/1
100 CAPSULE ORAL 3 TIMES DAILY PRN
COMMUNITY
End: 2024-03-07

## 2022-06-03 RX ORDER — HEPARIN 100 UNIT/ML
500 SYRINGE INTRAVENOUS
Status: CANCELLED | OUTPATIENT
Start: 2022-06-03

## 2022-06-03 RX ORDER — LORATADINE 10 MG/1
10 TABLET ORAL DAILY PRN
COMMUNITY
End: 2023-05-05

## 2022-06-03 RX ADMIN — SODIUM CHLORIDE 480 MG: 9 INJECTION, SOLUTION INTRAVENOUS at 11:06

## 2022-06-03 RX ADMIN — SODIUM CHLORIDE: 9 INJECTION, SOLUTION INTRAVENOUS at 11:06

## 2022-06-03 NOTE — PLAN OF CARE
1220 pt tolerated opdivo infusion without issue, pt to rtc 6/30/22, no distress noted upon d/c to home  with spouse

## 2022-06-03 NOTE — PLAN OF CARE
1110 pt here for D1C2 Opdivo infusion, labs, hx, meds, allergies reviewed, pt with no new complaints at this time, reclined in chair, continue to monitor

## 2022-06-03 NOTE — PROGRESS NOTES
ONCOLOGY FOLLOW UP VISIT    Reason for visit: Toxicity check on Opdivo for stage IIIA urothelial carcinoma    Cancer/Stage/TNM:   Cancer Staging  Endometrial ca  Staging form: Corpus Uteri - Carcinoma and Carcinosarcoma, AJCC 8th Edition  - Clinical: No stage assigned - Unsigned  - Pathologic stage from 7/18/2019: FIGO Stage IA (pT1a, pN0, cM0) - Signed by Lokesh Carias MD on 7/18/2019       Oncology History   Endometrial ca   6/26/2019 Initial Diagnosis    Endometrial ca     7/18/2019 Cancer Staged    Staging form: Corpus Uteri - Carcinoma and Carcinosarcoma, AJCC 8th Edition  - Pathologic stage from 7/18/2019: FIGO Stage IA (pT1a, pN0, cM0) - Signed by Lokesh Carias MD on 7/18/2019 7/18/2019 - 7/18/2019 Chemotherapy    Treatment Summary   Plan Name: OP GYN PACLITAXEL CARBOPLATIN (AUC 6) Q3W  Treatment Goal: Curative  Status: Inactive  Start Date:   End Date:   Provider: Lokesh Carias MD  Chemotherapy: CARBOplatin (PARAPLATIN) in sodium chloride 0.9% 250 mL chemo infusion, , Intravenous, Clinic/HOD 1 time, 0 of 6 cycles  PACLitaxel (TAXOL) 175 mg/m2 = 300 mg in sodium chloride 0.9% 500 mL chemo infusion, 175 mg/m2, Intravenous, Clinic/HOD 1 time, 0 of 6 cycles     Urothelial carcinoma of kidney, right   11/11/2021 Initial Diagnosis    Urothelial carcinoma of kidney, right     1/27/2022 Tumor Conference    Presenting Hospital / Clinic: [facility]  Virtual Tumor Board Conference: Virtual  Presenter: Edmond Manuel  Date Presented to Tumor Board: 1/27/2022  Specialties Present: Medical Oncology; Radiation Oncology; Pathology; Navigation; Urology  Presentation at Cancer Conference: Prospective  Cancer Type: Other  Other Cancer: right upper tract urothelial  Recommended Plan: Chemotherapy; Surgery  Recommended Plan Note: neoadjuvant split dose MVAC + open nephroureterectomy    Oncology History   Endometrial ca   Urothelial carcinoma of kidney, right   1/27/2022 Tumor Conference    Presenting Hospital  / Clinic: [facility] [address]  Virtual Tumor Board Conference: Virtual  Presenter: Edmond Moyraymond  Date Presented to Tumor Board: 1/27/2022  Specialties Present: Medical Oncology; Radiation Oncology; Pathology; Navigation; Urology  Presentation at Cancer Conference: Prospective  Cancer Type: Other  Other Cancer: right upper tract urothelial  Recommended Plan: Chemotherapy; Surgery  Recommended Plan Note: neoadjuvant split dose MVAC + open nephroureterectomy                  PATIENT SUMMARY:   Gita García is a 76 y.o. female with HG right upper tract UCC. History of endometrial cancer s/p neoadjuvant taxol+cisplatin and vaginal brachytherapy followed by robotic hysterectomy.    DISCUSSION:  Pathology review  Staging review    PERFORMANCE STATUS:  ECOG 0    Estimated GFR/CKD Stage: >60 (cr 0.7)    Clinical/Pathologic Stage (TNM): T1 N0 M0    FACULTY IN ATTENDANCE:    Urologic Oncology: Shane Diaz MD [x], Michael Finley MD [x] , Hever Pollack MD []    CONSULT NEEDED:     [] Urologic Oncology    [] Hem/Onc    [] Rad/Onc     [x] Treatment Guidelines (NCCN and AUA) reviewed and care planned is consistent with guidelines.    TUMOR BOARD RECOMMENDATIONS/PLAN/CONSENSUS:     Case discussed among group. Pathology and radiologic images were reviewed (if applicable).    Neoadjuvant split-dose DDMVAC + open nephroureterectomy  Referral to genetics      5/6/2022 -  Chemotherapy    Treatment Summary   Plan Name: OP NIVOLUMAB Q4W  Treatment Goal: Curative  Status: Active  Start Date: 5/6/2022  End Date: 9/22/2022 (Planned)  Provider: Urban Cancino MD  Chemotherapy: nivolumab 480 mg in sodium chloride 0.9% 148 mL infusion, 480 mg, Intravenous, Clinic/HOD 1 time, 1 of 6 cycles  Administration: 480 mg (5/6/2022)          HPI:   77 y.o. female who presents to clinic prior to C2 of Opdivo. Noticed fatigue for 3 weeks after her 1st dose but is managable and fluctuates. Reports sinus congestion after doing yard work. Patient  reports no other symptoms. Neuropathy at baseline from prior chemotherapy.       ROS:   Review of Systems   Constitutional: Positive for fatigue. Negative for activity change, chills, fever and unexpected weight change.   HENT: Negative for mouth sores, nosebleeds and sore throat.    Respiratory: Negative for cough, shortness of breath and wheezing.    Cardiovascular: Negative for chest pain, palpitations and leg swelling.   Gastrointestinal: Negative for abdominal distention, abdominal pain and blood in stool.   Endocrine: Negative for cold intolerance and heat intolerance.   Genitourinary: Negative for dysuria, flank pain and frequency.   Musculoskeletal: Negative for arthralgias, joint swelling and myalgias.   Skin: Negative for color change, rash and wound.   Neurological: Positive for numbness (feet). Negative for dizziness, light-headedness and headaches.   Hematological: Negative for adenopathy. Does not bruise/bleed easily.        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 several days after delivery    Urothelial carcinoma of kidney, right 11/11/2021        Past Surgical History:   Past Surgical History:   Procedure Laterality Date    ABLATION OF NEOPLASM OF URETER USING LASER Right 1/3/2022    Procedure: ABLATION, NEOPLASM, URETER, USING LASER;  Surgeon: Shane Diaz MD;  Location: [facility] OR [address] [place] [address];  Service: Urology;  Laterality: Right;    CYSTOSCOPY N/A 1/3/2022    Procedure: CYSTOSCOPY;  Surgeon: Shane Diaz MD;  Location: [facility] OR [address] [place] [address];  Service: Urology;  Laterality: N/A;    DILATION AND CURETTAGE OF UTERUS      GI scope  2016     LAPAROTOMY N/A 7/8/2019    Procedure: LAPAROTOMY;  Surgeon: Lokesh Carias MD;  Location: [facility] OR 2ND FLR;  Service: OB/GYN;  Laterality: N/A;  mini    OMENTECTOMY N/A 7/8/2019    Procedure: OMENTECTOMY;  Surgeon: Lokesh Carias MD;  Location: [facility] OR 2ND FLR;  Service: OB/GYN;  Laterality: N/A;    ROBOT-ASSISTED LAPAROSCOPIC ABDOMINAL HYSTERECTOMY USING DA SIRISHA XI N/A 7/8/2019    Procedure: XI ROBOTIC HYSTERECTOMY;  Surgeon: Lokesh Carias MD;  Location: [facility] OR 2ND FLR;  Service: OB/GYN;  Laterality: N/A;    ROBOT-ASSISTED LAPAROSCOPIC PELVIC LYMPHADENECTOMY USING DA SIRISHA XI Bilateral 7/8/2019    Procedure: XI ROBOTIC LYMPHADENECTOMY, PELVIC;  Surgeon: Lokesh Carias MD;  Location: [facility] OR 2ND FLR;  Service: OB/GYN;  Laterality: Bilateral;    ROBOT-ASSISTED LAPAROSCOPIC RETROPERITONEAL LYMPHADENECTOMY USING DA SIRISHA XI N/A 7/8/2019    Procedure: XI ROBOTIC LYMPHADENECTOMY, RETROPERITONEUM;  Surgeon: Lokesh Carias MD;  Location: [facility] OR 2ND FLR;  Service: OB/GYN;  Laterality: N/A;    ROBOT-ASSISTED LAPAROSCOPIC SALPINGO-OOPHORECTOMY USING DA SIRISHA XI Bilateral 7/8/2019    Procedure: XI ROBOTIC SALPINGO-OOPHORECTOMY;  Surgeon: Lokesh Carias MD;  Location: [facility] OR 2ND FLR;  Service: OB/GYN;  Laterality: Bilateral;    ROBOT-ASSISTED LAPAROSCOPIC SURGICAL REMOVAL OF KIDNEY AND URETER USING DA SIRISHA XI Right 3/14/2022    Procedure: XI ROBOTIC NEPHROURETERECTOMY/ WITH BLADDER CUFF;  Surgeon: Shane Diaz MD;  Location: [facility] OR 2ND FLR;  Service: Urology;  Laterality: Right;  4hrs    URETEROSCOPY Right 1/3/2022    Procedure: URETEROSCOPY;  Surgeon: Shane Diaz MD;  Location: [facility] OR 1ST FLR;  Service: Urology;  Laterality: Right;  2hrs        Family History:   Family History   Problem Relation Age of Onset    Colon cancer Sister 53    Prostate cancer Brother     Breast cancer Sister 64    Kidney cancer Sister     Breast cancer Sister         in her 60s    Skin cancer Sister      Stroke Brother     Prostate cancer Brother     Heart disease Brother     Prostate cancer Brother     Heart disease Brother     Ovarian cancer Neg Hx     Uterine cancer Neg Hx     Anesthesia problems Neg Hx         Social History:   Social History     Tobacco Use    Smoking status: Never Smoker    Smokeless tobacco: Never Used   Substance Use Topics    Alcohol use: Never        Allergies:   Review of patient's allergies indicates:   Allergen Reactions    Asa [aspirin] Other (See Comments)     Stomach upset    Dilaudid (pf) [hydromorphone (pf)] Nausea Only    Sulfa (sulfonamide antibiotics) Itching        Medications:   Current Outpatient Medications   Medication Sig Dispense Refill    acetaminophen (TYLENOL) 500 MG tablet Take 500 mg by mouth every 6 (six) hours as needed for Pain.      ALPRAZolam (XANAX) 0.25 MG tablet Take 0.25 mg by mouth 2 (two) times daily as needed for Anxiety.       bisoprolol-hydrochlorothiazide (ZIAC) 10-6.25 mg per tablet Take 1 tablet by mouth once daily.       calcium carbonate/vitamin D3 (CALCIUM 600 + D,3, ORAL) Take by mouth once daily.       co-enzyme Q-10 30 mg capsule Take 30 mg by mouth 3 (three) times daily.      milk thistle 175 mg tablet Take 175 mg by mouth once daily.      multivitamin/iron/folic acid (CENTRUM ORAL) Take by mouth once daily.       mv-min/FA/vit K/lycop/lut/zeax (OCUVITE EYE PLUS MULTI ORAL) Take 1 tablet by mouth once daily.       oxyCODONE (ROXICODONE) 5 MG immediate release tablet Take 1 tablet (5 mg total) by mouth every 6 (six) hours as needed for Pain. (Patient not taking: No sig reported) 10 tablet 0    pantoprazole (PROTONIX) 40 MG tablet Take 40 mg by mouth once daily.      polyethylene glycol (GLYCOLAX) 17 gram PwPk Take by mouth every morning.      raloxifene (EVISTA) 60 mg tablet Take 60 mg by mouth every evening.       No current facility-administered medications for this visit.        Physical Exam:   BP (!) 172/72 (BP  "Location: Left arm, Patient Position: Sitting, BP Method: Medium (Automatic))   Pulse (!) 58   Temp 97.7 °F (36.5 °C) (Oral)   Resp 18   Ht 5' 4.5" (1.638 m)   Wt 71.4 kg (157 lb 6.5 oz)   SpO2 98%   BMI 26.60 kg/m²      ECOG Performance Status: (foot note - ECOG PS provided by [employer]cology Group) 0 - Asymptomatic    Physical Exam  Constitutional:       Appearance: She is well-developed.   HENT:      Head: Normocephalic and atraumatic.   Eyes:      Conjunctiva/sclera: Conjunctivae normal.      Pupils: Pupils are equal, round, and reactive to light.   Pulmonary:      Effort: Pulmonary effort is normal. No respiratory distress.   Abdominal:      General: There is no distension.      Palpations: Abdomen is soft.   Musculoskeletal:         General: No swelling. Normal range of motion.      Cervical back: Normal range of motion and neck supple.   Lymphadenopathy:      Cervical: No cervical adenopathy.   Skin:     General: Skin is warm and dry.   Neurological:      General: No focal deficit present.      Mental Status: She is alert and oriented to person, place, and time.   Psychiatric:         Mood and Affect: Mood normal.         Behavior: Behavior normal.           Labs:   Recent Results (from the past 48 hour(s))   CBC Oncology    Collection Time: 06/03/22  8:57 AM   Result Value Ref Range    WBC 7.35 3.90 - 12.70 K/uL    RBC 3.94 (L) 4.00 - 5.40 M/uL    Hemoglobin 11.6 (L) 12.0 - 16.0 g/dL    Hematocrit 35.4 (L) 37.0 - 48.5 %    MCV 90 82 - 98 fL    MCH 29.4 27.0 - 31.0 pg    MCHC 32.8 32.0 - 36.0 g/dL    RDW 13.4 11.5 - 14.5 %    Platelets 229 150 - 450 K/uL    MPV 9.5 9.2 - 12.9 fL    Gran # (ANC) 4.3 1.8 - 7.7 K/uL    Immature Grans (Abs) 0.02 0.00 - 0.04 K/uL   Comprehensive Metabolic Panel    Collection Time: 06/03/22  8:57 AM   Result Value Ref Range    Sodium 141 136 - 145 mmol/L    Potassium 4.3 3.5 - 5.1 mmol/L    Chloride 105 95 - 110 mmol/L    CO2 28 23 - 29 mmol/L    Glucose 105 70 " - 110 mg/dL    BUN 16 8 - 23 mg/dL    Creatinine 1.2 0.5 - 1.4 mg/dL    Calcium 9.5 8.7 - 10.5 mg/dL    Total Protein 7.0 6.0 - 8.4 g/dL    Albumin 3.8 3.5 - 5.2 g/dL    Total Bilirubin 0.9 0.1 - 1.0 mg/dL    Alkaline Phosphatase 60 55 - 135 U/L    AST 21 10 - 40 U/L    ALT 14 10 - 44 U/L    Anion Gap 8 8 - 16 mmol/L    eGFR if African American 49.7 (A) >60 mL/min/1.73 m^2    eGFR if non  43.1 (A) >60 mL/min/1.73 m^2        Imaging:  CT Chest Abdomen Pelvis With Contrast  Narrative: EXAMINATION:  CT CHEST ABDOMEN PELVIS WITH CONTRAST (XPD)    CLINICAL HISTORY:  Re-staging for T3 urothelial carcinoma; Malignant neoplasm of right kidney, except renal pelvis    TECHNIQUE:  Axial images of the chest, abdomen, and pelvis were acquired  after the use of 75 cc [id number] IV contrast.  Coronal and sagittal reconstructions were also obtained    COMPARISON:  Outside CT abdomen pelvis 10/18/2021    Outside CT chest abdomen pelvis 01/30/2020    CT abdomen 07/02/2019    CT chest abdomen pelvis 06/27/2019    FINDINGS:  Thoracic soft tissues: Subcentimeter right thyroid hypodensity, stable from CT 01/30/2020. No axillary lymphadenopathy.    Aorta: Thoracic aorta is normal in caliber and contour with mild scattered calcific atherosclerosis.    Heart: Normal in size. No pericardial effusion. No significant calcific coronary atherosclerosis.    Nelida/Mediastinum: No mediastinal or hilar lymphadenopathy.    Lungs: Trachea and bronchi are patent.  Lung symmetrically expanded without consolidation.  Stable mild biapical fibrotic change.  Mild interlobular septal thickening at the lung apices, nonspecific and stable over multiple prior exams.  Stable bands of subsegmental atelectasis in the left lower lobe.  No pleural fluid or pneumothorax.  Few calcified granulomas in the right lower lobe.  Few right upper lobe ground-glass nodules, the largest of which measures 0.6 cm (series 6, image 98).  Bilateral solid micro nodules,  largest of which measures 0.3 cm and is located within the left upper lobe (series 6, image 138).    Liver: Normal in size and contour.  1.6 and 3.6 cm lesions within the right (series 2, image 103) and left (series 2, image 102) hepatic lobes respectively that demonstrate peripheral discontinuous nodular enhancement and are stable when compared to the previous exams.  No new hepatic lesions.    Gallbladder: No calcified gallstones.    Bile Ducts: No evidence of dilated ducts.    Pancreas: No mass or peripancreatic fat stranding.    Spleen: Stable splenic calcifications.    Esophagus: Unremarkable.    Stomach and duodenum: Unremarkable.    Adrenals: Unremarkable.    Kidneys/Ureters: Postoperative change of right nephroureterectomy.  Left kidney normal in size and location with normal enhancement.  Subcentimeter cortical hypodensities, too small to characterize but likely cysts.  No hydronephrosis or nephrolithiasis. No left hydroureter.    Bladder: Abnormal contour of the right posterolateral bladder and adjacent tubular soft tissue thickening (axial series 3, image 149), presumed postoperative change related to partial cystectomy.  No evidence of bladder wall thickening elsewhere.    Reproductive organs: Uterus surgically absent.    Bowel/Mesentery: Small bowel is normal in caliber with no evidence of obstruction.  No evidence of inflammation or wall thickening.  Appendix is visualized and appears normal.  Colon demonstrates no focal wall thickening.  Diverticulosis coli without evidence of acute diverticulitis.  Moderate amount of stool throughout the colon suggestive of constipation.    Peritoneum: No intraperitoneal free air or fluid.    Lymph nodes: Few prominent nonenlarged periaortic/retroperitoneal lymph nodes.  Prominent nonenlarged left external iliac chain lymph node measuring 0.7 cm in short axis (axial series 3, image 136), stable over prior multiple exams.    Abdominal wall:  Soft tissue thickening  underlying upper midline incision and few small lower abdominal incisions.  Right inguinal fat containing hernia.    Vasculature: No aneurysm. Moderate calcific atherosclerosis with significant calcifications at the origin of the celiac and left renal artery.  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    Bones: Pectus excavatum.  Degenerative change of the spine.  No acute fracture. No suspicious osseous lesions.  Impression: 1. In this patient with papillary urothelial carcinoma status post right nephroureterectomy and uterine carcinoma status post hysterectomy, there is irregular contour of the right posterolateral bladder wall with an adjacent area of tubular soft tissue attenuation, presumably postoperative in nature.  No evidence of intraabdominal/pelvis metastatic disease.  No measurable lesions per RECIST criteria.  2. Few bilateral solid and ground glass pulmonary nodules, indeterminate.  Attention on follow up CT recommended.  3. Two hepatic lesions with imaging characteristics consistent with hemangiomas and stable over multiple prior exams.  4. Diverticulosis coli without evidence of acute diverticulitis.  5. Additional findings as above.    Electronically signed by resident: Chintan Scott  Date:    04/21/2022  Time:    08:48    Electronically signed by: Jeff Steele MD  Date:    04/21/2022  Time:    10:17            Diagnoses:       1. Urothelial carcinoma of kidney, right    2. Solitary kidney, acquired    3. Chemotherapy-induced neuropathy    4. Endometrial ca          Assessment and Plan:         1. Stage IIIA Urothelial Carcinoma:   Discussed stage, adjuvant treatment options, and prognosis in detail with patient and daughter.  Due to below problems, I am recommending against cisplatin based adjuvant chemotherapy.  She would be a candidate for adjuvant Opdivo and discussed this treatment plan and the DFS benefit, though data is immature.  Patient and daughter  agree to move forward with Opdivo adjuvant x 1 year per Checkmate 274 clinical trial.  - Consented for Opdivo Q4W. Labs acceptable to proceed with C2. Re-staging scans in July.     2,3 Not a candidate for cisplatin chemotherapy due to these issues.    4 Continue to follow with Dr. Joe every 4 months.      40 minutes total time spent with patient, 25 minutes were spent face to face with the patient and her family to discuss the disease, natural history, treatment options and survival statistics. Greater than 50% of this time was for counseling. 15 minutes of chart review and coordination of care. I have provided the patient with an opportunity to ask questions and have all questions answered to his satisfaction.     she will return to clinic prior to C3, but knows to call in the interim if symptoms change or should a problem arise.    Patient was also seen and examined by Dr. Cancino. Patient is in agreement with the proposed treatment plan. All questions were answered to the patient's satisfaction. Pt knows to call clinic if anything is needed before the next clinic visit.    Joellen Valerio, MSN, APRN, FNP-C  Hematology and Medical Oncology  Nurse Practitioner to Dr. Urban Cancino  Nurse Practitioner, [facility]      I have reviewed the notes, assessments, and/or procedures performed by Joellen CAMERON, as above.  I have personally interviewed and examined the patient at the beside, and rounded with Joellen. I concur with her assessment and plan and the documentation of Gita García.    Urban Cancino M.D.  Hematology/Oncology Attending  [facility]  [facility]        Route Chart for Scheduling    Med Onc Chart Routing      Follow up with physician    Follow up with EVELYN 4 weeks. Prior to Opdivo C3   Labs CBC, CMP, free T4 and TSH   Lab interval: every 4 weeks  Prior to infusions   Imaging MRI and other   CT chest and MRI  urography in 4 weeks (7/5)   Pharmacy appointment    Other referrals          Treatment Plan Information   OP NIVOLUMAB Q4W   Urban Cancino MD   Upcoming Treatment Dates - OP NIVOLUMAB Q4W    6/2/2022       Chemotherapy       nivolumab 480 mg in sodium chloride 0.9% 148 mL infusion  6/30/2022       Chemotherapy       nivolumab 480 mg in sodium chloride 0.9% 148 mL infusion  7/28/2022       Chemotherapy       nivolumab 480 mg in sodium chloride 0.9% 148 mL infusion  8/25/2022       Chemotherapy       nivolumab 480 mg in sodium chloride 0.9% 148 mL infusion    Therapy Plan Information  mitoMYcin (MUTAMYCIN) 40 mg in sodium chloride 0.9% 40 mL bladder instillation  40 mg, Intravesical, 1 time a week, at least 5 days apart

## 2022-06-06 ENCOUNTER — TELEPHONE (OUTPATIENT)
Dept: HEMATOLOGY/ONCOLOGY | Facility: CLINIC | Age: 77
End: 2022-06-06
Payer: MEDICARE

## 2022-06-08 ENCOUNTER — TELEPHONE (OUTPATIENT)
Dept: HEMATOLOGY/ONCOLOGY | Facility: CLINIC | Age: 77
End: 2022-06-08
Payer: MEDICARE

## 2022-06-08 NOTE — TELEPHONE ENCOUNTER
----- Message from Nuvia Champion RN sent at 6/8/2022 11:56 AM CDT -----  Regarding: FW: Pt was out of town and missed a call from Lali to reschedule her appt and pt would like a call back today  Please return call  ----- Message -----  From: Yoko Crow  Sent: 6/8/2022  11:53 AM CDT  To: Teodoro Cuenca Staff  Subject: Pt was out of town and missed a call from To#    Appointment Access Request:    Pt was out of town and missed a call from Lali to reschedule her appt and pt would like a call back today    Pt can be reached at [phone]

## 2022-06-09 ENCOUNTER — HOSPITAL ENCOUNTER (OUTPATIENT)
Dept: RADIOLOGY | Facility: HOSPITAL | Age: 77
Discharge: HOME | End: 2022-06-09
Attending: NURSE PRACTITIONER
Payer: MEDICARE

## 2022-06-09 DIAGNOSIS — C64.1 UROTHELIAL CARCINOMA OF KIDNEY, RIGHT: ICD-10-CM

## 2022-06-09 PROCEDURE — 71250 CT CHEST WITHOUT CONTRAST: ICD-10-PCS | Mod: 26,,, | Performed by: RADIOLOGY

## 2022-06-09 PROCEDURE — 71250 CT THORAX DX C-: CPT | Mod: TC

## 2022-06-09 PROCEDURE — 71250 CT THORAX DX C-: CPT | Mod: 26,,, | Performed by: RADIOLOGY

## 2022-06-28 ENCOUNTER — PROCEDURE VISIT (OUTPATIENT)
Dept: UROLOGY | Facility: CLINIC | Age: 77
End: 2022-06-28
Payer: MEDICARE

## 2022-06-28 VITALS
SYSTOLIC BLOOD PRESSURE: 189 MMHG | BODY MASS INDEX: 26.35 KG/M2 | HEART RATE: 63 BPM | RESPIRATION RATE: 18 BRPM | HEIGHT: 65 IN | TEMPERATURE: 98 F | DIASTOLIC BLOOD PRESSURE: 80 MMHG | WEIGHT: 158.13 LBS

## 2022-06-28 DIAGNOSIS — C64.1 UROTHELIAL CARCINOMA OF KIDNEY, RIGHT: ICD-10-CM

## 2022-06-28 PROCEDURE — 52000 CYSTOSCOPY: ICD-10-PCS | Mod: S$GLB,,, | Performed by: UROLOGY

## 2022-06-28 PROCEDURE — 52000 CYSTOURETHROSCOPY: CPT | Mod: S$GLB,,, | Performed by: UROLOGY

## 2022-06-28 RX ORDER — LIDOCAINE HYDROCHLORIDE 20 MG/ML
JELLY TOPICAL
Status: COMPLETED | OUTPATIENT
Start: 2022-06-28 | End: 2022-06-28

## 2022-06-28 RX ADMIN — LIDOCAINE HYDROCHLORIDE: 20 JELLY TOPICAL at 09:06

## 2022-06-28 NOTE — PROCEDURES
Cystoscopy    Date/Time: 6/28/2022 9:49 AM  Performed by: Shane Diaz MD  Authorized by: Shane Diaz MD     Consent Done?:  Yes (Written)  Timeout: prior to procedure the correct patient, procedure, and site was verified    Prep: patient was prepped and draped in usual sterile fashion    Indications: history bladder cancer    Preparation: Patient was prepped and draped in usual sterile fashion    Scope type:  Flexible cystoscope  Stent inserted: No    Stent removed: No    External exam normal: Yes    Digital exam performed: No    Urethra normal: Yes    Bladder neck normal: Yes    Bladder normal: Yes     patient tolerated the procedure well with no immediate complications  Comments:      Normal bladder  1 year with cystoscopy       14-Apr-2020 15-Apr-2020

## 2022-06-28 NOTE — PATIENT INSTRUCTIONS
What to Expect After a Cystoscopy  For the next 24-48 hours, you may feel a mild burning when you urinate. This burning is normal and expected. Drink plenty of water to dilute the urine to help relieve the burning sensation. You may also see a small amount of blood in your urine after the procedure.    Unless you are already taking antibiotics, you may be given an antibiotic after the test to prevent infection.    Signs and Symptoms to Report  Call the [facility] at [phone] if you develop any of the following:  Fever of 101 degrees or higher  Chills or persistent bleeding  Inability to urinate

## 2022-07-01 ENCOUNTER — INFUSION (OUTPATIENT)
Dept: INFUSION THERAPY | Facility: HOSPITAL | Age: 77
End: 2022-07-01
Attending: NURSE PRACTITIONER
Payer: MEDICARE

## 2022-07-01 ENCOUNTER — OFFICE VISIT (OUTPATIENT)
Dept: HEMATOLOGY/ONCOLOGY | Facility: CLINIC | Age: 77
End: 2022-07-01
Payer: MEDICARE

## 2022-07-01 VITALS
OXYGEN SATURATION: 97 % | WEIGHT: 157.19 LBS | SYSTOLIC BLOOD PRESSURE: 152 MMHG | RESPIRATION RATE: 18 BRPM | BODY MASS INDEX: 26.19 KG/M2 | DIASTOLIC BLOOD PRESSURE: 79 MMHG | TEMPERATURE: 99 F | HEIGHT: 65 IN | HEART RATE: 52 BPM

## 2022-07-01 VITALS
DIASTOLIC BLOOD PRESSURE: 65 MMHG | HEART RATE: 57 BPM | SYSTOLIC BLOOD PRESSURE: 147 MMHG | HEIGHT: 64 IN | WEIGHT: 157.19 LBS | BODY MASS INDEX: 26.84 KG/M2

## 2022-07-01 DIAGNOSIS — C64.1 UROTHELIAL CARCINOMA OF KIDNEY, RIGHT: Primary | ICD-10-CM

## 2022-07-01 DIAGNOSIS — Z90.5 SOLITARY KIDNEY, ACQUIRED: ICD-10-CM

## 2022-07-01 DIAGNOSIS — G62.0 CHEMOTHERAPY-INDUCED NEUROPATHY: ICD-10-CM

## 2022-07-01 DIAGNOSIS — C54.1 ENDOMETRIAL CA: ICD-10-CM

## 2022-07-01 DIAGNOSIS — T45.1X5A CHEMOTHERAPY-INDUCED NEUROPATHY: ICD-10-CM

## 2022-07-01 PROCEDURE — 1159F MED LIST DOCD IN RCRD: CPT | Mod: CPTII,S$GLB,, | Performed by: NURSE PRACTITIONER

## 2022-07-01 PROCEDURE — 99999 PR PBB SHADOW E&M-EST. PATIENT-LVL IV: ICD-10-PCS | Mod: PBBFAC,,, | Performed by: NURSE PRACTITIONER

## 2022-07-01 PROCEDURE — 1160F RVW MEDS BY RX/DR IN RCRD: CPT | Mod: CPTII,S$GLB,, | Performed by: NURSE PRACTITIONER

## 2022-07-01 PROCEDURE — 3288F PR FALLS RISK ASSESSMENT DOCUMENTED: ICD-10-PCS | Mod: CPTII,S$GLB,, | Performed by: NURSE PRACTITIONER

## 2022-07-01 PROCEDURE — 1157F PR ADVANCE CARE PLAN OR EQUIV PRESENT IN MEDICAL RECORD: ICD-10-PCS | Mod: CPTII,S$GLB,, | Performed by: NURSE PRACTITIONER

## 2022-07-01 PROCEDURE — 99999 PR PBB SHADOW E&M-EST. PATIENT-LVL IV: CPT | Mod: PBBFAC,,, | Performed by: NURSE PRACTITIONER

## 2022-07-01 PROCEDURE — 1126F PR PAIN SEVERITY QUANTIFIED, NO PAIN PRESENT: ICD-10-PCS | Mod: CPTII,S$GLB,, | Performed by: NURSE PRACTITIONER

## 2022-07-01 PROCEDURE — 1101F PR PT FALLS ASSESS DOC 0-1 FALLS W/OUT INJ PAST YR: ICD-10-PCS | Mod: CPTII,S$GLB,, | Performed by: NURSE PRACTITIONER

## 2022-07-01 PROCEDURE — 1101F PT FALLS ASSESS-DOCD LE1/YR: CPT | Mod: CPTII,S$GLB,, | Performed by: NURSE PRACTITIONER

## 2022-07-01 PROCEDURE — 1126F AMNT PAIN NOTED NONE PRSNT: CPT | Mod: CPTII,S$GLB,, | Performed by: NURSE PRACTITIONER

## 2022-07-01 PROCEDURE — 96413 CHEMO IV INFUSION 1 HR: CPT

## 2022-07-01 PROCEDURE — 3077F SYST BP >= 140 MM HG: CPT | Mod: CPTII,S$GLB,, | Performed by: NURSE PRACTITIONER

## 2022-07-01 PROCEDURE — 3078F PR MOST RECENT DIASTOLIC BLOOD PRESSURE < 80 MM HG: ICD-10-PCS | Mod: CPTII,S$GLB,, | Performed by: NURSE PRACTITIONER

## 2022-07-01 PROCEDURE — 25000003 PHARM REV CODE 250: Performed by: NURSE PRACTITIONER

## 2022-07-01 PROCEDURE — 3078F DIAST BP <80 MM HG: CPT | Mod: CPTII,S$GLB,, | Performed by: NURSE PRACTITIONER

## 2022-07-01 PROCEDURE — 1159F PR MEDICATION LIST DOCUMENTED IN MEDICAL RECORD: ICD-10-PCS | Mod: CPTII,S$GLB,, | Performed by: NURSE PRACTITIONER

## 2022-07-01 PROCEDURE — 1157F ADVNC CARE PLAN IN RCRD: CPT | Mod: CPTII,S$GLB,, | Performed by: NURSE PRACTITIONER

## 2022-07-01 PROCEDURE — 3077F PR MOST RECENT SYSTOLIC BLOOD PRESSURE >= 140 MM HG: ICD-10-PCS | Mod: CPTII,S$GLB,, | Performed by: NURSE PRACTITIONER

## 2022-07-01 PROCEDURE — 99215 OFFICE O/P EST HI 40 MIN: CPT | Mod: S$GLB,,, | Performed by: NURSE PRACTITIONER

## 2022-07-01 PROCEDURE — 63600175 PHARM REV CODE 636 W HCPCS: Mod: JG | Performed by: NURSE PRACTITIONER

## 2022-07-01 PROCEDURE — 99215 PR OFFICE/OUTPT VISIT, EST, LEVL V, 40-54 MIN: ICD-10-PCS | Mod: S$GLB,,, | Performed by: NURSE PRACTITIONER

## 2022-07-01 PROCEDURE — 1160F PR REVIEW ALL MEDS BY PRESCRIBER/CLIN PHARMACIST DOCUMENTED: ICD-10-PCS | Mod: CPTII,S$GLB,, | Performed by: NURSE PRACTITIONER

## 2022-07-01 PROCEDURE — 3288F FALL RISK ASSESSMENT DOCD: CPT | Mod: CPTII,S$GLB,, | Performed by: NURSE PRACTITIONER

## 2022-07-01 RX ORDER — SODIUM CHLORIDE 0.9 % (FLUSH) 0.9 %
10 SYRINGE (ML) INJECTION
Status: CANCELLED | OUTPATIENT
Start: 2022-07-01

## 2022-07-01 RX ORDER — HEPARIN 100 UNIT/ML
500 SYRINGE INTRAVENOUS
Status: CANCELLED | OUTPATIENT
Start: 2022-07-01

## 2022-07-01 RX ORDER — SODIUM CHLORIDE 0.9 % (FLUSH) 0.9 %
10 SYRINGE (ML) INJECTION
Status: DISCONTINUED | OUTPATIENT
Start: 2022-07-01 | End: 2022-07-01 | Stop reason: HOSPADM

## 2022-07-01 RX ORDER — HEPARIN 100 UNIT/ML
500 SYRINGE INTRAVENOUS
Status: DISCONTINUED | OUTPATIENT
Start: 2022-07-01 | End: 2022-07-01 | Stop reason: HOSPADM

## 2022-07-01 RX ADMIN — SODIUM CHLORIDE 480 MG: 9 INJECTION, SOLUTION INTRAVENOUS at 12:07

## 2022-07-01 RX ADMIN — SODIUM CHLORIDE: 9 INJECTION, SOLUTION INTRAVENOUS at 12:07

## 2022-07-01 NOTE — PROGRESS NOTES
ONCOLOGY FOLLOW UP VISIT    Reason for visit: Toxicity check and imaging review on Opdivo for stage IIIA urothelial carcinoma    Cancer/Stage/TNM:   Cancer Staging  Endometrial ca  Staging form: Corpus Uteri - Carcinoma and Carcinosarcoma, AJCC 8th Edition  - Clinical: No stage assigned - Unsigned  - Pathologic stage from 7/18/2019: FIGO Stage IA (pT1a, pN0, cM0) - Signed by Lokesh Carias MD on 7/18/2019       Oncology History   Endometrial ca   6/26/2019 Initial Diagnosis    Endometrial ca     7/18/2019 Cancer Staged    Staging form: Corpus Uteri - Carcinoma and Carcinosarcoma, AJCC 8th Edition  - Pathologic stage from 7/18/2019: FIGO Stage IA (pT1a, pN0, cM0) - Signed by Lokesh Carias MD on 7/18/2019 7/18/2019 - 7/18/2019 Chemotherapy    Treatment Summary   Plan Name: OP GYN PACLITAXEL CARBOPLATIN (AUC 6) Q3W  Treatment Goal: Curative  Status: Inactive  Start Date:   End Date:   Provider: Lokesh Carias MD  Chemotherapy: CARBOplatin (PARAPLATIN) in sodium chloride 0.9% 250 mL chemo infusion, , Intravenous, Clinic/HOD 1 time, 0 of 6 cycles  PACLitaxel (TAXOL) 175 mg/m2 = 300 mg in sodium chloride 0.9% 500 mL chemo infusion, 175 mg/m2, Intravenous, Clinic/HOD 1 time, 0 of 6 cycles     Urothelial carcinoma of kidney, right   11/11/2021 Initial Diagnosis    Urothelial carcinoma of kidney, right     1/27/2022 Tumor Conference    Presenting Hospital / Clinic: [facility] [address]  Virtual Tumor Board Conference: Virtual  Presenter: Edmond Manuel  Date Presented to Tumor Board: 1/27/2022  Specialties Present: Medical Oncology; Radiation Oncology; Pathology; Navigation; Urology  Presentation at Cancer Conference: Prospective  Cancer Type: Other  Other Cancer: right upper tract urothelial  Recommended Plan: Chemotherapy; Surgery  Recommended Plan Note: neoadjuvant split dose MVAC + open nephroureterectomy    Oncology History   Endometrial ca   Urothelial carcinoma of kidney, right   1/27/2022 Tumor Conference     Presenting Hospital / Clinic: [facility] [address]  Virtual Tumor Board Conference: Virtual  Presenter: Edmond Manuel  Date Presented to Tumor Board: 1/27/2022  Specialties Present: Medical Oncology; Radiation Oncology; Pathology; Navigation; Urology  Presentation at Cancer Conference: Prospective  Cancer Type: Other  Other Cancer: right upper tract urothelial  Recommended Plan: Chemotherapy; Surgery  Recommended Plan Note: neoadjuvant split dose MVAC + open nephroureterectomy                  PATIENT SUMMARY:   Gita García is a 76 y.o. female with HG right upper tract UCC. History of endometrial cancer s/p neoadjuvant taxol+cisplatin and vaginal brachytherapy followed by robotic hysterectomy.    DISCUSSION:  Pathology review  Staging review    PERFORMANCE STATUS:  ECOG 0    Estimated GFR/CKD Stage: >60 (cr 0.7)    Clinical/Pathologic Stage (TNM): T1 N0 M0    FACULTY IN ATTENDANCE:    Urologic Oncology: Shane Diaz MD [x], Michael Finley MD [x] , Hever Pollack MD []    CONSULT NEEDED:     [] Urologic Oncology    [] Hem/Onc    [] Rad/Onc     [x] Treatment Guidelines (NCCN and AUA) reviewed and care planned is consistent with guidelines.    TUMOR BOARD RECOMMENDATIONS/PLAN/CONSENSUS:     Case discussed among group. Pathology and radiologic images were reviewed (if applicable).    Neoadjuvant split-dose DDMVAC + open nephroureterectomy  Referral to genetics      5/6/2022 -  Chemotherapy    Treatment Summary   Plan Name: OP NIVOLUMAB Q4W  Treatment Goal: Curative  Status: Active  Start Date: 5/6/2022  End Date: 9/22/2022 (Planned)  Provider: Urban Cancino MD  Chemotherapy: nivolumab 480 mg in sodium chloride 0.9% 148 mL infusion, 480 mg, Intravenous, Clinic/HOD 1 time, 2 of 6 cycles  Administration: 480 mg (5/6/2022), 480 mg (6/3/2022)          HPI:   77 y.o. female who presents to clinic prior to C3 of Opdivo. Fatigue is mild. Reports sinus congestion has resolved since last visit. Patient reports no other  symptoms. Neuropathy at baseline from prior chemotherapy.       ROS:   Review of Systems   Constitutional: Positive for fatigue. Negative for activity change, chills, fever and unexpected weight change.   HENT: Negative for mouth sores, nosebleeds and sore throat.    Respiratory: Negative for cough, shortness of breath and wheezing.    Cardiovascular: Negative for chest pain, palpitations and leg swelling.   Gastrointestinal: Negative for abdominal distention, abdominal pain and blood in stool.   Endocrine: Negative for cold intolerance and heat intolerance.   Genitourinary: Negative for dysuria, flank pain and frequency.   Musculoskeletal: Negative for arthralgias, joint swelling and myalgias.   Skin: Negative for color change, rash and wound.   Neurological: Positive for numbness (feet). Negative for dizziness, light-headedness and headaches.   Hematological: Negative for adenopathy. Does not bruise/bleed easily.        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 several days after delivery    Urothelial carcinoma of kidney, right 11/11/2021        Past Surgical History:   Past Surgical History:   Procedure Laterality Date    ABLATION OF NEOPLASM OF URETER USING LASER Right 1/3/2022    Procedure: ABLATION, NEOPLASM, URETER, USING LASER;  Surgeon: Shane Diaz MD;  Location: [facility] OR [address] [place] [address];  Service: Urology;  Laterality: Right;    CYSTOSCOPY N/A 1/3/2022    Procedure: CYSTOSCOPY;  Surgeon: Shane Diaz MD;  Location: [facility] OR [address] [place] [address];  Service: Urology;  Laterality: N/A;    DILATION AND CURETTAGE OF UTERUS      GI scope  2016    LAPAROTOMY  N/A 7/8/2019    Procedure: LAPAROTOMY;  Surgeon: Lokesh Carias MD;  Location: [facility] OR 2ND FLR;  Service: OB/GYN;  Laterality: N/A;  mini    OMENTECTOMY N/A 7/8/2019    Procedure: OMENTECTOMY;  Surgeon: Lokesh Carias MD;  Location: [facility] OR 2ND FLR;  Service: OB/GYN;  Laterality: N/A;    ROBOT-ASSISTED LAPAROSCOPIC ABDOMINAL HYSTERECTOMY USING DA SIRISHA XI N/A 7/8/2019    Procedure: XI ROBOTIC HYSTERECTOMY;  Surgeon: Lokesh Carias MD;  Location: [facility] OR 2ND FLR;  Service: OB/GYN;  Laterality: N/A;    ROBOT-ASSISTED LAPAROSCOPIC PELVIC LYMPHADENECTOMY USING DA SIRISHA XI Bilateral 7/8/2019    Procedure: XI ROBOTIC LYMPHADENECTOMY, PELVIC;  Surgeon: Lokesh Carias MD;  Location: [facility] OR 2ND FLR;  Service: OB/GYN;  Laterality: Bilateral;    ROBOT-ASSISTED LAPAROSCOPIC RETROPERITONEAL LYMPHADENECTOMY USING DA SIRISHA XI N/A 7/8/2019    Procedure: XI ROBOTIC LYMPHADENECTOMY, RETROPERITONEUM;  Surgeon: Lokesh Carias MD;  Location: [facility] OR 2ND FLR;  Service: OB/GYN;  Laterality: N/A;    ROBOT-ASSISTED LAPAROSCOPIC SALPINGO-OOPHORECTOMY USING DA SIRISHA XI Bilateral 7/8/2019    Procedure: XI ROBOTIC SALPINGO-OOPHORECTOMY;  Surgeon: Lokesh Carias MD;  Location: [facility] OR 2ND FLR;  Service: OB/GYN;  Laterality: Bilateral;    ROBOT-ASSISTED LAPAROSCOPIC SURGICAL REMOVAL OF KIDNEY AND URETER USING DA SIRISHA XI Right 3/14/2022    Procedure: XI ROBOTIC NEPHROURETERECTOMY/ WITH BLADDER CUFF;  Surgeon: Shane Diaz MD;  Location: [facility] OR 2ND FLR;  Service: Urology;  Laterality: Right;  4hrs    URETEROSCOPY Right 1/3/2022    Procedure: URETEROSCOPY;  Surgeon: Shane Diaz MD;  Location: [facility] OR 1ST FLR;  Service: Urology;  Laterality: Right;  2hrs        Family History:   Family History   Problem Relation Age of Onset    Colon cancer Sister 53    Prostate cancer Brother     Breast cancer Sister 64    Kidney cancer Sister     Breast cancer Sister         in her 60s    Skin cancer Sister     Stroke  Brother     Prostate cancer Brother     Heart disease Brother     Prostate cancer Brother     Heart disease Brother     Ovarian cancer Neg Hx     Uterine cancer Neg Hx     Anesthesia problems Neg Hx         Social History:   Social History     Tobacco Use    Smoking status: Never Smoker    Smokeless tobacco: Never Used   Substance Use Topics    Alcohol use: Never        Allergies:   Review of patient's allergies indicates:   Allergen Reactions    Asa [aspirin] Other (See Comments)     Stomach upset    Dilaudid (pf) [hydromorphone (pf)] Nausea Only    Sulfa (sulfonamide antibiotics) Itching        Medications:   Current Outpatient Medications   Medication Sig Dispense Refill    acetaminophen (TYLENOL) 500 MG tablet Take 500 mg by mouth every 6 (six) hours as needed for Pain.      ALPRAZolam (XANAX) 0.25 MG tablet Take 0.25 mg by mouth 2 (two) times daily as needed for Anxiety.       benzonatate (TESSALON) 100 MG capsule Take 100 mg by mouth 3 (three) times daily as needed for Cough.      bisoprolol-hydrochlorothiazide (ZIAC) 10-6.25 mg per tablet Take 1 tablet by mouth once daily.       calcium carbonate/vitamin D3 (CALCIUM 600 + D,3, ORAL) Take by mouth once daily.       co-enzyme Q-10 30 mg capsule Take 30 mg by mouth 3 (three) times daily.      loratadine (CLARITIN) 10 mg tablet Take 10 mg by mouth daily as needed for Allergies.      milk thistle 175 mg tablet Take 175 mg by mouth once daily.      multivitamin/iron/folic acid (CENTRUM ORAL) Take by mouth once daily.       mv-min/FA/vit K/lycop/lut/zeax (OCUVITE EYE PLUS MULTI ORAL) Take 1 tablet by mouth once daily.       pantoprazole (PROTONIX) 40 MG tablet Take 40 mg by mouth once daily.      polyethylene glycol (GLYCOLAX) 17 gram PwPk Take by mouth every morning.      raloxifene (EVISTA) 60 mg tablet Take 60 mg by mouth every evening.      oxyCODONE (ROXICODONE) 5 MG immediate release tablet Take 1 tablet (5 mg total) by mouth every 6  "(six) hours as needed for Pain. (Patient not taking: Reported on 7/1/2022) 10 tablet 0     No current facility-administered medications for this visit.        Physical Exam:   BP (!) 152/79 (BP Location: Left arm, Patient Position: Sitting, BP Method: Medium (Automatic))   Pulse (!) 52   Temp 98.5 °F (36.9 °C) (Oral)   Resp 18   Ht 5' 4.5" (1.638 m)   Wt 71.3 kg (157 lb 3 oz)   SpO2 97%   BMI 26.56 kg/m²      ECOG Performance Status: (foot note - ECOG PS provided by [employer]cology [employer]) 0 - Asymptomatic    Physical Exam  Constitutional:       Appearance: She is well-developed.   HENT:      Head: Normocephalic and atraumatic.   Eyes:      Conjunctiva/sclera: Conjunctivae normal.      Pupils: Pupils are equal, round, and reactive to light.   Pulmonary:      Effort: Pulmonary effort is normal. No respiratory distress.   Abdominal:      General: There is no distension.      Palpations: Abdomen is soft.   Musculoskeletal:         General: No swelling. Normal range of motion.      Cervical back: Normal range of motion and neck supple.   Lymphadenopathy:      Cervical: No cervical adenopathy.   Skin:     General: Skin is warm and dry.   Neurological:      General: No focal deficit present.      Mental Status: She is alert and oriented to person, place, and time.   Psychiatric:         Mood and Affect: Mood normal.         Behavior: Behavior normal.           Labs:   Recent Results (from the past 48 hour(s))   CBC Oncology    Collection Time: 07/01/22  9:40 AM   Result Value Ref Range    WBC 7.38 3.90 - 12.70 K/uL    RBC 3.94 (L) 4.00 - 5.40 M/uL    Hemoglobin 12.0 12.0 - 16.0 g/dL    Hematocrit 37.2 37.0 - 48.5 %    MCV 94 82 - 98 fL    MCH 30.5 27.0 - 31.0 pg    MCHC 32.3 32.0 - 36.0 g/dL    RDW 13.0 11.5 - 14.5 %    Platelets 224 150 - 450 K/uL    MPV 9.7 9.2 - 12.9 fL    Gran # (ANC) 4.6 1.8 - 7.7 K/uL    Immature Grans (Abs) 0.02 0.00 - 0.04 K/uL   Comprehensive Metabolic Panel    Collection Time: " 07/01/22  9:40 AM   Result Value Ref Range    Sodium 138 136 - 145 mmol/L    Potassium 4.3 3.5 - 5.1 mmol/L    Chloride 103 95 - 110 mmol/L    CO2 31 (H) 23 - 29 mmol/L    Glucose 89 70 - 110 mg/dL    BUN 16 8 - 23 mg/dL    Creatinine 1.0 0.5 - 1.4 mg/dL    Calcium 9.6 8.7 - 10.5 mg/dL    Total Protein 6.5 6.0 - 8.4 g/dL    Albumin 3.7 3.5 - 5.2 g/dL    Total Bilirubin 0.9 0.1 - 1.0 mg/dL    Alkaline Phosphatase 57 55 - 135 U/L    AST 18 10 - 40 U/L    ALT 13 10 - 44 U/L    Anion Gap 4 (L) 8 - 16 mmol/L    eGFR if African American >60.0 >60 mL/min/1.73 m^2    eGFR if non  53.7 (A) >60 mL/min/1.73 m^2   TSH    Collection Time: 07/01/22  9:40 AM   Result Value Ref Range    TSH 1.875 0.400 - 4.000 uIU/mL   T4, Free    Collection Time: 07/01/22  9:40 AM   Result Value Ref Range    Free T4 0.98 0.71 - 1.51 ng/dL        Imaging:  CT Chest Without Contrast  Narrative: EXAMINATION:  CT CHEST WITHOUT CONTRAST    CLINICAL HISTORY:  re-staging stage III urothelial carcinoma; Malignant neoplasm of right kidney, except renal pelvis    TECHNIQUE:  Low dose axial images, sagittal and coronal reformations were obtained from the thoracic inlet to the lung bases. Contrast was not administered.    COMPARISON:  04/21/2022    FINDINGS:  The thyroid appears normal.  The main central airways are patent.  The esophagus appears normal.  The heart is normal in size.  There are calcifications of the aortic valve.  Calcified atheromatous disease affects the aorta and its major branch vessels.  No pericardial effusion.  No pathologically enlarged mediastinal, hilar or axillary lymph nodes are seen.    The lungs are symmetrically expanded.  There is biapical pleuroparenchymal scarring.  Tiny nodule in the right upper lobe, stable.  Tiny ground-glass nodule in the left upper lobe, stable.  No new nodule is seen.  No consolidation or pleural effusions.    Hypoattenuation in the region of the hepatic confluence, previously  characterized as a hemangioma.    Age-appropriate degenerative changes affect the skeleton.  Impression: Two stable tiny nodules in the upper lobes bilaterally.  No new nodules are seen.  No consolidation or pleural effusions.    Electronically signed by: Viv Jones MD  Date:    06/09/2022  Time:    10:37            Diagnoses:       1. Urothelial carcinoma of kidney, right    2. Solitary kidney, acquired    3. Chemotherapy-induced neuropathy    4. Endometrial ca          Assessment and Plan:         1. Stage IIIA Urothelial Carcinoma:   Discussed stage, adjuvant treatment options, and prognosis in detail with patient and daughter.  Due to below problems, I am recommending against cisplatin based adjuvant chemotherapy.  She would be a candidate for adjuvant Opdivo and discussed this treatment plan and the DFS benefit, though data is immature.  Patient and daughter agree to move forward with Opdivo adjuvant x 1 year per Checkmate 274 clinical trial.  - Consented for Opdivo Q4W. Labs acceptable to proceed with C3. CT chest reviewed with patient and shows no evidence of malignancy. MRU scheduled on 7/5. Re-staging scans in October ([facility]).     2,3 Not a candidate for cisplatin chemotherapy due to these issues.    4 Continue to follow with Dr. Joe every 4 months.      40 minutes total time spent with patient, 25 minutes were spent face to face with the patient and her family to discuss the disease, natural history, treatment options and survival statistics. Greater than 50% of this time was for counseling. 15 minutes of chart review and coordination of care. I have provided the patient with an opportunity to ask questions and have all questions answered to his satisfaction.     she will return to clinic prior to C4, but knows to call in the interim if symptoms change or should a problem arise.    Patient is in agreement with the proposed treatment plan. All questions were answered to the patient's  satisfaction. Pt knows to call clinic if anything is needed before the next clinic visit.    Joellen Valerio, MSN, APRN, FNP-C  Hematology and Medical Oncology  Nurse Practitioner to Dr. Urban Cancino  Nurse Practitioner, [facility]          Route Chart for Scheduling    Med Onc Chart Routing      Follow up with physician 4 weeks. Prior to Opdivo C4   Follow up with EVELYN    Infusion scheduling note    Injection scheduling note    Labs CBC, CMP, free T4 and TSH   Lab interval: every 4 weeks  Prior to infusions   Imaging    Pharmacy appointment    Other referrals          Treatment Plan Information   OP NIVOLUMAB Q4W   Urban Cancino MD   Upcoming Treatment Dates - OP NIVOLUMAB Q4W    6/30/2022       Chemotherapy       nivolumab 480 mg in sodium chloride 0.9% 148 mL infusion  7/28/2022       Chemotherapy       nivolumab 480 mg in sodium chloride 0.9% 148 mL infusion  8/25/2022       Chemotherapy       nivolumab 480 mg in sodium chloride 0.9% 148 mL infusion  9/22/2022       Chemotherapy       nivolumab 480 mg in sodium chloride 0.9% 148 mL infusion    Therapy Plan Information  mitoMYcin (MUTAMYCIN) 40 mg in sodium chloride 0.9% 40 mL bladder instillation  40 mg, Intravesical, 1 time a week, at least 5 days apart

## 2022-07-05 ENCOUNTER — HOSPITAL ENCOUNTER (OUTPATIENT)
Dept: RADIOLOGY | Facility: HOSPITAL | Age: 77
Discharge: HOME | End: 2022-07-05
Attending: NURSE PRACTITIONER
Payer: MEDICARE

## 2022-07-05 DIAGNOSIS — C64.1 UROTHELIAL CARCINOMA OF KIDNEY, RIGHT: ICD-10-CM

## 2022-07-05 PROCEDURE — 72195 MRI PELVIS W/O DYE: CPT | Mod: TC

## 2022-07-05 PROCEDURE — 74181 MRI UROGRAPHY WITHOUT CONTRAST: ICD-10-PCS | Mod: 26,,, | Performed by: RADIOLOGY

## 2022-07-05 PROCEDURE — 72195 MRI UROGRAPHY WITHOUT CONTRAST: ICD-10-PCS | Mod: 26,,, | Performed by: RADIOLOGY

## 2022-07-05 PROCEDURE — 72195 MRI PELVIS W/O DYE: CPT | Mod: 26,,, | Performed by: RADIOLOGY

## 2022-07-05 PROCEDURE — 74181 MRI ABDOMEN W/O CONTRAST: CPT | Mod: TC

## 2022-07-05 PROCEDURE — 74181 MRI ABDOMEN W/O CONTRAST: CPT | Mod: 26,,, | Performed by: RADIOLOGY

## 2022-07-08 ENCOUNTER — TELEPHONE (OUTPATIENT)
Dept: HEMATOLOGY/ONCOLOGY | Facility: CLINIC | Age: 77
End: 2022-07-08
Payer: MEDICARE

## 2022-07-08 NOTE — TELEPHONE ENCOUNTER
"Spoke to wife, she just wanted to notify us that she tested positive for Covid. I asked her how she was feeling, she said "ok, just a little cough and sore throat". I thanked her for letting us know.  "

## 2022-07-08 NOTE — TELEPHONE ENCOUNTER
"----- Message from Bakari Martin sent at 7/8/2022 12:35 PM CDT -----  Consult/Advisory:          Name Of Caller: Self      Contact Preference?: [phone]       Provider Name: Barak      Does patient feel the need to be seen today? No      What is the nature of the call?: Calling to inform office that she's tested positive for Covid.        Additional Notes:  "Thank you for all that you do for our patients"      "

## 2022-07-11 ENCOUNTER — PATIENT MESSAGE (OUTPATIENT)
Dept: HEMATOLOGY/ONCOLOGY | Facility: CLINIC | Age: 77
End: 2022-07-11
Payer: MEDICARE

## 2022-07-11 ENCOUNTER — TELEPHONE (OUTPATIENT)
Dept: HEMATOLOGY/ONCOLOGY | Facility: CLINIC | Age: 77
End: 2022-07-11
Payer: MEDICARE

## 2022-07-11 NOTE — TELEPHONE ENCOUNTER
Spoke to patient. Told her I had spoken to Joellen and she said it's ok to take Tylenol 1,000 mg 3 times a day. We discussed the symptoms she was having and what to do for them. She got tessalon perles from the clinic where she was tested and she said she hasn't really needed them much. She's just running a temperature. I explained that it's not a true fever until it reaches 100.4 degrees fahrenheit. She said she is trying to eat even though she doesn't have much of an appetite. And she's getting plenty of fluids. She asked about quarantine. I told her 5 days after symptoms began or she tested positive to quarantine and then continue to wear a mask for 5 days after that. She verbalized understanding. I told her if she had any other questions to call back.

## 2022-07-11 NOTE — TELEPHONE ENCOUNTER
"----- Message from Amirareema Ta sent at 7/11/2022  8:24 AM CDT -----  Consult/Advisory:           Name Of Caller: self    Contact preference: [phone]      What is the nature of the call?: pt tested COVID+ on Friday and is inquiring on what to take for her fever to break           Additional Notes:  "Thank you for all that you do for our patients'"     "

## 2022-07-25 ENCOUNTER — PATIENT MESSAGE (OUTPATIENT)
Dept: HEMATOLOGY/ONCOLOGY | Facility: CLINIC | Age: 77
End: 2022-07-25
Payer: MEDICARE

## 2022-07-29 ENCOUNTER — OFFICE VISIT (OUTPATIENT)
Dept: HEMATOLOGY/ONCOLOGY | Facility: CLINIC | Age: 77
End: 2022-07-29
Payer: MEDICARE

## 2022-07-29 ENCOUNTER — INFUSION (OUTPATIENT)
Dept: INFUSION THERAPY | Facility: HOSPITAL | Age: 77
End: 2022-07-29
Payer: MEDICARE

## 2022-07-29 VITALS
HEIGHT: 65 IN | RESPIRATION RATE: 18 BRPM | OXYGEN SATURATION: 99 % | HEART RATE: 64 BPM | WEIGHT: 156.5 LBS | SYSTOLIC BLOOD PRESSURE: 138 MMHG | BODY MASS INDEX: 26.08 KG/M2 | DIASTOLIC BLOOD PRESSURE: 70 MMHG | TEMPERATURE: 99 F

## 2022-07-29 VITALS
SYSTOLIC BLOOD PRESSURE: 137 MMHG | RESPIRATION RATE: 16 BRPM | TEMPERATURE: 99 F | DIASTOLIC BLOOD PRESSURE: 65 MMHG | HEART RATE: 63 BPM | OXYGEN SATURATION: 98 %

## 2022-07-29 DIAGNOSIS — C54.1 ENDOMETRIAL CA: ICD-10-CM

## 2022-07-29 DIAGNOSIS — Z90.5 SOLITARY KIDNEY, ACQUIRED: ICD-10-CM

## 2022-07-29 DIAGNOSIS — R53.83 FATIGUE, UNSPECIFIED TYPE: ICD-10-CM

## 2022-07-29 DIAGNOSIS — G62.0 CHEMOTHERAPY-INDUCED NEUROPATHY: ICD-10-CM

## 2022-07-29 DIAGNOSIS — T45.1X5A CHEMOTHERAPY-INDUCED NEUROPATHY: ICD-10-CM

## 2022-07-29 DIAGNOSIS — C64.1 UROTHELIAL CARCINOMA OF KIDNEY, RIGHT: Primary | ICD-10-CM

## 2022-07-29 PROCEDURE — 1157F PR ADVANCE CARE PLAN OR EQUIV PRESENT IN MEDICAL RECORD: ICD-10-PCS | Mod: CPTII,S$GLB,, | Performed by: INTERNAL MEDICINE

## 2022-07-29 PROCEDURE — 1101F PR PT FALLS ASSESS DOC 0-1 FALLS W/OUT INJ PAST YR: ICD-10-PCS | Mod: CPTII,S$GLB,, | Performed by: INTERNAL MEDICINE

## 2022-07-29 PROCEDURE — 3288F FALL RISK ASSESSMENT DOCD: CPT | Mod: CPTII,S$GLB,, | Performed by: INTERNAL MEDICINE

## 2022-07-29 PROCEDURE — 3075F PR MOST RECENT SYSTOLIC BLOOD PRESS GE 130-139MM HG: ICD-10-PCS | Mod: CPTII,S$GLB,, | Performed by: INTERNAL MEDICINE

## 2022-07-29 PROCEDURE — 3288F PR FALLS RISK ASSESSMENT DOCUMENTED: ICD-10-PCS | Mod: CPTII,S$GLB,, | Performed by: INTERNAL MEDICINE

## 2022-07-29 PROCEDURE — 3078F DIAST BP <80 MM HG: CPT | Mod: CPTII,S$GLB,, | Performed by: INTERNAL MEDICINE

## 2022-07-29 PROCEDURE — 3075F SYST BP GE 130 - 139MM HG: CPT | Mod: CPTII,S$GLB,, | Performed by: INTERNAL MEDICINE

## 2022-07-29 PROCEDURE — 63600175 PHARM REV CODE 636 W HCPCS: Mod: JG | Performed by: INTERNAL MEDICINE

## 2022-07-29 PROCEDURE — 96413 CHEMO IV INFUSION 1 HR: CPT

## 2022-07-29 PROCEDURE — 1126F AMNT PAIN NOTED NONE PRSNT: CPT | Mod: CPTII,S$GLB,, | Performed by: INTERNAL MEDICINE

## 2022-07-29 PROCEDURE — 25000003 PHARM REV CODE 250: Performed by: INTERNAL MEDICINE

## 2022-07-29 PROCEDURE — 99215 OFFICE O/P EST HI 40 MIN: CPT | Mod: S$GLB,,, | Performed by: INTERNAL MEDICINE

## 2022-07-29 PROCEDURE — 1101F PT FALLS ASSESS-DOCD LE1/YR: CPT | Mod: CPTII,S$GLB,, | Performed by: INTERNAL MEDICINE

## 2022-07-29 PROCEDURE — 3078F PR MOST RECENT DIASTOLIC BLOOD PRESSURE < 80 MM HG: ICD-10-PCS | Mod: CPTII,S$GLB,, | Performed by: INTERNAL MEDICINE

## 2022-07-29 PROCEDURE — 99999 PR PBB SHADOW E&M-EST. PATIENT-LVL III: CPT | Mod: PBBFAC,,, | Performed by: INTERNAL MEDICINE

## 2022-07-29 PROCEDURE — 1157F ADVNC CARE PLAN IN RCRD: CPT | Mod: CPTII,S$GLB,, | Performed by: INTERNAL MEDICINE

## 2022-07-29 PROCEDURE — 99999 PR PBB SHADOW E&M-EST. PATIENT-LVL III: ICD-10-PCS | Mod: PBBFAC,,, | Performed by: INTERNAL MEDICINE

## 2022-07-29 PROCEDURE — 1126F PR PAIN SEVERITY QUANTIFIED, NO PAIN PRESENT: ICD-10-PCS | Mod: CPTII,S$GLB,, | Performed by: INTERNAL MEDICINE

## 2022-07-29 PROCEDURE — 99215 PR OFFICE/OUTPT VISIT, EST, LEVL V, 40-54 MIN: ICD-10-PCS | Mod: S$GLB,,, | Performed by: INTERNAL MEDICINE

## 2022-07-29 RX ORDER — HEPARIN 100 UNIT/ML
500 SYRINGE INTRAVENOUS
Status: CANCELLED | OUTPATIENT
Start: 2022-07-29

## 2022-07-29 RX ORDER — SODIUM CHLORIDE 0.9 % (FLUSH) 0.9 %
10 SYRINGE (ML) INJECTION
Status: CANCELLED | OUTPATIENT
Start: 2022-07-29

## 2022-07-29 RX ADMIN — SODIUM CHLORIDE: 9 INJECTION, SOLUTION INTRAVENOUS at 02:07

## 2022-07-29 RX ADMIN — SODIUM CHLORIDE 480 MG: 9 INJECTION, SOLUTION INTRAVENOUS at 02:07

## 2022-07-29 NOTE — PROGRESS NOTES
ONCOLOGY FOLLOW UP VISIT    Reason for visit: Toxicity check and imaging review on Opdivo for stage IIIA urothelial carcinoma    Cancer/Stage/TNM:   Cancer Staging  Endometrial ca  Staging form: Corpus Uteri - Carcinoma and Carcinosarcoma, AJCC 8th Edition  - Clinical: No stage assigned - Unsigned  - Pathologic stage from 7/18/2019: FIGO Stage IA (pT1a, pN0, cM0) - Signed by Lokesh Carias MD on 7/18/2019       Oncology History   Endometrial ca   6/26/2019 Initial Diagnosis    Endometrial ca     7/18/2019 Cancer Staged    Staging form: Corpus Uteri - Carcinoma and Carcinosarcoma, AJCC 8th Edition  - Pathologic stage from 7/18/2019: FIGO Stage IA (pT1a, pN0, cM0) - Signed by Lokesh Carias MD on 7/18/2019 7/18/2019 - 7/18/2019 Chemotherapy    Treatment Summary   Plan Name: OP GYN PACLITAXEL CARBOPLATIN (AUC 6) Q3W  Treatment Goal: Curative  Status: Inactive  Start Date:   End Date:   Provider: Lokesh Carias MD  Chemotherapy: CARBOplatin (PARAPLATIN) in sodium chloride 0.9% 250 mL chemo infusion, , Intravenous, Clinic/HOD 1 time, 0 of 6 cycles  PACLitaxel (TAXOL) 175 mg/m2 = 300 mg in sodium chloride 0.9% 500 mL chemo infusion, 175 mg/m2, Intravenous, Clinic/HOD 1 time, 0 of 6 cycles     Urothelial carcinoma of kidney, right   11/11/2021 Initial Diagnosis    Urothelial carcinoma of kidney, right     1/27/2022 Tumor Conference    Presenting Hospital / Clinic: [facility] [address] [facility]  Virtual Tumor Board Conference: Virtual  Presenter: Edmond Manuel  Date Presented to Tumor Board: 1/27/2022  Specialties Present: Medical Oncology; Radiation Oncology; Pathology; Navigation; Urology  Presentation at Cancer Conference: Prospective  Cancer Type: Other  Other Cancer: right upper tract urothelial  Recommended Plan: Chemotherapy; Surgery  Recommended Plan Note: neoadjuvant split dose MVAC + open nephroureterectomy    Oncology History   Endometrial ca   Urothelial carcinoma of kidney, right   1/27/2022 Tumor Conference     Presenting Hospital / Clinic: [facility] [address]  Virtual Tumor Board Conference: Virtual  Presenter: Edmond Manuel  Date Presented to Tumor Board: 1/27/2022  Specialties Present: Medical Oncology; Radiation Oncology; Pathology; Navigation; Urology  Presentation at Cancer Conference: Prospective  Cancer Type: Other  Other Cancer: right upper tract urothelial  Recommended Plan: Chemotherapy; Surgery  Recommended Plan Note: neoadjuvant split dose MVAC + open nephroureterectomy                  PATIENT SUMMARY:   Gita García is a 76 y.o. female with HG right upper tract UCC. History of endometrial cancer s/p neoadjuvant taxol+cisplatin and vaginal brachytherapy followed by robotic hysterectomy.    DISCUSSION:  Pathology review  Staging review    PERFORMANCE STATUS:  ECOG 0    Estimated GFR/CKD Stage: >60 (cr 0.7)    Clinical/Pathologic Stage (TNM): T1 N0 M0    FACULTY IN ATTENDANCE:    Urologic Oncology: Shane Diaz MD [x], Michael Finley MD [x] , Hever Pollack MD []    CONSULT NEEDED:     [] Urologic Oncology    [] Hem/Onc    [] Rad/Onc     [x] Treatment Guidelines (NCCN and AUA) reviewed and care planned is consistent with guidelines.    TUMOR BOARD RECOMMENDATIONS/PLAN/CONSENSUS:     Case discussed among group. Pathology and radiologic images were reviewed (if applicable).    Neoadjuvant split-dose DDMVAC + open nephroureterectomy  Referral to genetics      5/6/2022 -  Chemotherapy    Treatment Summary   Plan Name: OP NIVOLUMAB Q4W  Treatment Goal: Curative  Status: Active  Start Date: 5/6/2022  End Date: 3/10/2023 (Planned)  Provider: Urban Cancino MD  Chemotherapy: nivolumab 480 mg in sodium chloride 0.9% 148 mL infusion, 480 mg, Intravenous, Clinic/HOD 1 time, 3 of 12 cycles  Administration: 480 mg (5/6/2022), 480 mg (6/3/2022), 480 mg (7/1/2022)          HPI:   77 y.o. female who presents to clinic prior to C4 of Opdivo. Reports having covid since last visit. Had a mild cough and is now recovered.  Has some worsening of her fatigue after covid. Patient reports no other symptoms. Neuropathy at baseline from prior chemotherapy.       ROS:   Review of Systems   Constitutional: Positive for fatigue. Negative for activity change, chills, fever and unexpected weight change.   HENT: Negative for mouth sores, nosebleeds and sore throat.    Respiratory: Negative for cough, shortness of breath and wheezing.    Cardiovascular: Negative for chest pain, palpitations and leg swelling.   Gastrointestinal: Negative for abdominal distention, abdominal pain and blood in stool.   Endocrine: Negative for cold intolerance and heat intolerance.   Genitourinary: Negative for dysuria, flank pain and frequency.   Musculoskeletal: Negative for arthralgias, joint swelling and myalgias.   Skin: Negative for color change, rash and wound.   Neurological: Positive for numbness (feet). Negative for dizziness, light-headedness and headaches.   Hematological: Negative for adenopathy. Does not bruise/bleed easily.        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 several days after delivery    Urothelial carcinoma of kidney, right 11/11/2021        Past Surgical History:   Past Surgical History:   Procedure Laterality Date    ABLATION OF NEOPLASM OF URETER USING LASER Right 1/3/2022    Procedure: ABLATION, NEOPLASM, URETER, USING LASER;  Surgeon: Shane Diaz MD;  Location: [facility] OR [address] [place] [address];  Service: Urology;  Laterality: Right;    CYSTOSCOPY N/A 1/3/2022    Procedure: CYSTOSCOPY;  Surgeon: Shane Diaz MD;  Location: [facility] OR [address] [place] [address];  Service: Urology;  Laterality: N/A;     DILATION AND CURETTAGE OF UTERUS      GI scope  2016    LAPAROTOMY N/A 7/8/2019    Procedure: LAPAROTOMY;  Surgeon: Lokesh Carias MD;  Location: [facility] OR 2ND FLR;  Service: OB/GYN;  Laterality: N/A;  mini    OMENTECTOMY N/A 7/8/2019    Procedure: OMENTECTOMY;  Surgeon: Lokesh Carias MD;  Location: [facility] OR 2ND FLR;  Service: OB/GYN;  Laterality: N/A;    ROBOT-ASSISTED LAPAROSCOPIC ABDOMINAL HYSTERECTOMY USING DA SIRISHA XI N/A 7/8/2019    Procedure: XI ROBOTIC HYSTERECTOMY;  Surgeon: Lokesh Carias MD;  Location: [facility] OR 2ND FLR;  Service: OB/GYN;  Laterality: N/A;    ROBOT-ASSISTED LAPAROSCOPIC PELVIC LYMPHADENECTOMY USING DA SIRISHA XI Bilateral 7/8/2019    Procedure: XI ROBOTIC LYMPHADENECTOMY, PELVIC;  Surgeon: Lokesh Carias MD;  Location: [facility] OR 2ND FLR;  Service: OB/GYN;  Laterality: Bilateral;    ROBOT-ASSISTED LAPAROSCOPIC RETROPERITONEAL LYMPHADENECTOMY USING DA SIRISHA XI N/A 7/8/2019    Procedure: XI ROBOTIC LYMPHADENECTOMY, RETROPERITONEUM;  Surgeon: Lokesh Carias MD;  Location: [facility] 2ND FLR;  Service: OB/GYN;  Laterality: N/A;    ROBOT-ASSISTED LAPAROSCOPIC SALPINGO-OOPHORECTOMY USING DA SIRISHA XI Bilateral 7/8/2019    Procedure: XI ROBOTIC SALPINGO-OOPHORECTOMY;  Surgeon: Lokesh Carias MD;  Location: [facility] 2ND FLR;  Service: OB/GYN;  Laterality: Bilateral;    ROBOT-ASSISTED LAPAROSCOPIC SURGICAL REMOVAL OF KIDNEY AND URETER USING DA SIRISHA XI Right 3/14/2022    Procedure: XI ROBOTIC NEPHROURETERECTOMY/ WITH BLADDER CUFF;  Surgeon: Shane Diaz MD;  Location: [facility] OR 2ND FLR;  Service: Urology;  Laterality: Right;  4hrs    URETEROSCOPY Right 1/3/2022    Procedure: URETEROSCOPY;  Surgeon: Shane Diaz MD;  Location: [facility] OR 1ST FLR;  Service: Urology;  Laterality: Right;  2hrs        Family History:   Family History   Problem Relation Age of Onset    Colon cancer Sister 53    Prostate cancer Brother     Breast cancer Sister 64    Kidney cancer Sister     Breast  cancer Sister         in her 60s    Skin cancer Sister     Stroke Brother     Prostate cancer Brother     Heart disease Brother     Prostate cancer Brother     Heart disease Brother     Ovarian cancer Neg Hx     Uterine cancer Neg Hx     Anesthesia problems Neg Hx         Social History:   Social History     Tobacco Use    Smoking status: Never Smoker    Smokeless tobacco: Never Used   Substance Use Topics    Alcohol use: Never        Allergies:   Review of patient's allergies indicates:   Allergen Reactions    Asa [aspirin] Other (See Comments)     Stomach upset    Dilaudid (pf) [hydromorphone (pf)] Nausea Only    Sulfa (sulfonamide antibiotics) Itching        Medications:   Current Outpatient Medications   Medication Sig Dispense Refill    acetaminophen (TYLENOL) 500 MG tablet Take 500 mg by mouth every 6 (six) hours as needed for Pain.      ALPRAZolam (XANAX) 0.25 MG tablet Take 0.25 mg by mouth 2 (two) times daily as needed for Anxiety.       benzonatate (TESSALON) 100 MG capsule Take 100 mg by mouth 3 (three) times daily as needed for Cough.      bisoprolol-hydrochlorothiazide (ZIAC) 10-6.25 mg per tablet Take 1 tablet by mouth once daily.       calcium carbonate/vitamin D3 (CALCIUM 600 + D,3, ORAL) Take by mouth once daily.       co-enzyme Q-10 30 mg capsule Take 30 mg by mouth 3 (three) times daily.      loratadine (CLARITIN) 10 mg tablet Take 10 mg by mouth daily as needed for Allergies.      milk thistle 175 mg tablet Take 175 mg by mouth once daily.      multivitamin/iron/folic acid (CENTRUM ORAL) Take by mouth once daily.       mv-min/FA/vit K/lycop/lut/zeax (OCUVITE EYE PLUS MULTI ORAL) Take 1 tablet by mouth once daily.       oxyCODONE (ROXICODONE) 5 MG immediate release tablet Take 1 tablet (5 mg total) by mouth every 6 (six) hours as needed for Pain. (Patient not taking: Reported on 7/1/2022) 10 tablet 0    pantoprazole (PROTONIX) 40 MG tablet Take 40 mg by mouth once daily.  "     polyethylene glycol (GLYCOLAX) 17 gram PwPk Take by mouth every morning.      raloxifene (EVISTA) 60 mg tablet Take 60 mg by mouth every evening.       No current facility-administered medications for this visit.        Physical Exam:   /70 (BP Location: Left arm, Patient Position: Sitting, BP Method: Large (Automatic))   Pulse 64   Temp 98.9 °F (37.2 °C) (Oral)   Resp 18   Ht 5' 4.5" (1.638 m)   Wt 71 kg (156 lb 8.4 oz)   SpO2 99%   BMI 26.45 kg/m²      ECOG Performance Status: (foot note - ECOG PS provided by Eastern Co[employer] Oncology Group) 0 - Asymptomatic    Physical Exam  Constitutional:       Appearance: She is well-developed.   HENT:      Head: Normocephalic and atraumatic.   Eyes:      Conjunctiva/sclera: Conjunctivae normal.      Pupils: Pupils are equal, round, and reactive to light.   Pulmonary:      Effort: Pulmonary effort is normal. No respiratory distress.   Abdominal:      General: There is no distension.      Palpations: Abdomen is soft.   Musculoskeletal:         General: No swelling. Normal range of motion.      Cervical back: Normal range of motion and neck supple.   Lymphadenopathy:      Cervical: No cervical adenopathy.   Skin:     General: Skin is warm and dry.   Neurological:      General: No focal deficit present.      Mental Status: She is alert and oriented to person, place, and time.   Psychiatric:         Mood and Affect: Mood normal.         Behavior: Behavior normal.           Labs:   Recent Results (from the past 48 hour(s))   CBC Oncology    Collection Time: 07/29/22 12:30 PM   Result Value Ref Range    WBC 8.91 3.90 - 12.70 K/uL    RBC 4.18 4.00 - 5.40 M/uL    Hemoglobin 12.4 12.0 - 16.0 g/dL    Hematocrit 38.2 37.0 - 48.5 %    MCV 91 82 - 98 fL    MCH 29.7 27.0 - 31.0 pg    MCHC 32.5 32.0 - 36.0 g/dL    RDW 12.7 11.5 - 14.5 %    Platelets 253 150 - 450 K/uL    MPV 10.1 9.2 - 12.9 fL    Gran # (ANC) 6.0 1.8 - 7.7 K/uL    Immature Grans (Abs) 0.03 0.00 - 0.04 " K/uL        Imaging:  MRI Urography Without Contrast  Narrative: EXAMINATION:  MRI UROGRAPHY WITHOUT CONTRAST    CLINICAL HISTORY:  re-staging for urothelial carcinoma;  Malignant neoplasm of right kidney, except renal pelvis    TECHNIQUE:  Multiplanar multisequence images of the abdomen and pelvis without contrast.  Examination performed per urography protocol.    COMPARISON:  CT 04/21/2022, 07/01/2019    FINDINGS:  Liver: T2 hyperintense lesions within the central segment 8 and peripheral segment 2 measuring 1.5 and 2.4 cm respectively (series 11, image 5).  Lesions are stable in size compared to CT dated 07/01/2019.  In light of stability and prior imaging characteristics, findings are favored to represent hemangiomas.  No new focal lesion.    Gallbladder: Unremarkable.    Bile Ducts: No dilatation.    Pancreas: No mass or ductal dilatation.    Spleen: Unremarkable.    Adrenals: Unremarkable.    Kidneys/Ureters: Status post right nephroureterectomy.  No evidence to suggest local disease recurrence within the nephrectomy bed.  No solid left renal mass detected.  No hydronephrosis or ureteral dilatation.    Bladder: No focal bladder wall thickening or mass lesion.  The soft tissue focus adjacent to the right posterior bladder wall described on prior CT is favored to represent postoperative change.  No corresponding abnormal restricted diffusion.    Reproductive: Status post hysterectomy and bilateral salpingo-oophorectomy.  No suspicious soft tissue pelvic mass or abnormal restricted diffusion.    GI Tract/Mesentery: No evidence of bowel obstruction or inflammation.  Colonic diverticulosis.    Peritoneal Space: No ascites.    Retroperitoneum: No pathologically enlarged nodes.    Abdominal wall: Midline ventral abdominal wall incisional scar.    Vasculature: Abdominal aorta is normal caliber and contains atherosclerosis.  No aneurysm.    Bones: Degenerative changes.  No suspicious marrow replacing  lesion.  Impression: 1. Patient with urothelial carcinoma status post right nephroureterectomy.  No evidence of local disease recurrence or metastasis on this noncontrast exam.  2. Stable right and left hepatic lobe lesions, incompletely characterized on today's exam.  In light of stability and prior imaging characteristics, favor hemangiomas.  3. Additional findings as above.    Electronically signed by resident: Richard Lala  Date:    07/05/2022  Time:    16:01    Electronically signed by: Yonatan Cage Jr  Date:    07/05/2022  Time:    16:43            Diagnoses:       1. Urothelial carcinoma of kidney, right    2. Solitary kidney, acquired    3. Chemotherapy-induced neuropathy    4. Endometrial ca    5. Fatigue, unspecified type          Assessment and Plan:         1. Stage IIIA Urothelial Carcinoma:   Discussed stage, adjuvant treatment options, and prognosis in detail with patient and daughter.  Due to below problems, I am recommending against cisplatin based adjuvant chemotherapy.  She would be a candidate for adjuvant Opdivo and discussed this treatment plan and the DFS benefit, though data is immature.  Patient and daughter agree to move forward with Opdivo adjuvant x 1 year per Checkmate 274 clinical trial.  - Consented for Opdivo Q4W. Labs acceptable to proceed with C4. CT chest reviewed with patient and shows no evidence of malignancy. MRU scheduled on 7/5. Re-staging scans in October ([facility]).     2,3 Not a candidate for cisplatin chemotherapy due to these issues.    4 Continue to follow with Dr. Joe every 4 months.    5. Will continue to monitor.  40 minutes total time spent with patient, 25 minutes were spent face to face with the patient and her family to discuss the disease, natural history, treatment options and survival statistics. Greater than 50% of this time was for counseling. 15 minutes of chart review and coordination of care. I have provided the patient with an opportunity to  ask questions and have all questions answered to his satisfaction.     she will return to clinic prior to C5, but knows to call in the interim if symptoms change or should a problem arise.    Patient is in agreement with the proposed treatment plan. All questions were answered to the patient's satisfaction. Pt knows to call clinic if anything is needed before the next clinic visit.    Joellen Valerio, MSN, APRN, FNP-C  Hematology and Medical Oncology  Nurse Practitioner to Dr. Urban Cancino  Nurse Practitioner, [facility]        I have reviewed the notes, assessments, and/or procedures performed by Joellen CAMERON, as above.  I have personally interviewed and examined the patient at the beside, and rounded with Joellen. I concur with her assessment and plan and the documentation of Gita García.    Urban Cancino M.D.  Hematology/Oncology Attending  [facility]  [facility]          Route Chart for Scheduling    Med Onc Chart Routing      Follow up with physician    Follow up with EVELYN 4 weeks. Prior to Opdivo C5 - 0930    Infusion scheduling note    Injection scheduling note    Labs CBC, CMP, free T4 and TSH   Lab interval: every 4 weeks  Prior to infusions   Imaging    Pharmacy appointment    Other referrals          Treatment Plan Information   OP NIVOLUMAB Q4W   Urban Cancino MD   Upcoming Treatment Dates - OP NIVOLUMAB Q4W    7/29/2022       Chemotherapy       nivolumab 480 mg in sodium chloride 0.9% 148 mL infusion  8/26/2022       Chemotherapy       nivolumab 480 mg in sodium chloride 0.9% 148 mL infusion  9/23/2022       Chemotherapy       nivolumab 480 mg in sodium chloride 0.9% 148 mL infusion  10/21/2022       Chemotherapy       nivolumab 480 mg in sodium chloride 0.9% 148 mL infusion    Therapy Plan Information  mitoMYcin (MUTAMYCIN) 40 mg in sodium chloride 0.9% 40 mL bladder instillation  40 mg, Intravesical, 1  time a week, at least 5 days apart

## 2022-07-29 NOTE — PLAN OF CARE
Pt here for Opdivo.  Assessment complete and labs reviewed. VSS. PIV initiated to left hand. Pt tolerated infusion well; no reaction suspected. PIV removed prior to dc. No questions or concerns. Pt ambulated out unit unassisted.

## 2022-08-26 ENCOUNTER — OFFICE VISIT (OUTPATIENT)
Dept: HEMATOLOGY/ONCOLOGY | Facility: CLINIC | Age: 77
End: 2022-08-26
Payer: MEDICARE

## 2022-08-26 ENCOUNTER — INFUSION (OUTPATIENT)
Dept: INFUSION THERAPY | Facility: HOSPITAL | Age: 77
End: 2022-08-26
Payer: MEDICARE

## 2022-08-26 VITALS — DIASTOLIC BLOOD PRESSURE: 63 MMHG | SYSTOLIC BLOOD PRESSURE: 155 MMHG | RESPIRATION RATE: 17 BRPM | HEART RATE: 61 BPM

## 2022-08-26 VITALS
RESPIRATION RATE: 18 BRPM | TEMPERATURE: 99 F | OXYGEN SATURATION: 96 % | WEIGHT: 151.88 LBS | HEART RATE: 69 BPM | HEIGHT: 65 IN | DIASTOLIC BLOOD PRESSURE: 61 MMHG | BODY MASS INDEX: 25.3 KG/M2 | SYSTOLIC BLOOD PRESSURE: 122 MMHG

## 2022-08-26 DIAGNOSIS — G62.0 CHEMOTHERAPY-INDUCED NEUROPATHY: ICD-10-CM

## 2022-08-26 DIAGNOSIS — C64.1 UROTHELIAL CARCINOMA OF KIDNEY, RIGHT: Primary | ICD-10-CM

## 2022-08-26 DIAGNOSIS — C54.1 ENDOMETRIAL CA: ICD-10-CM

## 2022-08-26 DIAGNOSIS — Z90.5 SOLITARY KIDNEY, ACQUIRED: ICD-10-CM

## 2022-08-26 DIAGNOSIS — T45.1X5A CHEMOTHERAPY-INDUCED NEUROPATHY: ICD-10-CM

## 2022-08-26 DIAGNOSIS — R53.83 FATIGUE, UNSPECIFIED TYPE: ICD-10-CM

## 2022-08-26 PROCEDURE — 1157F ADVNC CARE PLAN IN RCRD: CPT | Mod: CPTII,S$GLB,, | Performed by: INTERNAL MEDICINE

## 2022-08-26 PROCEDURE — 1159F MED LIST DOCD IN RCRD: CPT | Mod: CPTII,S$GLB,, | Performed by: INTERNAL MEDICINE

## 2022-08-26 PROCEDURE — 1160F RVW MEDS BY RX/DR IN RCRD: CPT | Mod: CPTII,S$GLB,, | Performed by: INTERNAL MEDICINE

## 2022-08-26 PROCEDURE — 1157F PR ADVANCE CARE PLAN OR EQUIV PRESENT IN MEDICAL RECORD: ICD-10-PCS | Mod: CPTII,S$GLB,, | Performed by: INTERNAL MEDICINE

## 2022-08-26 PROCEDURE — 1101F PT FALLS ASSESS-DOCD LE1/YR: CPT | Mod: CPTII,S$GLB,, | Performed by: INTERNAL MEDICINE

## 2022-08-26 PROCEDURE — 99999 PR PBB SHADOW E&M-EST. PATIENT-LVL IV: CPT | Mod: PBBFAC,,, | Performed by: INTERNAL MEDICINE

## 2022-08-26 PROCEDURE — 3288F PR FALLS RISK ASSESSMENT DOCUMENTED: ICD-10-PCS | Mod: CPTII,S$GLB,, | Performed by: INTERNAL MEDICINE

## 2022-08-26 PROCEDURE — 1101F PR PT FALLS ASSESS DOC 0-1 FALLS W/OUT INJ PAST YR: ICD-10-PCS | Mod: CPTII,S$GLB,, | Performed by: INTERNAL MEDICINE

## 2022-08-26 PROCEDURE — 3288F FALL RISK ASSESSMENT DOCD: CPT | Mod: CPTII,S$GLB,, | Performed by: INTERNAL MEDICINE

## 2022-08-26 PROCEDURE — 99215 PR OFFICE/OUTPT VISIT, EST, LEVL V, 40-54 MIN: ICD-10-PCS | Mod: S$GLB,,, | Performed by: INTERNAL MEDICINE

## 2022-08-26 PROCEDURE — 3074F SYST BP LT 130 MM HG: CPT | Mod: CPTII,S$GLB,, | Performed by: INTERNAL MEDICINE

## 2022-08-26 PROCEDURE — 1126F PR PAIN SEVERITY QUANTIFIED, NO PAIN PRESENT: ICD-10-PCS | Mod: CPTII,S$GLB,, | Performed by: INTERNAL MEDICINE

## 2022-08-26 PROCEDURE — 3074F PR MOST RECENT SYSTOLIC BLOOD PRESSURE < 130 MM HG: ICD-10-PCS | Mod: CPTII,S$GLB,, | Performed by: INTERNAL MEDICINE

## 2022-08-26 PROCEDURE — 3078F DIAST BP <80 MM HG: CPT | Mod: CPTII,S$GLB,, | Performed by: INTERNAL MEDICINE

## 2022-08-26 PROCEDURE — 63600175 PHARM REV CODE 636 W HCPCS: Mod: JG | Performed by: INTERNAL MEDICINE

## 2022-08-26 PROCEDURE — 99999 PR PBB SHADOW E&M-EST. PATIENT-LVL IV: ICD-10-PCS | Mod: PBBFAC,,, | Performed by: INTERNAL MEDICINE

## 2022-08-26 PROCEDURE — 25000003 PHARM REV CODE 250: Performed by: INTERNAL MEDICINE

## 2022-08-26 PROCEDURE — 3078F PR MOST RECENT DIASTOLIC BLOOD PRESSURE < 80 MM HG: ICD-10-PCS | Mod: CPTII,S$GLB,, | Performed by: INTERNAL MEDICINE

## 2022-08-26 PROCEDURE — 99215 OFFICE O/P EST HI 40 MIN: CPT | Mod: S$GLB,,, | Performed by: INTERNAL MEDICINE

## 2022-08-26 PROCEDURE — 1159F PR MEDICATION LIST DOCUMENTED IN MEDICAL RECORD: ICD-10-PCS | Mod: CPTII,S$GLB,, | Performed by: INTERNAL MEDICINE

## 2022-08-26 PROCEDURE — 1126F AMNT PAIN NOTED NONE PRSNT: CPT | Mod: CPTII,S$GLB,, | Performed by: INTERNAL MEDICINE

## 2022-08-26 PROCEDURE — 96413 CHEMO IV INFUSION 1 HR: CPT

## 2022-08-26 PROCEDURE — 1160F PR REVIEW ALL MEDS BY PRESCRIBER/CLIN PHARMACIST DOCUMENTED: ICD-10-PCS | Mod: CPTII,S$GLB,, | Performed by: INTERNAL MEDICINE

## 2022-08-26 RX ORDER — SODIUM CHLORIDE 0.9 % (FLUSH) 0.9 %
10 SYRINGE (ML) INJECTION
Status: CANCELLED | OUTPATIENT
Start: 2022-08-26

## 2022-08-26 RX ORDER — HEPARIN 100 UNIT/ML
500 SYRINGE INTRAVENOUS
Status: CANCELLED | OUTPATIENT
Start: 2022-08-26

## 2022-08-26 RX ORDER — HEPARIN 100 UNIT/ML
500 SYRINGE INTRAVENOUS
Status: DISCONTINUED | OUTPATIENT
Start: 2022-08-26 | End: 2022-08-26 | Stop reason: HOSPADM

## 2022-08-26 RX ORDER — SODIUM CHLORIDE 0.9 % (FLUSH) 0.9 %
10 SYRINGE (ML) INJECTION
Status: DISCONTINUED | OUTPATIENT
Start: 2022-08-26 | End: 2022-08-26 | Stop reason: HOSPADM

## 2022-08-26 RX ADMIN — SODIUM CHLORIDE 480 MG: 9 INJECTION, SOLUTION INTRAVENOUS at 03:08

## 2022-08-26 RX ADMIN — SODIUM CHLORIDE: 9 INJECTION, SOLUTION INTRAVENOUS at 02:08

## 2022-08-26 NOTE — PROGRESS NOTES
ONCOLOGY FOLLOW UP VISIT    Reason for visit: Toxicity check on Opdivo for stage IIIA urothelial carcinoma    Cancer/Stage/TNM:   Cancer Staging  Endometrial ca  Staging form: Corpus Uteri - Carcinoma and Carcinosarcoma, AJCC 8th Edition  - Clinical: No stage assigned - Unsigned  - Pathologic stage from 7/18/2019: FIGO Stage IA (pT1a, pN0, cM0) - Signed by Lokesh Carias MD on 7/18/2019       Oncology History   Endometrial ca   6/26/2019 Initial Diagnosis    Endometrial ca     7/18/2019 Cancer Staged    Staging form: Corpus Uteri - Carcinoma and Carcinosarcoma, AJCC 8th Edition  - Pathologic stage from 7/18/2019: FIGO Stage IA (pT1a, pN0, cM0) - Signed by Lokesh Carias MD on 7/18/2019 7/18/2019 - 7/18/2019 Chemotherapy    Treatment Summary   Plan Name: OP GYN PACLITAXEL CARBOPLATIN (AUC 6) Q3W  Treatment Goal: Curative  Status: Inactive  Start Date:   End Date:   Provider: Lokesh Carias MD  Chemotherapy: CARBOplatin (PARAPLATIN) in sodium chloride 0.9% 250 mL chemo infusion, , Intravenous, Clinic/HOD 1 time, 0 of 6 cycles  PACLitaxel (TAXOL) 175 mg/m2 = 300 mg in sodium chloride 0.9% 500 mL chemo infusion, 175 mg/m2, Intravenous, Clinic/HOD 1 time, 0 of 6 cycles     Urothelial carcinoma of kidney, right   11/11/2021 Initial Diagnosis    Urothelial carcinoma of kidney, right     1/27/2022 Tumor Conference    Presenting Hospital / Clinic: [facility] [address]  Virtual Tumor Board Conference: Virtual  Presenter: Edmond Manuel  Date Presented to Tumor Board: 1/27/2022  Specialties Present: Medical Oncology; Radiation Oncology; Pathology; Navigation; Urology  Presentation at Cancer Conference: Prospective  Cancer Type: Other  Other Cancer: right upper tract urothelial  Recommended Plan: Chemotherapy; Surgery  Recommended Plan Note: neoadjuvant split dose MVAC + open nephroureterectomy    Oncology History   Endometrial ca   Urothelial carcinoma of kidney, right   1/27/2022 Tumor Conference    Presenting Hospital  / Clinic: [facility] [address]  Virtual Tumor Board Conference: Virtual  Presenter: Edmond Moyraymond  Date Presented to Tumor Board: 1/27/2022  Specialties Present: Medical Oncology; Radiation Oncology; Pathology; Navigation; Urology  Presentation at Cancer Conference: Prospective  Cancer Type: Other  Other Cancer: right upper tract urothelial  Recommended Plan: Chemotherapy; Surgery  Recommended Plan Note: neoadjuvant split dose MVAC + open nephroureterectomy                  PATIENT SUMMARY:   Gita García is a 76 y.o. female with HG right upper tract UCC. History of endometrial cancer s/p neoadjuvant taxol+cisplatin and vaginal brachytherapy followed by robotic hysterectomy.    DISCUSSION:  Pathology review  Staging review    PERFORMANCE STATUS:  ECOG 0    Estimated GFR/CKD Stage: >60 (cr 0.7)    Clinical/Pathologic Stage (TNM): T1 N0 M0    FACULTY IN ATTENDANCE:    Urologic Oncology: Shane Diaz MD [x], Michael Finley MD [x] , Hever Pollack MD []    CONSULT NEEDED:     [] Urologic Oncology    [] Hem/Onc    [] Rad/Onc     [x] Treatment Guidelines (NCCN and AUA) reviewed and care planned is consistent with guidelines.    TUMOR BOARD RECOMMENDATIONS/PLAN/CONSENSUS:     Case discussed among group. Pathology and radiologic images were reviewed (if applicable).    Neoadjuvant split-dose DDMVAC + open nephroureterectomy  Referral to genetics      5/6/2022 -  Chemotherapy    Treatment Summary   Plan Name: OP NIVOLUMAB Q4W  Treatment Goal: Curative  Status: Active  Start Date: 5/6/2022  End Date: 3/10/2023 (Planned)  Provider: Urban Cancino MD  Chemotherapy: nivolumab 480 mg in sodium chloride 0.9% 148 mL infusion, 480 mg, Intravenous, Clinic/HOD 1 time, 4 of 12 cycles  Administration: 480 mg (5/6/2022), 480 mg (6/3/2022), 480 mg (7/1/2022), 480 mg (7/29/2022)          HPI:   77 y.o. female who presents to clinic prior to C5 of Opdivo. Reports having covid since last visit. Has experienced taste changes after covid  and has lost weight. Having more issues for reflux and on daily protonix. Neuropathy at baseline from prior chemotherapy.       ROS:   Review of Systems   Constitutional: Positive for fatigue and unexpected weight change (weight loss). Negative for activity change, chills and fever.   HENT: Negative for mouth sores, nosebleeds and sore throat.         Taste changes   Respiratory: Negative for cough, shortness of breath and wheezing.    Cardiovascular: Negative for chest pain, palpitations and leg swelling.   Gastrointestinal: Negative for abdominal distention, abdominal pain and blood in stool.        Indigestion   Endocrine: Negative for cold intolerance and heat intolerance.   Genitourinary: Negative for dysuria, flank pain and frequency.   Musculoskeletal: Negative for arthralgias, joint swelling and myalgias.   Skin: Negative for color change, rash and wound.   Neurological: Positive for numbness (feet). Negative for dizziness, light-headedness and headaches.   Hematological: Negative for adenopathy. Does not bruise/bleed easily.        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 several days after delivery    Urothelial carcinoma of kidney, right 11/11/2021        Past Surgical History:   Past Surgical History:   Procedure Laterality Date    ABLATION OF NEOPLASM OF URETER USING LASER Right 1/3/2022    Procedure: ABLATION, NEOPLASM, URETER, USING LASER;  Surgeon: Shane Diaz MD;  Location: [facility] OR [address] [place] [address];  Service: Urology;  Laterality: Right;    CYSTOSCOPY N/A 1/3/2022    Procedure: CYSTOSCOPY;  Surgeon: Shane Diaz MD;  Location: [facility] OR  1ST FLR;  Service: Urology;  Laterality: N/A;    DILATION AND CURETTAGE OF UTERUS      GI scope  2016    LAPAROTOMY N/A 7/8/2019    Procedure: LAPAROTOMY;  Surgeon: Lokesh Carias MD;  Location: [facility] OR 2ND FLR;  Service: OB/GYN;  Laterality: N/A;  mini    OMENTECTOMY N/A 7/8/2019    Procedure: OMENTECTOMY;  Surgeon: Lokesh Carias MD;  Location: [facility] OR 2ND FLR;  Service: OB/GYN;  Laterality: N/A;    ROBOT-ASSISTED LAPAROSCOPIC ABDOMINAL HYSTERECTOMY USING DA SIRISHA XI N/A 7/8/2019    Procedure: XI ROBOTIC HYSTERECTOMY;  Surgeon: Lokesh Carias MD;  Location: [facility] OR 2ND FLR;  Service: OB/GYN;  Laterality: N/A;    ROBOT-ASSISTED LAPAROSCOPIC PELVIC LYMPHADENECTOMY USING DA SIRISHA XI Bilateral 7/8/2019    Procedure: XI ROBOTIC LYMPHADENECTOMY, PELVIC;  Surgeon: Lokesh Carias MD;  Location: [facility] OR 2ND FLR;  Service: OB/GYN;  Laterality: Bilateral;    ROBOT-ASSISTED LAPAROSCOPIC RETROPERITONEAL LYMPHADENECTOMY USING DA SIRISHA XI N/A 7/8/2019    Procedure: XI ROBOTIC LYMPHADENECTOMY, RETROPERITONEUM;  Surgeon: Lokesh Carias MD;  Location: [facility] OR 2ND FLR;  Service: OB/GYN;  Laterality: N/A;    ROBOT-ASSISTED LAPAROSCOPIC SALPINGO-OOPHORECTOMY USING DA SIRISHA XI Bilateral 7/8/2019    Procedure: XI ROBOTIC SALPINGO-OOPHORECTOMY;  Surgeon: Lokesh Carias MD;  Location: [facility] OR 2ND FLR;  Service: OB/GYN;  Laterality: Bilateral;    ROBOT-ASSISTED LAPAROSCOPIC SURGICAL REMOVAL OF KIDNEY AND URETER USING DA SIRISHA XI Right 3/14/2022    Procedure: XI ROBOTIC NEPHROURETERECTOMY/ WITH BLADDER CUFF;  Surgeon: Shane Diaz MD;  Location: [facility] OR 2ND FLR;  Service: Urology;  Laterality: Right;  4hrs    URETEROSCOPY Right 1/3/2022    Procedure: URETEROSCOPY;  Surgeon: Shane Diaz MD;  Location: [facility] OR 1ST FLR;  Service: Urology;  Laterality: Right;  2hrs        Family History:   Family History   Problem Relation Age of Onset    Colon cancer Sister 53    Prostate cancer Brother     Breast cancer  Sister 64    Kidney cancer Sister     Breast cancer Sister         in her 60s    Skin cancer Sister     Stroke Brother     Prostate cancer Brother     Heart disease Brother     Prostate cancer Brother     Heart disease Brother     Ovarian cancer Neg Hx     Uterine cancer Neg Hx     Anesthesia problems Neg Hx         Social History:   Social History     Tobacco Use    Smoking status: Never Smoker    Smokeless tobacco: Never Used   Substance Use Topics    Alcohol use: Never        Allergies:   Review of patient's allergies indicates:   Allergen Reactions    Asa [aspirin] Other (See Comments)     Stomach upset    Dilaudid (pf) [hydromorphone (pf)] Nausea Only    Sulfa (sulfonamide antibiotics) Itching        Medications:   Current Outpatient Medications   Medication Sig Dispense Refill    acetaminophen (TYLENOL) 500 MG tablet Take 500 mg by mouth every 6 (six) hours as needed for Pain.      ALPRAZolam (XANAX) 0.25 MG tablet Take 0.25 mg by mouth 2 (two) times daily as needed for Anxiety.       benzonatate (TESSALON) 100 MG capsule Take 100 mg by mouth 3 (three) times daily as needed for Cough.      bisoprolol-hydrochlorothiazide (ZIAC) 10-6.25 mg per tablet Take 1 tablet by mouth once daily.       calcium carbonate/vitamin D3 (CALCIUM 600 + D,3, ORAL) Take by mouth once daily.       co-enzyme Q-10 30 mg capsule Take 30 mg by mouth 3 (three) times daily.      loratadine (CLARITIN) 10 mg tablet Take 10 mg by mouth daily as needed for Allergies.      milk thistle 175 mg tablet Take 175 mg by mouth once daily.      multivitamin/iron/folic acid (CENTRUM ORAL) Take by mouth once daily.       mv-min/FA/vit K/lycop/lut/zeax (OCUVITE EYE PLUS MULTI ORAL) Take 1 tablet by mouth once daily.       oxyCODONE (ROXICODONE) 5 MG immediate release tablet Take 1 tablet (5 mg total) by mouth every 6 (six) hours as needed for Pain. (Patient not taking: Reported on 7/1/2022) 10 tablet 0    pantoprazole  "(PROTONIX) 40 MG tablet Take 40 mg by mouth once daily.      polyethylene glycol (GLYCOLAX) 17 gram PwPk Take by mouth every morning.      raloxifene (EVISTA) 60 mg tablet Take 60 mg by mouth every evening.       No current facility-administered medications for this visit.        Physical Exam:   /61 (BP Location: Left arm, Patient Position: Sitting, BP Method: Small (Automatic))   Pulse 69   Temp 98.5 °F (36.9 °C) (Oral)   Resp 18   Ht 5' 4.5" (1.638 m)   Wt 68.9 kg (151 lb 14.4 oz)   SpO2 96%   BMI 25.67 kg/m²      ECOG Performance Status: (foot note - ECOG PS provided by [employer]cology [employer]) 0 - Asymptomatic    Physical Exam  Constitutional:       Appearance: She is well-developed.   HENT:      Head: Normocephalic and atraumatic.   Eyes:      Conjunctiva/sclera: Conjunctivae normal.      Pupils: Pupils are equal, round, and reactive to light.   Pulmonary:      Effort: Pulmonary effort is normal. No respiratory distress.   Abdominal:      General: There is no distension.      Palpations: Abdomen is soft.   Musculoskeletal:         General: No swelling. Normal range of motion.      Cervical back: Normal range of motion and neck supple.   Lymphadenopathy:      Cervical: No cervical adenopathy.   Skin:     General: Skin is warm and dry.   Neurological:      General: No focal deficit present.      Mental Status: She is alert and oriented to person, place, and time.   Psychiatric:         Mood and Affect: Mood normal.         Behavior: Behavior normal.           Labs:   Recent Results (from the past 48 hour(s))   CBC Oncology    Collection Time: 08/26/22 12:38 PM   Result Value Ref Range    WBC 7.94 3.90 - 12.70 K/uL    RBC 3.93 (L) 4.00 - 5.40 M/uL    Hemoglobin 11.9 (L) 12.0 - 16.0 g/dL    Hematocrit 34.2 (L) 37.0 - 48.5 %    MCV 87 82 - 98 fL    MCH 30.3 27.0 - 31.0 pg    MCHC 34.8 32.0 - 36.0 g/dL    RDW 12.7 11.5 - 14.5 %    Platelets 219 150 - 450 K/uL    MPV 9.7 9.2 - 12.9 fL    " Gran # (ANC) 5.5 1.8 - 7.7 K/uL    Immature Grans (Abs) 0.03 0.00 - 0.04 K/uL   Comprehensive Metabolic Panel    Collection Time: 08/26/22 12:38 PM   Result Value Ref Range    Sodium 140 136 - 145 mmol/L    Potassium 3.6 3.5 - 5.1 mmol/L    Chloride 104 95 - 110 mmol/L    CO2 26 23 - 29 mmol/L    Glucose 98 70 - 110 mg/dL    BUN 12 8 - 23 mg/dL    Creatinine 0.9 0.5 - 1.4 mg/dL    Calcium 9.5 8.7 - 10.5 mg/dL    Total Protein 6.7 6.0 - 8.4 g/dL    Albumin 3.6 3.5 - 5.2 g/dL    Total Bilirubin 1.1 (H) 0.1 - 1.0 mg/dL    Alkaline Phosphatase 55 55 - 135 U/L    AST 15 10 - 40 U/L    ALT 13 10 - 44 U/L    Anion Gap 10 8 - 16 mmol/L    eGFR >60.0 >60 mL/min/1.73 m^2   TSH    Collection Time: 08/26/22 12:38 PM   Result Value Ref Range    TSH 1.390 0.400 - 4.000 uIU/mL   T4, Free    Collection Time: 08/26/22 12:38 PM   Result Value Ref Range    Free T4 0.81 0.71 - 1.51 ng/dL        Imaging:  MRI Urography Without Contrast  Narrative: EXAMINATION:  MRI UROGRAPHY WITHOUT CONTRAST    CLINICAL HISTORY:  re-staging for urothelial carcinoma;  Malignant neoplasm of right kidney, except renal pelvis    TECHNIQUE:  Multiplanar multisequence images of the abdomen and pelvis without contrast.  Examination performed per urography protocol.    COMPARISON:  CT 04/21/2022, 07/01/2019    FINDINGS:  Liver: T2 hyperintense lesions within the central segment 8 and peripheral segment 2 measuring 1.5 and 2.4 cm respectively (series 11, image 5).  Lesions are stable in size compared to CT dated 07/01/2019.  In light of stability and prior imaging characteristics, findings are favored to represent hemangiomas.  No new focal lesion.    Gallbladder: Unremarkable.    Bile Ducts: No dilatation.    Pancreas: No mass or ductal dilatation.    Spleen: Unremarkable.    Adrenals: Unremarkable.    Kidneys/Ureters: Status post right nephroureterectomy.  No evidence to suggest local disease recurrence within the nephrectomy bed.  No solid left renal mass  detected.  No hydronephrosis or ureteral dilatation.    Bladder: No focal bladder wall thickening or mass lesion.  The soft tissue focus adjacent to the right posterior bladder wall described on prior CT is favored to represent postoperative change.  No corresponding abnormal restricted diffusion.    Reproductive: Status post hysterectomy and bilateral salpingo-oophorectomy.  No suspicious soft tissue pelvic mass or abnormal restricted diffusion.    GI Tract/Mesentery: No evidence of bowel obstruction or inflammation.  Colonic diverticulosis.    Peritoneal Space: No ascites.    Retroperitoneum: No pathologically enlarged nodes.    Abdominal wall: Midline ventral abdominal wall incisional scar.    Vasculature: Abdominal aorta is normal caliber and contains atherosclerosis.  No aneurysm.    Bones: Degenerative changes.  No suspicious marrow replacing lesion.  Impression: 1. Patient with urothelial carcinoma status post right nephroureterectomy.  No evidence of local disease recurrence or metastasis on this noncontrast exam.  2. Stable right and left hepatic lobe lesions, incompletely characterized on today's exam.  In light of stability and prior imaging characteristics, favor hemangiomas.  3. Additional findings as above.    Electronically signed by resident: Richard Lala  Date:    07/05/2022  Time:    16:01    Electronically signed by: Yonatan Cage Jr  Date:    07/05/2022  Time:    16:43            Diagnoses:       1. Urothelial carcinoma of kidney, right    2. Solitary kidney, acquired    3. Chemotherapy-induced neuropathy    4. Endometrial ca    5. Fatigue, unspecified type          Assessment and Plan:         1. Stage IIIA Urothelial Carcinoma:   Discussed stage, adjuvant treatment options, and prognosis in detail with patient and daughter.  Due to below problems, I am recommending against cisplatin based adjuvant chemotherapy.  She would be a candidate for adjuvant Opdivo and discussed this treatment plan  and the DFS benefit, though data is immature.  Patient and daughter agree to move forward with Opdivo adjuvant x 1 year per Checkmate 274 clinical trial.  - Consented for Opdivo Q4W. Labs acceptable to proceed with C5. Re-st[facility] in October ([facility]     2,3 Not a candidate for cisplatin chemotherapy due to these issues.    4 Continue to follow with Dr. Joe every 4 months.    5. Residual from covid. Will continue to monitor.    40 minutes total time spent with patient, 25 minutes were spent face to face with the patient and her family to discuss the disease, natural history, treatment options and survival statistics. Greater than 50% of this time was for counseling. 15 minutes of chart review and coordination of care. I have provided the patient with an opportunity to ask questions and have all questions answered to his satisfaction.     she will return to clinic prior to C6, but knows to call in the interim if symptoms change or should a problem arise.    Patient was also seen and examined by Dr. Cancino. Patient is in agreement with the proposed treatment plan. All questions were answered to the patient's satisfaction. Pt knows to call clinic if anything is needed before the next clinic visit.    Joellen Valerio, MSN, APRN, FNP-C  Hematology and Medical Oncology  Nurse Practitioner to Dr. Urban Cancino  Nurse Practitioner, [facility]      I have reviewed the notes, assessments, and/or procedures performed by Joellen CAMERON, as above.  I have personally interviewed and examined the patient at the beside, and rounded with Joellen. I concur with her assessment and plan and the documentation of Gita García.    Urban Cancino M.D.  Hematology/Oncology Attending  [facility]  [facility]          Route Chart for Scheduling    Med Onc Chart Routing      Follow up with physician    Follow up with EVELYN 4 weeks. Prior to  Opdivo C6 - 0930    Infusion scheduling note    Injection scheduling note    Labs CBC, CMP, free T4 and TSH   Lab interval: every 4 weeks  Prior to infusions   Imaging    Pharmacy appointment    Other referrals          Treatment Plan Information   OP NIVOLUMAB Q4W   Urban Cancino MD   Upcoming Treatment Dates - OP NIVOLUMAB Q4W    8/26/2022       Chemotherapy       nivolumab 480 mg in sodium chloride 0.9% 148 mL infusion  9/23/2022       Chemotherapy       nivolumab 480 mg in sodium chloride 0.9% 148 mL infusion  10/21/2022       Chemotherapy       nivolumab 480 mg in sodium chloride 0.9% 148 mL infusion  11/18/2022       Chemotherapy       nivolumab 480 mg in sodium chloride 0.9% 148 mL infusion    Therapy Plan Information  mitoMYcin (MUTAMYCIN) 40 mg in sodium chloride 0.9% 40 mL bladder instillation  40 mg, Intravesical, 1 time a week, at least 5 days apart

## 2022-09-23 ENCOUNTER — OFFICE VISIT (OUTPATIENT)
Dept: HEMATOLOGY/ONCOLOGY | Facility: CLINIC | Age: 77
End: 2022-09-23
Payer: MEDICARE

## 2022-09-23 ENCOUNTER — LAB VISIT (OUTPATIENT)
Dept: LAB | Facility: HOSPITAL | Age: 77
End: 2022-09-23
Attending: INTERNAL MEDICINE
Payer: MEDICARE

## 2022-09-23 ENCOUNTER — INFUSION (OUTPATIENT)
Dept: INFUSION THERAPY | Facility: HOSPITAL | Age: 77
End: 2022-09-23
Attending: INTERNAL MEDICINE
Payer: MEDICARE

## 2022-09-23 VITALS
DIASTOLIC BLOOD PRESSURE: 76 MMHG | RESPIRATION RATE: 18 BRPM | TEMPERATURE: 98 F | SYSTOLIC BLOOD PRESSURE: 157 MMHG | HEIGHT: 65 IN | OXYGEN SATURATION: 99 % | HEART RATE: 59 BPM | WEIGHT: 149.69 LBS | BODY MASS INDEX: 24.94 KG/M2

## 2022-09-23 DIAGNOSIS — T45.1X5A CHEMOTHERAPY-INDUCED NEUROPATHY: ICD-10-CM

## 2022-09-23 DIAGNOSIS — C54.1 ENDOMETRIAL CA: ICD-10-CM

## 2022-09-23 DIAGNOSIS — G62.0 CHEMOTHERAPY-INDUCED NEUROPATHY: ICD-10-CM

## 2022-09-23 DIAGNOSIS — K21.9 GASTROESOPHAGEAL REFLUX DISEASE, UNSPECIFIED WHETHER ESOPHAGITIS PRESENT: ICD-10-CM

## 2022-09-23 DIAGNOSIS — C64.1 UROTHELIAL CARCINOMA OF KIDNEY, RIGHT: Primary | ICD-10-CM

## 2022-09-23 DIAGNOSIS — Z90.5 SOLITARY KIDNEY, ACQUIRED: ICD-10-CM

## 2022-09-23 DIAGNOSIS — R53.83 FATIGUE, UNSPECIFIED TYPE: ICD-10-CM

## 2022-09-23 DIAGNOSIS — C64.1 UROTHELIAL CARCINOMA OF KIDNEY, RIGHT: ICD-10-CM

## 2022-09-23 DIAGNOSIS — E03.9 HYPOTHYROIDISM, UNSPECIFIED TYPE: ICD-10-CM

## 2022-09-23 LAB
ALBUMIN SERPL BCP-MCNC: 3.7 G/DL (ref 3.5–5.2)
ALP SERPL-CCNC: 55 U/L (ref 55–135)
ALT SERPL W/O P-5'-P-CCNC: 14 U/L (ref 10–44)
ANION GAP SERPL CALC-SCNC: 10 MMOL/L (ref 8–16)
AST SERPL-CCNC: 16 U/L (ref 10–40)
BILIRUB SERPL-MCNC: 1.1 MG/DL (ref 0.1–1)
BUN SERPL-MCNC: 11 MG/DL (ref 8–23)
CALCIUM SERPL-MCNC: 9.3 MG/DL (ref 8.7–10.5)
CHLORIDE SERPL-SCNC: 105 MMOL/L (ref 95–110)
CO2 SERPL-SCNC: 28 MMOL/L (ref 23–29)
CREAT SERPL-MCNC: 1 MG/DL (ref 0.5–1.4)
ERYTHROCYTE [DISTWIDTH] IN BLOOD BY AUTOMATED COUNT: 13.2 % (ref 11.5–14.5)
EST. GFR  (NO RACE VARIABLE): 57.3 ML/MIN/1.73 M^2
GLUCOSE SERPL-MCNC: 94 MG/DL (ref 70–110)
HCT VFR BLD AUTO: 35.9 % (ref 37–48.5)
HGB BLD-MCNC: 11.4 G/DL (ref 12–16)
IMM GRANULOCYTES # BLD AUTO: 0.03 K/UL (ref 0–0.04)
MCH RBC QN AUTO: 29.5 PG (ref 27–31)
MCHC RBC AUTO-ENTMCNC: 31.8 G/DL (ref 32–36)
MCV RBC AUTO: 93 FL (ref 82–98)
NEUTROPHILS # BLD AUTO: 4.8 K/UL (ref 1.8–7.7)
PLATELET # BLD AUTO: 241 K/UL (ref 150–450)
PMV BLD AUTO: 10.1 FL (ref 9.2–12.9)
POTASSIUM SERPL-SCNC: 3.2 MMOL/L (ref 3.5–5.1)
PROT SERPL-MCNC: 6.6 G/DL (ref 6–8.4)
RBC # BLD AUTO: 3.86 M/UL (ref 4–5.4)
SODIUM SERPL-SCNC: 143 MMOL/L (ref 136–145)
T4 FREE SERPL-MCNC: 0.9 NG/DL (ref 0.71–1.51)
TSH SERPL DL<=0.005 MIU/L-ACNC: 1.93 UIU/ML (ref 0.4–4)
WBC # BLD AUTO: 6.83 K/UL (ref 3.9–12.7)

## 2022-09-23 PROCEDURE — 3077F PR MOST RECENT SYSTOLIC BLOOD PRESSURE >= 140 MM HG: ICD-10-PCS | Mod: CPTII,S$GLB,, | Performed by: INTERNAL MEDICINE

## 2022-09-23 PROCEDURE — 99215 OFFICE O/P EST HI 40 MIN: CPT | Mod: S$GLB,,, | Performed by: INTERNAL MEDICINE

## 2022-09-23 PROCEDURE — 63600175 PHARM REV CODE 636 W HCPCS: Mod: JG | Performed by: INTERNAL MEDICINE

## 2022-09-23 PROCEDURE — 25000003 PHARM REV CODE 250: Performed by: INTERNAL MEDICINE

## 2022-09-23 PROCEDURE — 99999 PR PBB SHADOW E&M-EST. PATIENT-LVL III: ICD-10-PCS | Mod: PBBFAC,,, | Performed by: INTERNAL MEDICINE

## 2022-09-23 PROCEDURE — 36415 COLL VENOUS BLD VENIPUNCTURE: CPT | Performed by: INTERNAL MEDICINE

## 2022-09-23 PROCEDURE — 1157F ADVNC CARE PLAN IN RCRD: CPT | Mod: CPTII,S$GLB,, | Performed by: INTERNAL MEDICINE

## 2022-09-23 PROCEDURE — 84439 ASSAY OF FREE THYROXINE: CPT | Performed by: INTERNAL MEDICINE

## 2022-09-23 PROCEDURE — 96413 CHEMO IV INFUSION 1 HR: CPT

## 2022-09-23 PROCEDURE — 3078F DIAST BP <80 MM HG: CPT | Mod: CPTII,S$GLB,, | Performed by: INTERNAL MEDICINE

## 2022-09-23 PROCEDURE — 1126F AMNT PAIN NOTED NONE PRSNT: CPT | Mod: CPTII,S$GLB,, | Performed by: INTERNAL MEDICINE

## 2022-09-23 PROCEDURE — 1126F PR PAIN SEVERITY QUANTIFIED, NO PAIN PRESENT: ICD-10-PCS | Mod: CPTII,S$GLB,, | Performed by: INTERNAL MEDICINE

## 2022-09-23 PROCEDURE — 1157F PR ADVANCE CARE PLAN OR EQUIV PRESENT IN MEDICAL RECORD: ICD-10-PCS | Mod: CPTII,S$GLB,, | Performed by: INTERNAL MEDICINE

## 2022-09-23 PROCEDURE — 99215 PR OFFICE/OUTPT VISIT, EST, LEVL V, 40-54 MIN: ICD-10-PCS | Mod: S$GLB,,, | Performed by: INTERNAL MEDICINE

## 2022-09-23 PROCEDURE — 80053 COMPREHEN METABOLIC PANEL: CPT | Performed by: INTERNAL MEDICINE

## 2022-09-23 PROCEDURE — 85027 COMPLETE CBC AUTOMATED: CPT | Performed by: INTERNAL MEDICINE

## 2022-09-23 PROCEDURE — 99999 PR PBB SHADOW E&M-EST. PATIENT-LVL III: CPT | Mod: PBBFAC,,, | Performed by: INTERNAL MEDICINE

## 2022-09-23 PROCEDURE — 3078F PR MOST RECENT DIASTOLIC BLOOD PRESSURE < 80 MM HG: ICD-10-PCS | Mod: CPTII,S$GLB,, | Performed by: INTERNAL MEDICINE

## 2022-09-23 PROCEDURE — 84443 ASSAY THYROID STIM HORMONE: CPT | Performed by: INTERNAL MEDICINE

## 2022-09-23 PROCEDURE — 3077F SYST BP >= 140 MM HG: CPT | Mod: CPTII,S$GLB,, | Performed by: INTERNAL MEDICINE

## 2022-09-23 RX ORDER — SUCRALFATE 1 G/1
1 TABLET ORAL 2 TIMES DAILY
COMMUNITY
Start: 2022-09-01

## 2022-09-23 RX ORDER — SODIUM CHLORIDE 0.9 % (FLUSH) 0.9 %
10 SYRINGE (ML) INJECTION
Status: CANCELLED | OUTPATIENT
Start: 2022-09-23

## 2022-09-23 RX ORDER — SODIUM CHLORIDE 0.9 % (FLUSH) 0.9 %
10 SYRINGE (ML) INJECTION
Status: DISCONTINUED | OUTPATIENT
Start: 2022-09-23 | End: 2022-09-23 | Stop reason: HOSPADM

## 2022-09-23 RX ORDER — HEPARIN 100 UNIT/ML
500 SYRINGE INTRAVENOUS
Status: CANCELLED | OUTPATIENT
Start: 2022-09-23

## 2022-09-23 RX ORDER — HEPARIN 100 UNIT/ML
500 SYRINGE INTRAVENOUS
Status: DISCONTINUED | OUTPATIENT
Start: 2022-09-23 | End: 2022-09-23 | Stop reason: HOSPADM

## 2022-09-23 RX ADMIN — SODIUM CHLORIDE 480 MG: 9 INJECTION, SOLUTION INTRAVENOUS at 11:09

## 2022-09-23 RX ADMIN — SODIUM CHLORIDE: 9 INJECTION, SOLUTION INTRAVENOUS at 11:09

## 2022-09-23 NOTE — PLAN OF CARE
Pt arrived AAOx3, VSS, labs reviewed, orders signed. Pt tolerated opdivo without incident and was discharged in stable condition to her friends in lobby.

## 2022-09-23 NOTE — PROGRESS NOTES
ONCOLOGY FOLLOW UP VISIT    Reason for visit: Toxicity check on Opdivo for stage IIIA urothelial carcinoma    Cancer/Stage/TNM:    Cancer Staging   Endometrial ca  Staging form: Corpus Uteri - Carcinoma and Carcinosarcoma, AJCC 8th Edition  - Clinical: No stage assigned - Unsigned  - Pathologic stage from 7/18/2019: FIGO Stage IA (pT1a, pN0, cM0) - Signed by Lokesh Carias MD on 7/18/2019       Oncology History   Endometrial ca   6/26/2019 Initial Diagnosis    Endometrial ca     7/18/2019 Cancer Staged    Staging form: Corpus Uteri - Carcinoma and Carcinosarcoma, AJCC 8th Edition  - Pathologic stage from 7/18/2019: FIGO Stage IA (pT1a, pN0, cM0) - Signed by Lokesh Carias MD on 7/18/2019 7/18/2019 - 7/18/2019 Chemotherapy    Treatment Summary   Plan Name: OP GYN PACLITAXEL CARBOPLATIN (AUC 6) Q3W  Treatment Goal: Curative  Status: Inactive  Start Date:   End Date:   Provider: Lokesh Carias MD  Chemotherapy: CARBOplatin (PARAPLATIN) in sodium chloride 0.9% 250 mL chemo infusion, , Intravenous, Clinic/HOD 1 time, 0 of 6 cycles  PACLitaxel (TAXOL) 175 mg/m2 = 300 mg in sodium chloride 0.9% 500 mL chemo infusion, 175 mg/m2, Intravenous, Clinic/HOD 1 time, 0 of 6 cycles     Urothelial carcinoma of kidney, right   11/11/2021 Initial Diagnosis    Urothelial carcinoma of kidney, right     1/27/2022 Tumor Conference    Presenting Hospital / Clinic: [facility] [address]  Virtual Tumor Board Conference: Virtual  Presenter: Edmond Manuel  Date Presented to Tumor Board: 1/27/2022  Specialties Present: Medical Oncology; Radiation Oncology; Pathology; Navigation; Urology  Presentation at Cancer Conference: Prospective  Cancer Type: Other  Other Cancer: right upper tract urothelial  Recommended Plan: Chemotherapy; Surgery  Recommended Plan Note: neoadjuvant split dose MVAC + open nephroureterectomy    Oncology History   Endometrial ca   Urothelial carcinoma of kidney, right   1/27/2022 Tumor Conference    Presenting  Hospital / Clinic: [facility]  Virtual Tumor Board Conference: Virtual  Presenter: Edmond Moyraymond  Date Presented to Tumor Board: 1/27/2022  Specialties Present: Medical Oncology; Radiation Oncology; Pathology; Navigation; Urology  Presentation at Cancer Conference: Prospective  Cancer Type: Other  Other Cancer: right upper tract urothelial  Recommended Plan: Chemotherapy; Surgery  Recommended Plan Note: neoadjuvant split dose MVAC + open nephroureterectomy                  PATIENT SUMMARY:   Gita García is a 76 y.o. female with HG right upper tract UCC. History of endometrial cancer s/p neoadjuvant taxol+cisplatin and vaginal brachytherapy followed by robotic hysterectomy.    DISCUSSION:  Pathology review  Staging review    PERFORMANCE STATUS:  ECOG 0    Estimated GFR/CKD Stage: >60 (cr 0.7)    Clinical/Pathologic Stage (TNM): T1 N0 M0    FACULTY IN ATTENDANCE:    Urologic Oncology: Shane Diaz MD [x], Michael Finley MD [x] , Hever Pollack MD []    CONSULT NEEDED:     [] Urologic Oncology    [] Hem/Onc    [] Rad/Onc     [x] Treatment Guidelines (NCCN and AUA) reviewed and care planned is consistent with guidelines.    TUMOR BOARD RECOMMENDATIONS/PLAN/CONSENSUS:     Case discussed among group. Pathology and radiologic images were reviewed (if applicable).    Neoadjuvant split-dose DDMVAC + open nephroureterectomy  Referral to genetics      5/6/2022 -  Chemotherapy    Treatment Summary   Plan Name: OP NIVOLUMAB Q4W  Treatment Goal: Curative  Status: Active  Start Date: 5/6/2022  End Date: 3/10/2023 (Planned)  Provider: Urban Cancino MD  Chemotherapy: [No matching medication found in this treatment plan]          HPI:   77 y.o. female who presents to clinic prior to C6 of Opdivo. Having more issues for reflux and on daily protonix, improved some with carafate, but patient discontinued. She has appt with GI early October. Neuropathy at baseline from prior chemotherapy.       ROS:   Review of Systems    Constitutional:  Positive for fatigue and unexpected weight change (weight loss). Negative for activity change, chills and fever.   HENT:  Positive for trouble swallowing. Negative for mouth sores, nosebleeds and sore throat.         Taste changes   Respiratory:  Negative for cough, shortness of breath and wheezing.    Cardiovascular:  Negative for chest pain, palpitations and leg swelling.   Gastrointestinal:  Negative for abdominal distention, abdominal pain and blood in stool.        Indigestion   Endocrine: Negative for cold intolerance and heat intolerance.   Genitourinary:  Negative for dysuria, flank pain and frequency.   Musculoskeletal:  Negative for arthralgias, joint swelling and myalgias.   Skin:  Negative for color change, rash and wound.   Neurological:  Positive for numbness (feet). Negative for dizziness, light-headedness and headaches.   Hematological:  Negative for adenopathy. Does not bruise/bleed easily.      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        Past Surgical History:   Past Surgical History:   Procedure Laterality Date    ABLATION OF NEOPLASM OF URETER USING LASER Right 1/3/2022    Procedure: ABLATION, NEOPLASM, URETER, USING LASER;  Surgeon: Shane Diaz MD;  Location: [facility] OR [address] [place] [address];  Service: Urology;  Laterality: Right;    CYSTOSCOPY N/A 1/3/2022    Procedure: CYSTOSCOPY;  Surgeon: Shane Diaz MD;  Location: [facility] OR [address] [place] [address];  Service: Urology;  Laterality: N/A;    DILATION AND CURETTAGE OF UTERUS      GI scope  2016    LAPAROTOMY N/A  7/8/2019    Procedure: LAPAROTOMY;  Surgeon: Lokesh Carias MD;  Location: [facility] OR 2ND FLR;  Service: OB/GYN;  Laterality: N/A;  mini    OMENTECTOMY N/A 7/8/2019    Procedure: OMENTECTOMY;  Surgeon: Lokesh Carias MD;  Location: [facility] OR 2ND FLR;  Service: OB/GYN;  Laterality: N/A;    ROBOT-ASSISTED LAPAROSCOPIC ABDOMINAL HYSTERECTOMY USING DA SIRISHA XI N/A 7/8/2019    Procedure: XI ROBOTIC HYSTERECTOMY;  Surgeon: Lokesh Carias MD;  Location: [facility] OR 2ND FLR;  Service: OB/GYN;  Laterality: N/A;    ROBOT-ASSISTED LAPAROSCOPIC PELVIC LYMPHADENECTOMY USING DA SIRISHA XI Bilateral 7/8/2019    Procedure: XI ROBOTIC LYMPHADENECTOMY, PELVIC;  Surgeon: Lokesh Carias MD;  Location: [facility] OR 2ND FLR;  Service: OB/GYN;  Laterality: Bilateral;    ROBOT-ASSISTED LAPAROSCOPIC RETROPERITONEAL LYMPHADENECTOMY USING DA SIRISHA XI N/A 7/8/2019    Procedure: XI ROBOTIC LYMPHADENECTOMY, RETROPERITONEUM;  Surgeon: Lokesh Carias MD;  Location: [facility] OR 2ND FLR;  Service: OB/GYN;  Laterality: N/A;    ROBOT-ASSISTED LAPAROSCOPIC SALPINGO-OOPHORECTOMY USING DA SIRISHA XI Bilateral 7/8/2019    Procedure: XI ROBOTIC SALPINGO-OOPHORECTOMY;  Surgeon: Lokesh Carias MD;  Location: [facility] OR 2ND FLR;  Service: OB/GYN;  Laterality: Bilateral;    ROBOT-ASSISTED LAPAROSCOPIC SURGICAL REMOVAL OF KIDNEY AND URETER USING DA SIRISHA XI Right 3/14/2022    Procedure: XI ROBOTIC NEPHROURETERECTOMY/ WITH BLADDER CUFF;  Surgeon: Shane Diaz MD;  Location: [facility] OR 2ND FLR;  Service: Urology;  Laterality: Right;  4hrs    URETEROSCOPY Right 1/3/2022    Procedure: URETEROSCOPY;  Surgeon: Shane Diaz MD;  Location: [facility] OR 1ST FLR;  Service: Urology;  Laterality: Right;  2hrs        Family History:   Family History   Problem Relation Age of Onset    Colon cancer Sister 53    Prostate cancer Brother     Breast cancer Sister 64    Kidney cancer Sister     Breast cancer Sister         in her 60s    Skin cancer Sister     Stroke Brother     Prostate  cancer Brother     Heart disease Brother     Prostate cancer Brother     Heart disease Brother     Ovarian cancer Neg Hx     Uterine cancer Neg Hx     Anesthesia problems Neg Hx         Social History:   Social History     Tobacco Use    Smoking status: Never    Smokeless tobacco: Never   Substance Use Topics    Alcohol use: Never        Allergies:   Review of patient's allergies indicates:   Allergen Reactions    Asa [aspirin] Other (See Comments)     Stomach upset    Dilaudid (pf) [hydromorphone (pf)] Nausea Only    Sulfa (sulfonamide antibiotics) Itching        Medications:   Current Outpatient Medications   Medication Sig Dispense Refill    acetaminophen (TYLENOL) 500 MG tablet Take 500 mg by mouth every 6 (six) hours as needed for Pain.      ALPRAZolam (XANAX) 0.25 MG tablet Take 0.25 mg by mouth 2 (two) times daily as needed for Anxiety.       benzonatate (TESSALON) 100 MG capsule Take 100 mg by mouth 3 (three) times daily as needed for Cough.      bisoprolol-hydrochlorothiazide (ZIAC) 10-6.25 mg per tablet Take 1 tablet by mouth once daily.       calcium carbonate/vitamin D3 (CALCIUM 600 + D,3, ORAL) Take by mouth once daily.       co-enzyme Q-10 30 mg capsule Take 30 mg by mouth 3 (three) times daily.      loratadine (CLARITIN) 10 mg tablet Take 10 mg by mouth daily as needed for Allergies.      milk thistle 175 mg tablet Take 175 mg by mouth once daily.      multivitamin/iron/folic acid (CENTRUM ORAL) Take by mouth once daily.       mv-min/FA/vit K/lycop/lut/zeax (OCUVITE EYE PLUS MULTI ORAL) Take 1 tablet by mouth once daily.       oxyCODONE (ROXICODONE) 5 MG immediate release tablet Take 1 tablet (5 mg total) by mouth every 6 (six) hours as needed for Pain. (Patient not taking: Reported on 7/1/2022) 10 tablet 0    pantoprazole (PROTONIX) 40 MG tablet Take 40 mg by mouth once daily.      polyethylene glycol (GLYCOLAX) 17 gram PwPk Take by mouth every morning.      raloxifene (EVISTA) 60 mg tablet Take 60  "mg by mouth every evening.      sucralfate (CARAFATE) 1 gram tablet Take 1 g by mouth 2 (two) times daily.       No current facility-administered medications for this visit.     Facility-Administered Medications Ordered in Other Visits   Medication Dose Route Frequency Provider Last Rate Last Admin    alteplase injection 2 mg  2 mg Intra-Catheter PRN Urban Cancino MD        heparin, porcine (PF) 100 unit/mL injection flush 500 Units  500 Units Intravenous PRN Urban Cancino MD        nivolumab 480 mg in sodium chloride 0.9% 148 mL infusion  480 mg Intravenous 1 time in Clinic/HOD Urban Cancino MD        sodium chloride 0.9% 100 mL flush bag   Intravenous 1 time in Clinic/HOD Urban Cancino MD        sodium chloride 0.9% flush 10 mL  10 mL Intravenous PRN Urban Cancino MD            Physical Exam:   BP (!) 157/76 (BP Location: Right arm, Patient Position: Sitting, BP Method: Medium (Automatic))   Pulse (!) 59   Temp 98.3 °F (36.8 °C) (Oral)   Resp 18   Ht 5' 4.5" (1.638 m)   Wt 67.9 kg (149 lb 11.1 oz)   SpO2 99%   BMI 25.30 kg/m²      ECOG Performance Status: (foot note - ECOG PS provided by [employer]cology [employer]) 0 - Asymptomatic    Physical Exam  Constitutional:       Appearance: She is well-developed.   HENT:      Head: Normocephalic and atraumatic.   Eyes:      Conjunctiva/sclera: Conjunctivae normal.      Pupils: Pupils are equal, round, and reactive to light.   Pulmonary:      Effort: Pulmonary effort is normal. No respiratory distress.   Abdominal:      General: There is no distension.      Palpations: Abdomen is soft.   Musculoskeletal:         General: No swelling. Normal range of motion.      Cervical back: Normal range of motion and neck supple.   Lymphadenopathy:      Cervical: No cervical adenopathy.   Skin:     General: Skin is warm and dry.   Neurological:      General: No focal deficit present.      Mental Status: She is alert and oriented to person, place, " and time.   Psychiatric:         Mood and Affect: Mood normal.         Behavior: Behavior normal.         Labs:   Recent Results (from the past 48 hour(s))   CBC Oncology    Collection Time: 09/23/22  9:10 AM   Result Value Ref Range    WBC 6.83 3.90 - 12.70 K/uL    RBC 3.86 (L) 4.00 - 5.40 M/uL    Hemoglobin 11.4 (L) 12.0 - 16.0 g/dL    Hematocrit 35.9 (L) 37.0 - 48.5 %    MCV 93 82 - 98 fL    MCH 29.5 27.0 - 31.0 pg    MCHC 31.8 (L) 32.0 - 36.0 g/dL    RDW 13.2 11.5 - 14.5 %    Platelets 241 150 - 450 K/uL    MPV 10.1 9.2 - 12.9 fL    Gran # (ANC) 4.8 1.8 - 7.7 K/uL    Immature Grans (Abs) 0.03 0.00 - 0.04 K/uL   Comprehensive Metabolic Panel    Collection Time: 09/23/22  9:10 AM   Result Value Ref Range    Sodium 143 136 - 145 mmol/L    Potassium 3.2 (L) 3.5 - 5.1 mmol/L    Chloride 105 95 - 110 mmol/L    CO2 28 23 - 29 mmol/L    Glucose 94 70 - 110 mg/dL    BUN 11 8 - 23 mg/dL    Creatinine 1.0 0.5 - 1.4 mg/dL    Calcium 9.3 8.7 - 10.5 mg/dL    Total Protein 6.6 6.0 - 8.4 g/dL    Albumin 3.7 3.5 - 5.2 g/dL    Total Bilirubin 1.1 (H) 0.1 - 1.0 mg/dL    Alkaline Phosphatase 55 55 - 135 U/L    AST 16 10 - 40 U/L    ALT 14 10 - 44 U/L    Anion Gap 10 8 - 16 mmol/L    eGFR 57.3 (A) >60 mL/min/1.73 m^2   TSH    Collection Time: 09/23/22  9:10 AM   Result Value Ref Range    TSH 1.930 0.400 - 4.000 uIU/mL   T4, Free    Collection Time: 09/23/22  9:10 AM   Result Value Ref Range    Free T4 0.90 0.71 - 1.51 ng/dL        Imaging:  MRI Urography Without Contrast  Narrative: EXAMINATION:  MRI UROGRAPHY WITHOUT CONTRAST    CLINICAL HISTORY:  re-staging for urothelial carcinoma;  Malignant neoplasm of right kidney, except renal pelvis    TECHNIQUE:  Multiplanar multisequence images of the abdomen and pelvis without contrast.  Examination performed per urography protocol.    COMPARISON:  CT 04/21/2022, 07/01/2019    FINDINGS:  Liver: T2 hyperintense lesions within the central segment 8 and peripheral segment 2 measuring 1.5 and  2.4 cm respectively (series 11, image 5).  Lesions are stable in size compared to CT dated 07/01/2019.  In light of stability and prior imaging characteristics, findings are favored to represent hemangiomas.  No new focal lesion.    Gallbladder: Unremarkable.    Bile Ducts: No dilatation.    Pancreas: No mass or ductal dilatation.    Spleen: Unremarkable.    Adrenals: Unremarkable.    Kidneys/Ureters: Status post right nephroureterectomy.  No evidence to suggest local disease recurrence within the nephrectomy bed.  No solid left renal mass detected.  No hydronephrosis or ureteral dilatation.    Bladder: No focal bladder wall thickening or mass lesion.  The soft tissue focus adjacent to the right posterior bladder wall described on prior CT is favored to represent postoperative change.  No corresponding abnormal restricted diffusion.    Reproductive: Status post hysterectomy and bilateral salpingo-oophorectomy.  No suspicious soft tissue pelvic mass or abnormal restricted diffusion.    GI Tract/Mesentery: No evidence of bowel obstruction or inflammation.  Colonic diverticulosis.    Peritoneal Space: No ascites.    Retroperitoneum: No pathologically enlarged nodes.    Abdominal wall: Midline ventral abdominal wall incisional scar.    Vasculature: Abdominal aorta is normal caliber and contains atherosclerosis.  No aneurysm.    Bones: Degenerative changes.  No suspicious marrow replacing lesion.  Impression: 1. Patient with urothelial carcinoma status post right nephroureterectomy.  No evidence of local disease recurrence or metastasis on this noncontrast exam.  2. Stable right and left hepatic lobe lesions, incompletely characterized on today's exam.  In light of stability and prior imaging characteristics, favor hemangiomas.  3. Additional findings as above.    Electronically signed by resident: Richard Lala  Date:    07/05/2022  Time:    16:01    Electronically signed by: Yonatan Cage    Date:    07/05/2022  Time:    16:43            Diagnoses:       1. Urothelial carcinoma of kidney, right    2. Solitary kidney, acquired    3. Chemotherapy-induced neuropathy    4. Endometrial ca    5. Fatigue, unspecified type    6. Gastroesophageal reflux disease, unspecified whether esophagitis present          Assessment and Plan:         1. Stage IIIA Urothelial Carcinoma:   Discussed stage, adjuvant treatment options, and prognosis in detail with patient and daughter.  Due to below problems, I am recommending against cisplatin based adjuvant chemotherapy.  She would be a candidate for adjuvant Opdivo and discussed this treatment plan and the DFS benefit, though data is immature.  Patient and daughter agree to move forward with Opdivo adjuvant x 1 year per Checkmate 274 clinical trial.  - Consented for Opdivo Q4W. Labs acceptable to proceed with C6. Re-staging scans in October ([facility]).     2,3 Not a candidate for cisplatin chemotherapy due to these issues.    4 Continue to follow with Dr. Joe every 4 months.    5. Residual from covid. Will continue to monitor.    6 Will defer to GI for recs regarding indigestion, swallowing complaints.      she will return to clinic prior to C7, but knows to call in the interim if symptoms change or should a problem arise.      Urban Cancino M.D.  Hematology/Oncology Attending  [facility]  [facility]

## 2022-09-23 NOTE — Clinical Note
- RTC in 4 weeks to see me with CBC, CMP, TSH, free T4, MRI, and CT chest prior.  Please schedule labs and imaging day or two before clinic visit at facility closest to patient.

## 2022-09-27 ENCOUNTER — TELEPHONE (OUTPATIENT)
Dept: HEMATOLOGY/ONCOLOGY | Facility: CLINIC | Age: 77
End: 2022-09-27
Payer: MEDICARE

## 2022-09-27 ENCOUNTER — HOSPITAL ENCOUNTER (OUTPATIENT)
Dept: RADIOLOGY | Facility: HOSPITAL | Age: 77
Discharge: HOME | End: 2022-09-27
Attending: INTERNAL MEDICINE
Payer: MEDICARE

## 2022-09-27 DIAGNOSIS — C64.1 UROTHELIAL CARCINOMA OF KIDNEY, RIGHT: ICD-10-CM

## 2022-09-27 PROCEDURE — 71250 CT THORAX DX C-: CPT | Mod: TC

## 2022-09-27 NOTE — TELEPHONE ENCOUNTER
----- Message from Luanne Tillman sent at 9/27/2022  8:14 AM CDT -----  Regarding: Questions and concers  Contact: [phone]  Patient Gita is calling. Patient would like dr to know that her MRI was rescheduled from [place] because they cant perform the test needed. It has been rescheduled to [facility] for 10/26. Please call patient at [phone]       Thank You

## 2022-09-27 NOTE — TELEPHONE ENCOUNTER
I spoke to patient. We have rescheduled the MRI for prior to her appointment with Dr. Cancino. She is aware as Ary from MRI had called her to tell her.

## 2022-10-18 ENCOUNTER — HOSPITAL ENCOUNTER (OUTPATIENT)
Dept: RADIOLOGY | Facility: HOSPITAL | Age: 77
Discharge: HOME | End: 2022-10-18
Attending: INTERNAL MEDICINE
Payer: MEDICARE

## 2022-10-18 DIAGNOSIS — C64.1 UROTHELIAL CARCINOMA OF KIDNEY, RIGHT: ICD-10-CM

## 2022-10-18 PROCEDURE — 74183 MRI ABD W/O CNTR FLWD CNTR: CPT | Mod: 26,,, | Performed by: STUDENT IN AN ORGANIZED HEALTH CARE EDUCATION/TRAINING PROGRAM

## 2022-10-18 PROCEDURE — 74183 MRI UROGRAPHY W W/O CONTRAST (XPD): ICD-10-PCS | Mod: 26,,, | Performed by: STUDENT IN AN ORGANIZED HEALTH CARE EDUCATION/TRAINING PROGRAM

## 2022-10-18 PROCEDURE — 25500020 PHARM REV CODE 255: Performed by: INTERNAL MEDICINE

## 2022-10-18 PROCEDURE — 72197 MRI PELVIS W/O & W/DYE: CPT | Mod: 26,,, | Performed by: STUDENT IN AN ORGANIZED HEALTH CARE EDUCATION/TRAINING PROGRAM

## 2022-10-18 PROCEDURE — 72197 MRI PELVIS W/O & W/DYE: CPT | Mod: TC

## 2022-10-18 PROCEDURE — A9585 GADOBUTROL INJECTION: HCPCS | Performed by: INTERNAL MEDICINE

## 2022-10-18 PROCEDURE — 72197 MRI UROGRAPHY W W/O CONTRAST (XPD): ICD-10-PCS | Mod: 26,,, | Performed by: STUDENT IN AN ORGANIZED HEALTH CARE EDUCATION/TRAINING PROGRAM

## 2022-10-18 RX ORDER — GADOBUTROL 604.72 MG/ML
7 INJECTION INTRAVENOUS
Status: COMPLETED | OUTPATIENT
Start: 2022-10-18 | End: 2022-10-18

## 2022-10-18 RX ADMIN — GADOBUTROL 7 ML: 604.72 INJECTION INTRAVENOUS at 03:10

## 2022-10-21 ENCOUNTER — OFFICE VISIT (OUTPATIENT)
Dept: HEMATOLOGY/ONCOLOGY | Facility: CLINIC | Age: 77
End: 2022-10-21
Payer: MEDICARE

## 2022-10-21 ENCOUNTER — INFUSION (OUTPATIENT)
Dept: INFUSION THERAPY | Facility: HOSPITAL | Age: 77
End: 2022-10-21
Attending: INTERNAL MEDICINE
Payer: MEDICARE

## 2022-10-21 ENCOUNTER — PATIENT MESSAGE (OUTPATIENT)
Dept: HEMATOLOGY/ONCOLOGY | Facility: CLINIC | Age: 77
End: 2022-10-21
Payer: MEDICARE

## 2022-10-21 VITALS
WEIGHT: 145.94 LBS | HEART RATE: 53 BPM | BODY MASS INDEX: 24.32 KG/M2 | HEIGHT: 65 IN | RESPIRATION RATE: 18 BRPM | TEMPERATURE: 98 F | SYSTOLIC BLOOD PRESSURE: 136 MMHG | DIASTOLIC BLOOD PRESSURE: 62 MMHG

## 2022-10-21 VITALS
OXYGEN SATURATION: 98 % | HEIGHT: 65 IN | DIASTOLIC BLOOD PRESSURE: 65 MMHG | HEART RATE: 60 BPM | TEMPERATURE: 98 F | RESPIRATION RATE: 18 BRPM | WEIGHT: 145.94 LBS | SYSTOLIC BLOOD PRESSURE: 148 MMHG | BODY MASS INDEX: 24.32 KG/M2

## 2022-10-21 DIAGNOSIS — C64.1 UROTHELIAL CARCINOMA OF KIDNEY, RIGHT: Primary | ICD-10-CM

## 2022-10-21 DIAGNOSIS — K21.9 GASTROESOPHAGEAL REFLUX DISEASE, UNSPECIFIED WHETHER ESOPHAGITIS PRESENT: ICD-10-CM

## 2022-10-21 DIAGNOSIS — T45.1X5A CHEMOTHERAPY-INDUCED NEUROPATHY: ICD-10-CM

## 2022-10-21 DIAGNOSIS — C54.1 ENDOMETRIAL CA: ICD-10-CM

## 2022-10-21 DIAGNOSIS — G62.0 CHEMOTHERAPY-INDUCED NEUROPATHY: ICD-10-CM

## 2022-10-21 DIAGNOSIS — Z90.5 SOLITARY KIDNEY, ACQUIRED: ICD-10-CM

## 2022-10-21 DIAGNOSIS — R63.4 WEIGHT LOSS: ICD-10-CM

## 2022-10-21 DIAGNOSIS — R63.0 LOSS OF APPETITE: ICD-10-CM

## 2022-10-21 DIAGNOSIS — R68.2 DRY MOUTH: ICD-10-CM

## 2022-10-21 DIAGNOSIS — R53.83 FATIGUE, UNSPECIFIED TYPE: ICD-10-CM

## 2022-10-21 PROCEDURE — 3077F PR MOST RECENT SYSTOLIC BLOOD PRESSURE >= 140 MM HG: ICD-10-PCS | Mod: CPTII,S$GLB,, | Performed by: INTERNAL MEDICINE

## 2022-10-21 PROCEDURE — 1101F PR PT FALLS ASSESS DOC 0-1 FALLS W/OUT INJ PAST YR: ICD-10-PCS | Mod: CPTII,S$GLB,, | Performed by: INTERNAL MEDICINE

## 2022-10-21 PROCEDURE — 1126F AMNT PAIN NOTED NONE PRSNT: CPT | Mod: CPTII,S$GLB,, | Performed by: INTERNAL MEDICINE

## 2022-10-21 PROCEDURE — 99215 PR OFFICE/OUTPT VISIT, EST, LEVL V, 40-54 MIN: ICD-10-PCS | Mod: S$GLB,,, | Performed by: INTERNAL MEDICINE

## 2022-10-21 PROCEDURE — 3077F SYST BP >= 140 MM HG: CPT | Mod: CPTII,S$GLB,, | Performed by: INTERNAL MEDICINE

## 2022-10-21 PROCEDURE — 1157F ADVNC CARE PLAN IN RCRD: CPT | Mod: CPTII,S$GLB,, | Performed by: INTERNAL MEDICINE

## 2022-10-21 PROCEDURE — 99215 OFFICE O/P EST HI 40 MIN: CPT | Mod: S$GLB,,, | Performed by: INTERNAL MEDICINE

## 2022-10-21 PROCEDURE — 3078F DIAST BP <80 MM HG: CPT | Mod: CPTII,S$GLB,, | Performed by: INTERNAL MEDICINE

## 2022-10-21 PROCEDURE — 96413 CHEMO IV INFUSION 1 HR: CPT

## 2022-10-21 PROCEDURE — 1126F PR PAIN SEVERITY QUANTIFIED, NO PAIN PRESENT: ICD-10-PCS | Mod: CPTII,S$GLB,, | Performed by: INTERNAL MEDICINE

## 2022-10-21 PROCEDURE — 1101F PT FALLS ASSESS-DOCD LE1/YR: CPT | Mod: CPTII,S$GLB,, | Performed by: INTERNAL MEDICINE

## 2022-10-21 PROCEDURE — 63600175 PHARM REV CODE 636 W HCPCS: Mod: JG | Performed by: INTERNAL MEDICINE

## 2022-10-21 PROCEDURE — 99999 PR PBB SHADOW E&M-EST. PATIENT-LVL IV: CPT | Mod: PBBFAC,,, | Performed by: INTERNAL MEDICINE

## 2022-10-21 PROCEDURE — 99999 PR PBB SHADOW E&M-EST. PATIENT-LVL IV: ICD-10-PCS | Mod: PBBFAC,,, | Performed by: INTERNAL MEDICINE

## 2022-10-21 PROCEDURE — 3078F PR MOST RECENT DIASTOLIC BLOOD PRESSURE < 80 MM HG: ICD-10-PCS | Mod: CPTII,S$GLB,, | Performed by: INTERNAL MEDICINE

## 2022-10-21 PROCEDURE — 1157F PR ADVANCE CARE PLAN OR EQUIV PRESENT IN MEDICAL RECORD: ICD-10-PCS | Mod: CPTII,S$GLB,, | Performed by: INTERNAL MEDICINE

## 2022-10-21 PROCEDURE — 3288F PR FALLS RISK ASSESSMENT DOCUMENTED: ICD-10-PCS | Mod: CPTII,S$GLB,, | Performed by: INTERNAL MEDICINE

## 2022-10-21 PROCEDURE — 3288F FALL RISK ASSESSMENT DOCD: CPT | Mod: CPTII,S$GLB,, | Performed by: INTERNAL MEDICINE

## 2022-10-21 PROCEDURE — 25000003 PHARM REV CODE 250: Performed by: INTERNAL MEDICINE

## 2022-10-21 RX ORDER — SODIUM CHLORIDE 0.9 % (FLUSH) 0.9 %
10 SYRINGE (ML) INJECTION
Status: DISCONTINUED | OUTPATIENT
Start: 2022-10-21 | End: 2022-10-21 | Stop reason: HOSPADM

## 2022-10-21 RX ORDER — HEPARIN 100 UNIT/ML
500 SYRINGE INTRAVENOUS
Status: CANCELLED | OUTPATIENT
Start: 2022-10-21

## 2022-10-21 RX ORDER — SODIUM CHLORIDE 0.9 % (FLUSH) 0.9 %
10 SYRINGE (ML) INJECTION
Status: CANCELLED | OUTPATIENT
Start: 2022-10-21

## 2022-10-21 RX ADMIN — SODIUM CHLORIDE 480 MG: 9 INJECTION, SOLUTION INTRAVENOUS at 11:10

## 2022-10-21 RX ADMIN — SODIUM CHLORIDE: 9 INJECTION, SOLUTION INTRAVENOUS at 11:10

## 2022-10-21 NOTE — PLAN OF CARE
1228  Infusion completed, pt tolerated well; provided/reviewed printed ed for dietary sources of K+; pt instructed to increase water hydration; discussed when to contact MD, when to report to ER; AVS declined, pt and daughter verbalized understanding of all discussed and when to report next

## 2022-10-21 NOTE — NURSING
1050  Pt here for Opdivo infusion, accompanied by daughter, no new complaints or concerns at present, reports tolerating treatment; discussed treatment plan for today, all questions answered and pt agrees to proceed

## 2022-10-21 NOTE — PROGRESS NOTES
ONCOLOGY FOLLOW UP VISIT    Reason for visit: Toxicity check on Opdivo for stage IIIA urothelial carcinoma    Cancer/Stage/TNM:    Cancer Staging   Endometrial ca  Staging form: Corpus Uteri - Carcinoma and Carcinosarcoma, AJCC 8th Edition  - Clinical: No stage assigned - Unsigned  - Pathologic stage from 7/18/2019: FIGO Stage IA (pT1a, pN0, cM0) - Signed by Lokesh Carias MD on 7/18/2019       Oncology History   Endometrial ca   6/26/2019 Initial Diagnosis    Endometrial ca     7/18/2019 Cancer Staged    Staging form: Corpus Uteri - Carcinoma and Carcinosarcoma, AJCC 8th Edition  - Pathologic stage from 7/18/2019: FIGO Stage IA (pT1a, pN0, cM0) - Signed by Lokesh Carias MD on 7/18/2019 7/18/2019 - 7/18/2019 Chemotherapy    Treatment Summary   Plan Name: OP GYN PACLITAXEL CARBOPLATIN (AUC 6) Q3W  Treatment Goal: Curative  Status: Inactive  Start Date:   End Date:   Provider: Lokesh Carias MD  Chemotherapy: CARBOplatin (PARAPLATIN) in sodium chloride 0.9% 250 mL chemo infusion, , Intravenous, Clinic/HOD 1 time, 0 of 6 cycles  PACLitaxel (TAXOL) 175 mg/m2 = 300 mg in sodium chloride 0.9% 500 mL chemo infusion, 175 mg/m2, Intravenous, Clinic/HOD 1 time, 0 of 6 cycles       Urothelial carcinoma of kidney, right   11/11/2021 Initial Diagnosis    Urothelial carcinoma of kidney, right     1/27/2022 Tumor Conference    Presenting Hospital / Clinic: [facility] [address]  Virtual Tumor Board Conference: Virtual  Presenter: Edmond Manuel  Date Presented to Tumor Board: 1/27/2022  Specialties Present: Medical Oncology; Radiation Oncology; Pathology; Navigation; Urology  Presentation at Cancer Conference: Prospective  Cancer Type: Other  Other Cancer: right upper tract urothelial  Recommended Plan: Chemotherapy; Surgery  Recommended Plan Note: neoadjuvant split dose MVAC + open nephroureterectomy    Oncology History   Endometrial ca   Urothelial carcinoma of kidney, right   1/27/2022 Tumor Conference    Presenting  Hospital / Clinic: [facility]  Virtual Tumor Board Conference: Virtual  Presenter: Edmond Manuel  Date Presented to Tumor Board: 1/27/2022  Specialties Present: Medical Oncology; Radiation Oncology; Pathology; Navigation; Urology  Presentation at Cancer Conference: Prospective  Cancer Type: Other  Other Cancer: right upper tract urothelial  Recommended Plan: Chemotherapy; Surgery  Recommended Plan Note: neoadjuvant split dose MVAC + open nephroureterectomy                  PATIENT SUMMARY:   Gita García is a 76 y.o. female with HG right upper tract UCC. History of endometrial cancer s/p neoadjuvant taxol+cisplatin and vaginal brachytherapy followed by robotic hysterectomy.    DISCUSSION:  Pathology review  Staging review    PERFORMANCE STATUS:  ECOG 0    Estimated GFR/CKD Stage: >60 (cr 0.7)    Clinical/Pathologic Stage (TNM): T1 N0 M0    FACULTY IN ATTENDANCE:    Urologic Oncology: Shane Diaz MD [x], Michael Finley MD [x] , Hever Pollack MD []    CONSULT NEEDED:     [] Urologic Oncology    [] Hem/Onc    [] Rad/Onc     [x] Treatment Guidelines (NCCN and AUA) reviewed and care planned is consistent with guidelines.    TUMOR BOARD RECOMMENDATIONS/PLAN/CONSENSUS:     Case discussed among group. Pathology and radiologic images were reviewed (if applicable).    Neoadjuvant split-dose DDMVAC + open nephroureterectomy  Referral to genetics      5/6/2022 -  Chemotherapy    Treatment Summary   Plan Name: OP NIVOLUMAB Q4W  Treatment Goal: Curative  Status: Active  Start Date: 5/6/2022  End Date: 3/10/2023 (Planned)  Provider: Urban Cancino MD  Chemotherapy: [No matching medication found in this treatment plan]            HPI:   77 y.o. female who presents to clinic prior to C7 of Opdivo. Had covid in July and since then she has been losing weight. Reports significant dry mouth. Dry mouth is the same after stopping carafate. She is not drinking a lot of water. Neuropathy at baseline from prior  chemotherapy.       ROS:   Review of Systems   Constitutional:  Positive for fatigue and unexpected weight change (weight loss). Negative for activity change, chills and fever.   HENT:  Positive for trouble swallowing. Negative for mouth sores, nosebleeds and sore throat.         Dry mouth   Respiratory:  Negative for cough, shortness of breath and wheezing.    Cardiovascular:  Negative for chest pain, palpitations and leg swelling.   Gastrointestinal:  Negative for abdominal distention, abdominal pain and blood in stool.        Indigestion   Endocrine: Negative for cold intolerance and heat intolerance.   Genitourinary:  Negative for dysuria, flank pain and frequency.   Musculoskeletal:  Negative for arthralgias, joint swelling and myalgias.   Skin:  Negative for color change, rash and wound.   Neurological:  Positive for numbness (feet). Negative for dizziness, light-headedness and headaches.   Hematological:  Negative for adenopathy. Does not bruise/bleed easily.      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        Past Surgical History:   Past Surgical History:   Procedure Laterality Date    ABLATION OF NEOPLASM OF URETER USING LASER Right 1/3/2022    Procedure: ABLATION, NEOPLASM, URETER, USING LASER;  Surgeon: Shane Diaz MD;  Location: [address];  Service: Urology;  Laterality: Right;    CYSTOSCOPY N/A 1/3/2022    Procedure: CYSTOSCOPY;  Surgeon: Shane Diaz MD;  Location: [facility] OR [address] [place] [address];  Service: Urology;  Laterality: N/A;    DILATION AND CURETTAGE OF UTERUS       GI scope  2016    LAPAROTOMY N/A 7/8/2019    Procedure: LAPAROTOMY;  Surgeon: Lokesh Carias MD;  Location: [facility] OR 2ND FLR;  Service: OB/GYN;  Laterality: N/A;  mini    OMENTECTOMY N/A 7/8/2019    Procedure: OMENTECTOMY;  Surgeon: Lokesh Carias MD;  Location: [facility] OR 2ND FLR;  Service: OB/GYN;  Laterality: N/A;    ROBOT-ASSISTED LAPAROSCOPIC ABDOMINAL HYSTERECTOMY USING DA SIRISHA XI N/A 7/8/2019    Procedure: XI ROBOTIC HYSTERECTOMY;  Surgeon: Lokesh Carias MD;  Location: [facility] OR 2ND FLR;  Service: OB/GYN;  Laterality: N/A;    ROBOT-ASSISTED LAPAROSCOPIC PELVIC LYMPHADENECTOMY USING DA SIRISHA XI Bilateral 7/8/2019    Procedure: XI ROBOTIC LYMPHADENECTOMY, PELVIC;  Surgeon: Lokesh Carias MD;  Location: [facility] OR 2ND FLR;  Service: OB/GYN;  Laterality: Bilateral;    ROBOT-ASSISTED LAPAROSCOPIC RETROPERITONEAL LYMPHADENECTOMY USING DA SIRISHA XI N/A 7/8/2019    Procedure: XI ROBOTIC LYMPHADENECTOMY, RETROPERITONEUM;  Surgeon: Lokesh Carias MD;  Location: [facility] OR 2ND FLR;  Service: OB/GYN;  Laterality: N/A;    ROBOT-ASSISTED LAPAROSCOPIC SALPINGO-OOPHORECTOMY USING DA SIRISHA XI Bilateral 7/8/2019    Procedure: XI ROBOTIC SALPINGO-OOPHORECTOMY;  Surgeon: Lokesh Carias MD;  Location: [facility] OR 2ND FLR;  Service: OB/GYN;  Laterality: Bilateral;    ROBOT-ASSISTED LAPAROSCOPIC SURGICAL REMOVAL OF KIDNEY AND URETER USING DA SIRISHA XI Right 3/14/2022    Procedure: XI ROBOTIC NEPHROURETERECTOMY/ WITH BLADDER CUFF;  Surgeon: Shane Diaz MD;  Location: [facility] OR 2ND FLR;  Service: Urology;  Laterality: Right;  4hrs    URETEROSCOPY Right 1/3/2022    Procedure: URETEROSCOPY;  Surgeon: Shane Diaz MD;  Location: [facility] OR 1ST FLR;  Service: Urology;  Laterality: Right;  2hrs        Family History:   Family History   Problem Relation Age of Onset    Colon cancer Sister 53    Prostate cancer Brother     Breast cancer Sister 64    Kidney cancer Sister     Breast cancer Sister         in her 60s    Skin cancer  Sister     Stroke Brother     Prostate cancer Brother     Heart disease Brother     Prostate cancer Brother     Heart disease Brother     Ovarian cancer Neg Hx     Uterine cancer Neg Hx     Anesthesia problems Neg Hx         Social History:   Social History     Tobacco Use    Smoking status: Never    Smokeless tobacco: Never   Substance Use Topics    Alcohol use: Never        Allergies:   Review of patient's allergies indicates:   Allergen Reactions    Asa [aspirin] Other (See Comments)     Stomach upset    Dilaudid (pf) [hydromorphone (pf)] Nausea Only    Sulfa (sulfonamide antibiotics) Itching        Medications:   Current Outpatient Medications   Medication Sig Dispense Refill    acetaminophen (TYLENOL) 500 MG tablet Take 500 mg by mouth every 6 (six) hours as needed for Pain.      ALPRAZolam (XANAX) 0.25 MG tablet Take 0.25 mg by mouth 2 (two) times daily as needed for Anxiety.       benzonatate (TESSALON) 100 MG capsule Take 100 mg by mouth 3 (three) times daily as needed for Cough.      bisoprolol-hydrochlorothiazide (ZIAC) 10-6.25 mg per tablet Take 1 tablet by mouth once daily.       calcium carbonate/vitamin D3 (CALCIUM 600 + D,3, ORAL) Take by mouth once daily.       co-enzyme Q-10 30 mg capsule Take 30 mg by mouth 3 (three) times daily.      loratadine (CLARITIN) 10 mg tablet Take 10 mg by mouth daily as needed for Allergies.      milk thistle 175 mg tablet Take 175 mg by mouth once daily.      multivitamin/iron/folic acid (CENTRUM ORAL) Take by mouth once daily.       mv-min/FA/vit K/lycop/lut/zeax (OCUVITE EYE PLUS MULTI ORAL) Take 1 tablet by mouth once daily.       oxyCODONE (ROXICODONE) 5 MG immediate release tablet Take 1 tablet (5 mg total) by mouth every 6 (six) hours as needed for Pain. 10 tablet 0    pantoprazole (PROTONIX) 40 MG tablet Take 40 mg by mouth once daily.      polyethylene glycol (GLYCOLAX) 17 gram PwPk Take by mouth every morning.      raloxifene (EVISTA) 60 mg tablet Take 60 mg by  "mouth every evening.      sucralfate (CARAFATE) 1 gram tablet Take 1 g by mouth 2 (two) times daily.       No current facility-administered medications for this visit.        Physical Exam:   BP (!) 148/65 (BP Location: Left arm, Patient Position: Sitting, BP Method: Medium (Automatic))   Pulse 60   Temp 98 °F (36.7 °C) (Oral)   Resp 18   Ht 5' 4.5" (1.638 m)   Wt 66.2 kg (145 lb 15.1 oz)   SpO2 98%   BMI 24.66 kg/m²      ECOG Performance Status: (foot note - ECOG PS provided by [employer]cology [employer]) 0 - Asymptomatic    Physical Exam  Constitutional:       Appearance: She is well-developed.   HENT:      Head: Normocephalic and atraumatic.   Eyes:      Conjunctiva/sclera: Conjunctivae normal.      Pupils: Pupils are equal, round, and reactive to light.   Pulmonary:      Effort: Pulmonary effort is normal. No respiratory distress.   Abdominal:      General: There is no distension.      Palpations: Abdomen is soft.   Musculoskeletal:         General: No swelling. Normal range of motion.      Cervical back: Normal range of motion and neck supple.   Lymphadenopathy:      Cervical: No cervical adenopathy.   Skin:     General: Skin is warm and dry.   Neurological:      General: No focal deficit present.      Mental Status: She is alert and oriented to person, place, and time.   Psychiatric:         Mood and Affect: Mood normal.         Behavior: Behavior normal.         Labs:   Recent Results (from the past 48 hour(s))   CBC Oncology    Collection Time: 10/21/22  9:04 AM   Result Value Ref Range    WBC 7.56 3.90 - 12.70 K/uL    RBC 3.89 (L) 4.00 - 5.40 M/uL    Hemoglobin 11.5 (L) 12.0 - 16.0 g/dL    Hematocrit 35.8 (L) 37.0 - 48.5 %    MCV 92 82 - 98 fL    MCH 29.6 27.0 - 31.0 pg    MCHC 32.1 32.0 - 36.0 g/dL    RDW 13.1 11.5 - 14.5 %    Platelets 229 150 - 450 K/uL    MPV 10.2 9.2 - 12.9 fL    Gran # (ANC) 5.3 1.8 - 7.7 K/uL    Immature Grans (Abs) 0.02 0.00 - 0.04 K/uL   Comprehensive Metabolic Panel "    Collection Time: 10/21/22  9:04 AM   Result Value Ref Range    Sodium 138 136 - 145 mmol/L    Potassium 3.4 (L) 3.5 - 5.1 mmol/L    Chloride 103 95 - 110 mmol/L    CO2 27 23 - 29 mmol/L    Glucose 94 70 - 110 mg/dL    BUN 12 8 - 23 mg/dL    Creatinine 0.8 0.5 - 1.4 mg/dL    Calcium 9.2 8.7 - 10.5 mg/dL    Total Protein 6.6 6.0 - 8.4 g/dL    Albumin 3.6 3.5 - 5.2 g/dL    Total Bilirubin 1.1 (H) 0.1 - 1.0 mg/dL    Alkaline Phosphatase 54 (L) 55 - 135 U/L    AST 17 10 - 40 U/L    ALT 17 10 - 44 U/L    Anion Gap 8 8 - 16 mmol/L    eGFR >60.0 >60 mL/min/1.73 m^2   TSH    Collection Time: 10/21/22  9:04 AM   Result Value Ref Range    TSH 2.436 0.400 - 4.000 uIU/mL   T4, Free    Collection Time: 10/21/22  9:04 AM   Result Value Ref Range    Free T4 0.91 0.71 - 1.51 ng/dL        Imaging:  MRI Urography W W/O Contrast (XPD)  Narrative: EXAMINATION:  MRI UROGRAPHY W W/O CONTRAST (XPD)    CLINICAL HISTORY:  Bladder cancer, invasive, assess treatment response;  Malignant neoplasm of right kidney, except renal pelvis    TECHNIQUE:  Multiplanar multisequence images of the abdomen and pelvis before and after administration of 7 mL Gadavist intravenous contrast. Examination performed per urography protocol.    COMPARISON:  MRI urography 07/05/2022, CT chest abdomen pelvis 04/21/2022    FINDINGS:  Motion degraded exam.    Liver: Stable T2 hyperintense right and left lobe lesions demonstrating arterial enhancement persisting on venous and delayed phase following blood pool intensity consistent with hemangiomas.  Left lobe measures 2.4 cm (series 28, image 5).  Right lobe measures 1.5 cm (series 28, image 3).  No new lesion.    Gallbladder: Unremarkable.    Bile Ducts: Mild dilatation of the central intrahepatic bile ducts.  Common bile duct is normal caliber.    Pancreas: No mass or ductal dilatation.    Spleen: Unremarkable.    Adrenals: Unremarkable.    Kidneys/Ureters: Postoperative changes of right nephroureterectomy.  No  evidence of local disease recurrence.  Stable appearance of the left kidney.  No solid enhancing renal mass.  No hydronephrosis or ureteral dilatation.    Bladder: No focal bladder wall thickening or enhancing mass lesion.  No abnormal restricted diffusion.  The soft tissue focus adjacent to the right posterior bladder wall described on prior CT is favored to represent postoperative change.  No corresponding abnormal restricted diffusion.    Reproductive: Status post hysterectomy and bilateral salpingo-oophorectomy.    GI Tract/Mesentery (imaged portion): No evidence of bowel obstruction or inflammation.  Colonic diverticulosis.    Peritoneal Space: No ascites.    Retroperitoneum: No pathologically enlarged nodes.    Abdominal wall: Midline ventral abdominal wall incisional scar.    Vasculature: Abdominal aorta is normal caliber and contains atherosclerosis.  No aneurysm.    Bones: Degenerative changes.  No suspicious marrow replacing lesion.  Impression: 1. Patient with urothelial carcinoma status post right nephroureterectomy.  No evidence of local disease recurrence or metastasis.  2. Stable hepatic lesions consistent with hemangiomas.  3. Additional findings as above.    Electronically signed by resident: Sylvia Heck  Date:    10/18/2022  Time:    15:28    Electronically signed by: Richard Lala  Date:    10/18/2022  Time:    17:04            Diagnoses:       1. Urothelial carcinoma of kidney, right    2. Solitary kidney, acquired    3. Chemotherapy-induced neuropathy    4. Endometrial ca    5. Fatigue, unspecified type    6. Gastroesophageal reflux disease, unspecified whether esophagitis present    7. Dry mouth    8. Loss of appetite    9. Weight loss            Assessment and Plan:         1. Stage IIIA Urothelial Carcinoma:   Discussed stage, adjuvant treatment options, and prognosis in detail with patient and daughter.  Due to below problems, I am recommending against cisplatin based adjuvant  chemotherapy.  She would be a candidate for adjuvant Opdivo and discussed this treatment plan and the DFS benefit, though data is immature.  Patient and daughter agree to move forward with Opdivo adjuvant x 1 year per Checkmate 274 clinical trial.  - Consented for Opdivo Q4W. MRU reviewed today and shows no evidence of disease. Labs acceptable to proceed with C7. Re-staging scans in January ([facility]).     2,3 Not a candidate for cisplatin chemotherapy due to these issues.    4 Continue to follow with Dr. Joe every 4 months.    5. Residual from covid. Will continue to monitor.    6 Will defer to GI for recs regarding indigestion, swallowing complaints.    7-9. Nutrition referral ordered. Educated to increase water intake during meals. May also use Xylimelts and hard candies for dry mouth. Needs to supplement diet with Boost/Ensure.       she will return to clinic prior to C8, but knows to call in the interim if symptoms change or should a problem arise.    Patient was also seen and examined by Dr. Cancino. Patient is in agreement with the proposed treatment plan. All questions were answered to the patient's satisfaction. Pt knows to call clinic if anything is needed before the next clinic visit.    Joellen Valerio, MSN, APRN, FNP-C  Hematology and Medical Oncology  Nurse Practitioner to Dr. Urban Cancino  Nurse Practitioner, [facility]      I have reviewed the notes, assessments, and/or procedures performed by Joellen CAMERON, as above.  I have personally interviewed and examined the patient at the beside, and rounded with Joellen. I concur with her assessment and plan and the documentation of Gita García.    MDM includes:    - Acute or chronic illness or injury that poses a threat to life or bodily function  - Independent review and explanation of 2 results from unique tests  - Discussion of management and ordering 2 unique tests  - Extensive discussion of treatment and  management  - Prescription drug management  - Drug therapy requiring intensive monitoring for toxicity    Urban Cancino M.D.  Hematology/Oncology Attending  [facility]  [facility]          Route Chart for Scheduling    Med Onc Chart Routing      Follow up with physician    Follow up with EVELYN 4 weeks. prior to Opdivo   Infusion scheduling note    Injection scheduling note    Labs CBC, CMP, TSH and free T4   Lab interval:  prior to infusion   Imaging    Pharmacy appointment    Other referrals        Treatment Plan Information   OP NIVOLUMAB Q4W   Urban Cancino MD   Upcoming Treatment Dates - OP NIVOLUMAB Q4W    10/21/2022       Chemotherapy       nivolumab 480 mg in sodium chloride 0.9% 148 mL infusion  11/18/2022       Chemotherapy       nivolumab 480 mg in sodium chloride 0.9% 148 mL infusion  12/16/2022       Chemotherapy       nivolumab 480 mg in sodium chloride 0.9% 148 mL infusion  1/13/2023       Chemotherapy       nivolumab 480 mg in sodium chloride 0.9% 148 mL infusion    Therapy Plan Information  mitoMYcin (MUTAMYCIN) 40 mg in sodium chloride 0.9% 40 mL bladder instillation  40 mg, Intravesical, 1 time a week, at least 5 days apart

## 2022-11-18 ENCOUNTER — INFUSION (OUTPATIENT)
Dept: INFUSION THERAPY | Facility: HOSPITAL | Age: 77
End: 2022-11-18
Attending: INTERNAL MEDICINE
Payer: MEDICARE

## 2022-11-18 ENCOUNTER — OFFICE VISIT (OUTPATIENT)
Dept: HEMATOLOGY/ONCOLOGY | Facility: CLINIC | Age: 77
End: 2022-11-18
Payer: MEDICARE

## 2022-11-18 VITALS
HEART RATE: 55 BPM | TEMPERATURE: 98 F | RESPIRATION RATE: 17 BRPM | DIASTOLIC BLOOD PRESSURE: 67 MMHG | SYSTOLIC BLOOD PRESSURE: 147 MMHG | OXYGEN SATURATION: 99 %

## 2022-11-18 VITALS
HEIGHT: 65 IN | WEIGHT: 143.75 LBS | RESPIRATION RATE: 18 BRPM | OXYGEN SATURATION: 99 % | TEMPERATURE: 99 F | BODY MASS INDEX: 23.95 KG/M2 | DIASTOLIC BLOOD PRESSURE: 71 MMHG | HEART RATE: 53 BPM | SYSTOLIC BLOOD PRESSURE: 140 MMHG

## 2022-11-18 DIAGNOSIS — R63.4 WEIGHT LOSS: ICD-10-CM

## 2022-11-18 DIAGNOSIS — R63.0 LOSS OF APPETITE: ICD-10-CM

## 2022-11-18 DIAGNOSIS — R68.2 DRY MOUTH: ICD-10-CM

## 2022-11-18 DIAGNOSIS — T45.1X5A CHEMOTHERAPY-INDUCED NEUROPATHY: ICD-10-CM

## 2022-11-18 DIAGNOSIS — G62.0 CHEMOTHERAPY-INDUCED NEUROPATHY: ICD-10-CM

## 2022-11-18 DIAGNOSIS — Z90.5 SOLITARY KIDNEY, ACQUIRED: ICD-10-CM

## 2022-11-18 DIAGNOSIS — K21.9 GASTROESOPHAGEAL REFLUX DISEASE, UNSPECIFIED WHETHER ESOPHAGITIS PRESENT: ICD-10-CM

## 2022-11-18 DIAGNOSIS — R53.83 FATIGUE, UNSPECIFIED TYPE: ICD-10-CM

## 2022-11-18 DIAGNOSIS — C64.1 UROTHELIAL CARCINOMA OF KIDNEY, RIGHT: Primary | ICD-10-CM

## 2022-11-18 DIAGNOSIS — C54.1 ENDOMETRIAL CA: ICD-10-CM

## 2022-11-18 PROCEDURE — 99999 PR PBB SHADOW E&M-EST. PATIENT-LVL IV: CPT | Mod: PBBFAC,,, | Performed by: INTERNAL MEDICINE

## 2022-11-18 PROCEDURE — 3078F DIAST BP <80 MM HG: CPT | Mod: CPTII,S$GLB,, | Performed by: INTERNAL MEDICINE

## 2022-11-18 PROCEDURE — 63600175 PHARM REV CODE 636 W HCPCS: Mod: JG | Performed by: NURSE PRACTITIONER

## 2022-11-18 PROCEDURE — 3078F PR MOST RECENT DIASTOLIC BLOOD PRESSURE < 80 MM HG: ICD-10-PCS | Mod: CPTII,S$GLB,, | Performed by: INTERNAL MEDICINE

## 2022-11-18 PROCEDURE — 1157F PR ADVANCE CARE PLAN OR EQUIV PRESENT IN MEDICAL RECORD: ICD-10-PCS | Mod: CPTII,S$GLB,, | Performed by: INTERNAL MEDICINE

## 2022-11-18 PROCEDURE — 3077F PR MOST RECENT SYSTOLIC BLOOD PRESSURE >= 140 MM HG: ICD-10-PCS | Mod: CPTII,S$GLB,, | Performed by: INTERNAL MEDICINE

## 2022-11-18 PROCEDURE — 25000003 PHARM REV CODE 250: Performed by: NURSE PRACTITIONER

## 2022-11-18 PROCEDURE — 99215 OFFICE O/P EST HI 40 MIN: CPT | Mod: S$GLB,,, | Performed by: INTERNAL MEDICINE

## 2022-11-18 PROCEDURE — 1101F PT FALLS ASSESS-DOCD LE1/YR: CPT | Mod: CPTII,S$GLB,, | Performed by: INTERNAL MEDICINE

## 2022-11-18 PROCEDURE — 1126F AMNT PAIN NOTED NONE PRSNT: CPT | Mod: CPTII,S$GLB,, | Performed by: INTERNAL MEDICINE

## 2022-11-18 PROCEDURE — 3288F PR FALLS RISK ASSESSMENT DOCUMENTED: ICD-10-PCS | Mod: CPTII,S$GLB,, | Performed by: INTERNAL MEDICINE

## 2022-11-18 PROCEDURE — 99215 PR OFFICE/OUTPT VISIT, EST, LEVL V, 40-54 MIN: ICD-10-PCS | Mod: S$GLB,,, | Performed by: INTERNAL MEDICINE

## 2022-11-18 PROCEDURE — 1101F PR PT FALLS ASSESS DOC 0-1 FALLS W/OUT INJ PAST YR: ICD-10-PCS | Mod: CPTII,S$GLB,, | Performed by: INTERNAL MEDICINE

## 2022-11-18 PROCEDURE — 96413 CHEMO IV INFUSION 1 HR: CPT

## 2022-11-18 PROCEDURE — 3077F SYST BP >= 140 MM HG: CPT | Mod: CPTII,S$GLB,, | Performed by: INTERNAL MEDICINE

## 2022-11-18 PROCEDURE — 99999 PR PBB SHADOW E&M-EST. PATIENT-LVL IV: ICD-10-PCS | Mod: PBBFAC,,, | Performed by: INTERNAL MEDICINE

## 2022-11-18 PROCEDURE — 1126F PR PAIN SEVERITY QUANTIFIED, NO PAIN PRESENT: ICD-10-PCS | Mod: CPTII,S$GLB,, | Performed by: INTERNAL MEDICINE

## 2022-11-18 PROCEDURE — 3288F FALL RISK ASSESSMENT DOCD: CPT | Mod: CPTII,S$GLB,, | Performed by: INTERNAL MEDICINE

## 2022-11-18 PROCEDURE — 1157F ADVNC CARE PLAN IN RCRD: CPT | Mod: CPTII,S$GLB,, | Performed by: INTERNAL MEDICINE

## 2022-11-18 RX ORDER — HEPARIN 100 UNIT/ML
500 SYRINGE INTRAVENOUS
Status: CANCELLED | OUTPATIENT
Start: 2022-11-18

## 2022-11-18 RX ORDER — SODIUM CHLORIDE 0.9 % (FLUSH) 0.9 %
10 SYRINGE (ML) INJECTION
Status: CANCELLED | OUTPATIENT
Start: 2022-11-18

## 2022-11-18 RX ORDER — HEPARIN 100 UNIT/ML
500 SYRINGE INTRAVENOUS
Status: DISCONTINUED | OUTPATIENT
Start: 2022-11-18 | End: 2022-11-18 | Stop reason: HOSPADM

## 2022-11-18 RX ORDER — SODIUM CHLORIDE 0.9 % (FLUSH) 0.9 %
10 SYRINGE (ML) INJECTION
Status: DISCONTINUED | OUTPATIENT
Start: 2022-11-18 | End: 2022-11-18 | Stop reason: HOSPADM

## 2022-11-18 RX ADMIN — SODIUM CHLORIDE 480 MG: 9 INJECTION, SOLUTION INTRAVENOUS at 11:11

## 2022-11-18 RX ADMIN — SODIUM CHLORIDE: 0.9 INJECTION, SOLUTION INTRAVENOUS at 10:11

## 2022-11-18 NOTE — PROGRESS NOTES
ONCOLOGY FOLLOW UP VISIT    Reason for visit: Toxicity check on Opdivo for stage IIIA urothelial carcinoma    Cancer/Stage/TNM:    Cancer Staging   Endometrial ca  Staging form: Corpus Uteri - Carcinoma and Carcinosarcoma, AJCC 8th Edition  - Clinical: No stage assigned - Unsigned  - Pathologic stage from 7/18/2019: FIGO Stage IA (pT1a, pN0, cM0) - Signed by Lokesh Carias MD on 7/18/2019       Oncology History   Endometrial ca   6/26/2019 Initial Diagnosis    Endometrial ca     7/18/2019 Cancer Staged    Staging form: Corpus Uteri - Carcinoma and Carcinosarcoma, AJCC 8th Edition  - Pathologic stage from 7/18/2019: FIGO Stage IA (pT1a, pN0, cM0) - Signed by Lokesh Carias MD on 7/18/2019 7/18/2019 - 7/18/2019 Chemotherapy    Treatment Summary   Plan Name: OP GYN PACLITAXEL CARBOPLATIN (AUC 6) Q3W  Treatment Goal: Curative  Status: Inactive  Start Date:   End Date:   Provider: Lokesh Carias MD  Chemotherapy: CARBOplatin (PARAPLATIN) in sodium chloride 0.9% 250 mL chemo infusion, , Intravenous, Clinic/HOD 1 time, 0 of 6 cycles  PACLitaxel (TAXOL) 175 mg/m2 = 300 mg in sodium chloride 0.9% 500 mL chemo infusion, 175 mg/m2, Intravenous, Clinic/HOD 1 time, 0 of 6 cycles       Urothelial carcinoma of kidney, right   11/11/2021 Initial Diagnosis    Urothelial carcinoma of kidney, right     1/27/2022 Tumor Conference    Presenting Hospital / Clinic: [facility] [address]  Virtual Tumor Board Conference: Virtual  Presenter: Edmond Manuel  Date Presented to Tumor Board: 1/27/2022  Specialties Present: Medical Oncology; Radiation Oncology; Pathology; Navigation; Urology  Presentation at Cancer Conference: Prospective  Cancer Type: Other  Other Cancer: right upper tract urothelial  Recommended Plan: Chemotherapy; Surgery  Recommended Plan Note: neoadjuvant split dose MVAC + open nephroureterectomy    Oncology History   Endometrial ca   Urothelial carcinoma of kidney, right   1/27/2022 Tumor Conference    Presenting  Hospital / Clinic: [facility]  Virtual Tumor Board Conference: Virtual  Presenter: Edmond Manuel  Date Presented to Tumor Board: 1/27/2022  Specialties Present: Medical Oncology; Radiation Oncology; Pathology; Navigation; Urology  Presentation at Cancer Conference: Prospective  Cancer Type: Other  Other Cancer: right upper tract urothelial  Recommended Plan: Chemotherapy; Surgery  Recommended Plan Note: neoadjuvant split dose MVAC + open nephroureterectomy                  PATIENT SUMMARY:   Gita García is a 76 y.o. female with HG right upper tract UCC. History of endometrial cancer s/p neoadjuvant taxol+cisplatin and vaginal brachytherapy followed by robotic hysterectomy.    DISCUSSION:  Pathology review  Staging review    PERFORMANCE STATUS:  ECOG 0    Estimated GFR/CKD Stage: >60 (cr 0.7)    Clinical/Pathologic Stage (TNM): T1 N0 M0    FACULTY IN ATTENDANCE:    Urologic Oncology: Shane Diaz MD [x], Michael Finley MD [x] , Hever Pollack MD []    CONSULT NEEDED:     [] Urologic Oncology    [] Hem/Onc    [] Rad/Onc     [x] Treatment Guidelines (NCCN and AUA) reviewed and care planned is consistent with guidelines.    TUMOR BOARD RECOMMENDATIONS/PLAN/CONSENSUS:     Case discussed among group. Pathology and radiologic images were reviewed (if applicable).    Neoadjuvant split-dose DDMVAC + open nephroureterectomy  Referral to genetics      5/6/2022 -  Chemotherapy    Treatment Summary   Plan Name: OP NIVOLUMAB Q4W  Treatment Goal: Curative  Status: Active  Start Date: 5/6/2022  End Date: 3/10/2023 (Planned)  Provider: Urban Cancino MD  Chemotherapy: [No matching medication found in this treatment plan]            HPI:   77 y.o. female who presents to clinic prior to C8 of Opdivo. Reports significant dry mouth. She is trying sour candies and Biotene mouthwash. Describes thick saliva in the back of her throat. Still having weight loss. Has not met with nutrition yet and not consistently  supplementing with Boost. She is not drinking a lot of water. Neuropathy at baseline from prior chemotherapy.       ROS:   Review of Systems   Constitutional:  Positive for fatigue and unexpected weight change (weight loss). Negative for activity change, chills and fever.   HENT:  Negative for mouth sores, nosebleeds, sore throat and trouble swallowing.         Dry mouth. Thickened saliva.    Respiratory:  Negative for cough, shortness of breath and wheezing.    Cardiovascular:  Negative for chest pain, palpitations and leg swelling.   Gastrointestinal:  Negative for abdominal distention, abdominal pain and blood in stool.   Endocrine: Negative for cold intolerance and heat intolerance.   Genitourinary:  Negative for dysuria, flank pain and frequency.   Musculoskeletal:  Negative for arthralgias, joint swelling and myalgias.   Skin:  Negative for color change, rash and wound.   Neurological:  Positive for numbness (feet). Negative for dizziness, light-headedness and headaches.   Hematological:  Negative for adenopathy. Does not bruise/bleed easily.      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        Past Surgical History:   Past Surgical History:   Procedure Laterality Date    ABLATION OF NEOPLASM OF URETER USING LASER Right 1/3/2022    Procedure: ABLATION, NEOPLASM, URETER, USING LASER;  Surgeon: Shane Diaz MD;  Location: [facility] OR [address] [place] [address];  Service: Urology;  Laterality: Right;    CYSTOSCOPY N/A 1/3/2022    Procedure: CYSTOSCOPY;  Surgeon: Shane Diaz MD;  Location: [facility] OR  1ST FLR;  Service: Urology;  Laterality: N/A;    DILATION AND CURETTAGE OF UTERUS      GI scope  2016    LAPAROTOMY N/A 7/8/2019    Procedure: LAPAROTOMY;  Surgeon: Lokesh Carias MD;  Location: [facility] OR 2ND FLR;  Service: OB/GYN;  Laterality: N/A;  mini    OMENTECTOMY N/A 7/8/2019    Procedure: OMENTECTOMY;  Surgeon: Lokesh Carias MD;  Location: [facility] OR 2ND FLR;  Service: OB/GYN;  Laterality: N/A;    ROBOT-ASSISTED LAPAROSCOPIC ABDOMINAL HYSTERECTOMY USING DA SIRISHA XI N/A 7/8/2019    Procedure: XI ROBOTIC HYSTERECTOMY;  Surgeon: Lokesh Carias MD;  Location: [facility] OR 2ND FLR;  Service: OB/GYN;  Laterality: N/A;    ROBOT-ASSISTED LAPAROSCOPIC PELVIC LYMPHADENECTOMY USING DA SIRISHA XI Bilateral 7/8/2019    Procedure: XI ROBOTIC LYMPHADENECTOMY, PELVIC;  Surgeon: Lokesh Carias MD;  Location: [facility] OR 2ND FLR;  Service: OB/GYN;  Laterality: Bilateral;    ROBOT-ASSISTED LAPAROSCOPIC RETROPERITONEAL LYMPHADENECTOMY USING DA SIRISHA XI N/A 7/8/2019    Procedure: XI ROBOTIC LYMPHADENECTOMY, RETROPERITONEUM;  Surgeon: Lokesh Carias MD;  Location: [facility] OR 2ND FLR;  Service: OB/GYN;  Laterality: N/A;    ROBOT-ASSISTED LAPAROSCOPIC SALPINGO-OOPHORECTOMY USING DA SIRISHA XI Bilateral 7/8/2019    Procedure: XI ROBOTIC SALPINGO-OOPHORECTOMY;  Surgeon: Lokesh Carias MD;  Location: [facility] OR 2ND FLR;  Service: OB/GYN;  Laterality: Bilateral;    ROBOT-ASSISTED LAPAROSCOPIC SURGICAL REMOVAL OF KIDNEY AND URETER USING DA SIRISHA XI Right 3/14/2022    Procedure: XI ROBOTIC NEPHROURETERECTOMY/ WITH BLADDER CUFF;  Surgeon: Shane Diaz MD;  Location: [facility] OR 2ND FLR;  Service: Urology;  Laterality: Right;  4hrs    URETEROSCOPY Right 1/3/2022    Procedure: URETEROSCOPY;  Surgeon: Shane Diaz MD;  Location: [facility] OR 1ST FLR;  Service: Urology;  Laterality: Right;  2hrs        Family History:   Family History   Problem Relation Age of Onset    Colon cancer Sister 53    Prostate cancer Brother     Breast cancer Sister 64     Kidney cancer Sister     Breast cancer Sister         in her 60s    Skin cancer Sister     Stroke Brother     Prostate cancer Brother     Heart disease Brother     Prostate cancer Brother     Heart disease Brother     Ovarian cancer Neg Hx     Uterine cancer Neg Hx     Anesthesia problems Neg Hx         Social History:   Social History     Tobacco Use    Smoking status: Never    Smokeless tobacco: Never   Substance Use Topics    Alcohol use: Never        Allergies:   Review of patient's allergies indicates:   Allergen Reactions    Asa [aspirin] Other (See Comments)     Stomach upset    Dilaudid (pf) [hydromorphone (pf)] Nausea Only    Sulfa (sulfonamide antibiotics) Itching        Medications:   Current Outpatient Medications   Medication Sig Dispense Refill    acetaminophen (TYLENOL) 500 MG tablet Take 500 mg by mouth every 6 (six) hours as needed for Pain.      ALPRAZolam (XANAX) 0.25 MG tablet Take 0.25 mg by mouth 2 (two) times daily as needed for Anxiety.       benzonatate (TESSALON) 100 MG capsule Take 100 mg by mouth 3 (three) times daily as needed for Cough.      bisoprolol-hydrochlorothiazide (ZIAC) 10-6.25 mg per tablet Take 1 tablet by mouth once daily.       calcium carbonate/vitamin D3 (CALCIUM 600 + D,3, ORAL) Take by mouth once daily.       co-enzyme Q-10 30 mg capsule Take 30 mg by mouth 3 (three) times daily.      loratadine (CLARITIN) 10 mg tablet Take 10 mg by mouth daily as needed for Allergies.      milk thistle 175 mg tablet Take 175 mg by mouth once daily.      multivitamin/iron/folic acid (CENTRUM ORAL) Take by mouth once daily.       mv-min/FA/vit K/lycop/lut/zeax (OCUVITE EYE PLUS MULTI ORAL) Take 1 tablet by mouth once daily.       oxyCODONE (ROXICODONE) 5 MG immediate release tablet Take 1 tablet (5 mg total) by mouth every 6 (six) hours as needed for Pain. 10 tablet 0    pantoprazole (PROTONIX) 40 MG tablet Take 40 mg by mouth once daily.      polyethylene glycol (GLYCOLAX) 17 gram PwPk  "Take by mouth every morning.      raloxifene (EVISTA) 60 mg tablet Take 60 mg by mouth every evening.      sucralfate (CARAFATE) 1 gram tablet Take 1 g by mouth 2 (two) times daily.       No current facility-administered medications for this visit.        Physical Exam:   BP (!) 140/71 (BP Location: Left arm, Patient Position: Sitting, BP Method: Medium (Automatic))   Pulse (!) 53   Temp 98.7 °F (37.1 °C) (Oral)   Resp 18   Ht 5' 4.5" (1.638 m)   Wt 65.2 kg (143 lb 11.8 oz)   SpO2 99%   BMI 24.29 kg/m²      ECOG Performance Status: (foot note - ECOG PS provided by Eastern Cooperative Oncology Group) 0 - Asymptomatic    Physical Exam  Constitutional:       Appearance: She is well-developed.   HENT:      Head: Normocephalic and atraumatic.   Eyes:      Conjunctiva/sclera: Conjunctivae normal.      Pupils: Pupils are equal, round, and reactive to light.   Pulmonary:      Effort: Pulmonary effort is normal. No respiratory distress.   Abdominal:      General: There is no distension.      Palpations: Abdomen is soft.   Musculoskeletal:         General: No swelling. Normal range of motion.      Cervical back: Normal range of motion and neck supple.   Lymphadenopathy:      Cervical: No cervical adenopathy.   Skin:     General: Skin is warm and dry.   Neurological:      General: No focal deficit present.      Mental Status: She is alert and oriented to person, place, and time.   Psychiatric:         Mood and Affect: Mood normal.         Behavior: Behavior normal.         Labs:   Recent Results (from the past 48 hour(s))   CBC Oncology    Collection Time: 11/18/22  9:21 AM   Result Value Ref Range    WBC 7.02 3.90 - 12.70 K/uL    RBC 3.92 (L) 4.00 - 5.40 M/uL    Hemoglobin 11.8 (L) 12.0 - 16.0 g/dL    Hematocrit 35.5 (L) 37.0 - 48.5 %    MCV 91 82 - 98 fL    MCH 30.1 27.0 - 31.0 pg    MCHC 33.2 32.0 - 36.0 g/dL    RDW 13.1 11.5 - 14.5 %    Platelets 241 150 - 450 K/uL    MPV 9.9 9.2 - 12.9 fL    Gran # (ANC) 4.9 1.8 - " 7.7 K/uL    Immature Grans (Abs) 0.02 0.00 - 0.04 K/uL        Imaging:  MRI Urography W W/O Contrast (XPD)  Narrative: EXAMINATION:  MRI UROGRAPHY W W/O CONTRAST (XPD)    CLINICAL HISTORY:  Bladder cancer, invasive, assess treatment response;  Malignant neoplasm of right kidney, except renal pelvis    TECHNIQUE:  Multiplanar multisequence images of the abdomen and pelvis before and after administration of 7 mL Gadavist intravenous contrast. Examination performed per urography protocol.    COMPARISON:  MRI urography 07/05/2022, CT chest abdomen pelvis 04/21/2022    FINDINGS:  Motion degraded exam.    Liver: Stable T2 hyperintense right and left lobe lesions demonstrating arterial enhancement persisting on venous and delayed phase following blood pool intensity consistent with hemangiomas.  Left lobe measures 2.4 cm (series 28, image 5).  Right lobe measures 1.5 cm (series 28, image 3).  No new lesion.    Gallbladder: Unremarkable.    Bile Ducts: Mild dilatation of the central intrahepatic bile ducts.  Common bile duct is normal caliber.    Pancreas: No mass or ductal dilatation.    Spleen: Unremarkable.    Adrenals: Unremarkable.    Kidneys/Ureters: Postoperative changes of right nephroureterectomy.  No evidence of local disease recurrence.  Stable appearance of the left kidney.  No solid enhancing renal mass.  No hydronephrosis or ureteral dilatation.    Bladder: No focal bladder wall thickening or enhancing mass lesion.  No abnormal restricted diffusion.  The soft tissue focus adjacent to the right posterior bladder wall described on prior CT is favored to represent postoperative change.  No corresponding abnormal restricted diffusion.    Reproductive: Status post hysterectomy and bilateral salpingo-oophorectomy.    GI Tract/Mesentery (imaged portion): No evidence of bowel obstruction or inflammation.  Colonic diverticulosis.    Peritoneal Space: No ascites.    Retroperitoneum: No pathologically enlarged  nodes.    Abdominal wall: Midline ventral abdominal wall incisional scar.    Vasculature: Abdominal aorta is normal caliber and contains atherosclerosis.  No aneurysm.    Bones: Degenerative changes.  No suspicious marrow replacing lesion.  Impression: 1. Patient with urothelial carcinoma status post right nephroureterectomy.  No evidence of local disease recurrence or metastasis.  2. Stable hepatic lesions consistent with hemangiomas.  3. Additional findings as above.    Electronically signed by resident: Sylvia Heck  Date:    10/18/2022  Time:    15:28    Electronically signed by: Richard Lala  Date:    10/18/2022  Time:    17:04            Diagnoses:       1. Urothelial carcinoma of kidney, right    2. Solitary kidney, acquired    3. Chemotherapy-induced neuropathy    4. Endometrial ca    5. Fatigue, unspecified type    6. Gastroesophageal reflux disease, unspecified whether esophagitis present    7. Dry mouth    8. Loss of appetite    9. Weight loss            Assessment and Plan:         1. Stage IIIA Urothelial Carcinoma:   Discussed stage, adjuvant treatment options, and prognosis in detail with patient and daughter.  Due to below problems, I am recommending against cisplatin based adjuvant chemotherapy.  She would be a candidate for adjuvant Opdivo and discussed this treatment plan and the DFS benefit, though data is immature.  Patient and daughter agree to move forward with Opdivo adjuvant x 1 year per Checkmate 274 clinical trial.  - Consented for Opdivo Q4W. MRU reviewed today and shows no evidence of disease. Labs acceptable to proceed with C8. Re-staging scans in January ([facility]).     2,3 Not a candidate for cisplatin chemotherapy due to these issues.    4 Continue to follow with Dr. Joe every 4 months.    5. Residual from covid. Will continue to monitor.    6 Will defer to GI for recs regarding indigestion, swallowing complaints.    7-9. Nutrition referral ordered. Educated to increase water  intake during meals. May also use Xylimelts and hard candies for dry mouth. Needs to supplement diet with Boost/Ensure. Try Mucinex for thick saliva.       she will return to clinic prior to C9, but knows to call in the interim if symptoms change or should a problem arise.    Patient was also seen and examined by Dr. Cancino. Patient is in agreement with the proposed treatment plan. All questions were answered to the patient's satisfaction. Pt knows to call clinic if anything is needed before the next clinic visit.    Joellen Valerio, MSN, APRN, FNP-C  Hematology and Medical Oncology  Nurse Practitioner to Dr. Urban Cancino  Nurse Practitioner, [facility]      I have reviewed the notes, assessments, and/or procedures performed by Joellen CAMERON, as above.  I have personally interviewed and examined the patient at the beside, and rounded with Joellen. I concur with her assessment and plan and the documentation of Gita García.    MDM includes:    - Acute or chronic illness or injury that poses a threat to life or bodily function  - Independent review and explanation of 2 results from unique tests  - Discussion of management and ordering 2 unique tests  - Extensive discussion of treatment and management  - Prescription drug management  - Drug therapy requiring intensive monitoring for toxicity    Urban Cancino M.D.  Hematology/Oncology Attending  [facility]  [facility]          Route Chart for Scheduling    Med Onc Chart Routing      Follow up with physician    Follow up with EVELYN 4 weeks. prior to Opdivo   Infusion scheduling note    Injection scheduling note    Labs CBC, CMP, TSH and free T4   Lab interval:  prior to infusion   Imaging    Pharmacy appointment    Other referrals        Treatment Plan Information   OP NIVOLUMAB Q4W   Urban Cancino MD   Upcoming Treatment Dates - OP NIVOLUMAB Q4W    11/18/2022        Chemotherapy       nivolumab 480 mg in sodium chloride 0.9% 148 mL infusion  12/16/2022       Chemotherapy       nivolumab 480 mg in sodium chloride 0.9% 148 mL infusion  1/13/2023       Chemotherapy       nivolumab 480 mg in sodium chloride 0.9% 148 mL infusion  2/10/2023       Chemotherapy       nivolumab 480 mg in sodium chloride 0.9% 148 mL infusion    Therapy Plan Information  mitoMYcin (MUTAMYCIN) 40 mg in sodium chloride 0.9% 40 mL bladder instillation  40 mg, Intravesical, 1 time a week, at least 5 days apart

## 2022-11-18 NOTE — PLAN OF CARE
1155  Patient completed Opdivo infusion and subsequent flush, tolerated well.  PIV discontinued without issue.  RTC 12/16/22  Patient ambulated off floor accompanied by .

## 2022-11-29 ENCOUNTER — LAB VISIT (OUTPATIENT)
Dept: LAB | Facility: HOSPITAL | Age: 77
End: 2022-11-29
Attending: OBSTETRICS & GYNECOLOGY
Payer: MEDICARE

## 2022-11-29 ENCOUNTER — OFFICE VISIT (OUTPATIENT)
Dept: GYNECOLOGIC ONCOLOGY | Facility: CLINIC | Age: 77
End: 2022-11-29
Payer: MEDICARE

## 2022-11-29 VITALS
DIASTOLIC BLOOD PRESSURE: 69 MMHG | SYSTOLIC BLOOD PRESSURE: 157 MMHG | HEART RATE: 56 BPM | WEIGHT: 141 LBS | BODY MASS INDEX: 24.07 KG/M2 | HEIGHT: 64 IN

## 2022-11-29 DIAGNOSIS — C54.1 ENDOMETRIAL CA: Primary | ICD-10-CM

## 2022-11-29 DIAGNOSIS — C54.1 ENDOMETRIAL CA: ICD-10-CM

## 2022-11-29 LAB — CANCER AG125 SERPL-ACNC: 26 U/ML (ref 0–30)

## 2022-11-29 PROCEDURE — 99999 PR PBB SHADOW E&M-EST. PATIENT-LVL III: ICD-10-PCS | Mod: PBBFAC,,, | Performed by: OBSTETRICS & GYNECOLOGY

## 2022-11-29 PROCEDURE — 99999 PR PBB SHADOW E&M-EST. PATIENT-LVL III: CPT | Mod: PBBFAC,,, | Performed by: OBSTETRICS & GYNECOLOGY

## 2022-11-29 PROCEDURE — 3078F DIAST BP <80 MM HG: CPT | Mod: CPTII,S$GLB,, | Performed by: OBSTETRICS & GYNECOLOGY

## 2022-11-29 PROCEDURE — 3288F FALL RISK ASSESSMENT DOCD: CPT | Mod: CPTII,S$GLB,, | Performed by: OBSTETRICS & GYNECOLOGY

## 2022-11-29 PROCEDURE — 1159F MED LIST DOCD IN RCRD: CPT | Mod: CPTII,S$GLB,, | Performed by: OBSTETRICS & GYNECOLOGY

## 2022-11-29 PROCEDURE — 3077F PR MOST RECENT SYSTOLIC BLOOD PRESSURE >= 140 MM HG: ICD-10-PCS | Mod: CPTII,S$GLB,, | Performed by: OBSTETRICS & GYNECOLOGY

## 2022-11-29 PROCEDURE — 1101F PT FALLS ASSESS-DOCD LE1/YR: CPT | Mod: CPTII,S$GLB,, | Performed by: OBSTETRICS & GYNECOLOGY

## 2022-11-29 PROCEDURE — 1157F ADVNC CARE PLAN IN RCRD: CPT | Mod: CPTII,S$GLB,, | Performed by: OBSTETRICS & GYNECOLOGY

## 2022-11-29 PROCEDURE — 99214 OFFICE O/P EST MOD 30 MIN: CPT | Mod: S$GLB,,, | Performed by: OBSTETRICS & GYNECOLOGY

## 2022-11-29 PROCEDURE — 3288F PR FALLS RISK ASSESSMENT DOCUMENTED: ICD-10-PCS | Mod: CPTII,S$GLB,, | Performed by: OBSTETRICS & GYNECOLOGY

## 2022-11-29 PROCEDURE — 3078F PR MOST RECENT DIASTOLIC BLOOD PRESSURE < 80 MM HG: ICD-10-PCS | Mod: CPTII,S$GLB,, | Performed by: OBSTETRICS & GYNECOLOGY

## 2022-11-29 PROCEDURE — 86304 IMMUNOASSAY TUMOR CA 125: CPT | Performed by: OBSTETRICS & GYNECOLOGY

## 2022-11-29 PROCEDURE — 1159F PR MEDICATION LIST DOCUMENTED IN MEDICAL RECORD: ICD-10-PCS | Mod: CPTII,S$GLB,, | Performed by: OBSTETRICS & GYNECOLOGY

## 2022-11-29 PROCEDURE — 36415 COLL VENOUS BLD VENIPUNCTURE: CPT | Performed by: OBSTETRICS & GYNECOLOGY

## 2022-11-29 PROCEDURE — 1126F PR PAIN SEVERITY QUANTIFIED, NO PAIN PRESENT: ICD-10-PCS | Mod: CPTII,S$GLB,, | Performed by: OBSTETRICS & GYNECOLOGY

## 2022-11-29 PROCEDURE — 99214 PR OFFICE/OUTPT VISIT, EST, LEVL IV, 30-39 MIN: ICD-10-PCS | Mod: S$GLB,,, | Performed by: OBSTETRICS & GYNECOLOGY

## 2022-11-29 PROCEDURE — 1101F PR PT FALLS ASSESS DOC 0-1 FALLS W/OUT INJ PAST YR: ICD-10-PCS | Mod: CPTII,S$GLB,, | Performed by: OBSTETRICS & GYNECOLOGY

## 2022-11-29 PROCEDURE — 1157F PR ADVANCE CARE PLAN OR EQUIV PRESENT IN MEDICAL RECORD: ICD-10-PCS | Mod: CPTII,S$GLB,, | Performed by: OBSTETRICS & GYNECOLOGY

## 2022-11-29 PROCEDURE — 3077F SYST BP >= 140 MM HG: CPT | Mod: CPTII,S$GLB,, | Performed by: OBSTETRICS & GYNECOLOGY

## 2022-11-29 PROCEDURE — 1126F AMNT PAIN NOTED NONE PRSNT: CPT | Mod: CPTII,S$GLB,, | Performed by: OBSTETRICS & GYNECOLOGY

## 2022-11-29 NOTE — PROGRESS NOTES
Subjective:      Patient ID: Gita García is a 77 y.o. female.    Chief Complaint: Follow-up      HPI  Presents today for routine surveillance visit for [facility]. Previously followed by Dr. Carias.  Diagnosis of urothelial carcinoma, followed by Dr. Diaz 2021. S/p right robotic nephroureterectomy and intraoperative administration of chemotherapeutic agent. Adjuvant Opdivo.    MRI pelvis/urography and CT chest 10/2022 negative.       24  Disease free interval from endometrial cancer 3 years.   Scheduled for genetic testing.      MMG with Dr. Abdul  Colonoscopy: 2016: N  Repeat in 5 years due FH colon ca in sister.   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 Negative cytology. 2 mm invasion.   Aug 2019: VBT with Dr. Cui  Dec 2019: Records received from Dr. Ruddy Simental. Completed  6 cycles of Taxol and carboplatin.     2021: : 12.6 in [place].      Aug 2021.   in May. 2 sisters . One from Covid.   Review of Systems   Constitutional:  Negative for appetite change, chills, fatigue and fever.   HENT:  Negative for mouth sores.    Respiratory:  Negative for cough and shortness of breath.    Cardiovascular:  Negative for leg swelling.   Gastrointestinal:  Negative for abdominal pain, blood in stool, constipation and diarrhea.   Endocrine: Negative for cold intolerance.   Genitourinary:  Negative for dysuria and vaginal bleeding.   Musculoskeletal:  Negative for myalgias.   Skin:  Negative for rash.   Allergic/Immunologic: Negative.    Neurological:  Negative for weakness and numbness.   Hematological:  Negative for adenopathy. Does not bruise/bleed easily.   Psychiatric/Behavioral:  Negative for confusion.      Objective:   Physical Exam:   Constitutional: She is oriented to person, place, and time. She appears well-developed and  well-nourished.    HENT:   Head: Normocephalic and atraumatic.    Eyes: Pupils are equal, round, and reactive to light. EOM are normal.    Neck: No thyromegaly present.    Cardiovascular:  Normal rate, regular rhythm and intact distal pulses.             Pulmonary/Chest: Effort normal and breath sounds normal. No respiratory distress. She has no wheezes.        Abdominal: Soft. Bowel sounds are normal. She exhibits no distension and no mass. There is no abdominal tenderness.     Genitourinary:    Vagina and rectum normal.      Pelvic exam was performed with patient supine.   There is no lesion on the right labia. There is no lesion on the left labia. Right adnexum displays no mass. Left adnexum displays no mass. Vaginal cuff normal.  Cervix is absent.Uterus is absent.           Musculoskeletal: Normal range of motion and moves all extremeties.      Lymphadenopathy:     She has no cervical adenopathy. No inguinal adenopathy noted on the right or left side.        Right: No supraclavicular adenopathy present.        Left: No supraclavicular adenopathy present.    Neurological: She is alert and oriented to person, place, and time.    Skin: Skin is warm and dry. No rash noted.    Psychiatric: She has a normal mood and affect.     Assessment:     1. Endometrial ca        Plan:     Orders Placed This Encounter   Procedures         No evidence of disease on today's exam   within normal limits   Plan for continued surveillance. RTC 6 months with  or sooner if needed.      I spent approximately 45 minutes reviewing the available records and evaluating the patient, out of which over 50% of the time was spent face to face with the patient in counseling and coordinating this patient's care.

## 2022-12-16 ENCOUNTER — OFFICE VISIT (OUTPATIENT)
Dept: HEMATOLOGY/ONCOLOGY | Facility: CLINIC | Age: 77
End: 2022-12-16
Payer: MEDICARE

## 2022-12-16 ENCOUNTER — LAB VISIT (OUTPATIENT)
Dept: LAB | Facility: HOSPITAL | Age: 77
End: 2022-12-16
Attending: INTERNAL MEDICINE
Payer: MEDICARE

## 2022-12-16 VITALS
OXYGEN SATURATION: 98 % | HEIGHT: 64 IN | BODY MASS INDEX: 23.9 KG/M2 | TEMPERATURE: 98 F | SYSTOLIC BLOOD PRESSURE: 156 MMHG | HEART RATE: 62 BPM | WEIGHT: 140 LBS | DIASTOLIC BLOOD PRESSURE: 77 MMHG | RESPIRATION RATE: 18 BRPM

## 2022-12-16 DIAGNOSIS — Z90.5 SOLITARY KIDNEY, ACQUIRED: ICD-10-CM

## 2022-12-16 DIAGNOSIS — C54.1 ENDOMETRIAL CA: ICD-10-CM

## 2022-12-16 DIAGNOSIS — E03.9 HYPOTHYROIDISM, UNSPECIFIED TYPE: ICD-10-CM

## 2022-12-16 DIAGNOSIS — C64.1 UROTHELIAL CARCINOMA OF KIDNEY, RIGHT: Primary | ICD-10-CM

## 2022-12-16 DIAGNOSIS — R53.83 FATIGUE, UNSPECIFIED TYPE: ICD-10-CM

## 2022-12-16 DIAGNOSIS — R63.0 LOSS OF APPETITE: ICD-10-CM

## 2022-12-16 DIAGNOSIS — G62.0 CHEMOTHERAPY-INDUCED NEUROPATHY: ICD-10-CM

## 2022-12-16 DIAGNOSIS — R68.2 DRY MOUTH: ICD-10-CM

## 2022-12-16 DIAGNOSIS — T45.1X5A CHEMOTHERAPY-INDUCED NEUROPATHY: ICD-10-CM

## 2022-12-16 DIAGNOSIS — R63.4 WEIGHT LOSS: ICD-10-CM

## 2022-12-16 DIAGNOSIS — C64.1 UROTHELIAL CARCINOMA OF KIDNEY, RIGHT: ICD-10-CM

## 2022-12-16 DIAGNOSIS — K21.9 GASTROESOPHAGEAL REFLUX DISEASE, UNSPECIFIED WHETHER ESOPHAGITIS PRESENT: ICD-10-CM

## 2022-12-16 LAB
ALBUMIN SERPL BCP-MCNC: 3.6 G/DL (ref 3.5–5.2)
ALP SERPL-CCNC: 73 U/L (ref 55–135)
ALT SERPL W/O P-5'-P-CCNC: 13 U/L (ref 10–44)
ANION GAP SERPL CALC-SCNC: 8 MMOL/L (ref 8–16)
AST SERPL-CCNC: 14 U/L (ref 10–40)
BILIRUB SERPL-MCNC: 0.7 MG/DL (ref 0.1–1)
BUN SERPL-MCNC: 14 MG/DL (ref 8–23)
CALCIUM SERPL-MCNC: 9.3 MG/DL (ref 8.7–10.5)
CHLORIDE SERPL-SCNC: 101 MMOL/L (ref 95–110)
CO2 SERPL-SCNC: 28 MMOL/L (ref 23–29)
CREAT SERPL-MCNC: 0.9 MG/DL (ref 0.5–1.4)
ERYTHROCYTE [DISTWIDTH] IN BLOOD BY AUTOMATED COUNT: 13 % (ref 11.5–14.5)
EST. GFR  (NO RACE VARIABLE): >60 ML/MIN/1.73 M^2
GLUCOSE SERPL-MCNC: 116 MG/DL (ref 70–110)
HCT VFR BLD AUTO: 35.6 % (ref 37–48.5)
HGB BLD-MCNC: 11.4 G/DL (ref 12–16)
IMM GRANULOCYTES # BLD AUTO: 0.02 K/UL (ref 0–0.04)
MCH RBC QN AUTO: 29.5 PG (ref 27–31)
MCHC RBC AUTO-ENTMCNC: 32 G/DL (ref 32–36)
MCV RBC AUTO: 92 FL (ref 82–98)
NEUTROPHILS # BLD AUTO: 6.2 K/UL (ref 1.8–7.7)
PLATELET # BLD AUTO: 272 K/UL (ref 150–450)
PMV BLD AUTO: 9.5 FL (ref 9.2–12.9)
POTASSIUM SERPL-SCNC: 3.2 MMOL/L (ref 3.5–5.1)
PROT SERPL-MCNC: 6.9 G/DL (ref 6–8.4)
RBC # BLD AUTO: 3.86 M/UL (ref 4–5.4)
SODIUM SERPL-SCNC: 137 MMOL/L (ref 136–145)
T4 FREE SERPL-MCNC: 0.93 NG/DL (ref 0.71–1.51)
TSH SERPL DL<=0.005 MIU/L-ACNC: 1.37 UIU/ML (ref 0.4–4)
WBC # BLD AUTO: 8.2 K/UL (ref 3.9–12.7)

## 2022-12-16 PROCEDURE — 80053 COMPREHEN METABOLIC PANEL: CPT | Performed by: INTERNAL MEDICINE

## 2022-12-16 PROCEDURE — 1101F PR PT FALLS ASSESS DOC 0-1 FALLS W/OUT INJ PAST YR: ICD-10-PCS | Mod: CPTII,S$GLB,, | Performed by: INTERNAL MEDICINE

## 2022-12-16 PROCEDURE — 3288F PR FALLS RISK ASSESSMENT DOCUMENTED: ICD-10-PCS | Mod: CPTII,S$GLB,, | Performed by: INTERNAL MEDICINE

## 2022-12-16 PROCEDURE — 85027 COMPLETE CBC AUTOMATED: CPT | Performed by: INTERNAL MEDICINE

## 2022-12-16 PROCEDURE — 99999 PR PBB SHADOW E&M-EST. PATIENT-LVL IV: CPT | Mod: PBBFAC,,, | Performed by: INTERNAL MEDICINE

## 2022-12-16 PROCEDURE — 99215 PR OFFICE/OUTPT VISIT, EST, LEVL V, 40-54 MIN: ICD-10-PCS | Mod: S$GLB,,, | Performed by: INTERNAL MEDICINE

## 2022-12-16 PROCEDURE — 3077F PR MOST RECENT SYSTOLIC BLOOD PRESSURE >= 140 MM HG: ICD-10-PCS | Mod: CPTII,S$GLB,, | Performed by: INTERNAL MEDICINE

## 2022-12-16 PROCEDURE — 84439 ASSAY OF FREE THYROXINE: CPT | Performed by: INTERNAL MEDICINE

## 2022-12-16 PROCEDURE — 1101F PT FALLS ASSESS-DOCD LE1/YR: CPT | Mod: CPTII,S$GLB,, | Performed by: INTERNAL MEDICINE

## 2022-12-16 PROCEDURE — 1159F MED LIST DOCD IN RCRD: CPT | Mod: CPTII,S$GLB,, | Performed by: INTERNAL MEDICINE

## 2022-12-16 PROCEDURE — 1157F ADVNC CARE PLAN IN RCRD: CPT | Mod: CPTII,S$GLB,, | Performed by: INTERNAL MEDICINE

## 2022-12-16 PROCEDURE — 3078F PR MOST RECENT DIASTOLIC BLOOD PRESSURE < 80 MM HG: ICD-10-PCS | Mod: CPTII,S$GLB,, | Performed by: INTERNAL MEDICINE

## 2022-12-16 PROCEDURE — 36415 COLL VENOUS BLD VENIPUNCTURE: CPT | Performed by: INTERNAL MEDICINE

## 2022-12-16 PROCEDURE — 1159F PR MEDICATION LIST DOCUMENTED IN MEDICAL RECORD: ICD-10-PCS | Mod: CPTII,S$GLB,, | Performed by: INTERNAL MEDICINE

## 2022-12-16 PROCEDURE — 99999 PR PBB SHADOW E&M-EST. PATIENT-LVL IV: ICD-10-PCS | Mod: PBBFAC,,, | Performed by: INTERNAL MEDICINE

## 2022-12-16 PROCEDURE — 99215 OFFICE O/P EST HI 40 MIN: CPT | Mod: S$GLB,,, | Performed by: INTERNAL MEDICINE

## 2022-12-16 PROCEDURE — 1126F PR PAIN SEVERITY QUANTIFIED, NO PAIN PRESENT: ICD-10-PCS | Mod: CPTII,S$GLB,, | Performed by: INTERNAL MEDICINE

## 2022-12-16 PROCEDURE — 3078F DIAST BP <80 MM HG: CPT | Mod: CPTII,S$GLB,, | Performed by: INTERNAL MEDICINE

## 2022-12-16 PROCEDURE — 84443 ASSAY THYROID STIM HORMONE: CPT | Performed by: INTERNAL MEDICINE

## 2022-12-16 PROCEDURE — 3288F FALL RISK ASSESSMENT DOCD: CPT | Mod: CPTII,S$GLB,, | Performed by: INTERNAL MEDICINE

## 2022-12-16 PROCEDURE — 1157F PR ADVANCE CARE PLAN OR EQUIV PRESENT IN MEDICAL RECORD: ICD-10-PCS | Mod: CPTII,S$GLB,, | Performed by: INTERNAL MEDICINE

## 2022-12-16 PROCEDURE — 1126F AMNT PAIN NOTED NONE PRSNT: CPT | Mod: CPTII,S$GLB,, | Performed by: INTERNAL MEDICINE

## 2022-12-16 PROCEDURE — 3077F SYST BP >= 140 MM HG: CPT | Mod: CPTII,S$GLB,, | Performed by: INTERNAL MEDICINE

## 2022-12-16 RX ORDER — TRIAMCINOLONE ACETONIDE 1 MG/G
CREAM TOPICAL
COMMUNITY
Start: 2022-09-12 | End: 2024-03-07

## 2022-12-16 RX ORDER — HYOSCYAMINE SULFATE 0.125 MG
125 TABLET ORAL
COMMUNITY
Start: 2022-09-01 | End: 2023-05-05

## 2022-12-16 RX ORDER — BENZONATATE 200 MG/1
200 CAPSULE ORAL
COMMUNITY
Start: 2022-07-08 | End: 2023-05-05

## 2022-12-16 NOTE — Clinical Note
Elly,  I don't know who is taking over for  navigation, but this patient was lost to follow up with Dr. Diaz.  She should continue regular cystoscopies with Joe, could you please schedule?  She is done her immunotherapy.  Thanks Gee

## 2022-12-16 NOTE — PROGRESS NOTES
ONCOLOGY FOLLOW UP VISIT    Reason for visit: Toxicity check on Opdivo for stage IIIA urothelial carcinoma    Cancer/Stage/TNM:    Cancer Staging   Endometrial ca  Staging form: Corpus Uteri - Carcinoma and Carcinosarcoma, AJCC 8th Edition  - Clinical: No stage assigned - Unsigned  - Pathologic stage from 7/18/2019: FIGO Stage IA (pT1a, pN0, cM0) - Signed by Lokesh Carias MD on 7/18/2019         Oncology History   Endometrial ca   6/26/2019 Initial Diagnosis    Endometrial ca     7/18/2019 Cancer Staged    Staging form: Corpus Uteri - Carcinoma and Carcinosarcoma, AJCC 8th Edition  - Pathologic stage from 7/18/2019: FIGO Stage IA (pT1a, pN0, cM0) - Signed by oLkesh Carias MD on 7/18/2019 7/18/2019 - 7/18/2019 Chemotherapy    Treatment Summary   Plan Name: OP GYN PACLITAXEL CARBOPLATIN (AUC 6) Q3W  Treatment Goal: Curative  Status: Inactive  Start Date:   End Date:   Provider: Lokesh Carias MD  Chemotherapy: CARBOplatin (PARAPLATIN) in sodium chloride 0.9% 250 mL chemo infusion, , Intravenous, Clinic/HOD 1 time, 0 of 6 cycles  PACLitaxel (TAXOL) 175 mg/m2 = 300 mg in sodium chloride 0.9% 500 mL chemo infusion, 175 mg/m2, Intravenous, Clinic/HOD 1 time, 0 of 6 cycles       Urothelial carcinoma of kidney, right   11/11/2021 Initial Diagnosis    Urothelial carcinoma of kidney, right     1/27/2022 Tumor Conference    Presenting Hospital / Clinic: [facility] [address]  Virtual Tumor Board Conference: Virtual  Presenter: Edmond Manuel  Date Presented to Tumor Board: 1/27/2022  Specialties Present: Medical Oncology; Radiation Oncology; Pathology; Navigation; Urology  Presentation at Cancer Conference: Prospective  Cancer Type: Other  Other Cancer: right upper tract urothelial  Recommended Plan: Chemotherapy; Surgery  Recommended Plan Note: neoadjuvant split dose MVAC + open nephroureterectomy    Oncology History   Endometrial ca   Urothelial carcinoma of kidney, right   1/27/2022 Tumor Conference    Presenting  Hospital / Clinic: [facility]  Virtual Tumor Board Conference: Virtual  Presenter: Edmond Manuel  Date Presented to Tumor Board: 1/27/2022  Specialties Present: Medical Oncology; Radiation Oncology; Pathology; Navigation; Urology  Presentation at Cancer Conference: Prospective  Cancer Type: Other  Other Cancer: right upper tract urothelial  Recommended Plan: Chemotherapy; Surgery  Recommended Plan Note: neoadjuvant split dose MVAC + open nephroureterectomy                  PATIENT SUMMARY:   Gita García is a 76 y.o. female with HG right upper tract UCC. History of endometrial cancer s/p neoadjuvant taxol+cisplatin and vaginal brachytherapy followed by robotic hysterectomy.    DISCUSSION:  Pathology review  Staging review    PERFORMANCE STATUS:  ECOG 0    Estimated GFR/CKD Stage: >60 (cr 0.7)    Clinical/Pathologic Stage (TNM): T1 N0 M0    FACULTY IN ATTENDANCE:    Urologic Oncology: Shane Diaz MD [x], Michael Finley MD [x] , Hever Pollack MD []    CONSULT NEEDED:     [] Urologic Oncology    [] Hem/Onc    [] Rad/Onc     [x] Treatment Guidelines (NCCN and AUA) reviewed and care planned is consistent with guidelines.    TUMOR BOARD RECOMMENDATIONS/PLAN/CONSENSUS:     Case discussed among group. Pathology and radiologic images were reviewed (if applicable).    Neoadjuvant split-dose DDMVAC + open nephroureterectomy  Referral to genetics      5/6/2022 -  Chemotherapy    Treatment Summary   Plan Name: OP NIVOLUMAB Q4W  Treatment Goal: Curative  Status: Active  Start Date: 5/6/2022  End Date: 3/10/2023 (Planned)  Provider: Urban Cancino MD  Chemotherapy: [No matching medication found in this treatment plan]            HPI:   77 y.o. female who presents to clinic prior to C9 of Opdivo. Reports significant dry mouth and now reports dryness of her sinuses. Feels like she had a cold and using saline nasal spray and lubricant. She is trying sour candies and Biotene mouthwash. Describes thick saliva in the  back of her throat. Still having weight loss. Has not met with nutrition yet and not consistently supplementing with Boost. She is not drinking a lot of water. She is wanting to stop treatment d/t thick saliva and dry mouth. Neuropathy at baseline from prior chemotherapy.       ROS:   Review of Systems   Constitutional:  Positive for fatigue and unexpected weight change (weight loss). Negative for activity change, chills and fever.   HENT:  Negative for mouth sores, nosebleeds, sore throat and trouble swallowing.         Dry mouth. Thickened saliva.    Respiratory:  Negative for cough, shortness of breath and wheezing.    Cardiovascular:  Negative for chest pain, palpitations and leg swelling.   Gastrointestinal:  Negative for abdominal distention, abdominal pain and blood in stool.   Endocrine: Negative for cold intolerance and heat intolerance.   Genitourinary:  Negative for dysuria, flank pain and frequency.   Musculoskeletal:  Negative for arthralgias, joint swelling and myalgias.   Skin:  Negative for color change, rash and wound.   Neurological:  Positive for numbness (feet). Negative for dizziness, light-headedness and headaches.   Hematological:  Negative for adenopathy. Does not bruise/bleed easily.      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        Past Surgical History:   Past Surgical History:   Procedure Laterality Date    ABLATION OF NEOPLASM OF URETER USING LASER Right 1/3/2022    Procedure: ABLATION, NEOPLASM, URETER, USING LASER;  Surgeon: Shane Diaz MD;  Location:  [facility] OR 1ST FLR;  Service: Urology;  Laterality: Right;    CYSTOSCOPY N/A 1/3/2022    Procedure: CYSTOSCOPY;  Surgeon: Shane Diaz MD;  Location: [facility] OR 1ST FLR;  Service: Urology;  Laterality: N/A;    DILATION AND CURETTAGE OF UTERUS      GI scope  2016    LAPAROTOMY N/A 7/8/2019    Procedure: LAPAROTOMY;  Surgeon: Lokesh Carias MD;  Location: [facility] OR 2ND FLR;  Service: OB/GYN;  Laterality: N/A;  mini    OMENTECTOMY N/A 7/8/2019    Procedure: OMENTECTOMY;  Surgeon: Lokesh Carias MD;  Location: [facility] OR 2ND FLR;  Service: OB/GYN;  Laterality: N/A;    ROBOT-ASSISTED LAPAROSCOPIC ABDOMINAL HYSTERECTOMY USING DA SIRISHA XI N/A 7/8/2019    Procedure: XI ROBOTIC HYSTERECTOMY;  Surgeon: Lokesh Carias MD;  Location: [facility] OR 2ND FLR;  Service: OB/GYN;  Laterality: N/A;    ROBOT-ASSISTED LAPAROSCOPIC PELVIC LYMPHADENECTOMY USING DA SIRISHA XI Bilateral 7/8/2019    Procedure: XI ROBOTIC LYMPHADENECTOMY, PELVIC;  Surgeon: Lokesh Carias MD;  Location: [facility] OR 2ND FLR;  Service: OB/GYN;  Laterality: Bilateral;    ROBOT-ASSISTED LAPAROSCOPIC RETROPERITONEAL LYMPHADENECTOMY USING DA SIRISHA XI N/A 7/8/2019    Procedure: XI ROBOTIC LYMPHADENECTOMY, RETROPERITONEUM;  Surgeon: Lokesh Carias MD;  Location: [facility] OR 2ND FLR;  Service: OB/GYN;  Laterality: N/A;    ROBOT-ASSISTED LAPAROSCOPIC SALPINGO-OOPHORECTOMY USING DA SIRISHA XI Bilateral 7/8/2019    Procedure: XI ROBOTIC SALPINGO-OOPHORECTOMY;  Surgeon: Lokesh Carias MD;  Location: [facility] OR 2ND FLR;  Service: OB/GYN;  Laterality: Bilateral;    ROBOT-ASSISTED LAPAROSCOPIC SURGICAL REMOVAL OF KIDNEY AND URETER USING DA SIRISHA XI Right 3/14/2022    Procedure: XI ROBOTIC NEPHROURETERECTOMY/ WITH BLADDER CUFF;  Surgeon: Shane Diaz MD;  Location: [facility] OR 2ND FLR;  Service: Urology;  Laterality: Right;  4hrs    URETEROSCOPY Right 1/3/2022    Procedure: URETEROSCOPY;  Surgeon: Shane Diaz MD;  Location: [facility] OR 1ST FLR;  Service: Urology;  Laterality: Right;   2hrs        Family History:   Family History   Problem Relation Age of Onset    Colon cancer Sister 53    Prostate cancer Brother     Breast cancer Sister 64    Kidney cancer Sister     Breast cancer Sister         in her 60s    Skin cancer Sister     Stroke Brother     Prostate cancer Brother     Heart disease Brother     Prostate cancer Brother     Heart disease Brother     Ovarian cancer Neg Hx     Uterine cancer Neg Hx     Anesthesia problems Neg Hx         Social History:   Social History     Tobacco Use    Smoking status: Never    Smokeless tobacco: Never   Substance Use Topics    Alcohol use: Never        Allergies:   Review of patient's allergies indicates:   Allergen Reactions    Asa [aspirin] Other (See Comments)     Stomach upset    Dilaudid (pf) [hydromorphone (pf)] Nausea Only    Sulfa (sulfonamide antibiotics) Itching        Medications:   Current Outpatient Medications   Medication Sig Dispense Refill    acetaminophen (TYLENOL) 500 MG tablet Take 500 mg by mouth every 6 (six) hours as needed for Pain.      ALPRAZolam (XANAX) 0.25 MG tablet Take 0.25 mg by mouth 2 (two) times daily as needed for Anxiety.       bisoprolol-hydrochlorothiazide (ZIAC) 10-6.25 mg per tablet Take 1 tablet by mouth once daily.       calcium carbonate/vitamin D3 (CALCIUM 600 + D,3, ORAL) Take by mouth once daily.       co-enzyme Q-10 30 mg capsule Take 30 mg by mouth 3 (three) times daily.      hyoscyamine (ANASPAZ,LEVSIN) 0.125 mg Tab Take 125 mcg by mouth.      loratadine (CLARITIN) 10 mg tablet Take 10 mg by mouth daily as needed for Allergies.      milk thistle 175 mg tablet Take 175 mg by mouth once daily.      multivitamin/iron/folic acid (CENTRUM ORAL) Take by mouth once daily.       mv-min/FA/vit K/lycop/lut/zeax (OCUVITE EYE PLUS MULTI ORAL) Take 1 tablet by mouth once daily.       oxyCODONE (ROXICODONE) 5 MG immediate release tablet Take 1 tablet (5 mg total) by mouth every 6 (six) hours as needed for Pain. 10 tablet  "0    pantoprazole (PROTONIX) 40 MG tablet Take 40 mg by mouth once daily.      polyethylene glycol (GLYCOLAX) 17 gram PwPk Take by mouth every morning.      raloxifene (EVISTA) 60 mg tablet Take 60 mg by mouth every evening.      sucralfate (CARAFATE) 1 gram tablet Take 1 g by mouth 2 (two) times daily.      triamcinolone acetonide 0.1% (KENALOG) 0.1 % cream APPLY TO THE AFFECTED AREA(S) UNDER breast TWICE DAILY AS NEEDED      benzonatate (TESSALON) 100 MG capsule Take 100 mg by mouth 3 (three) times daily as needed for Cough.      benzonatate (TESSALON) 200 MG capsule Take 200 mg by mouth.       No current facility-administered medications for this visit.        Physical Exam:   BP (!) 156/77 (BP Location: Left arm, Patient Position: Sitting, BP Method: Medium (Automatic))   Pulse 62   Temp 98.1 °F (36.7 °C) (Oral)   Resp 18   Ht 5' 4" (1.626 m)   Wt 63.5 kg (139 lb 15.9 oz)   SpO2 98%   BMI 24.03 kg/m²      ECOG Performance Status: (foot note - ECOG PS provided by [employer]) 0 - Asymptomatic    Physical Exam  Constitutional:       Appearance: She is well-developed.   HENT:      Head: Normocephalic and atraumatic.   Eyes:      Conjunctiva/sclera: Conjunctivae normal.      Pupils: Pupils are equal, round, and reactive to light.   Pulmonary:      Effort: Pulmonary effort is normal. No respiratory distress.   Abdominal:      General: There is no distension.      Palpations: Abdomen is soft.   Musculoskeletal:         General: No swelling. Normal range of motion.      Cervical back: Normal range of motion and neck supple.   Lymphadenopathy:      Cervical: No cervical adenopathy.   Skin:     General: Skin is warm and dry.   Neurological:      General: No focal deficit present.      Mental Status: She is alert and oriented to person, place, and time.   Psychiatric:         Mood and Affect: Mood normal.         Behavior: Behavior normal.         Labs:   Recent Results (from the past 48 " hour(s))   CBC Oncology    Collection Time: 12/16/22  8:47 AM   Result Value Ref Range    WBC 8.20 3.90 - 12.70 K/uL    RBC 3.86 (L) 4.00 - 5.40 M/uL    Hemoglobin 11.4 (L) 12.0 - 16.0 g/dL    Hematocrit 35.6 (L) 37.0 - 48.5 %    MCV 92 82 - 98 fL    MCH 29.5 27.0 - 31.0 pg    MCHC 32.0 32.0 - 36.0 g/dL    RDW 13.0 11.5 - 14.5 %    Platelets 272 150 - 450 K/uL    MPV 9.5 9.2 - 12.9 fL    Gran # (ANC) 6.2 1.8 - 7.7 K/uL    Immature Grans (Abs) 0.02 0.00 - 0.04 K/uL   Comprehensive Metabolic Panel    Collection Time: 12/16/22  8:47 AM   Result Value Ref Range    Sodium 137 136 - 145 mmol/L    Potassium 3.2 (L) 3.5 - 5.1 mmol/L    Chloride 101 95 - 110 mmol/L    CO2 28 23 - 29 mmol/L    Glucose 116 (H) 70 - 110 mg/dL    BUN 14 8 - 23 mg/dL    Creatinine 0.9 0.5 - 1.4 mg/dL    Calcium 9.3 8.7 - 10.5 mg/dL    Total Protein 6.9 6.0 - 8.4 g/dL    Albumin 3.6 3.5 - 5.2 g/dL    Total Bilirubin 0.7 0.1 - 1.0 mg/dL    Alkaline Phosphatase 73 55 - 135 U/L    AST 14 10 - 40 U/L    ALT 13 10 - 44 U/L    Anion Gap 8 8 - 16 mmol/L    eGFR >60.0 >60 mL/min/1.73 m^2        Imaging:  MRI Urography W W/O Contrast (XPD)  Narrative: EXAMINATION:  MRI UROGRAPHY W W/O CONTRAST (XPD)    CLINICAL HISTORY:  Bladder cancer, invasive, assess treatment response;  Malignant neoplasm of right kidney, except renal pelvis    TECHNIQUE:  Multiplanar multisequence images of the abdomen and pelvis before and after administration of 7 mL Gadavist intravenous contrast. Examination performed per urography protocol.    COMPARISON:  MRI urography 07/05/2022, CT chest abdomen pelvis 04/21/2022    FINDINGS:  Motion degraded exam.    Liver: Stable T2 hyperintense right and left lobe lesions demonstrating arterial enhancement persisting on venous and delayed phase following blood pool intensity consistent with hemangiomas.  Left lobe measures 2.4 cm (series 28, image 5).  Right lobe measures 1.5 cm (series 28, image 3).  No new lesion.    Gallbladder:  Unremarkable.    Bile Ducts: Mild dilatation of the central intrahepatic bile ducts.  Common bile duct is normal caliber.    Pancreas: No mass or ductal dilatation.    Spleen: Unremarkable.    Adrenals: Unremarkable.    Kidneys/Ureters: Postoperative changes of right nephroureterectomy.  No evidence of local disease recurrence.  Stable appearance of the left kidney.  No solid enhancing renal mass.  No hydronephrosis or ureteral dilatation.    Bladder: No focal bladder wall thickening or enhancing mass lesion.  No abnormal restricted diffusion.  The soft tissue focus adjacent to the right posterior bladder wall described on prior CT is favored to represent postoperative change.  No corresponding abnormal restricted diffusion.    Reproductive: Status post hysterectomy and bilateral salpingo-oophorectomy.    GI Tract/Mesentery (imaged portion): No evidence of bowel obstruction or inflammation.  Colonic diverticulosis.    Peritoneal Space: No ascites.    Retroperitoneum: No pathologically enlarged nodes.    Abdominal wall: Midline ventral abdominal wall incisional scar.    Vasculature: Abdominal aorta is normal caliber and contains atherosclerosis.  No aneurysm.    Bones: Degenerative changes.  No suspicious marrow replacing lesion.  Impression: 1. Patient with urothelial carcinoma status post right nephroureterectomy.  No evidence of local disease recurrence or metastasis.  2. Stable hepatic lesions consistent with hemangiomas.  3. Additional findings as above.    Electronically signed by resident: Sylvia Heck  Date:    10/18/2022  Time:    15:28    Electronically signed by: Richard Lala  Date:    10/18/2022  Time:    17:04         Diagnoses:       1. Urothelial carcinoma of kidney, right    2. Solitary kidney, acquired    3. Chemotherapy-induced neuropathy    4. Endometrial ca    5. Fatigue, unspecified type    6. Gastroesophageal reflux disease, unspecified whether esophagitis present    7. Dry mouth    8. Loss  of appetite    9. Weight loss      Assessment and Plan:         1. Stage IIIA Urothelial Carcinoma:   Discussed stage, adjuvant treatment options, and prognosis in detail with patient and daughter.  Due to below problems, I am recommending against cisplatin based adjuvant chemotherapy.  She would be a candidate for adjuvant Opdivo and discussed this treatment plan and the DFS benefit, though data is immature.  Patient and daughter agree to move forward with Opdivo adjuvant x 1 year per Checkmate 274 clinical trial.  - Consented for Opdivo Q4W. MRU reviewed las visit and shows no evidence of disease. Will now stop Opdivo for problems 7-9. Re-staging scans in January ([facility]). Patient has not has a cystoscope since starting immunotherapy - message sent to Dr. Diaz.     2,3 Not a candidate for cisplatin chemotherapy due to these issues.    4 Continue to follow with Dr. Joe every 4 months.    5. Residual from covid. Will continue to monitor.    6 Will defer to GI for recs regarding indigestion, swallowing complaints.    7-9. Issues continue to be progressive and patient desires to stop treatment. Nutrition referral ordered. Educated to increase water intake during meals. May also use Xylimelts and hard candies for dry mouth. Needs to supplement diet with Boost/Ensure. Try Mucinex for thick saliva.       she will return to clinic in 4 weeks, but knows to call in the interim if symptoms change or should a problem arise.    Patient was also seen and examined by Dr. Cancino. Patient is in agreement with the proposed treatment plan. All questions were answered to the patient's satisfaction. Pt knows to call clinic if anything is needed before the next clinic visit.    Joellen Valerio, MSN, APRN, FNP-C  Hematology and Medical Oncology  Nurse Practitioner to Dr. Urban Cancino  Nurse Practitioner, [facility]      I have reviewed the notes, assessments, and/or procedures performed by Joellen  Teodoro CAMERON, as above.  I have personally interviewed and examined the patient at the beside, and rounded with Joellen. I concur with her assessment and plan and the documentation of Gita García.    MDM includes:    - Acute or chronic illness or injury that poses a threat to life or bodily function  - Independent review and explanation of 2 results from unique tests  - Discussion of management and ordering 2 unique tests  - Extensive discussion of treatment and management  - Prescription drug management  - Drug therapy requiring intensive monitoring for toxicity    Urban Cancino M.D.  Hematology/Oncology Attending  [facility]  [facility]          Route Chart for Scheduling    Med Onc Chart Routing      Follow up with physician 4 weeks. review imaging   Follow up with EVELYN    Infusion scheduling note    Injection scheduling note    Labs CMP, CBC, TSH and free T4   Lab interval: every 4 weeks     Imaging MRI   CT chest and MRU in 4 weeks   Pharmacy appointment    Other referrals        Treatment Plan Information   OP NIVOLUMAB Q4W   Urban Cancino MD   Upcoming Treatment Dates - OP NIVOLUMAB Q4W    12/16/2022       Chemotherapy       nivolumab 480 mg in sodium chloride 0.9% 148 mL infusion  1/13/2023       Chemotherapy       nivolumab 480 mg in sodium chloride 0.9% 148 mL infusion  2/10/2023       Chemotherapy       nivolumab 480 mg in sodium chloride 0.9% 148 mL infusion  3/10/2023       Chemotherapy       nivolumab 480 mg in sodium chloride 0.9% 148 mL infusion    Therapy Plan Information  mitoMYcin (MUTAMYCIN) 40 mg in sodium chloride 0.9% 40 mL bladder instillation  40 mg, Intravesical, 1 time a week, at least 5 days apart

## 2023-01-03 ENCOUNTER — PROCEDURE VISIT (OUTPATIENT)
Dept: UROLOGY | Facility: CLINIC | Age: 78
End: 2023-01-03
Payer: MEDICARE

## 2023-01-03 VITALS
DIASTOLIC BLOOD PRESSURE: 74 MMHG | BODY MASS INDEX: 23.24 KG/M2 | HEART RATE: 77 BPM | RESPIRATION RATE: 18 BRPM | SYSTOLIC BLOOD PRESSURE: 141 MMHG | WEIGHT: 136.13 LBS | HEIGHT: 64 IN | TEMPERATURE: 98 F

## 2023-01-03 DIAGNOSIS — C64.1 UROTHELIAL CARCINOMA OF KIDNEY, RIGHT: ICD-10-CM

## 2023-01-03 PROCEDURE — 52000 CYSTOURETHROSCOPY: CPT | Mod: S$GLB,,, | Performed by: UROLOGY

## 2023-01-03 PROCEDURE — 52000 CYSTOSCOPY: ICD-10-PCS | Mod: S$GLB,,, | Performed by: UROLOGY

## 2023-01-03 RX ORDER — LIDOCAINE HYDROCHLORIDE 20 MG/ML
JELLY TOPICAL ONCE
Status: COMPLETED | OUTPATIENT
Start: 2023-01-03 | End: 2023-01-03

## 2023-01-03 RX ADMIN — LIDOCAINE HYDROCHLORIDE: 20 JELLY TOPICAL at 09:01

## 2023-01-03 NOTE — PATIENT INSTRUCTIONS
What to Expect After a Cystoscopy  For the next 24-48 hours, you may feel a mild burning when you urinate. This burning is normal and expected. Drink plenty of water to dilute the urine to help relieve the burning sensation. You may also see a small amount of blood in your urine after the procedure.    Unless you are already taking antibiotics, you may be given an antibiotic after the test to prevent infection.    Signs and Symptoms to Report  Call the [facility] at [phone] if you develop any of the following:  Fever of 101 degrees or higher  Chills or persistent bleeding  Inability to urinate

## 2023-01-03 NOTE — PROCEDURES
Cystoscopy    Date/Time: 1/3/2023 9:47 AM  Performed by: Shane Diaz MD  Authorized by: Shane Diaz MD     Consent Done?:  Yes (Written)  Timeout: prior to procedure the correct patient, procedure, and site was verified    Prep: patient was prepped and draped in usual sterile fashion    Indications: history bladder cancer    Position:  Dorsal lithotomy  Preparation: Patient was prepped and draped in usual sterile fashion    Scope type:  Flexible cystoscope  Stent inserted: No    Stent removed: No    External exam normal: Yes    Digital exam performed: No    Urethra normal: Yes    Bladder neck normal: Yes    Bladder normal: Yes     patient tolerated the procedure well with no immediate complications  Comments:      Normal surveillance cystoscopy  6 months for cysto f/u.

## 2023-01-17 ENCOUNTER — HOSPITAL ENCOUNTER (OUTPATIENT)
Dept: RADIOLOGY | Facility: HOSPITAL | Age: 78
Discharge: HOME | End: 2023-01-17
Attending: NURSE PRACTITIONER
Payer: MEDICARE

## 2023-01-17 DIAGNOSIS — C64.1 UROTHELIAL CARCINOMA OF KIDNEY, RIGHT: ICD-10-CM

## 2023-01-17 PROCEDURE — 71250 CT THORAX DX C-: CPT | Mod: TC

## 2023-01-17 PROCEDURE — 71250 CT THORAX DX C-: CPT | Mod: 26,,, | Performed by: RADIOLOGY

## 2023-01-17 PROCEDURE — 72197 MRI PELVIS W/O & W/DYE: CPT | Mod: 26,,, | Performed by: RADIOLOGY

## 2023-01-17 PROCEDURE — 74183 MRI UROGRAPHY W W/O CONTRAST (XPD): ICD-10-PCS | Mod: 26,,, | Performed by: RADIOLOGY

## 2023-01-17 PROCEDURE — 71250 CT CHEST WITHOUT CONTRAST: ICD-10-PCS | Mod: 26,,, | Performed by: RADIOLOGY

## 2023-01-17 PROCEDURE — 72197 MRI PELVIS W/O & W/DYE: CPT | Mod: TC

## 2023-01-17 PROCEDURE — 74183 MRI ABD W/O CNTR FLWD CNTR: CPT | Mod: 26,,, | Performed by: RADIOLOGY

## 2023-01-17 PROCEDURE — A9585 GADOBUTROL INJECTION: HCPCS | Performed by: NURSE PRACTITIONER

## 2023-01-17 PROCEDURE — 72197 MRI UROGRAPHY W W/O CONTRAST (XPD): ICD-10-PCS | Mod: 26,,, | Performed by: RADIOLOGY

## 2023-01-17 PROCEDURE — 25500020 PHARM REV CODE 255: Performed by: NURSE PRACTITIONER

## 2023-01-17 RX ORDER — GADOBUTROL 604.72 MG/ML
10 INJECTION INTRAVENOUS
Status: COMPLETED | OUTPATIENT
Start: 2023-01-17 | End: 2023-01-17

## 2023-01-17 RX ADMIN — GADOBUTROL 10 ML: 604.72 INJECTION INTRAVENOUS at 06:01

## 2023-01-19 ENCOUNTER — OFFICE VISIT (OUTPATIENT)
Dept: HEMATOLOGY/ONCOLOGY | Facility: CLINIC | Age: 78
End: 2023-01-19
Payer: MEDICARE

## 2023-01-19 VITALS
HEART RATE: 70 BPM | WEIGHT: 138.44 LBS | SYSTOLIC BLOOD PRESSURE: 151 MMHG | TEMPERATURE: 98 F | OXYGEN SATURATION: 99 % | RESPIRATION RATE: 18 BRPM | HEIGHT: 64 IN | BODY MASS INDEX: 23.64 KG/M2 | DIASTOLIC BLOOD PRESSURE: 67 MMHG

## 2023-01-19 DIAGNOSIS — C64.1 UROTHELIAL CARCINOMA OF KIDNEY, RIGHT: Primary | ICD-10-CM

## 2023-01-19 DIAGNOSIS — G62.0 CHEMOTHERAPY-INDUCED NEUROPATHY: ICD-10-CM

## 2023-01-19 DIAGNOSIS — R68.2 DRY MOUTH: ICD-10-CM

## 2023-01-19 DIAGNOSIS — Z90.5 SOLITARY KIDNEY, ACQUIRED: ICD-10-CM

## 2023-01-19 DIAGNOSIS — R63.0 LOSS OF APPETITE: ICD-10-CM

## 2023-01-19 DIAGNOSIS — C54.1 ENDOMETRIAL CA: ICD-10-CM

## 2023-01-19 DIAGNOSIS — R63.4 WEIGHT LOSS: ICD-10-CM

## 2023-01-19 DIAGNOSIS — T45.1X5A CHEMOTHERAPY-INDUCED NEUROPATHY: ICD-10-CM

## 2023-01-19 DIAGNOSIS — R53.83 FATIGUE, UNSPECIFIED TYPE: ICD-10-CM

## 2023-01-19 DIAGNOSIS — K21.9 GASTROESOPHAGEAL REFLUX DISEASE, UNSPECIFIED WHETHER ESOPHAGITIS PRESENT: ICD-10-CM

## 2023-01-19 PROCEDURE — 99999 PR PBB SHADOW E&M-EST. PATIENT-LVL V: CPT | Mod: PBBFAC,,, | Performed by: INTERNAL MEDICINE

## 2023-01-19 PROCEDURE — 3288F FALL RISK ASSESSMENT DOCD: CPT | Mod: CPTII,S$GLB,, | Performed by: INTERNAL MEDICINE

## 2023-01-19 PROCEDURE — 1101F PT FALLS ASSESS-DOCD LE1/YR: CPT | Mod: CPTII,S$GLB,, | Performed by: INTERNAL MEDICINE

## 2023-01-19 PROCEDURE — 3077F SYST BP >= 140 MM HG: CPT | Mod: CPTII,S$GLB,, | Performed by: INTERNAL MEDICINE

## 2023-01-19 PROCEDURE — 1101F PR PT FALLS ASSESS DOC 0-1 FALLS W/OUT INJ PAST YR: ICD-10-PCS | Mod: CPTII,S$GLB,, | Performed by: INTERNAL MEDICINE

## 2023-01-19 PROCEDURE — 3078F PR MOST RECENT DIASTOLIC BLOOD PRESSURE < 80 MM HG: ICD-10-PCS | Mod: CPTII,S$GLB,, | Performed by: INTERNAL MEDICINE

## 2023-01-19 PROCEDURE — 1159F PR MEDICATION LIST DOCUMENTED IN MEDICAL RECORD: ICD-10-PCS | Mod: CPTII,S$GLB,, | Performed by: INTERNAL MEDICINE

## 2023-01-19 PROCEDURE — 3078F DIAST BP <80 MM HG: CPT | Mod: CPTII,S$GLB,, | Performed by: INTERNAL MEDICINE

## 2023-01-19 PROCEDURE — 99215 OFFICE O/P EST HI 40 MIN: CPT | Mod: S$GLB,,, | Performed by: INTERNAL MEDICINE

## 2023-01-19 PROCEDURE — 1126F AMNT PAIN NOTED NONE PRSNT: CPT | Mod: CPTII,S$GLB,, | Performed by: INTERNAL MEDICINE

## 2023-01-19 PROCEDURE — 1159F MED LIST DOCD IN RCRD: CPT | Mod: CPTII,S$GLB,, | Performed by: INTERNAL MEDICINE

## 2023-01-19 PROCEDURE — 1157F PR ADVANCE CARE PLAN OR EQUIV PRESENT IN MEDICAL RECORD: ICD-10-PCS | Mod: CPTII,S$GLB,, | Performed by: INTERNAL MEDICINE

## 2023-01-19 PROCEDURE — 99999 PR PBB SHADOW E&M-EST. PATIENT-LVL V: ICD-10-PCS | Mod: PBBFAC,,, | Performed by: INTERNAL MEDICINE

## 2023-01-19 PROCEDURE — 3077F PR MOST RECENT SYSTOLIC BLOOD PRESSURE >= 140 MM HG: ICD-10-PCS | Mod: CPTII,S$GLB,, | Performed by: INTERNAL MEDICINE

## 2023-01-19 PROCEDURE — 1160F PR REVIEW ALL MEDS BY PRESCRIBER/CLIN PHARMACIST DOCUMENTED: ICD-10-PCS | Mod: CPTII,S$GLB,, | Performed by: INTERNAL MEDICINE

## 2023-01-19 PROCEDURE — 99215 PR OFFICE/OUTPT VISIT, EST, LEVL V, 40-54 MIN: ICD-10-PCS | Mod: S$GLB,,, | Performed by: INTERNAL MEDICINE

## 2023-01-19 PROCEDURE — 3288F PR FALLS RISK ASSESSMENT DOCUMENTED: ICD-10-PCS | Mod: CPTII,S$GLB,, | Performed by: INTERNAL MEDICINE

## 2023-01-19 PROCEDURE — 1160F RVW MEDS BY RX/DR IN RCRD: CPT | Mod: CPTII,S$GLB,, | Performed by: INTERNAL MEDICINE

## 2023-01-19 PROCEDURE — 1157F ADVNC CARE PLAN IN RCRD: CPT | Mod: CPTII,S$GLB,, | Performed by: INTERNAL MEDICINE

## 2023-01-19 PROCEDURE — 1126F PR PAIN SEVERITY QUANTIFIED, NO PAIN PRESENT: ICD-10-PCS | Mod: CPTII,S$GLB,, | Performed by: INTERNAL MEDICINE

## 2023-01-19 NOTE — PROGRESS NOTES
ONCOLOGY FOLLOW UP VISIT    Reason for visit: Toxicity check on Opdivo for stage IIIA urothelial carcinoma    Cancer/Stage/TNM:    Cancer Staging   Endometrial ca  Staging form: Corpus Uteri - Carcinoma and Carcinosarcoma, AJCC 8th Edition  - Clinical: No stage assigned - Unsigned  - Pathologic stage from 7/18/2019: FIGO Stage IA (pT1a, pN0, cM0) - Signed by Lokesh Carias MD on 7/18/2019         Oncology History   Endometrial ca   6/26/2019 Initial Diagnosis    Endometrial ca     7/18/2019 Cancer Staged    Staging form: Corpus Uteri - Carcinoma and Carcinosarcoma, AJCC 8th Edition  - Pathologic stage from 7/18/2019: FIGO Stage IA (pT1a, pN0, cM0) - Signed by Lokesh Carias MD on 7/18/2019 7/18/2019 - 7/18/2019 Chemotherapy    Treatment Summary   Plan Name: OP GYN PACLITAXEL CARBOPLATIN (AUC 6) Q3W  Treatment Goal: Curative  Status: Inactive  Start Date:   End Date:   Provider: Lokesh Carias MD  Chemotherapy: CARBOplatin (PARAPLATIN) in sodium chloride 0.9% 250 mL chemo infusion, , Intravenous, Clinic/HOD 1 time, 0 of 6 cycles  PACLitaxel (TAXOL) 175 mg/m2 = 300 mg in sodium chloride 0.9% 500 mL chemo infusion, 175 mg/m2, Intravenous, Clinic/HOD 1 time, 0 of 6 cycles       Urothelial carcinoma of kidney, right   11/11/2021 Initial Diagnosis    Urothelial carcinoma of kidney, right     1/27/2022 Tumor Conference    Presenting Hospital / Clinic: [facility] [address]  Virtual Tumor Board Conference: Virtual  Presenter: Edmond Manuel  Date Presented to Tumor Board: 1/27/2022  Specialties Present: Medical Oncology; Radiation Oncology; Pathology; Navigation; Urology  Presentation at Cancer Conference: Prospective  Cancer Type: Other  Other Cancer: right upper tract urothelial  Recommended Plan: Chemotherapy; Surgery  Recommended Plan Note: neoadjuvant split dose MVAC + open nephroureterectomy    Oncology History   Endometrial ca   Urothelial carcinoma of kidney, right   1/27/2022 Tumor Conference    Presenting  Hospital / Clinic: [facility]  Virtual Tumor Board Conference: Virtual  Presenter: Edmond Manuel  Date Presented to Tumor Board: 1/27/2022  Specialties Present: Medical Oncology; Radiation Oncology; Pathology; Navigation; Urology  Presentation at Cancer Conference: Prospective  Cancer Type: Other  Other Cancer: right upper tract urothelial  Recommended Plan: Chemotherapy; Surgery  Recommended Plan Note: neoadjuvant split dose MVAC + open nephroureterectomy                  PATIENT SUMMARY:   Gita García is a 76 y.o. female with HG right upper tract UCC. History of endometrial cancer s/p neoadjuvant taxol+cisplatin and vaginal brachytherapy followed by robotic hysterectomy.    DISCUSSION:  Pathology review  Staging review    PERFORMANCE STATUS:  ECOG 0    Estimated GFR/CKD Stage: >60 (cr 0.7)    Clinical/Pathologic Stage (TNM): T1 N0 M0    FACULTY IN ATTENDANCE:    Urologic Oncology: Shane Diaz MD [x], Michael Finley MD [x] , Hever Pollack MD []    CONSULT NEEDED:     [] Urologic Oncology    [] Hem/Onc    [] Rad/Onc     [x] Treatment Guidelines (NCCN and AUA) reviewed and care planned is consistent with guidelines.    TUMOR BOARD RECOMMENDATIONS/PLAN/CONSENSUS:     Case discussed among group. Pathology and radiologic images were reviewed (if applicable).    Neoadjuvant split-dose DDMVAC + open nephroureterectomy  Referral to genetics      5/6/2022 -  Chemotherapy    Treatment Summary   Plan Name: OP NIVOLUMAB Q4W  Treatment Goal: Curative  Status: Active  Start Date: 5/6/2022  End Date: 3/10/2023 (Planned)  Provider: Urban Cancino MD  Chemotherapy: [No matching medication found in this treatment plan]            HPI:   77 y.o. female who presents to clinic to review imaging. Dry mouth has improved minimally since stopping immunotherapy.     ROS:   Review of Systems   Constitutional:  Negative for activity change, chills, fatigue, fever and unexpected weight change.   HENT:  Negative for mouth  sores, nosebleeds, sore throat and trouble swallowing.         Dry mouth; Thickened saliva - improving   Respiratory:  Negative for cough, shortness of breath and wheezing.    Cardiovascular:  Negative for chest pain, palpitations and leg swelling.   Gastrointestinal:  Negative for abdominal distention, abdominal pain and blood in stool.   Endocrine: Negative for cold intolerance and heat intolerance.   Genitourinary:  Negative for dysuria, flank pain and frequency.   Musculoskeletal:  Negative for arthralgias, joint swelling and myalgias.   Skin:  Negative for color change, rash and wound.   Neurological:  Positive for numbness (feet). Negative for dizziness, light-headedness and headaches.   Hematological:  Negative for adenopathy. Does not bruise/bleed easily.      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        Past Surgical History:   Past Surgical History:   Procedure Laterality Date    ABLATION OF NEOPLASM OF URETER USING LASER Right 1/3/2022    Procedure: ABLATION, NEOPLASM, URETER, USING LASER;  Surgeon: Shane Diaz MD;  Location: [facility] OR [address] [place] [address];  Service: Urology;  Laterality: Right;    CYSTOSCOPY N/A 1/3/2022    Procedure: CYSTOSCOPY;  Surgeon: Shane Diaz MD;  [facility];  Service: Urology;  Laterality: N/A;    DILATION AND CURETTAGE OF UTERUS      GI scope  2016    LAPAROTOMY N/A 7/8/2019    Procedure: LAPAROTOMY;  Surgeon: Lokesh Carias MD;  Location: [facility] OR 2ND FLR;  Service: OB/GYN;  Laterality: N/A;  mini    OMENTECTOMY N/A 7/8/2019     Procedure: OMENTECTOMY;  Surgeon: Lokesh Carias MD;  Location: [facility] OR 2ND FLR;  Service: OB/GYN;  Laterality: N/A;    ROBOT-ASSISTED LAPAROSCOPIC ABDOMINAL HYSTERECTOMY USING DA SIRISHA XI N/A 7/8/2019    Procedure: XI ROBOTIC HYSTERECTOMY;  Surgeon: Lokesh Carias MD;  Location: [facility] OR 2ND FLR;  Service: OB/GYN;  Laterality: N/A;    ROBOT-ASSISTED LAPAROSCOPIC PELVIC LYMPHADENECTOMY USING DA SIRISHA XI Bilateral 7/8/2019    Procedure: XI ROBOTIC LYMPHADENECTOMY, PELVIC;  Surgeon: Lokesh Carias MD;  Location: [facility] OR 2ND FLR;  Service: OB/GYN;  Laterality: Bilateral;    ROBOT-ASSISTED LAPAROSCOPIC RETROPERITONEAL LYMPHADENECTOMY USING DA SIRISHA XI N/A 7/8/2019    Procedure: XI ROBOTIC LYMPHADENECTOMY, RETROPERITONEUM;  Surgeon: Lokesh Carias MD;  Location: [facility] OR 2ND FLR;  Service: OB/GYN;  Laterality: N/A;    ROBOT-ASSISTED LAPAROSCOPIC SALPINGO-OOPHORECTOMY USING DA SIRISHA XI Bilateral 7/8/2019    Procedure: XI ROBOTIC SALPINGO-OOPHORECTOMY;  Surgeon: Lokesh Carias MD;  Location: [facility] OR 2ND FLR;  Service: OB/GYN;  Laterality: Bilateral;    ROBOT-ASSISTED LAPAROSCOPIC SURGICAL REMOVAL OF KIDNEY AND URETER USING DA SIRISHA XI Right 3/14/2022    Procedure: XI ROBOTIC NEPHROURETERECTOMY/ WITH BLADDER CUFF;  Surgeon: Shane Diaz MD;  Location: [facility] OR 2ND FLR;  Service: Urology;  Laterality: Right;  4hrs    URETEROSCOPY Right 1/3/2022    Procedure: URETEROSCOPY;  Surgeon: Shane Diaz MD;  Location: [facility] OR 1ST FLR;  Service: Urology;  Laterality: Right;  2hrs        Family History:   Family History   Problem Relation Age of Onset    Colon cancer Sister 53    Prostate cancer Brother     Breast cancer Sister 64    Kidney cancer Sister     Breast cancer Sister         in her 60s    Skin cancer Sister     Stroke Brother     Prostate cancer Brother     Heart disease Brother     Prostate cancer Brother     Heart disease Brother     Ovarian cancer Neg Hx     Uterine cancer Neg Hx     Anesthesia  problems Neg Hx         Social History:   Social History     Tobacco Use    Smoking status: Never    Smokeless tobacco: Never   Substance Use Topics    Alcohol use: Never        Allergies:   Review of patient's allergies indicates:   Allergen Reactions    Asa [aspirin] Other (See Comments)     Stomach upset    Dilaudid (pf) [hydromorphone (pf)] Nausea Only    Sulfa (sulfonamide antibiotics) Itching        Medications:   Current Outpatient Medications   Medication Sig Dispense Refill    acetaminophen (TYLENOL) 500 MG tablet Take 500 mg by mouth every 6 (six) hours as needed for Pain.      ALPRAZolam (XANAX) 0.25 MG tablet Take 0.25 mg by mouth 2 (two) times daily as needed for Anxiety.       benzonatate (TESSALON) 100 MG capsule Take 100 mg by mouth 3 (three) times daily as needed for Cough.      benzonatate (TESSALON) 200 MG capsule Take 200 mg by mouth.      bisoprolol-hydrochlorothiazide (ZIAC) 10-6.25 mg per tablet Take 1 tablet by mouth once daily.       calcium carbonate/vitamin D3 (CALCIUM 600 + D,3, ORAL) Take by mouth once daily.       co-enzyme Q-10 30 mg capsule Take 30 mg by mouth 3 (three) times daily.      hyoscyamine (ANASPAZ,LEVSIN) 0.125 mg Tab Take 125 mcg by mouth.      loratadine (CLARITIN) 10 mg tablet Take 10 mg by mouth daily as needed for Allergies.      milk thistle 175 mg tablet Take 175 mg by mouth once daily.      multivitamin/iron/folic acid (CENTRUM ORAL) Take by mouth once daily.       mv-min/FA/vit K/lycop/lut/zeax (OCUVITE EYE PLUS MULTI ORAL) Take 1 tablet by mouth once daily.       oxyCODONE (ROXICODONE) 5 MG immediate release tablet Take 1 tablet (5 mg total) by mouth every 6 (six) hours as needed for Pain. 10 tablet 0    pantoprazole (PROTONIX) 40 MG tablet Take 40 mg by mouth once daily.      polyethylene glycol (GLYCOLAX) 17 gram PwPk Take by mouth every morning.      raloxifene (EVISTA) 60 mg tablet Take 60 mg by mouth every evening.      sucralfate (CARAFATE) 1 gram tablet  "Take 1 g by mouth 2 (two) times daily.      triamcinolone acetonide 0.1% (KENALOG) 0.1 % cream APPLY TO THE AFFECTED AREA(S) UNDER breast TWICE DAILY AS NEEDED       No current facility-administered medications for this visit.        Physical Exam:   BP (!) 151/67   Pulse 70   Temp 98.2 °F (36.8 °C) (Oral)   Resp 18   Ht 5' 4" (1.626 m)   Wt 62.8 kg (138 lb 7.2 oz)   SpO2 99%   BMI 23.76 kg/m²      ECOG Performance Status: (foot note - ECOG PS provided by [employer]cology [employer]) 0 - Asymptomatic    Physical Exam  Constitutional:       Appearance: She is well-developed.   HENT:      Head: Normocephalic and atraumatic.   Eyes:      Conjunctiva/sclera: Conjunctivae normal.      Pupils: Pupils are equal, round, and reactive to light.   Pulmonary:      Effort: Pulmonary effort is normal. No respiratory distress.   Abdominal:      General: There is no distension.      Palpations: Abdomen is soft.   Musculoskeletal:         General: No swelling. Normal range of motion.      Cervical back: Normal range of motion and neck supple.   Lymphadenopathy:      Cervical: No cervical adenopathy.   Skin:     General: Skin is warm and dry.   Neurological:      General: No focal deficit present.      Mental Status: She is alert and oriented to person, place, and time.   Psychiatric:         Mood and Affect: Mood normal.         Behavior: Behavior normal.         Labs:   Recent Results (from the past 48 hour(s))   CBC Oncology    Collection Time: 01/17/23  5:03 PM   Result Value Ref Range    WBC 7.87 3.90 - 12.70 K/uL    RBC 3.56 (L) 4.00 - 5.40 M/uL    Hemoglobin 10.4 (L) 12.0 - 16.0 g/dL    Hematocrit 33.0 (L) 37.0 - 48.5 %    MCV 93 82 - 98 fL    MCH 29.2 27.0 - 31.0 pg    MCHC 31.5 (L) 32.0 - 36.0 g/dL    RDW 13.2 11.5 - 14.5 %    Platelets 225 150 - 450 K/uL    MPV 10.5 9.2 - 12.9 fL    Gran # (ANC) 5.2 1.8 - 7.7 K/uL    Immature Grans (Abs) 0.02 0.00 - 0.04 K/uL   Comprehensive Metabolic Panel    Collection Time: " 01/17/23  5:03 PM   Result Value Ref Range    Sodium 142 136 - 145 mmol/L    Potassium 3.3 (L) 3.5 - 5.1 mmol/L    Chloride 106 95 - 110 mmol/L    CO2 28 23 - 29 mmol/L    Glucose 111 (H) 70 - 110 mg/dL    BUN 10 8 - 23 mg/dL    Creatinine 0.9 0.5 - 1.4 mg/dL    Calcium 9.0 8.7 - 10.5 mg/dL    Total Protein 6.4 6.0 - 8.4 g/dL    Albumin 3.3 (L) 3.5 - 5.2 g/dL    Total Bilirubin 0.7 0.1 - 1.0 mg/dL    Alkaline Phosphatase 72 55 - 135 U/L    AST 15 10 - 40 U/L    ALT 14 10 - 44 U/L    Anion Gap 8 8 - 16 mmol/L    eGFR >60.0 >60 mL/min/1.73 m^2   TSH    Collection Time: 01/17/23  5:03 PM   Result Value Ref Range    TSH 1.723 0.400 - 4.000 uIU/mL   T4, Free    Collection Time: 01/17/23  5:03 PM   Result Value Ref Range    Free T4 0.87 0.71 - 1.51 ng/dL        Imaging: I have personally CT and MRI imaging showing no evidence of disease  CT Chest Without Contrast  Narrative: EXAMINATION:  CT CHEST WITHOUT CONTRAST    CLINICAL HISTORY:  metastatic disease evaluation; Malignant neoplasm of right kidney, except renal pelvis    TECHNIQUE:  Using low dose technique the chest was surveyed from above the pulmonary apices through the posterior costophrenic angles.  Data was reconstructed for multiplanar images in axial, sagittal and coronal planes and for maximal intensity projection images in the the axial, sagittal and coronal planes.    COMPARISON:  CT chest 09/27/2022, 04/21/2022; CT chest abdomen pelvis 06/27/2019    FINDINGS:  Base of Neck: Multiple subcentimeter hypodense nodules within the thyroid.    Aorta: Left-sided aortic arch with 3 arterial branches. The aorta maintains normal caliber, contour and course. There are mild calcifications of the thoracic aorta and its proximal branches.    Heart/pericardium: Normal size.  There are minimal calcifications of the aortic annulus.  Stable trace pericardial effusion.    Pulmonary vasculature: Within normal limits.    Nelida/Mediastinum: No pathologic briana  enlargement.    Esophagus: No significant abnormality.    Thoracic soft tissues: Pectus excavatum.  Stable bilateral breast tissue calcifications.    Upper Abdomen: Stable calcifications of the right adrenal.  Stable calcifications of the spleen.  Stable partially calcified splenic artery aneurysms.    Bones: Degenerative changes of the spine, including multilevel disc disease.  No acute fracture. No suspicious lytic or sclerotic lesion.    Airways/Lungs/Pleura:  Trachea is midline and proximal airways are patent.    Lungs are symmetrically expanded.  Stable biapical fibro nodular scarring.  Bilateral lower lung zone bandlike opacities, likely represent atelectasis versus scarring.  No focal consolidation.    Stable sub solid nodules within the right upper lobe, largest measuring 0.4 cm (series 4, image 120).  Stable right lower lobe calcified granulomas.    No pleural effusion.  No pneumothorax.  Impression: 1. Stable sub solid nodules within the right upper lobe when compared to CT chest abdomen pelvis 06/27/2019.  2. No specific CT evidence for new metastatic disease in the chest.    Electronically signed by resident: Tashi Belcher  Date:    01/18/2023  Time:    08:19    Electronically signed by: Zulema Junior  Date:    01/18/2023  Time:    10:23  MRI Urography W W/O Contrast (XPD)  Narrative: EXAMINATION:  MRI UROGRAPHY W W/O CONTRAST (XPD)    CLINICAL HISTORY:  Bladder cancer, invasive, assess treatment response;  Malignant neoplasm of right kidney, except renal pelvis    TECHNIQUE:  Multiplanar multi sequence imaging was obtained through the abdomen and pelvis prior to and following the administration of 10 cc of intravenous Gadavist according to the MRI Urogram protocol.    COMPARISON:  MRI urography 10/18/2022, 07/05/2022    CT chest abdomen pelvis 06/27/2019    CT abdomen 07/01/2019    FINDINGS:  Liver: Normal in size.  No signal dropout on opposed phase imaging.  T2 hyperintense right and left lobe lesions  demonstrating arterial enhancement persisting on venous and delayed phase following blood pool intensity consistent with hemangiomas.  Left lobe hemangioma measures 2.3 cm (7:3).  Right lobe hemangioma measures 1.3 cm (7:3).  No new concerning enhancing lesions.    Gallbladder: Unremarkable.    Bile Ducts: Common bile duct is normal in caliber tapering to the level of the ampulla.  Perhaps trace central intrahepatic ductal dilatation similar to priors.    Pancreas: Normal in appearance.  No main pancreatic ductal dilatation or peripheral stranding.    Spleen: Unremarkable.    Adrenals: Bilateral adrenal glands are present and otherwise unremarkable.    Kidneys/Ureters: Postoperative changes of right nephroureterectomy.  No concerning enhancement or new lesions within the nephrectomy bed.  Stable appearance of the left kidney.  No solid enhancing renal mass.  No hydronephrosis or ureteral dilatation.    Bladder: No focal bladder wall thickening or enhancing mass lesion.  No corresponding abnormal restricted diffusion.    Reproductive: Status post hysterectomy and bilateral salpingo-oophorectomy.    GI: Visualized loops of bowel are normal in caliber.  No obstructive bowel pattern.  Colonic diverticulosis without surrounding inflammatory changes.    Retroperitoneum: No pathologically enlarged nodes.    Abdominal wall: Midline ventral abdominal wall incisional scar.  Small fat containing right inguinal hernia.    Vasculature: Abdominal aorta is normal caliber.    Bones: Degenerative changes.  No suspicious marrow replacing lesion.  Impression: History of urothelial carcinoma status post right nephroureterectomy.  No evidence of local disease recurrence.    No lymphadenopathy.  No evidence of metastatic disease.    Stable hepatic lesions consistent with hemangiomas.    Electronically signed by resident: Tashi Graf  Date:    01/18/2023  Time:    08:44    Electronically signed by: Shane Cancino  MD  Date:    01/18/2023  Time:    10:02         Diagnoses:       1. Urothelial carcinoma of kidney, right    2. Solitary kidney, acquired    3. Chemotherapy-induced neuropathy    4. Endometrial ca    5. Fatigue, unspecified type    6. Gastroesophageal reflux disease, unspecified whether esophagitis present    7. Dry mouth    8. Loss of appetite    9. Weight loss        Assessment and Plan:         1. Stage IIIA Urothelial Carcinoma:   Discussed stage, adjuvant treatment options, and prognosis in detail with patient and daughter.  Due to below problems, I am recommending against cisplatin based adjuvant chemotherapy.  She would be a candidate for adjuvant Opdivo and discussed this treatment plan and the DFS benefit, though data is immature.  Patient and daughter agree to move forward with Opdivo adjuvant x 1 year per Checkmate 274 clinical trial.  - Consented for Opdivo Q4W. MRU reviewed las visit and shows no evidence of disease. Will now stop Opdivo for problems 7-9.   - Scans reviewed today and continues to show no evidence of disease recurrence. Re-staging scans in April ([facility] Patient had normal cystoscope in January- next due in 6 months.     2,3 Not a candidate for cisplatin chemotherapy due to these issues.    4 Continue to follow with Dr. Joe every 4 months.    5. Residual from covid. Will continue to monitor.    6 Will defer to GI for recs regarding indigestion, swallowing complaints.    7-9. Issues continue to be progressive and patient desires to stop treatment. Nutrition referral ordered. Educated to increase water intake during meals. May also use Xylimelts and hard candies for dry mouth. Needs to supplement diet with Boost/Ensure. Try Mucinex for thick saliva.       she will return to clinic in 12 weeks, but knows to call in the interim if symptoms change or should a problem arise.    Patient was also seen and examined by Dr. Cancino. Patient is in agreement with the proposed treatment plan.  All questions were answered to the patient's satisfaction. Pt knows to call clinic if anything is needed before the next clinic visit.    Joellen Valerio, MSN, APRN, FNP-C  Hematology and Medical Oncology  Nurse Practitioner to Dr. Urban Cancino  Nurse Practitioner, [facility]      I have reviewed the notes, assessments, and/or procedures performed by Joellen CAMERON, as above.  I have personally interviewed and examined the patient at the beside, and rounded with Joellen. I concur with her assessment and plan and the documentation of Gita García.    MDM includes:    - Acute or chronic illness or injury that poses a threat to life or bodily function  - Independent review and explanation of 2 results from unique tests  - Discussion of management and ordering 2 unique tests  - Extensive discussion of treatment and management    Urban Cancino M.D.  Hematology/Oncology Attending  [facility]  [facility]          Route Chart for Scheduling    Med Onc Chart Routing      Follow up with physician 3 months. review imaging   Follow up with EVELYN    Infusion scheduling note    Injection scheduling note    Labs CBC, CMP, free T4 and TSH   Lab interval: every 12 weeks     Imaging MRI and other   MRI Urography and CT chest in 3 months   Pharmacy appointment    Other referrals        Treatment Plan Information   OP NIVOLUMAB Q4W   Urban Cancino MD   Upcoming Treatment Dates - OP NIVOLUMAB Q4W    12/16/2022       Chemotherapy       nivolumab 480 mg in sodium chloride 0.9% 148 mL infusion  1/13/2023       Chemotherapy       nivolumab 480 mg in sodium chloride 0.9% 148 mL infusion  2/10/2023       Chemotherapy       nivolumab 480 mg in sodium chloride 0.9% 148 mL infusion  3/10/2023       Chemotherapy       nivolumab 480 mg in sodium chloride 0.9% 148 mL infusion    Therapy Plan Information  mitoMYcin (MUTAMYCIN) 40 mg in sodium chloride  0.9% 40 mL bladder instillation  40 mg, Intravesical, 1 time a week, at least 5 days apart

## 2023-03-24 ENCOUNTER — PATIENT MESSAGE (OUTPATIENT)
Dept: HEMATOLOGY/ONCOLOGY | Facility: CLINIC | Age: 78
End: 2023-03-24
Payer: MEDICARE

## 2023-04-17 ENCOUNTER — PATIENT MESSAGE (OUTPATIENT)
Dept: HEMATOLOGY/ONCOLOGY | Facility: CLINIC | Age: 78
End: 2023-04-17
Payer: MEDICARE

## 2023-04-17 ENCOUNTER — HOSPITAL ENCOUNTER (OUTPATIENT)
Dept: RADIOLOGY | Facility: HOSPITAL | Age: 78
Discharge: HOME | End: 2023-04-17
Attending: NURSE PRACTITIONER
Payer: MEDICARE

## 2023-04-17 DIAGNOSIS — C64.1 UROTHELIAL CARCINOMA OF KIDNEY, RIGHT: ICD-10-CM

## 2023-04-17 PROCEDURE — 71250 CT THORAX DX C-: CPT | Mod: 26,,, | Performed by: RADIOLOGY

## 2023-04-17 PROCEDURE — 72197 MRI PELVIS W/O & W/DYE: CPT | Mod: TC

## 2023-04-17 PROCEDURE — 71250 CT THORAX DX C-: CPT | Mod: TC

## 2023-04-17 PROCEDURE — 25500020 PHARM REV CODE 255: Performed by: NURSE PRACTITIONER

## 2023-04-17 PROCEDURE — 72197 MRI PELVIS W/O & W/DYE: CPT | Mod: 26,,, | Performed by: INTERNAL MEDICINE

## 2023-04-17 PROCEDURE — A9585 GADOBUTROL INJECTION: HCPCS | Performed by: NURSE PRACTITIONER

## 2023-04-17 PROCEDURE — 72197 MRI UROGRAPHY W W/O CONTRAST (XPD): ICD-10-PCS | Mod: 26,,, | Performed by: INTERNAL MEDICINE

## 2023-04-17 PROCEDURE — 74183 MRI UROGRAPHY W W/O CONTRAST (XPD): ICD-10-PCS | Mod: 26,,, | Performed by: INTERNAL MEDICINE

## 2023-04-17 PROCEDURE — 71250 CT CHEST WITHOUT CONTRAST: ICD-10-PCS | Mod: 26,,, | Performed by: RADIOLOGY

## 2023-04-17 PROCEDURE — 74183 MRI ABD W/O CNTR FLWD CNTR: CPT | Mod: 26,,, | Performed by: INTERNAL MEDICINE

## 2023-04-17 RX ORDER — GADOBUTROL 604.72 MG/ML
6 INJECTION INTRAVENOUS
Status: COMPLETED | OUTPATIENT
Start: 2023-04-17 | End: 2023-04-17

## 2023-04-17 RX ADMIN — GADOBUTROL 6 ML: 604.72 INJECTION INTRAVENOUS at 09:04

## 2023-04-20 ENCOUNTER — OFFICE VISIT (OUTPATIENT)
Dept: HEMATOLOGY/ONCOLOGY | Facility: CLINIC | Age: 78
End: 2023-04-20
Payer: MEDICARE

## 2023-04-20 ENCOUNTER — LAB VISIT (OUTPATIENT)
Dept: LAB | Facility: HOSPITAL | Age: 78
End: 2023-04-20
Attending: INTERNAL MEDICINE
Payer: MEDICARE

## 2023-04-20 VITALS
SYSTOLIC BLOOD PRESSURE: 151 MMHG | OXYGEN SATURATION: 98 % | RESPIRATION RATE: 18 BRPM | TEMPERATURE: 98 F | DIASTOLIC BLOOD PRESSURE: 67 MMHG | WEIGHT: 129.19 LBS | HEIGHT: 64 IN | BODY MASS INDEX: 22.06 KG/M2 | HEART RATE: 62 BPM

## 2023-04-20 DIAGNOSIS — T45.1X5A CHEMOTHERAPY-INDUCED NEUROPATHY: ICD-10-CM

## 2023-04-20 DIAGNOSIS — C54.1 ENDOMETRIAL CA: ICD-10-CM

## 2023-04-20 DIAGNOSIS — K86.89 PANCREATIC INSUFFICIENCY: ICD-10-CM

## 2023-04-20 DIAGNOSIS — C64.1 UROTHELIAL CARCINOMA OF KIDNEY, RIGHT: Primary | ICD-10-CM

## 2023-04-20 DIAGNOSIS — K21.9 GASTROESOPHAGEAL REFLUX DISEASE, UNSPECIFIED WHETHER ESOPHAGITIS PRESENT: ICD-10-CM

## 2023-04-20 DIAGNOSIS — E03.9 HYPOTHYROIDISM, UNSPECIFIED TYPE: ICD-10-CM

## 2023-04-20 DIAGNOSIS — R53.83 FATIGUE, UNSPECIFIED TYPE: ICD-10-CM

## 2023-04-20 DIAGNOSIS — R63.4 WEIGHT LOSS: ICD-10-CM

## 2023-04-20 DIAGNOSIS — R68.2 DRY MOUTH: ICD-10-CM

## 2023-04-20 DIAGNOSIS — Z90.5 SOLITARY KIDNEY, ACQUIRED: ICD-10-CM

## 2023-04-20 DIAGNOSIS — G62.0 CHEMOTHERAPY-INDUCED NEUROPATHY: ICD-10-CM

## 2023-04-20 DIAGNOSIS — R63.0 LOSS OF APPETITE: ICD-10-CM

## 2023-04-20 DIAGNOSIS — C64.1 UROTHELIAL CARCINOMA OF KIDNEY, RIGHT: ICD-10-CM

## 2023-04-20 LAB
ALBUMIN SERPL BCP-MCNC: 3.4 G/DL (ref 3.5–5.2)
ALP SERPL-CCNC: 85 U/L (ref 55–135)
ALT SERPL W/O P-5'-P-CCNC: 22 U/L (ref 10–44)
ANION GAP SERPL CALC-SCNC: 10 MMOL/L (ref 8–16)
AST SERPL-CCNC: 17 U/L (ref 10–40)
BILIRUB SERPL-MCNC: 0.8 MG/DL (ref 0.1–1)
BUN SERPL-MCNC: 9 MG/DL (ref 8–23)
CALCIUM SERPL-MCNC: 8.6 MG/DL (ref 8.7–10.5)
CHLORIDE SERPL-SCNC: 110 MMOL/L (ref 95–110)
CO2 SERPL-SCNC: 26 MMOL/L (ref 23–29)
CREAT SERPL-MCNC: 0.9 MG/DL (ref 0.5–1.4)
ERYTHROCYTE [DISTWIDTH] IN BLOOD BY AUTOMATED COUNT: 13.3 % (ref 11.5–14.5)
EST. GFR  (NO RACE VARIABLE): >60 ML/MIN/1.73 M^2
GLUCOSE SERPL-MCNC: 113 MG/DL (ref 70–110)
HCT VFR BLD AUTO: 36.1 % (ref 37–48.5)
HGB BLD-MCNC: 11.4 G/DL (ref 12–16)
IMM GRANULOCYTES # BLD AUTO: 0.03 K/UL (ref 0–0.04)
MCH RBC QN AUTO: 29 PG (ref 27–31)
MCHC RBC AUTO-ENTMCNC: 31.6 G/DL (ref 32–36)
MCV RBC AUTO: 92 FL (ref 82–98)
NEUTROPHILS # BLD AUTO: 6.7 K/UL (ref 1.8–7.7)
PLATELET # BLD AUTO: 210 K/UL (ref 150–450)
PMV BLD AUTO: 10.1 FL (ref 9.2–12.9)
POTASSIUM SERPL-SCNC: 3.5 MMOL/L (ref 3.5–5.1)
PROT SERPL-MCNC: 6 G/DL (ref 6–8.4)
RBC # BLD AUTO: 3.93 M/UL (ref 4–5.4)
SODIUM SERPL-SCNC: 146 MMOL/L (ref 136–145)
T4 FREE SERPL-MCNC: 0.85 NG/DL (ref 0.71–1.51)
TSH SERPL DL<=0.005 MIU/L-ACNC: 1.89 UIU/ML (ref 0.4–4)
WBC # BLD AUTO: 9.12 K/UL (ref 3.9–12.7)

## 2023-04-20 PROCEDURE — 3077F PR MOST RECENT SYSTOLIC BLOOD PRESSURE >= 140 MM HG: ICD-10-PCS | Mod: CPTII,S$GLB,, | Performed by: INTERNAL MEDICINE

## 2023-04-20 PROCEDURE — 1101F PT FALLS ASSESS-DOCD LE1/YR: CPT | Mod: CPTII,S$GLB,, | Performed by: INTERNAL MEDICINE

## 2023-04-20 PROCEDURE — 1126F PR PAIN SEVERITY QUANTIFIED, NO PAIN PRESENT: ICD-10-PCS | Mod: CPTII,S$GLB,, | Performed by: INTERNAL MEDICINE

## 2023-04-20 PROCEDURE — 80053 COMPREHEN METABOLIC PANEL: CPT | Performed by: INTERNAL MEDICINE

## 2023-04-20 PROCEDURE — 99999 PR PBB SHADOW E&M-EST. PATIENT-LVL V: CPT | Mod: PBBFAC,,, | Performed by: INTERNAL MEDICINE

## 2023-04-20 PROCEDURE — 1157F PR ADVANCE CARE PLAN OR EQUIV PRESENT IN MEDICAL RECORD: ICD-10-PCS | Mod: CPTII,S$GLB,, | Performed by: INTERNAL MEDICINE

## 2023-04-20 PROCEDURE — 3078F DIAST BP <80 MM HG: CPT | Mod: CPTII,S$GLB,, | Performed by: INTERNAL MEDICINE

## 2023-04-20 PROCEDURE — 1160F PR REVIEW ALL MEDS BY PRESCRIBER/CLIN PHARMACIST DOCUMENTED: ICD-10-PCS | Mod: CPTII,S$GLB,, | Performed by: INTERNAL MEDICINE

## 2023-04-20 PROCEDURE — 1101F PR PT FALLS ASSESS DOC 0-1 FALLS W/OUT INJ PAST YR: ICD-10-PCS | Mod: CPTII,S$GLB,, | Performed by: INTERNAL MEDICINE

## 2023-04-20 PROCEDURE — 85027 COMPLETE CBC AUTOMATED: CPT | Performed by: INTERNAL MEDICINE

## 2023-04-20 PROCEDURE — 3077F SYST BP >= 140 MM HG: CPT | Mod: CPTII,S$GLB,, | Performed by: INTERNAL MEDICINE

## 2023-04-20 PROCEDURE — 3288F PR FALLS RISK ASSESSMENT DOCUMENTED: ICD-10-PCS | Mod: CPTII,S$GLB,, | Performed by: INTERNAL MEDICINE

## 2023-04-20 PROCEDURE — 3288F FALL RISK ASSESSMENT DOCD: CPT | Mod: CPTII,S$GLB,, | Performed by: INTERNAL MEDICINE

## 2023-04-20 PROCEDURE — 1159F MED LIST DOCD IN RCRD: CPT | Mod: CPTII,S$GLB,, | Performed by: INTERNAL MEDICINE

## 2023-04-20 PROCEDURE — 3078F PR MOST RECENT DIASTOLIC BLOOD PRESSURE < 80 MM HG: ICD-10-PCS | Mod: CPTII,S$GLB,, | Performed by: INTERNAL MEDICINE

## 2023-04-20 PROCEDURE — 99999 PR PBB SHADOW E&M-EST. PATIENT-LVL V: ICD-10-PCS | Mod: PBBFAC,,, | Performed by: INTERNAL MEDICINE

## 2023-04-20 PROCEDURE — 84443 ASSAY THYROID STIM HORMONE: CPT | Performed by: INTERNAL MEDICINE

## 2023-04-20 PROCEDURE — 1157F ADVNC CARE PLAN IN RCRD: CPT | Mod: CPTII,S$GLB,, | Performed by: INTERNAL MEDICINE

## 2023-04-20 PROCEDURE — 36415 COLL VENOUS BLD VENIPUNCTURE: CPT | Performed by: INTERNAL MEDICINE

## 2023-04-20 PROCEDURE — 1160F RVW MEDS BY RX/DR IN RCRD: CPT | Mod: CPTII,S$GLB,, | Performed by: INTERNAL MEDICINE

## 2023-04-20 PROCEDURE — 84439 ASSAY OF FREE THYROXINE: CPT | Performed by: INTERNAL MEDICINE

## 2023-04-20 PROCEDURE — 99214 PR OFFICE/OUTPT VISIT, EST, LEVL IV, 30-39 MIN: ICD-10-PCS | Mod: S$GLB,,, | Performed by: INTERNAL MEDICINE

## 2023-04-20 PROCEDURE — 1126F AMNT PAIN NOTED NONE PRSNT: CPT | Mod: CPTII,S$GLB,, | Performed by: INTERNAL MEDICINE

## 2023-04-20 PROCEDURE — 1159F PR MEDICATION LIST DOCUMENTED IN MEDICAL RECORD: ICD-10-PCS | Mod: CPTII,S$GLB,, | Performed by: INTERNAL MEDICINE

## 2023-04-20 PROCEDURE — 99214 OFFICE O/P EST MOD 30 MIN: CPT | Mod: S$GLB,,, | Performed by: INTERNAL MEDICINE

## 2023-04-20 RX ORDER — DICYCLOMINE HYDROCHLORIDE 10 MG/1
10 CAPSULE ORAL DAILY PRN
COMMUNITY
Start: 2023-02-20 | End: 2023-05-05

## 2023-04-20 RX ORDER — PANCRELIPASE 36000; 180000; 114000 [USP'U]/1; [USP'U]/1; [USP'U]/1
CAPSULE, DELAYED RELEASE PELLETS ORAL
COMMUNITY
Start: 2023-04-11 | End: 2024-03-07

## 2023-04-20 NOTE — PROGRESS NOTES
ONCOLOGY FOLLOW UP VISIT    Reason for visit: Toxicity check on Opdivo for stage IIIA urothelial carcinoma    Cancer/Stage/TNM:    Cancer Staging   Endometrial ca  Staging form: Corpus Uteri - Carcinoma and Carcinosarcoma, AJCC 8th Edition  - Clinical: No stage assigned - Unsigned  - Pathologic stage from 7/18/2019: FIGO Stage IA (pT1a, pN0, cM0) - Signed by Lokesh Carias MD on 7/18/2019         Oncology History   Endometrial ca   6/26/2019 Initial Diagnosis    Endometrial ca       7/18/2019 Cancer Staged    Staging form: Corpus Uteri - Carcinoma and Carcinosarcoma, AJCC 8th Edition  - Pathologic stage from 7/18/2019: FIGO Stage IA (pT1a, pN0, cM0) - Signed by Lokesh Carias MD on 7/18/2019 7/18/2019 - 7/18/2019 Chemotherapy    Treatment Summary   Plan Name: OP GYN PACLITAXEL CARBOPLATIN (AUC 6) Q3W  Treatment Goal: Curative  Status: Inactive  Start Date:   End Date:   Provider: Lokesh Carias MD  Chemotherapy: CARBOplatin (PARAPLATIN) in sodium chloride 0.9% 250 mL chemo infusion, , Intravenous, Clinic/HOD 1 time, 0 of 6 cycles  PACLitaxel (TAXOL) 175 mg/m2 = 300 mg in sodium chloride 0.9% 500 mL chemo infusion, 175 mg/m2, Intravenous, Clinic/HOD 1 time, 0 of 6 cycles       Urothelial carcinoma of kidney, right   11/11/2021 Initial Diagnosis    Urothelial carcinoma of kidney, right     1/27/2022 Tumor Conference    Presenting Hospital / Clinic: [facility] [address]  Virtual Tumor Board Conference: Virtual  Presenter: Edmond Manuel  Date Presented to Tumor Board: 1/27/2022  Specialties Present: Medical Oncology; Radiation Oncology; Pathology; Navigation; Urology  Presentation at Cancer Conference: Prospective  Cancer Type: Other  Other Cancer: right upper tract urothelial  Recommended Plan: Chemotherapy; Surgery  Recommended Plan Note: neoadjuvant split dose MVAC + open nephroureterectomy    Oncology History   Endometrial ca   Urothelial carcinoma of kidney, right   1/27/2022 Tumor Conference    Presenting  Hospital / Clinic: [facility]  Virtual Tumor Board Conference: Virtual  Presenter: Edmond Manuel  Date Presented to Tumor Board: 1/27/2022  Specialties Present: Medical Oncology; Radiation Oncology; Pathology; Navigation; Urology  Presentation at Cancer Conference: Prospective  Cancer Type: Other  Other Cancer: right upper tract urothelial  Recommended Plan: Chemotherapy; Surgery  Recommended Plan Note: neoadjuvant split dose MVAC + open nephroureterectomy                  PATIENT SUMMARY:   Gita García is a 76 y.o. female with HG right upper tract UCC. History of endometrial cancer s/p neoadjuvant taxol+cisplatin and vaginal brachytherapy followed by robotic hysterectomy.    DISCUSSION:  Pathology review  Staging review    PERFORMANCE STATUS:  ECOG 0    Estimated GFR/CKD Stage: >60 (cr 0.7)    Clinical/Pathologic Stage (TNM): T1 N0 M0    FACULTY IN ATTENDANCE:    Urologic Oncology: Shane Diaz MD [x], Michael Finley MD [x] , Hever Pollack MD []    CONSULT NEEDED:     [] Urologic Oncology    [] Hem/Onc    [] Rad/Onc     [x] Treatment Guidelines (NCCN and AUA) reviewed and care planned is consistent with guidelines.    TUMOR BOARD RECOMMENDATIONS/PLAN/CONSENSUS:     Case discussed among group. Pathology and radiologic images were reviewed (if applicable).    Neoadjuvant split-dose DDMVAC + open nephroureterectomy  Referral to genetics      5/6/2022 -  Chemotherapy    Treatment Summary   Plan Name: OP NIVOLUMAB Q4W  Treatment Goal: Curative  Status: Active  Start Date: 5/6/2022  End Date: 3/10/2023 (Planned)  Provider: Urban Cancino MD  Chemotherapy: [No matching medication found in this treatment plan]            HPI:   77 y.o. female who presents to clinic to review imaging. Now on Creon for pancreatic insufficiency. Appetite is gone and has been suffering weight loss. Verbalizes poor understanding of how to use Creon and new diagnosis of pancreatic insufficiency. Dry mouth has improved minimally  since stopping immunotherapy.     ROS:   Review of Systems   Constitutional:  Positive for appetite change and unexpected weight change (weight loss). Negative for activity change, chills, fatigue and fever.   HENT:  Negative for mouth sores, nosebleeds, sore throat and trouble swallowing.         Dry mouth; Thickened saliva - improving   Respiratory:  Negative for cough, shortness of breath and wheezing.    Cardiovascular:  Negative for chest pain, palpitations and leg swelling.   Gastrointestinal:  Negative for abdominal distention, abdominal pain and blood in stool.   Endocrine: Negative for cold intolerance and heat intolerance.   Genitourinary:  Negative for dysuria, flank pain and frequency.   Musculoskeletal:  Negative for arthralgias, joint swelling and myalgias.   Skin:  Negative for color change, rash and wound.   Neurological:  Negative for dizziness, light-headedness, numbness and headaches.   Hematological:  Negative for adenopathy. Does not bruise/bleed easily.      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        Past Surgical History:   Past Surgical History:   Procedure Laterality Date    ABLATION OF NEOPLASM OF URETER USING LASER Right 1/3/2022    Procedure: ABLATION, NEOPLASM, URETER, USING LASER;  Surgeon: Shane Diaz MD;  Location: [facility] OR [address] [place] [address];  Service: Urology;  Laterality: Right;    CYSTOSCOPY N/A 1/3/2022    Procedure: CYSTOSCOPY;  Surgeon: Shane Diaz MD;  Location: [facility] OR [address] [place] [address];  Service: Urology;  Laterality: N/A;    DILATION AND CURETTAGE OF  UTERUS      GI scope  2016    LAPAROTOMY N/A 7/8/2019    Procedure: LAPAROTOMY;  Surgeon: Lokesh Carias MD;  Location: [facility] OR 2ND FLR;  Service: OB/GYN;  Laterality: N/A;  mini    OMENTECTOMY N/A 7/8/2019    Procedure: OMENTECTOMY;  Surgeon: Lokesh Carias MD;  Location: [facility] OR 2ND FLR;  Service: OB/GYN;  Laterality: N/A;    ROBOT-ASSISTED LAPAROSCOPIC ABDOMINAL HYSTERECTOMY USING DA SIRISHA XI N/A 7/8/2019    Procedure: XI ROBOTIC HYSTERECTOMY;  Surgeon: Lokesh Carias MD;  Location: [facility] OR 2ND FLR;  Service: OB/GYN;  Laterality: N/A;    ROBOT-ASSISTED LAPAROSCOPIC PELVIC LYMPHADENECTOMY USING DA SIRISHA XI Bilateral 7/8/2019    Procedure: XI ROBOTIC LYMPHADENECTOMY, PELVIC;  Surgeon: Lokesh Carias MD;  Location: [facility] OR 2ND FLR;  Service: OB/GYN;  Laterality: Bilateral;    ROBOT-ASSISTED LAPAROSCOPIC RETROPERITONEAL LYMPHADENECTOMY USING DA SIRISHA XI N/A 7/8/2019    Procedure: XI ROBOTIC LYMPHADENECTOMY, RETROPERITONEUM;  Surgeon: Lokesh Carias MD;  Location: [facility] OR 2ND FLR;  Service: OB/GYN;  Laterality: N/A;    ROBOT-ASSISTED LAPAROSCOPIC SALPINGO-OOPHORECTOMY USING DA SIRISHA XI Bilateral 7/8/2019    Procedure: XI ROBOTIC SALPINGO-OOPHORECTOMY;  Surgeon: Lokesh Carias MD;  Location: [facility] OR 2ND FLR;  Service: OB/GYN;  Laterality: Bilateral;    ROBOT-ASSISTED LAPAROSCOPIC SURGICAL REMOVAL OF KIDNEY AND URETER USING DA SIRISHA XI Right 3/14/2022    Procedure: XI ROBOTIC NEPHROURETERECTOMY/ WITH BLADDER CUFF;  Surgeon: Shane Diaz MD;  Location: [facility] OR 2ND FLR;  Service: Urology;  Laterality: Right;  4hrs    URETEROSCOPY Right 1/3/2022    Procedure: URETEROSCOPY;  Surgeon: Shane Diaz MD;  Location: [facility] OR 1ST FLR;  Service: Urology;  Laterality: Right;  2hrs        Family History:   Family History   Problem Relation Age of Onset    Colon cancer Sister 53    Prostate cancer Brother     Breast cancer Sister 64    Kidney cancer Sister     Breast cancer Sister         in her 60s    Skin  cancer Sister     Stroke Brother     Prostate cancer Brother     Heart disease Brother     Prostate cancer Brother     Heart disease Brother     Ovarian cancer Neg Hx     Uterine cancer Neg Hx     Anesthesia problems Neg Hx         Social History:   Social History     Tobacco Use    Smoking status: Never    Smokeless tobacco: Never   Substance Use Topics    Alcohol use: Never        Allergies:   Review of patient's allergies indicates:   Allergen Reactions    Asa [aspirin] Other (See Comments)     Stomach upset    Dilaudid (pf) [hydromorphone (pf)] Nausea Only    Sulfa (sulfonamide antibiotics) Itching        Medications:   Current Outpatient Medications   Medication Sig Dispense Refill    acetaminophen (TYLENOL) 500 MG tablet Take 500 mg by mouth every 6 (six) hours as needed for Pain.      ALPRAZolam (XANAX) 0.25 MG tablet Take 0.25 mg by mouth 2 (two) times daily as needed for Anxiety.       benzonatate (TESSALON) 100 MG capsule Take 100 mg by mouth 3 (three) times daily as needed for Cough.      benzonatate (TESSALON) 200 MG capsule Take 200 mg by mouth.      bisoprolol-hydrochlorothiazide (ZIAC) 10-6.25 mg per tablet Take 1 tablet by mouth once daily.       calcium carbonate/vitamin D3 (CALCIUM 600 + D,3, ORAL) Take by mouth once daily.       co-enzyme Q-10 30 mg capsule Take 30 mg by mouth 3 (three) times daily.      CREON 36,000-114,000- 180,000 unit CpDR       dicyclomine (BENTYL) 10 MG capsule Take 10 mg by mouth daily as needed.      hyoscyamine (ANASPAZ,LEVSIN) 0.125 mg Tab Take 125 mcg by mouth.      loratadine (CLARITIN) 10 mg tablet Take 10 mg by mouth daily as needed for Allergies.      milk thistle 175 mg tablet Take 175 mg by mouth once daily.      multivitamin/iron/folic acid (CENTRUM ORAL) Take by mouth once daily.       mv-min/FA/vit K/lycop/lut/zeax (OCUVITE EYE PLUS MULTI ORAL) Take 1 tablet by mouth once daily.       oxyCODONE (ROXICODONE) 5 MG immediate release tablet Take 1 tablet (5 mg  "total) by mouth every 6 (six) hours as needed for Pain. 10 tablet 0    pantoprazole (PROTONIX) 40 MG tablet Take 40 mg by mouth once daily.      polyethylene glycol (GLYCOLAX) 17 gram PwPk Take by mouth every morning.      raloxifene (EVISTA) 60 mg tablet Take 60 mg by mouth every evening.      sucralfate (CARAFATE) 1 gram tablet Take 1 g by mouth 2 (two) times daily.      triamcinolone acetonide 0.1% (KENALOG) 0.1 % cream APPLY TO THE AFFECTED AREA(S) UNDER breast TWICE DAILY AS NEEDED       No current facility-administered medications for this visit.        Physical Exam:   BP (!) 151/67 (BP Location: Left arm, Patient Position: Sitting, BP Method: Medium (Automatic))   Pulse 62   Temp 98.2 °F (36.8 °C) (Oral)   Resp 18   Ht 5' 4" (1.626 m)   Wt 58.6 kg (129 lb 3 oz)   SpO2 98%   BMI 22.18 kg/m²      ECOG Performance Status: (foot note - ECOG PS provided by [employer]cology [employer]) 0 - Asymptomatic    Physical Exam  Constitutional:       Appearance: She is well-developed.   HENT:      Head: Normocephalic and atraumatic.   Eyes:      Conjunctiva/sclera: Conjunctivae normal.      Pupils: Pupils are equal, round, and reactive to light.   Pulmonary:      Effort: Pulmonary effort is normal. No respiratory distress.   Abdominal:      General: There is no distension.      Palpations: Abdomen is soft.   Musculoskeletal:         General: No swelling. Normal range of motion.      Cervical back: Normal range of motion and neck supple.   Lymphadenopathy:      Cervical: No cervical adenopathy.   Skin:     General: Skin is warm and dry.   Neurological:      General: No focal deficit present.      Mental Status: She is alert and oriented to person, place, and time.   Psychiatric:         Mood and Affect: Mood normal.         Behavior: Behavior normal.         Labs:   Recent Results (from the past 48 hour(s))   CBC Oncology    Collection Time: 04/20/23  7:59 AM   Result Value Ref Range    WBC 9.12 3.90 - 12.70 " K/uL    RBC 3.93 (L) 4.00 - 5.40 M/uL    Hemoglobin 11.4 (L) 12.0 - 16.0 g/dL    Hematocrit 36.1 (L) 37.0 - 48.5 %    MCV 92 82 - 98 fL    MCH 29.0 27.0 - 31.0 pg    MCHC 31.6 (L) 32.0 - 36.0 g/dL    RDW 13.3 11.5 - 14.5 %    Platelets 210 150 - 450 K/uL    MPV 10.1 9.2 - 12.9 fL    Gran # (ANC) 6.7 1.8 - 7.7 K/uL    Immature Grans (Abs) 0.03 0.00 - 0.04 K/uL        Imaging: I have personally CT and MRI imaging showing no evidence of disease  MRI Urography W W/O Contrast (XPD)  Narrative: EXAMINATION:  MRI UROGRAPHY W W/O CONTRAST (XPD)    CLINICAL HISTORY:  Bladder cancer, invasive, monitor;  Malignant neoplasm of right kidney, except renal pelvis    TECHNIQUE:  Multiplanar multi sequence imaging was obtained through the abdomen and pelvis prior to and following the administration of 6 cc of intravenous Gadavist according to the MRI Urogram protocol.    COMPARISON:  MRI urography 01/17/2023, CT chest abdomen pelvis 04/21/2022    FINDINGS:  Lower thorax: Pectus excavatum.    Liver: Normal in size.  No global hepatic signal abnormality.  3.8 cm hemangioma in segment 2/3 (26:8).  1.7 cm hemangioma in segment 8 (26:7).  No new focal lesion.  Hepatic and portal vein are patent.    Gallbladder: Unremarkable.    Biliary ducts: No biliary duct dilatation.    Pancreas: No focal lesion.  Pancreatic duct is normal.    Spleen: Unremarkable.    Adrenals: Unremarkable.    Kidney/ureter: Postoperative changes from right nephroureterectomy, including the bladder cuff.  No evidence of recurrent tumor in the operative bed.  No hydronephrosis or renal mass on the left.    The bladder: Normally distended and shows no abnormalities.    Stomach is unremarkable. Small and large bowel are normal in caliber with no evidence of obstruction.  Colonic diverticuli.  No inflammation.  No free fluid. No lymphadenopathy.  Postoperative changes from omentectomy.    Reproductive organs: Postoperative changes from hysterectomy and bilateral salpingo  oophorectomy.  No evidence of recurrent tumor in the operative bed.    Vasculature: Aortoiliac atherosclerosis.  No aneurysm.    Bone: Degenerative changes. No suspicious lesions.    Soft tissues: Anterior abdominal wall surgical changes.  Small fat containing umbilical hernia.  Impression: Postoperative changes from right nephroureterectomy for urothelial carcinoma.    Postoperative changes from hysterectomy, bilateral salpingo oophorectomy, and omentectomy for uterine carcinoma.    No local recurrence or metastases in the abdomen or pelvis.    Other findings as described.    Electronically signed by resident: April De La Fuente  Date:    04/17/2023  Time:    09:41    Electronically signed by: Osvaldo Meier  Date:    04/17/2023  Time:    12:43  CT Chest Without Contrast  Narrative: EXAMINATION:  CT CHEST WITHOUT CONTRAST    CLINICAL HISTORY:  Lung nodule, > 8mm; Malignant neoplasm of right kidney, except renal pelvis    TECHNIQUE:  Low dose axial images, sagittal and coronal reformations were obtained from the thoracic inlet to the lung bases. Contrast was not administered.    COMPARISON:  01/17/2023, 09/27/2022    FINDINGS:  Support tubes and lines: None.    Aorta: Normal caliber.    Heart: Normal size.    Coronary arteries: No calcifications.    Pericardium: Normal. No effusion, thickening, or calcification.    Central pulmonary arteries: Normal caliber.    Base of neck/thyroid: Unremarkable.    Lymph nodes: No supraclavicular, axillary, internal mammary, mediastinal, or hilar adenopathy.    Esophagus: Unremarkable.    Pleura: No effusion, thickening, or calcification.    Upper abdomen: Unremarkable.    Body wall: Unremarkable.    Airways: Unremarkable.    Lungs: A 2 mm right middle lobe nodule (series 4, image 295) is unchanged going back at least as far as 09/27/2022.  Additional tiny nodules seen in both lungs, most of which are calcified, are also unchanged.  No new nodules.    Bones: Unremarkable.  Impression:  Stable tiny bilateral pulmonary nodules going back at least as far as 09/27/2022.  No new nodules.    Electronically signed by: Radha Palomino  Date:    04/17/2023  Time:    10:22         Diagnoses:       1. Urothelial carcinoma of kidney, right    2. Solitary kidney, acquired    3. Chemotherapy-induced neuropathy    4. Endometrial ca    5. Fatigue, unspecified type    6. Gastroesophageal reflux disease, unspecified whether esophagitis present    7. Loss of appetite    8. Weight loss    9. Pancreatic insufficiency    10. Dry mouth          Assessment and Plan:         1. Stage IIIA Urothelial Carcinoma:   Discussed stage, adjuvant treatment options, and prognosis in detail with patient and daughter.  Due to below problems, I am recommending against cisplatin based adjuvant chemotherapy.  She would be a candidate for adjuvant Opdivo and discussed this treatment plan and the DFS benefit, though data is immature.  Patient and daughter agree to move forward with Opdivo adjuvant x 1 year per Checkmate 274 clinical trial.  - Consented for Opdivo Q4W. MRU reviewed las visit and shows no evidence of disease. Will now stop Opdivo for problems 7-9.   - Scans reviewed today and continues to show no evidence of disease recurrence. Re-staging scans in July ([facility]). Patient had normal cystoscope in January- next due in 6 months.     2,3 Not a candidate for cisplatin chemotherapy due to these issues.    4 Continue to follow with Dr. Joe every 4 months.    5. Residual from covid. Will continue to monitor.    6-10. Issues continue to be progressive and patient desires to stop treatment. Now found to have pancreatic insufficiency which accounts for many of these problems. Nutrition referral ordered. Educated to increase water intake during meals. May also use Xylimelts and hard candies for dry mouth. Needs to supplement diet with Boost/Ensure. Try Mucinex for thick saliva.       she will return to clinic in 12 weeks, but  knows to call in the interim if symptoms change or should a problem arise.    Patient was also seen and examined by Dr. Cancino. Patient is in agreement with the proposed treatment plan. All questions were answered to the patient's satisfaction. Pt knows to call clinic if anything is needed before the next clinic visit.    Joellen Valerio, MSN, APRN, FNP-C  Hematology and Medical Oncology  Nurse Practitioner to Dr. Urban Cancino  Nurse Practitioner, [facility]      I have reviewed the notes, assessments, and/or procedures performed by Joellen CAMERON, as above.  I have personally interviewed and examined the patient at the beside, and rounded with Joellen. I concur with her assessment and plan and the documentation of Gita García.    MDM includes:    - Acute or chronic illness or injury that poses a threat to life or bodily function  - Independent review and explanation of 2 results from unique tests  - Discussion of management and ordering 2 unique tests  - Extensive discussion of treatment and management    Urban Cancino M.D.  Hematology/Oncology Attending  [place] [facility]  [facility]          Route Chart for Scheduling    Med Onc Chart Routing      Follow up with physician 3 months. review imaging   Follow up with EVELYN    Infusion scheduling note    Injection scheduling note    Labs CBC, CMP, free T4 and TSH   Scheduling:  Preferred lab:  Lab interval:  prior to visit   Imaging   CT chest and MRI urography in 3 months   Pharmacy appointment    Other referrals           Treatment Plan Information   OP NIVOLUMAB Q4W   Urban Cancino MD   Upcoming Treatment Dates - OP NIVOLUMAB Q4W    12/16/2022       Chemotherapy       nivolumab 480 mg in sodium chloride 0.9% 148 mL infusion  1/13/2023       Chemotherapy       nivolumab 480 mg in sodium chloride 0.9% 148 mL infusion  2/10/2023       Chemotherapy       nivolumab 480 mg in  sodium chloride 0.9% 148 mL infusion  3/10/2023       Chemotherapy       nivolumab 480 mg in sodium chloride 0.9% 148 mL infusion    Therapy Plan Information  mitoMYcin (MUTAMYCIN) 40 mg in sodium chloride 0.9% 40 mL bladder instillation  40 mg, Intravesical, 1 time a week, at least 5 days apart

## 2023-04-27 ENCOUNTER — TELEPHONE (OUTPATIENT)
Dept: GASTROENTEROLOGY | Facility: CLINIC | Age: 78
End: 2023-04-27
Payer: MEDICARE

## 2023-04-27 NOTE — TELEPHONE ENCOUNTER
"----- Message from Bakari Martin sent at 4/27/2023  9:47 AM CDT -----  Scheduling Request        Patient Status: Np      Scheduling Appt: From referral      Time/Date Preference: Soonest available      Relationship to Patient?: Eros,Margarita (Daughter)        Contact Preference?:  [phone] (Mobile)      Treating Provider: Barak      Do you feel you need to be seen today? No        Additional Notes: Pt requesting to be seen much sooner than July (which was soonest available)      "Thank you for all that you do for our patients"      "

## 2023-05-01 ENCOUNTER — LAB VISIT (OUTPATIENT)
Dept: LAB | Facility: HOSPITAL | Age: 78
End: 2023-05-01
Attending: NURSE PRACTITIONER
Payer: MEDICARE

## 2023-05-01 DIAGNOSIS — R14.0 BLOATING: ICD-10-CM

## 2023-05-01 DIAGNOSIS — K86.89 PANCREATIC INSUFFICIENCY: Primary | ICD-10-CM

## 2023-05-01 DIAGNOSIS — R19.7 DIARRHEA: ICD-10-CM

## 2023-05-01 DIAGNOSIS — R63.4 WEIGHT LOSS: ICD-10-CM

## 2023-05-01 LAB — IGA SERPL-MCNC: 76 MG/DL (ref 40–350)

## 2023-05-01 PROCEDURE — 86364 TISS TRNSGLTMNASE EA IG CLAS: CPT | Performed by: NURSE PRACTITIONER

## 2023-05-01 PROCEDURE — 36415 COLL VENOUS BLD VENIPUNCTURE: CPT | Performed by: NURSE PRACTITIONER

## 2023-05-01 PROCEDURE — 82784 ASSAY IGA/IGD/IGG/IGM EACH: CPT | Performed by: NURSE PRACTITIONER

## 2023-05-03 ENCOUNTER — HOSPITAL ENCOUNTER (OUTPATIENT)
Dept: RADIOLOGY | Facility: HOSPITAL | Age: 78
Discharge: HOME | End: 2023-05-03
Attending: NURSE PRACTITIONER
Payer: MEDICARE

## 2023-05-03 ENCOUNTER — TELEPHONE (OUTPATIENT)
Dept: HEMATOLOGY/ONCOLOGY | Facility: CLINIC | Age: 78
End: 2023-05-03
Payer: MEDICARE

## 2023-05-03 DIAGNOSIS — R63.4 LOSS OF WEIGHT: ICD-10-CM

## 2023-05-03 DIAGNOSIS — R14.0 BLOATING: ICD-10-CM

## 2023-05-03 DIAGNOSIS — K86.89 PANCREATIC INSUFFICIENCY: ICD-10-CM

## 2023-05-03 PROCEDURE — 25500020 PHARM REV CODE 255

## 2023-05-03 PROCEDURE — 74160 CT ABDOMEN WITH CONTRAST: ICD-10-PCS | Mod: 26,,, | Performed by: RADIOLOGY

## 2023-05-03 PROCEDURE — 74160 CT ABDOMEN W/CONTRAST: CPT | Mod: TC

## 2023-05-03 PROCEDURE — 74160 CT ABDOMEN W/CONTRAST: CPT | Mod: 26,,, | Performed by: RADIOLOGY

## 2023-05-03 RX ADMIN — IOHEXOL 75 ML: 350 INJECTION, SOLUTION INTRAVENOUS at 08:05

## 2023-05-03 RX ADMIN — IOHEXOL 15 ML: 350 INJECTION, SOLUTION INTRAVENOUS at 08:05

## 2023-05-03 NOTE — TELEPHONE ENCOUNTER
Spoke with patient's daughter.  She is calling to speak with dr holcomb, as her mom had CT today--and she is requesting danna/dr holcomb to review it.  Patient is taking creon as prescribed---and she is having loose stools with every single thing she puts in her mouth. She is utilizing a low fat diet and feels she has a lump in her throat.    Her mom is down 4 more pounds since the 20th, last visit with dr holcomb.    She saw an outside MD today---and they are requesting patient to see a gastro MD and have endoscopy---they are trying to set this up with johan or suyapa rush---daughter is not sure.  First gastro MD appointment is months away.    Daughter would like to speak with dr holcomb about admitting her mom---as she would ideally like to keep her care with [facility] and wants to get to the bottom of what is wrong with her mom.        Message routed to danna and dr holcomb

## 2023-05-03 NOTE — TELEPHONE ENCOUNTER
----- Message from Nayeli Clarke sent at 5/3/2023  4:08 PM CDT -----  Regarding: Advice  Contact: [phone]  Patient daughter is calling to speak with as soon as possible due to she is concern about weight dropping. Please contact pt

## 2023-05-04 LAB — TTG IGA SER-ACNC: <0.2 U/ML

## 2023-05-04 NOTE — TELEPHONE ENCOUNTER
I spoke to Margarita again. I told her Dr. Cancino said to keep the appointment scheduled for tomorrow. He sent a note to the NP she is seeing and told her she needs an EGD. Margarita said they would and thanked me for calling her back.

## 2023-05-04 NOTE — TELEPHONE ENCOUNTER
----- Message from Nuvia Torres sent at 5/4/2023 10:53 AM CDT -----  Regarding: Consult/Advisory:      Name Of Caller:    Margarita (Daughter)      Contact Preference?:    [phone]      What is the nature of the call?:  Calling to speak w/ nurse. They have questions about the pt's current condition.

## 2023-05-04 NOTE — TELEPHONE ENCOUNTER
I spoke with patient's daughter, Margarita. She is calling again about her mother and the scan she had this week and would like Dr. Cancino to review it. She took her mother to their local doctor who is saying she needs an endoscopy. He recommended a physician who works with [facility], they can get her in next week but she would like to keep her care with [facility]. She has an appointment with a NP on Friday in [place], but she's afraid she won't order the procedure that's needed. Her mom is down pounds since the 20th, last visit with Dr Cancino.  Margarita would like to know if Dr. Cancino can help get the ball rolling for the procedure. She says she doesn't know what to do next, but her mom is suffering and she has to do something.  I told her I would relay the message to Dr. Cancino and we would get back with her.

## 2023-05-05 ENCOUNTER — OFFICE VISIT (OUTPATIENT)
Dept: GASTROENTEROLOGY | Facility: CLINIC | Age: 78
End: 2023-05-05
Payer: MEDICARE

## 2023-05-05 VITALS — WEIGHT: 124.75 LBS | HEIGHT: 64 IN | BODY MASS INDEX: 21.3 KG/M2

## 2023-05-05 DIAGNOSIS — R19.7 DIARRHEA, UNSPECIFIED TYPE: Primary | ICD-10-CM

## 2023-05-05 PROCEDURE — 3288F FALL RISK ASSESSMENT DOCD: CPT | Mod: CPTII,S$GLB,,

## 2023-05-05 PROCEDURE — 1157F PR ADVANCE CARE PLAN OR EQUIV PRESENT IN MEDICAL RECORD: ICD-10-PCS | Mod: CPTII,S$GLB,,

## 2023-05-05 PROCEDURE — 1126F PR PAIN SEVERITY QUANTIFIED, NO PAIN PRESENT: ICD-10-PCS | Mod: CPTII,S$GLB,,

## 2023-05-05 PROCEDURE — 99999 PR PBB SHADOW E&M-EST. PATIENT-LVL III: CPT | Mod: PBBFAC,,,

## 2023-05-05 PROCEDURE — 99215 PR OFFICE/OUTPT VISIT, EST, LEVL V, 40-54 MIN: ICD-10-PCS | Mod: S$GLB,,,

## 2023-05-05 PROCEDURE — 1157F ADVNC CARE PLAN IN RCRD: CPT | Mod: CPTII,S$GLB,,

## 2023-05-05 PROCEDURE — 1159F MED LIST DOCD IN RCRD: CPT | Mod: CPTII,S$GLB,,

## 2023-05-05 PROCEDURE — 1126F AMNT PAIN NOTED NONE PRSNT: CPT | Mod: CPTII,S$GLB,,

## 2023-05-05 PROCEDURE — 1101F PT FALLS ASSESS-DOCD LE1/YR: CPT | Mod: CPTII,S$GLB,,

## 2023-05-05 PROCEDURE — 99999 PR PBB SHADOW E&M-EST. PATIENT-LVL III: ICD-10-PCS | Mod: PBBFAC,,,

## 2023-05-05 PROCEDURE — 99215 OFFICE O/P EST HI 40 MIN: CPT | Mod: S$GLB,,,

## 2023-05-05 PROCEDURE — 1159F PR MEDICATION LIST DOCUMENTED IN MEDICAL RECORD: ICD-10-PCS | Mod: CPTII,S$GLB,,

## 2023-05-05 PROCEDURE — 3288F PR FALLS RISK ASSESSMENT DOCUMENTED: ICD-10-PCS | Mod: CPTII,S$GLB,,

## 2023-05-05 PROCEDURE — 1101F PR PT FALLS ASSESS DOC 0-1 FALLS W/OUT INJ PAST YR: ICD-10-PCS | Mod: CPTII,S$GLB,,

## 2023-05-05 NOTE — PROGRESS NOTES
"     GASTROENTEROLOGY CLINIC NOTE    Reason for visit: The encounter diagnosis was Diarrhea, unspecified type.  Referring provider/PCP: ZAINAB Natarajan MD    HPI:  Gita García is a 77 y.o. female here today for diarrhea and dysphagia, she is accompanied by her daughter. 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  S/p right robotic nephroureterectomy and intraoperative administration She developed diarrhea sometime in February, was having 4-5 BM/day at that time. "Fluffy" consistency, bristol scale type 6, yellow in color. Outside GI ordered SS, fecal elastase was low, fecal fat high, and borderline calprotectin. She was treated with creon. CT negative for panc masses or lesions. Now having less BM with creon, maybe 2-3 non liquid stools/day, associated gas/flatulence. She has also lost about 35 lbs over within the last year. She endorses decreased appetite and early satiety. Also some nausea reported, no vomiting. She also has dysphagia, feels as through pills and some food get caught in mid neck. She had EGD in 2019 which showed hiatal hernia. Taking protonix daily.     Prior Endoscopy: 2-3 years ago, normal per pt report  EGD:  Colon:    (Portions of this note were dictated using voice recognition software and may contain dictation related errors in spelling/grammar/syntax not found on text review)    Review of Systems   Constitutional:  Positive for weight loss.   Gastrointestinal:  Positive for diarrhea and nausea. Negative for abdominal pain.     Past Medical History: has a past medical history of Acid reflux, Arthritis, Chemotherapy induced nausea and vomiting, Endometrial ca, Essential hypertension, Estrogen deficiency, Hormone deficiency, Hypertension, Liver mass, Neuropathy due to chemotherapeutic drug, Osteopenia, Seizures, and Urothelial carcinoma of kidney, right.    Past Surgical History: has a past surgical " history that includes Dilation and curettage of uterus; GI scope (2016); Robot-assisted laparoscopic salpingo-oophorectomy using da Papi Xi (Bilateral, 7/8/2019); Robot-assisted laparoscopic abdominal hysterectomy using da Papi Xi (N/A, 7/8/2019); Robot-assisted laparoscopic pelvic lymphadenectomy using da Papi Xi (Bilateral, 7/8/2019); Omentectomy (N/A, 7/8/2019); Robot-assisted laparoscopic retroperitoneal lymphadenectomy using da Papi Xi (N/A, 7/8/2019); Laparotomy (N/A, 7/8/2019); Ureteroscopy (Right, 1/3/2022); Ablation of neoplasm of ureter using laser (Right, 1/3/2022); Cystoscopy (N/A, 1/3/2022); and Robot-assisted laparoscopic surgical removal of kidney and ureter using da Papi Xi (Right, 3/14/2022).    Home medications:   Current Outpatient Medications on File Prior to Visit   Medication Sig Dispense Refill    acetaminophen (TYLENOL) 500 MG tablet Take 500 mg by mouth every 6 (six) hours as needed for Pain.      ALPRAZolam (XANAX) 0.25 MG tablet Take 0.25 mg by mouth 2 (two) times daily as needed for Anxiety.       bisoprolol-hydrochlorothiazide (ZIAC) 10-6.25 mg per tablet Take 1 tablet by mouth once daily.       CREON 36,000-114,000- 180,000 unit CpDR       mv-min/FA/vit K/lycop/lut/zeax (OCUVITE EYE PLUS MULTI ORAL) Take 1 tablet by mouth once daily.       pantoprazole (PROTONIX) 40 MG tablet Take 40 mg by mouth once daily.      raloxifene (EVISTA) 60 mg tablet Take 60 mg by mouth every evening.      sucralfate (CARAFATE) 1 gram tablet Take 1 g by mouth 2 (two) times daily.      triamcinolone acetonide 0.1% (KENALOG) 0.1 % cream APPLY TO THE AFFECTED AREA(S) UNDER breast TWICE DAILY AS NEEDED      benzonatate (TESSALON) 100 MG capsule Take 100 mg by mouth 3 (three) times daily as needed for Cough.      [DISCONTINUED] benzonatate (TESSALON) 200 MG capsule Take 200 mg by mouth.      [DISCONTINUED] calcium carbonate/vitamin D3 (CALCIUM 600 + D,3, ORAL) Take by mouth once daily.       [DISCONTINUED]  "co-enzyme Q-10 30 mg capsule Take 30 mg by mouth 3 (three) times daily.      [DISCONTINUED] dicyclomine (BENTYL) 10 MG capsule Take 10 mg by mouth daily as needed.      [DISCONTINUED] hyoscyamine (ANASPAZ,LEVSIN) 0.125 mg Tab Take 125 mcg by mouth.      [DISCONTINUED] loratadine (CLARITIN) 10 mg tablet Take 10 mg by mouth daily as needed for Allergies.      [DISCONTINUED] milk thistle 175 mg tablet Take 175 mg by mouth once daily.      [DISCONTINUED] multivitamin/iron/folic acid (CENTRUM ORAL) Take by mouth once daily.       [DISCONTINUED] oxybutynin (DITROPAN) 5 MG Tab Take 1 tablet (5 mg total) by mouth 3 (three) times daily as needed (bladder spasm). (Patient not taking: No sig reported) 30 tablet 0    [DISCONTINUED] oxyCODONE (ROXICODONE) 5 MG immediate release tablet Take 1 tablet (5 mg total) by mouth every 6 (six) hours as needed for Pain. 10 tablet 0    [DISCONTINUED] polyethylene glycol (GLYCOLAX) 17 gram PwPk Take by mouth every morning.      [DISCONTINUED] tamsulosin (FLOMAX) 0.4 mg Cap Take 1 capsule (0.4 mg total) by mouth once daily. (Patient not taking: No sig reported) 30 capsule 0     No current facility-administered medications on file prior to visit.       Vital signs:  Ht 5' 4" (1.626 m)   Wt 56.6 kg (124 lb 12.5 oz)   BMI 21.42 kg/m²     Physical Exam  Constitutional:       Appearance: Normal appearance. She is normal weight.   Abdominal:      General: Abdomen is flat. There is no distension.      Palpations: Abdomen is soft.      Tenderness: There is no abdominal tenderness.   Neurological:      Mental Status: She is alert.     I spent greater than 60 minutes in a combination of : a focused physical exam and history, reviewing outside records, reviewing of any available radiographs, counseling, communicating with other health care professionals regarding this patient's medical condition, independently interpreting results, and documenting clinical information in the medical record.     I have " reviewed associated labs, imaging and notes.     Assessment:  1. Diarrhea, unspecified type    Diarrhea   Began in February, saw outside GI- SS demonstrated panc insufficiency, borderline calprotectin    CT negative for pancreatic lesions/masses, started on creon    Calpro could be elevated due to diarrhea from EPI, advised to recheck when not having diarrhea      If remains elevated, will need colonoscopy to further evaluate for inflammation    Diarrhea has lessened, now 2-3 BM/day, firmer in consistency  Weight loss ~ 35 lbs over last year  Dysphagia   Occurring with pills and some foods   Last EGD in 2019, HH, no previous dilation  Has EUS scheduled at  on 5/11, requesting earlier procedure here at [facility]- will reach out to advanced team   Suggested having EJ add on EGD also, to evaluate dysphagia   If unable, will schedule here     Plan:  Orders Placed This Encounter    Calprotectin, Stool     Continue taking creon as previously prescribed  Recheck calprotectin     Continue with previous scheduled EUS   Need EGD as well, pt to check if EJ can add on EGD   Otherwise will schedule here  Refer to GI dietician for weight loss and EPI    Afshan Vasquez NP  [facility] [place]

## 2023-06-08 ENCOUNTER — OFFICE VISIT (OUTPATIENT)
Dept: GASTROENTEROLOGY | Facility: CLINIC | Age: 78
End: 2023-06-08
Payer: MEDICARE

## 2023-06-08 VITALS
DIASTOLIC BLOOD PRESSURE: 80 MMHG | HEART RATE: 70 BPM | WEIGHT: 119.94 LBS | SYSTOLIC BLOOD PRESSURE: 153 MMHG | BODY MASS INDEX: 20.47 KG/M2 | HEIGHT: 64 IN

## 2023-06-08 DIAGNOSIS — K86.89 PANCREATIC INSUFFICIENCY: ICD-10-CM

## 2023-06-08 PROCEDURE — 99999 PR PBB SHADOW E&M-EST. PATIENT-LVL IV: CPT | Mod: PBBFAC,,, | Performed by: INTERNAL MEDICINE

## 2023-06-08 PROCEDURE — 99999 PR PBB SHADOW E&M-EST. PATIENT-LVL IV: ICD-10-PCS | Mod: PBBFAC,,, | Performed by: INTERNAL MEDICINE

## 2023-06-08 PROCEDURE — 1159F MED LIST DOCD IN RCRD: CPT | Mod: CPTII,S$GLB,, | Performed by: INTERNAL MEDICINE

## 2023-06-08 PROCEDURE — 3079F PR MOST RECENT DIASTOLIC BLOOD PRESSURE 80-89 MM HG: ICD-10-PCS | Mod: CPTII,S$GLB,, | Performed by: INTERNAL MEDICINE

## 2023-06-08 PROCEDURE — 3288F PR FALLS RISK ASSESSMENT DOCUMENTED: ICD-10-PCS | Mod: CPTII,S$GLB,, | Performed by: INTERNAL MEDICINE

## 2023-06-08 PROCEDURE — 1157F ADVNC CARE PLAN IN RCRD: CPT | Mod: CPTII,S$GLB,, | Performed by: INTERNAL MEDICINE

## 2023-06-08 PROCEDURE — 1101F PR PT FALLS ASSESS DOC 0-1 FALLS W/OUT INJ PAST YR: ICD-10-PCS | Mod: CPTII,S$GLB,, | Performed by: INTERNAL MEDICINE

## 2023-06-08 PROCEDURE — 3077F PR MOST RECENT SYSTOLIC BLOOD PRESSURE >= 140 MM HG: ICD-10-PCS | Mod: CPTII,S$GLB,, | Performed by: INTERNAL MEDICINE

## 2023-06-08 PROCEDURE — 3288F FALL RISK ASSESSMENT DOCD: CPT | Mod: CPTII,S$GLB,, | Performed by: INTERNAL MEDICINE

## 2023-06-08 PROCEDURE — 1157F PR ADVANCE CARE PLAN OR EQUIV PRESENT IN MEDICAL RECORD: ICD-10-PCS | Mod: CPTII,S$GLB,, | Performed by: INTERNAL MEDICINE

## 2023-06-08 PROCEDURE — 1126F PR PAIN SEVERITY QUANTIFIED, NO PAIN PRESENT: ICD-10-PCS | Mod: CPTII,S$GLB,, | Performed by: INTERNAL MEDICINE

## 2023-06-08 PROCEDURE — 3079F DIAST BP 80-89 MM HG: CPT | Mod: CPTII,S$GLB,, | Performed by: INTERNAL MEDICINE

## 2023-06-08 PROCEDURE — 99213 OFFICE O/P EST LOW 20 MIN: CPT | Mod: S$GLB,,, | Performed by: INTERNAL MEDICINE

## 2023-06-08 PROCEDURE — 99213 PR OFFICE/OUTPT VISIT, EST, LEVL III, 20-29 MIN: ICD-10-PCS | Mod: S$GLB,,, | Performed by: INTERNAL MEDICINE

## 2023-06-08 PROCEDURE — 1101F PT FALLS ASSESS-DOCD LE1/YR: CPT | Mod: CPTII,S$GLB,, | Performed by: INTERNAL MEDICINE

## 2023-06-08 PROCEDURE — 1159F PR MEDICATION LIST DOCUMENTED IN MEDICAL RECORD: ICD-10-PCS | Mod: CPTII,S$GLB,, | Performed by: INTERNAL MEDICINE

## 2023-06-08 PROCEDURE — 3077F SYST BP >= 140 MM HG: CPT | Mod: CPTII,S$GLB,, | Performed by: INTERNAL MEDICINE

## 2023-06-08 PROCEDURE — 1126F AMNT PAIN NOTED NONE PRSNT: CPT | Mod: CPTII,S$GLB,, | Performed by: INTERNAL MEDICINE

## 2023-06-08 RX ORDER — ONDANSETRON 8 MG/1
TABLET, ORALLY DISINTEGRATING ORAL
COMMUNITY
Start: 2023-05-15 | End: 2024-03-07

## 2023-06-08 RX ORDER — BUDESONIDE 3 MG/1
9 CAPSULE, COATED PELLETS ORAL
COMMUNITY
Start: 2023-05-17 | End: 2024-03-07

## 2023-06-08 NOTE — Clinical Note
Can you please set her up with  vv with one of the nutritionists please.  Re; low fat diet for pancreatic insufficiency

## 2023-06-08 NOTE — PROGRESS NOTES
SUBJECTIVE:         Chief Complaint:   Chief Complaint   Patient presents with    GI Problem       History of Present Illness:  Patient is a 78 y.o. female presents with pancreatic insufficiency. The symptoms include diarrhea and tonie loss, intolerance to fatty foods..  Onset of symptoms was gradual starting a few months ago with gradually improving course since that time. Previous studies include colonoscopy and endoscopic US. I reviewed multiple records as part of the clinic visit.  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 She was seen by Dr Lopez and had an EUS performed.  This along with imaging suggest no malignancy.     Her weight has improved and her appetite better.  Her bowl habits are bit more regular        OBJECTIVE:     Vital Signs (Most Recent)  Pulse: 70 (06/08/23 0954)  BP: (!) 153/80 (06/08/23 0954)    General: well developed, well nourished    Diagnostic Results:  Labs: Reviewed  CT: Reviewed  EUS      ASSESSMENT/PLAN:    Pancreatic insufficiency- idiopathic in nature though it's possible immunotherapy for her  cancer has played a role.  She should continue her Creon. I suggested a consultation with a nutritionist to discuss diet therapy.  She had questions about budesonide and I suggested she discuss this therapy with Jonn.  We will follow up in 4 months with a virtual visit

## 2023-07-18 ENCOUNTER — HOSPITAL ENCOUNTER (OUTPATIENT)
Dept: RADIOLOGY | Facility: HOSPITAL | Age: 78
Discharge: HOME | End: 2023-07-18
Attending: NURSE PRACTITIONER
Payer: MEDICARE

## 2023-07-18 DIAGNOSIS — C64.1 UROTHELIAL CARCINOMA OF KIDNEY, RIGHT: ICD-10-CM

## 2023-07-18 PROCEDURE — 74183 MRI ABD W/O CNTR FLWD CNTR: CPT | Mod: 26,,, | Performed by: RADIOLOGY

## 2023-07-18 PROCEDURE — A9585 GADOBUTROL INJECTION: HCPCS | Performed by: NURSE PRACTITIONER

## 2023-07-18 PROCEDURE — 71250 CT THORAX DX C-: CPT | Mod: TC

## 2023-07-18 PROCEDURE — 25500020 PHARM REV CODE 255: Performed by: NURSE PRACTITIONER

## 2023-07-18 PROCEDURE — 74183 MRI UROGRAPHY W W/O CONTRAST (XPD): ICD-10-PCS | Mod: 26,,, | Performed by: RADIOLOGY

## 2023-07-18 PROCEDURE — 72197 MRI UROGRAPHY W W/O CONTRAST (XPD): ICD-10-PCS | Mod: 26,,, | Performed by: RADIOLOGY

## 2023-07-18 PROCEDURE — 72197 MRI PELVIS W/O & W/DYE: CPT | Mod: TC

## 2023-07-18 PROCEDURE — 72197 MRI PELVIS W/O & W/DYE: CPT | Mod: 26,,, | Performed by: RADIOLOGY

## 2023-07-18 RX ORDER — GADOBUTROL 604.72 MG/ML
10 INJECTION INTRAVENOUS
Status: COMPLETED | OUTPATIENT
Start: 2023-07-18 | End: 2023-07-18

## 2023-07-18 RX ADMIN — GADOBUTROL 10 ML: 604.72 INJECTION INTRAVENOUS at 01:07

## 2023-07-20 ENCOUNTER — OFFICE VISIT (OUTPATIENT)
Dept: HEMATOLOGY/ONCOLOGY | Facility: CLINIC | Age: 78
End: 2023-07-20
Payer: MEDICARE

## 2023-07-20 ENCOUNTER — LAB VISIT (OUTPATIENT)
Dept: LAB | Facility: HOSPITAL | Age: 78
End: 2023-07-20
Payer: MEDICARE

## 2023-07-20 VITALS
TEMPERATURE: 98 F | HEART RATE: 64 BPM | WEIGHT: 119.25 LBS | BODY MASS INDEX: 20.36 KG/M2 | DIASTOLIC BLOOD PRESSURE: 80 MMHG | RESPIRATION RATE: 18 BRPM | OXYGEN SATURATION: 96 % | SYSTOLIC BLOOD PRESSURE: 165 MMHG | HEIGHT: 64 IN

## 2023-07-20 DIAGNOSIS — R68.2 DRY MOUTH: ICD-10-CM

## 2023-07-20 DIAGNOSIS — G62.0 CHEMOTHERAPY-INDUCED NEUROPATHY: ICD-10-CM

## 2023-07-20 DIAGNOSIS — K21.9 GASTROESOPHAGEAL REFLUX DISEASE, UNSPECIFIED WHETHER ESOPHAGITIS PRESENT: ICD-10-CM

## 2023-07-20 DIAGNOSIS — E46 PROTEIN-CALORIE MALNUTRITION, UNSPECIFIED SEVERITY: ICD-10-CM

## 2023-07-20 DIAGNOSIS — Z90.5 SOLITARY KIDNEY, ACQUIRED: ICD-10-CM

## 2023-07-20 DIAGNOSIS — R63.4 WEIGHT LOSS: ICD-10-CM

## 2023-07-20 DIAGNOSIS — K86.89 PANCREATIC INSUFFICIENCY: ICD-10-CM

## 2023-07-20 DIAGNOSIS — D64.9 ANEMIA, UNSPECIFIED TYPE: ICD-10-CM

## 2023-07-20 DIAGNOSIS — T45.1X5A CHEMOTHERAPY-INDUCED NEUROPATHY: ICD-10-CM

## 2023-07-20 DIAGNOSIS — R63.0 LOSS OF APPETITE: ICD-10-CM

## 2023-07-20 DIAGNOSIS — C64.1 UROTHELIAL CARCINOMA OF KIDNEY, RIGHT: Primary | ICD-10-CM

## 2023-07-20 DIAGNOSIS — C54.1 ENDOMETRIAL CA: ICD-10-CM

## 2023-07-20 DIAGNOSIS — R53.83 FATIGUE, UNSPECIFIED TYPE: ICD-10-CM

## 2023-07-20 LAB
FERRITIN SERPL-MCNC: 47 NG/ML (ref 20–300)
FOLATE SERPL-MCNC: 10.6 NG/ML (ref 4–24)
IRON SERPL-MCNC: 75 UG/DL (ref 30–160)
SATURATED IRON: 16 % (ref 20–50)
TOTAL IRON BINDING CAPACITY: 474 UG/DL (ref 250–450)
TRANSFERRIN SERPL-MCNC: 320 MG/DL (ref 200–375)
VIT B12 SERPL-MCNC: 449 PG/ML (ref 210–950)

## 2023-07-20 PROCEDURE — 1157F ADVNC CARE PLAN IN RCRD: CPT | Mod: CPTII,S$GLB,, | Performed by: INTERNAL MEDICINE

## 2023-07-20 PROCEDURE — 1126F PR PAIN SEVERITY QUANTIFIED, NO PAIN PRESENT: ICD-10-PCS | Mod: CPTII,S$GLB,, | Performed by: INTERNAL MEDICINE

## 2023-07-20 PROCEDURE — 99999 PR PBB SHADOW E&M-EST. PATIENT-LVL V: ICD-10-PCS | Mod: PBBFAC,,, | Performed by: INTERNAL MEDICINE

## 2023-07-20 PROCEDURE — 84466 ASSAY OF TRANSFERRIN: CPT | Performed by: NURSE PRACTITIONER

## 2023-07-20 PROCEDURE — 1101F PT FALLS ASSESS-DOCD LE1/YR: CPT | Mod: CPTII,S$GLB,, | Performed by: INTERNAL MEDICINE

## 2023-07-20 PROCEDURE — 1101F PR PT FALLS ASSESS DOC 0-1 FALLS W/OUT INJ PAST YR: ICD-10-PCS | Mod: CPTII,S$GLB,, | Performed by: INTERNAL MEDICINE

## 2023-07-20 PROCEDURE — 3077F PR MOST RECENT SYSTOLIC BLOOD PRESSURE >= 140 MM HG: ICD-10-PCS | Mod: CPTII,S$GLB,, | Performed by: INTERNAL MEDICINE

## 2023-07-20 PROCEDURE — 3079F DIAST BP 80-89 MM HG: CPT | Mod: CPTII,S$GLB,, | Performed by: INTERNAL MEDICINE

## 2023-07-20 PROCEDURE — 1157F PR ADVANCE CARE PLAN OR EQUIV PRESENT IN MEDICAL RECORD: ICD-10-PCS | Mod: CPTII,S$GLB,, | Performed by: INTERNAL MEDICINE

## 2023-07-20 PROCEDURE — 82607 VITAMIN B-12: CPT | Performed by: NURSE PRACTITIONER

## 2023-07-20 PROCEDURE — 82728 ASSAY OF FERRITIN: CPT | Performed by: NURSE PRACTITIONER

## 2023-07-20 PROCEDURE — 1160F PR REVIEW ALL MEDS BY PRESCRIBER/CLIN PHARMACIST DOCUMENTED: ICD-10-PCS | Mod: CPTII,S$GLB,, | Performed by: INTERNAL MEDICINE

## 2023-07-20 PROCEDURE — 99999 PR PBB SHADOW E&M-EST. PATIENT-LVL V: CPT | Mod: PBBFAC,,, | Performed by: INTERNAL MEDICINE

## 2023-07-20 PROCEDURE — 3079F PR MOST RECENT DIASTOLIC BLOOD PRESSURE 80-89 MM HG: ICD-10-PCS | Mod: CPTII,S$GLB,, | Performed by: INTERNAL MEDICINE

## 2023-07-20 PROCEDURE — 1159F PR MEDICATION LIST DOCUMENTED IN MEDICAL RECORD: ICD-10-PCS | Mod: CPTII,S$GLB,, | Performed by: INTERNAL MEDICINE

## 2023-07-20 PROCEDURE — 99214 OFFICE O/P EST MOD 30 MIN: CPT | Mod: S$GLB,,, | Performed by: INTERNAL MEDICINE

## 2023-07-20 PROCEDURE — 99214 PR OFFICE/OUTPT VISIT, EST, LEVL IV, 30-39 MIN: ICD-10-PCS | Mod: S$GLB,,, | Performed by: INTERNAL MEDICINE

## 2023-07-20 PROCEDURE — 1126F AMNT PAIN NOTED NONE PRSNT: CPT | Mod: CPTII,S$GLB,, | Performed by: INTERNAL MEDICINE

## 2023-07-20 PROCEDURE — 1160F RVW MEDS BY RX/DR IN RCRD: CPT | Mod: CPTII,S$GLB,, | Performed by: INTERNAL MEDICINE

## 2023-07-20 PROCEDURE — 3077F SYST BP >= 140 MM HG: CPT | Mod: CPTII,S$GLB,, | Performed by: INTERNAL MEDICINE

## 2023-07-20 PROCEDURE — 36415 COLL VENOUS BLD VENIPUNCTURE: CPT | Performed by: NURSE PRACTITIONER

## 2023-07-20 PROCEDURE — 82746 ASSAY OF FOLIC ACID SERUM: CPT | Performed by: NURSE PRACTITIONER

## 2023-07-20 PROCEDURE — 3288F PR FALLS RISK ASSESSMENT DOCUMENTED: ICD-10-PCS | Mod: CPTII,S$GLB,, | Performed by: INTERNAL MEDICINE

## 2023-07-20 PROCEDURE — 3288F FALL RISK ASSESSMENT DOCD: CPT | Mod: CPTII,S$GLB,, | Performed by: INTERNAL MEDICINE

## 2023-07-20 PROCEDURE — 1159F MED LIST DOCD IN RCRD: CPT | Mod: CPTII,S$GLB,, | Performed by: INTERNAL MEDICINE

## 2023-07-20 RX ORDER — HYDROCHLOROTHIAZIDE 12.5 MG/1
12.5 TABLET ORAL DAILY
COMMUNITY
Start: 2023-07-06

## 2023-07-20 RX ORDER — PROPRANOLOL HYDROCHLORIDE AND HYDROCHLOROTHIAZIDE 40; 25 MG/1; MG/1
TABLET ORAL
COMMUNITY
End: 2024-03-07

## 2023-07-20 RX ORDER — OMEPRAZOLE 40 MG/1
40 CAPSULE, DELAYED RELEASE ORAL EVERY MORNING
COMMUNITY

## 2023-07-20 NOTE — PROGRESS NOTES
ONCOLOGY FOLLOW UP VISIT    Reason for visit: Toxicity check on Opdivo for stage IIIA urothelial carcinoma    Cancer/Stage/TNM:    Cancer Staging   Endometrial ca  Staging form: Corpus Uteri - Carcinoma and Carcinosarcoma, AJCC 8th Edition  - Clinical: No stage assigned - Unsigned  - Pathologic stage from 7/18/2019: FIGO Stage IA (pT1a, pN0, cM0) - Signed by Lokesh Carias MD on 7/18/2019         Oncology History   Endometrial ca   6/26/2019 Initial Diagnosis    Endometrial ca     7/18/2019 Cancer Staged    Staging form: Corpus Uteri - Carcinoma and Carcinosarcoma, AJCC 8th Edition  - Pathologic stage from 7/18/2019: FIGO Stage IA (pT1a, pN0, cM0) - Signed by Lokesh Carias MD on 7/18/2019 7/18/2019 - 7/18/2019 Chemotherapy    Treatment Summary   Plan Name: OP GYN PACLITAXEL CARBOPLATIN (AUC 6) Q3W  Treatment Goal: Curative  Status: Inactive  Start Date:   End Date:   Provider: Lokesh Carias MD  Chemotherapy: CARBOplatin (PARAPLATIN) in sodium chloride 0.9% 250 mL chemo infusion, , Intravenous, Clinic/HOD 1 time, 0 of 6 cycles  PACLitaxel (TAXOL) 175 mg/m2 = 300 mg in sodium chloride 0.9% 500 mL chemo infusion, 175 mg/m2, Intravenous, Clinic/HOD 1 time, 0 of 6 cycles     Urothelial carcinoma of kidney, right   11/11/2021 Initial Diagnosis    Urothelial carcinoma of kidney, right     1/27/2022 Tumor Conference    Presenting Hospital / Clinic: [facility] [address]  Virtual Tumor Board Conference: Virtual  Presenter: Edmond Manuel  Date Presented to Tumor Board: 1/27/2022  Specialties Present: Medical Oncology; Radiation Oncology; Pathology; Navigation; Urology  Presentation at Cancer Conference: Prospective  Cancer Type: Other  Other Cancer: right upper tract urothelial  Recommended Plan: Chemotherapy; Surgery  Recommended Plan Note: neoadjuvant split dose MVAC + open nephroureterectomy    Oncology History   Endometrial ca   Urothelial carcinoma of kidney, right   1/27/2022 Tumor Conference    Presenting  Hospital / Clinic: [facility]  Virtual Tumor Board Conference: Virtual  Presenter: Edmond Manuel  Date Presented to Tumor Board: 1/27/2022  Specialties Present: Medical Oncology; Radiation Oncology; Pathology; Navigation; Urology  Presentation at Cancer Conference: Prospective  Cancer Type: Other  Other Cancer: right upper tract urothelial  Recommended Plan: Chemotherapy; Surgery  Recommended Plan Note: neoadjuvant split dose MVAC + open nephroureterectomy                  PATIENT SUMMARY:   Gita García is a 76 y.o. female with HG right upper tract UCC. History of endometrial cancer s/p neoadjuvant taxol+cisplatin and vaginal brachytherapy followed by robotic hysterectomy.    DISCUSSION:  Pathology review  Staging review    PERFORMANCE STATUS:  ECOG 0    Estimated GFR/CKD Stage: >60 (cr 0.7)    Clinical/Pathologic Stage (TNM): T1 N0 M0    FACULTY IN ATTENDANCE:    Urologic Oncology: Shane Diaz MD [x], Michael Finley MD [x] , Hever Pollack MD []    CONSULT NEEDED:     [] Urologic Oncology    [] Hem/Onc    [] Rad/Onc     [x] Treatment Guidelines (NCCN and AUA) reviewed and care planned is consistent with guidelines.    TUMOR BOARD RECOMMENDATIONS/PLAN/CONSENSUS:     Case discussed among group. Pathology and radiologic images were reviewed (if applicable).    Neoadjuvant split-dose DDMVAC + open nephroureterectomy  Referral to genetics      5/6/2022 -  Chemotherapy    Treatment Summary   Plan Name: OP NIVOLUMAB Q4W  Treatment Goal: Curative  Status: Active  Start Date: 5/6/2022  End Date: 3/10/2023 (Planned)  Provider: Urban Cancino MD  Chemotherapy: [No matching medication found in this treatment plan]          HPI:   78 y.o. female who presents to clinic to review imaging. Now on Creon for pancreatic insufficiency. Eating a little better with taking Creon better but still having trouble getting in enough calories. Has more desire to eat. Dry mouth has improved minimally since stopping  immunotherapy.     ROS:   Review of Systems   Constitutional:  Positive for appetite change and unexpected weight change (weight loss). Negative for activity change, chills, fatigue and fever.   HENT:  Negative for mouth sores, nosebleeds, sore throat and trouble swallowing.         Dry mouth; Thickened saliva - improving   Respiratory:  Negative for cough, shortness of breath and wheezing.    Cardiovascular:  Negative for chest pain, palpitations and leg swelling.   Gastrointestinal:  Negative for abdominal distention, abdominal pain and blood in stool.   Endocrine: Negative for cold intolerance and heat intolerance.   Genitourinary:  Negative for dysuria, flank pain and frequency.   Musculoskeletal:  Negative for arthralgias, joint swelling and myalgias.   Skin:  Negative for color change, rash and wound.   Neurological:  Negative for dizziness, light-headedness, numbness and headaches.   Hematological:  Negative for adenopathy. Does not bruise/bleed easily.      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        Past Surgical History:   Past Surgical History:   Procedure Laterality Date    ABLATION OF NEOPLASM OF URETER USING LASER Right 1/3/2022    Procedure: ABLATION, NEOPLASM, URETER, USING LASER;  Surgeon: Shane Diaz MD;  Location: [address];  Service: Urology;  Laterality: Right;    CYSTOSCOPY N/A 1/3/2022    Procedure: CYSTOSCOPY;  Surgeon: Shane Diaz MD;  Location: [facility] OR [address] [place] [address];  Service: Urology;  Laterality: N/A;    DILATION AND CURETTAGE OF UTERUS      GI  scope  2016    LAPAROTOMY N/A 7/8/2019    Procedure: LAPAROTOMY;  Surgeon: Lokesh Carias MD;  Location: [facility] OR 2ND FLR;  Service: OB/GYN;  Laterality: N/A;  mini    OMENTECTOMY N/A 7/8/2019    Procedure: OMENTECTOMY;  Surgeon: Lokesh Carias MD;  Location: [facility] OR 2ND FLR;  Service: OB/GYN;  Laterality: N/A;    ROBOT-ASSISTED LAPAROSCOPIC ABDOMINAL HYSTERECTOMY USING DA SIRISHA XI N/A 7/8/2019    Procedure: XI ROBOTIC HYSTERECTOMY;  Surgeon: Lokesh Carias MD;  Location: [facility] OR 2ND FLR;  Service: OB/GYN;  Laterality: N/A;    ROBOT-ASSISTED LAPAROSCOPIC PELVIC LYMPHADENECTOMY USING DA SIRISHA XI Bilateral 7/8/2019    Procedure: XI ROBOTIC LYMPHADENECTOMY, PELVIC;  Surgeon: Lokesh Carias MD;  Location: [facility] OR 2ND FLR;  Service: OB/GYN;  Laterality: Bilateral;    ROBOT-ASSISTED LAPAROSCOPIC RETROPERITONEAL LYMPHADENECTOMY USING DA SIRISHA XI N/A 7/8/2019    Procedure: XI ROBOTIC LYMPHADENECTOMY, RETROPERITONEUM;  Surgeon: Lokesh Carias MD;  Location: [facility] OR 2ND FLR;  Service: OB/GYN;  Laterality: N/A;    ROBOT-ASSISTED LAPAROSCOPIC SALPINGO-OOPHORECTOMY USING DA SIRISHA XI Bilateral 7/8/2019    Procedure: XI ROBOTIC SALPINGO-OOPHORECTOMY;  Surgeon: Lokesh Carias MD;  Location: [facility] OR 2ND FLR;  Service: OB/GYN;  Laterality: Bilateral;    ROBOT-ASSISTED LAPAROSCOPIC SURGICAL REMOVAL OF KIDNEY AND URETER USING DA SIRISHA XI Right 3/14/2022    Procedure: XI ROBOTIC NEPHROURETERECTOMY/ WITH BLADDER CUFF;  Surgeon: Shane Diaz MD;  Location: [facility] OR 2ND FLR;  Service: Urology;  Laterality: Right;  4hrs    URETEROSCOPY Right 1/3/2022    Procedure: URETEROSCOPY;  Surgeon: Shane Diaz MD;  Location: [facility] OR 1ST FLR;  Service: Urology;  Laterality: Right;  2hrs        Family History:   Family History   Problem Relation Age of Onset    Colon cancer Sister 53    Prostate cancer Brother     Breast cancer Sister 64    Kidney cancer Sister     Breast cancer Sister         in her 60s    Skin cancer Sister      Stroke Brother     Prostate cancer Brother     Heart disease Brother     Prostate cancer Brother     Heart disease Brother     Ovarian cancer Neg Hx     Uterine cancer Neg Hx     Anesthesia problems Neg Hx         Social History:   Social History     Tobacco Use    Smoking status: Never    Smokeless tobacco: Never   Substance Use Topics    Alcohol use: Never        Allergies:   Review of patient's allergies indicates:   Allergen Reactions    Asa [aspirin] Other (See Comments)     Stomach upset    Dilaudid (pf) [hydromorphone (pf)] Nausea Only    Sulfa (sulfonamide antibiotics) Itching        Medications:   Current Outpatient Medications   Medication Sig Dispense Refill    acetaminophen (TYLENOL) 500 MG tablet Take 500 mg by mouth every 6 (six) hours as needed for Pain.      ALPRAZolam (XANAX) 0.25 MG tablet Take 0.25 mg by mouth 2 (two) times daily as needed for Anxiety.       benzonatate (TESSALON) 100 MG capsule Take 100 mg by mouth 3 (three) times daily as needed for Cough.      bisoprolol-hydrochlorothiazide (ZIAC) 10-6.25 mg per tablet Take 1 tablet by mouth once daily.       budesonide (ENTOCORT EC) 3 mg capsule Take 9 mg by mouth.      CALCIUM CARB-MAG OX-ZINC SULF ORAL Take by mouth once daily.      CREON 36,000-114,000- 180,000 unit CpDR       hydroCHLOROthiazide (HYDRODIURIL) 12.5 MG Tab       mv-min/FA/vit K/lycop/lut/zeax (OCUVITE EYE PLUS MULTI ORAL) Take 1 tablet by mouth once daily.       omeprazole (PRILOSEC) 40 MG capsule       ondansetron (ZOFRAN-ODT) 8 MG TbDL Take by mouth.      pantoprazole (PROTONIX) 40 MG tablet Take 40 mg by mouth 2 (two) times daily.      propranoloL-hydrochlorothiazid (INDERIDE) 40-25 mg per tablet       raloxifene (EVISTA) 60 mg tablet Take 60 mg by mouth every evening.      sucralfate (CARAFATE) 1 gram tablet Take 1 g by mouth 2 (two) times daily.      triamcinolone acetonide 0.1% (KENALOG) 0.1 % cream APPLY TO THE AFFECTED AREA(S) UNDER breast TWICE DAILY AS NEEDED       "UNABLE TO FIND once daily. Tumeric capsule       No current facility-administered medications for this visit.        Physical Exam:   BP (!) 165/80 (BP Location: Left arm, Patient Position: Sitting, BP Method: Medium (Automatic))   Pulse 64   Temp 98 °F (36.7 °C) (Oral)   Resp 18   Ht 5' 4" (1.626 m)   Wt 54.1 kg (119 lb 4.3 oz)   SpO2 96%   BMI 20.47 kg/m²      ECOG Performance Status: (foot note - ECOG PS provided by Eastern Cooperative Oncology Group) 0 - Asymptomatic    Physical Exam  Constitutional:       Appearance: She is well-developed.   HENT:      Head: Normocephalic and atraumatic.   Eyes:      Conjunctiva/sclera: Conjunctivae normal.      Pupils: Pupils are equal, round, and reactive to light.   Pulmonary:      Effort: Pulmonary effort is normal. No respiratory distress.   Abdominal:      General: There is no distension.      Palpations: Abdomen is soft.   Musculoskeletal:         General: No swelling. Normal range of motion.      Cervical back: Normal range of motion and neck supple.   Lymphadenopathy:      Cervical: No cervical adenopathy.   Skin:     General: Skin is warm and dry.   Neurological:      General: No focal deficit present.      Mental Status: She is alert and oriented to person, place, and time.   Psychiatric:         Mood and Affect: Mood normal.         Behavior: Behavior normal.         Labs:   Recent Results (from the past 48 hour(s))   CBC Auto Differential    Collection Time: 07/18/23 10:59 AM   Result Value Ref Range    WBC 6.81 3.90 - 12.70 K/uL    RBC 3.63 (L) 4.00 - 5.40 M/uL    Hemoglobin 10.9 (L) 12.0 - 16.0 g/dL    Hematocrit 34.4 (L) 37.0 - 48.5 %    MCV 95 82 - 98 fL    MCH 30.0 27.0 - 31.0 pg    MCHC 31.7 (L) 32.0 - 36.0 g/dL    RDW 16.2 (H) 11.5 - 14.5 %    Platelets 230 150 - 450 K/uL    MPV 9.9 9.2 - 12.9 fL    Immature Granulocytes 0.3 0.0 - 0.5 %    Gran # (ANC) 4.6 1.8 - 7.7 K/uL    Immature Grans (Abs) 0.02 0.00 - 0.04 K/uL    Lymph # 1.5 1.0 - 4.8 K/uL    " Mono # 0.4 0.3 - 1.0 K/uL    Eos # 0.2 0.0 - 0.5 K/uL    Baso # 0.05 0.00 - 0.20 K/uL    nRBC 0 0 /100 WBC    Gran % 67.6 38.0 - 73.0 %    Lymph % 22.6 18.0 - 48.0 %    Mono % 6.2 4.0 - 15.0 %    Eosinophil % 2.6 0.0 - 8.0 %    Basophil % 0.7 0.0 - 1.9 %    Differential Method Automated    Comprehensive Metabolic Panel    Collection Time: 07/18/23 10:59 AM   Result Value Ref Range    Sodium 144 136 - 145 mmol/L    Potassium 3.4 (L) 3.5 - 5.1 mmol/L    Chloride 107 95 - 110 mmol/L    CO2 27 23 - 29 mmol/L    Glucose 122 (H) 70 - 110 mg/dL    BUN 16 8 - 23 mg/dL    Creatinine 0.9 0.5 - 1.4 mg/dL    Calcium 8.8 8.7 - 10.5 mg/dL    Total Protein 6.1 6.0 - 8.4 g/dL    Albumin 3.5 3.5 - 5.2 g/dL    Total Bilirubin 1.1 (H) 0.1 - 1.0 mg/dL    Alkaline Phosphatase 98 55 - 135 U/L    AST 50 (H) 10 - 40 U/L    ALT 84 (H) 10 - 44 U/L    eGFR >60.0 >60 mL/min/1.73 m^2    Anion Gap 10 8 - 16 mmol/L   TSH    Collection Time: 07/18/23 10:59 AM   Result Value Ref Range    TSH 1.637 0.400 - 4.000 uIU/mL   T4, Free    Collection Time: 07/18/23 10:59 AM   Result Value Ref Range    Free T4 0.77 0.71 - 1.51 ng/dL        Imaging: I have personally CT and MRI imaging showing no evidence of disease  MRI Urography W W/O Contrast (XPD)  Narrative: EXAMINATION:  MRI UROGRAPHY W W/O CONTRAST (XPD)    CLINICAL HISTORY:  Bladder cancer, invasive, monitor;  Malignant neoplasm of right kidney, except renal pelvis    TECHNIQUE:  Multisequence, multiplanar MRI of the abdomen and pelvis performed before and after administration of 10 mL Gadavist intravenous contrast per MR urography protocol.    COMPARISON:  MR urography 04/17/2023, 01/17/2023.  CT abdomen 05/03/2023.    FINDINGS:  Examination degraded by patient motion artifact.    LOWER THORAX: Unremarkable.    LIVER: No global hepatic signal abnormality. 3.5 cm enhancing lesion of the left hepatic lobe (series 29, image 4).  1.7 cm enhancing lesion of the right hepatic lobe (series 29, image 1).   Both lesions are incompletely evaluated due to arterial phase field of view, though appear grossly stable in size and have been previously characterized as benign hemangiomas.    BILIARY: Normal gallbladder. No biliary ductal dilatation.    SPLEEN: No splenomegaly.    PANCREAS: No focal masses or ductal dilatation.    ADRENALS: No adrenal nodules.    GENITOURINARY: Postoperative change of right nephroureterectomy.  No abnormal enhancement or diffusion signal to suggest local recurrence.  No left hydronephrosis or solid mass lesions. Bladder is unremarkable.    REPRODUCTIVE: Postoperative change of hysterectomy, bilateral salpingo-oophorectomy, omentectomy.  No abnormal enhancement or diffusion signal to suggest local recurrence.    PERITONEUM / RETROPERITONEUM: No intraperitoneal free fluid.    LYMPH NODES: No significant lymphadenopathy.    VESSELS: Unremarkable.    GI TRACT: No distention or wall thickening.    BONES AND SOFT TISSUES: No marrow replacing lesions.  Impression: Reported history of urothelial carcinoma status post right nephroureterectomy.  No convincing evidence for local recurrence.    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  No convincing evidence for local recurrence.    No evidence for abdominopelvic metastases.    Additional findings as above.    Electronically signed by resident: aSi Roper  Date:    07/18/2023  Time:    14:12    Electronically signed by: Yonatan Cage Jr  Date:    07/18/2023  Time:    15:34  CT Chest Without Contrast  Narrative: EXAMINATION:  CT CHEST WITHOUT CONTRAST    CLINICAL HISTORY:  Musculoskeletal neoplasm, staging;Malignant neoplasm of right kidney, except renal pelvis    TECHNIQUE:  Volumetric data acquisition through the chest (lung apices to adrenals) without intravenous. Sagittal and coronal multiplanar reconstructions (MPR) were performed and pushed to PACS for evaluation. The lack of IV  contrast material limits the assessment of the mediastinal structures and abdominal solid organs.    COMPARISON:  Comparison made with prior CT chest dated April 17, 2023.    FINDINGS:  Technical limitations.    Chest wall and lower neck: No axillary or supraclavicular lymphadenopathy.Calcifications are noted in both breasts.  Findings compatible with pectus excavatum.    Lungs and pleura: Biapical pleuroparenchymal scar.  Multiple subcentimeter calcified granulomas are seen in the right lower lobe, unchanged.  No pleural effusion is noted.    Mediastinum and homero: No mediastinal or hilar adenopathy.    Heart and pericardium: Heart size is normal. No pericardial effusion.    Vessels: Mild atheromatous disease is seen in the aorta.   The coronary arteries show mild atheromatous disease.    Upper abdomen: Hepatic steatosis.  Calcified granulomas are noted in the spleen.  Rounded appearing partially calcified structure noted adjacent to the splenic hilum may represent splenic artery aneurysm, poorly evaluated.    Bones: Biomechanical changes are noted in the thoracic spine with osteophyte formations.  Vacuum phenomenon in the lower thoracic spine.  Impression: 1. Stable thoracic findings since previous examination.  2. Findings compatible with pectus excavatum.  3. Hepatic steatosis.    Electronically signed by: Hilton Segura  Date:    07/18/2023  Time:    12:06         Diagnoses:       1. Urothelial carcinoma of kidney, right    2. Solitary kidney, acquired    3. Chemotherapy-induced neuropathy    4. Endometrial ca    5. Fatigue, unspecified type    6. Gastroesophageal reflux disease, unspecified whether esophagitis present    7. Loss of appetite    8. Weight loss    9. Pancreatic insufficiency    10. Dry mouth    11. Anemia, unspecified type    12. Protein-calorie malnutrition, unspecified severity      Assessment and Plan:         1. Stage IIIA Urothelial Carcinoma:   Discussed stage, adjuvant treatment  options, and prognosis in detail with patient and daughter.  Due to below problems, I am recommending against cisplatin based adjuvant chemotherapy.  She would be a candidate for adjuvant Opdivo and discussed this treatment plan and the DFS benefit, though data is immature.  Patient and daughter agree to move forward with Opdivo adjuvant x 1 year per Checkmate 274 clinical trial.  - Consented for Opdivo Q4W. MRU reviewed las visit and shows no evidence of disease. Will now stop Opdivo for problems 7-9.   - Scans reviewed today and continues to show no evidence of disease recurrence. Re-staging scans in October ([facility]). Patient had normal cystoscope in January- next due in 6 months.     2,3 Not a candidate for cisplatin chemotherapy due to these issues.    4 Continue to follow with Dr. Joe every 4 months.    5. Residual from covid. Will continue to monitor.    6-10. Issues continue to be progressive and patient desires to stop treatment. Now found to have pancreatic insufficiency which accounts for many of these problems. Nutrition referral ordered. Educated to increase water intake during meals. May also use Xylimelts and hard candies for dry mouth. Needs to supplement diet with Boost/Ensure. Try Mucinex for thick saliva.     11,12. Will check for iron and vitamin deficiencies.     she will return to clinic in 12 weeks, but knows to call in the interim if symptoms change or should a problem arise.    Patient was also seen and examined by Dr. Cancino. Patient is in agreement with the proposed treatment plan. All questions were answered to the patient's satisfaction. Pt knows to call clinic if anything is needed before the next clinic visit.    Joellen Valerio, MSN, APRN, FNP-C  Hematology and Medical Oncology  Nurse Practitioner to Dr. Urban Cancino  Nurse Practitioner, [facility]      I have reviewed the notes, assessments, and/or procedures performed by Joellen Valerio APRN, as  above.  I have personally interviewed and examined the patient at the beside, and rounded with Joellen. I concur with her assessment and plan and the documentation of Gita García.    MDM includes:    - Acute or chronic illness or injury that poses a threat to life or bodily function  - Independent review and explanation of 2 results from unique tests  - Discussion of management and ordering 2 unique tests  - Extensive discussion of treatment and management    Urban Cancino M.D.  Hematology/Oncology Attending  [facility]  [facility]          Route Chart for Scheduling    Med Onc Chart Routing      Follow up with physician 3 months. review imaging   Follow up with EVELYN    Infusion scheduling note    Injection scheduling note    Labs CBC, CMP and TSH   Scheduling:  Preferred lab:  Lab interval:     Imaging   MRU and CT chest in 3 months   Pharmacy appointment    Other referrals            Treatment Plan Information   OP NIVOLUMAB Q4W   Urban Cancino MD   Upcoming Treatment Dates - OP NIVOLUMAB Q4W    12/16/2022       Chemotherapy       nivolumab 480 mg in sodium chloride 0.9% 148 mL infusion  1/13/2023       Chemotherapy       nivolumab 480 mg in sodium chloride 0.9% 148 mL infusion  2/10/2023       Chemotherapy       nivolumab 480 mg in sodium chloride 0.9% 148 mL infusion  3/10/2023       Chemotherapy       nivolumab 480 mg in sodium chloride 0.9% 148 mL infusion    Therapy Plan Information  mitoMYcin (MUTAMYCIN) 40 mg in sodium chloride 0.9% 40 mL bladder instillation  40 mg, Intravesical, 1 time a week, at least 5 days apart

## 2023-07-25 ENCOUNTER — PROCEDURE VISIT (OUTPATIENT)
Dept: UROLOGY | Facility: CLINIC | Age: 78
End: 2023-07-25
Payer: MEDICARE

## 2023-07-25 VITALS
TEMPERATURE: 98 F | HEIGHT: 64 IN | SYSTOLIC BLOOD PRESSURE: 157 MMHG | HEART RATE: 62 BPM | WEIGHT: 121.31 LBS | DIASTOLIC BLOOD PRESSURE: 71 MMHG | BODY MASS INDEX: 20.71 KG/M2 | RESPIRATION RATE: 18 BRPM

## 2023-07-25 DIAGNOSIS — C64.1 UROTHELIAL CARCINOMA OF KIDNEY, RIGHT: ICD-10-CM

## 2023-07-25 PROCEDURE — 52000 CYSTOURETHROSCOPY: CPT | Mod: S$GLB,,, | Performed by: UROLOGY

## 2023-07-25 PROCEDURE — 52000 CYSTOSCOPY: ICD-10-PCS | Mod: S$GLB,,, | Performed by: UROLOGY

## 2023-07-25 RX ORDER — LIDOCAINE HYDROCHLORIDE 20 MG/ML
JELLY TOPICAL
Status: COMPLETED | OUTPATIENT
Start: 2023-07-25 | End: 2023-07-25

## 2023-07-25 RX ADMIN — LIDOCAINE HYDROCHLORIDE: 20 JELLY TOPICAL at 08:07

## 2023-07-25 NOTE — PATIENT INSTRUCTIONS
What to Expect After a Cystoscopy  For the next 24-48 hours, you may feel a mild burning when you urinate. This burning is normal and expected. Drink plenty of water to dilute the urine to help relieve the burning sensation. You may also see a small amount of blood in your urine after the procedure.    Unless you are already taking antibiotics, you may be given an antibiotic after the test to prevent infection.    Signs and Symptoms to Report  Call the [facility] at [phone] if you develop any of the following:  Fever of 101 degrees or higher  Chills or persistent bleeding  Inability to urinate

## 2023-07-25 NOTE — PROCEDURES
Cystoscopy    Date/Time: 7/25/2023 9:11 AM  Performed by: Shane Diaz MD  Authorized by: Shane Diaz MD     Consent Done?:  Yes (Written)  Timeout: prior to procedure the correct patient, procedure, and site was verified    Prep: patient was prepped and draped in usual sterile fashion    Indications: history bladder cancer    Preparation: Patient was prepped and draped in usual sterile fashion    Scope type:  Flexible cystoscope  Stent inserted: No    Stent removed: No    External exam normal: Yes    Digital exam performed: No    Urethra normal: Yes    Bladder neck normal: Yes    Bladder normal: Yes     patient tolerated the procedure well with no immediate complications  Comments:      6 months with cysto, MRI, CXR.

## 2023-09-25 ENCOUNTER — TELEPHONE (OUTPATIENT)
Dept: GYNECOLOGIC ONCOLOGY | Facility: CLINIC | Age: 78
End: 2023-09-25
Payer: MEDICARE

## 2023-09-25 NOTE — TELEPHONE ENCOUNTER
----- Message from Jammie Drew LPN sent at 9/25/2023  1:05 PM CDT -----  Regarding: reschedule appt    ----- Message -----  From: John Elias  Sent: 9/25/2023  12:07 PM CDT  To: [facility] Staff     Name of Who is Calling:     What is the request in detail:  patient request callback in reference to reschedule appointment Please contact to further discuss and advise  patient request mid morning or after lunch appointment     Can the clinic reply by [facility]:     What Number to Call Back if not in [facility]:  [phone]

## 2023-09-26 DIAGNOSIS — C54.1 ENDOMETRIAL CA: Primary | ICD-10-CM

## 2023-10-13 ENCOUNTER — HOSPITAL ENCOUNTER (OUTPATIENT)
Dept: RADIOLOGY | Facility: HOSPITAL | Age: 78
Discharge: HOME | End: 2023-10-13
Attending: NURSE PRACTITIONER
Payer: MEDICARE

## 2023-10-13 DIAGNOSIS — C64.1 UROTHELIAL CARCINOMA OF KIDNEY, RIGHT: ICD-10-CM

## 2023-10-13 PROCEDURE — 72197 MRI UROGRAPHY W W/O CONTRAST (XPD): ICD-10-PCS | Mod: 26,,, | Performed by: INTERNAL MEDICINE

## 2023-10-13 PROCEDURE — 71250 CT THORAX DX C-: CPT | Mod: 26,,, | Performed by: RADIOLOGY

## 2023-10-13 PROCEDURE — 72197 MRI PELVIS W/O & W/DYE: CPT | Mod: TC

## 2023-10-13 PROCEDURE — 74183 MRI UROGRAPHY W W/O CONTRAST (XPD): ICD-10-PCS | Mod: 26,,, | Performed by: INTERNAL MEDICINE

## 2023-10-13 PROCEDURE — 74183 MRI ABD W/O CNTR FLWD CNTR: CPT | Mod: 26,,, | Performed by: INTERNAL MEDICINE

## 2023-10-13 PROCEDURE — 71250 CT THORAX DX C-: CPT | Mod: TC

## 2023-10-13 PROCEDURE — A9585 GADOBUTROL INJECTION: HCPCS | Performed by: NURSE PRACTITIONER

## 2023-10-13 PROCEDURE — 72197 MRI PELVIS W/O & W/DYE: CPT | Mod: 26,,, | Performed by: INTERNAL MEDICINE

## 2023-10-13 PROCEDURE — 71250 CT CHEST WITHOUT CONTRAST: ICD-10-PCS | Mod: 26,,, | Performed by: RADIOLOGY

## 2023-10-13 PROCEDURE — 25500020 PHARM REV CODE 255: Performed by: NURSE PRACTITIONER

## 2023-10-13 RX ORDER — GADOBUTROL 604.72 MG/ML
6 INJECTION INTRAVENOUS
Status: COMPLETED | OUTPATIENT
Start: 2023-10-13 | End: 2023-10-13

## 2023-10-13 RX ADMIN — GADOBUTROL 6 ML: 604.72 INJECTION INTRAVENOUS at 01:10

## 2023-10-25 ENCOUNTER — OFFICE VISIT (OUTPATIENT)
Dept: HEMATOLOGY/ONCOLOGY | Facility: CLINIC | Age: 78
End: 2023-10-25
Payer: MEDICARE

## 2023-10-25 VITALS
SYSTOLIC BLOOD PRESSURE: 171 MMHG | DIASTOLIC BLOOD PRESSURE: 80 MMHG | RESPIRATION RATE: 18 BRPM | TEMPERATURE: 98 F | WEIGHT: 129.63 LBS | BODY MASS INDEX: 22.25 KG/M2 | HEART RATE: 59 BPM | OXYGEN SATURATION: 99 %

## 2023-10-25 DIAGNOSIS — T45.1X5A CHEMOTHERAPY-INDUCED NEUROPATHY: ICD-10-CM

## 2023-10-25 DIAGNOSIS — R63.0 LOSS OF APPETITE: ICD-10-CM

## 2023-10-25 DIAGNOSIS — R68.2 DRY MOUTH: ICD-10-CM

## 2023-10-25 DIAGNOSIS — K21.9 GASTROESOPHAGEAL REFLUX DISEASE, UNSPECIFIED WHETHER ESOPHAGITIS PRESENT: ICD-10-CM

## 2023-10-25 DIAGNOSIS — C64.1 UROTHELIAL CARCINOMA OF KIDNEY, RIGHT: Primary | ICD-10-CM

## 2023-10-25 DIAGNOSIS — R53.83 FATIGUE, UNSPECIFIED TYPE: ICD-10-CM

## 2023-10-25 DIAGNOSIS — K86.89 PANCREATIC INSUFFICIENCY: ICD-10-CM

## 2023-10-25 DIAGNOSIS — G62.0 CHEMOTHERAPY-INDUCED NEUROPATHY: ICD-10-CM

## 2023-10-25 DIAGNOSIS — R63.4 WEIGHT LOSS: ICD-10-CM

## 2023-10-25 DIAGNOSIS — C54.1 ENDOMETRIAL CA: ICD-10-CM

## 2023-10-25 DIAGNOSIS — Z90.5 SOLITARY KIDNEY, ACQUIRED: ICD-10-CM

## 2023-10-25 PROCEDURE — 1159F PR MEDICATION LIST DOCUMENTED IN MEDICAL RECORD: ICD-10-PCS | Mod: CPTII,S$GLB,, | Performed by: INTERNAL MEDICINE

## 2023-10-25 PROCEDURE — 3079F DIAST BP 80-89 MM HG: CPT | Mod: CPTII,S$GLB,, | Performed by: INTERNAL MEDICINE

## 2023-10-25 PROCEDURE — 1101F PT FALLS ASSESS-DOCD LE1/YR: CPT | Mod: CPTII,S$GLB,, | Performed by: INTERNAL MEDICINE

## 2023-10-25 PROCEDURE — 99999 PR PBB SHADOW E&M-EST. PATIENT-LVL IV: CPT | Mod: PBBFAC,,, | Performed by: INTERNAL MEDICINE

## 2023-10-25 PROCEDURE — 99214 PR OFFICE/OUTPT VISIT, EST, LEVL IV, 30-39 MIN: ICD-10-PCS | Mod: S$GLB,,, | Performed by: INTERNAL MEDICINE

## 2023-10-25 PROCEDURE — 1159F MED LIST DOCD IN RCRD: CPT | Mod: CPTII,S$GLB,, | Performed by: INTERNAL MEDICINE

## 2023-10-25 PROCEDURE — 99999 PR PBB SHADOW E&M-EST. PATIENT-LVL IV: ICD-10-PCS | Mod: PBBFAC,,, | Performed by: INTERNAL MEDICINE

## 2023-10-25 PROCEDURE — 3077F SYST BP >= 140 MM HG: CPT | Mod: CPTII,S$GLB,, | Performed by: INTERNAL MEDICINE

## 2023-10-25 PROCEDURE — 1126F PR PAIN SEVERITY QUANTIFIED, NO PAIN PRESENT: ICD-10-PCS | Mod: CPTII,S$GLB,, | Performed by: INTERNAL MEDICINE

## 2023-10-25 PROCEDURE — 1157F PR ADVANCE CARE PLAN OR EQUIV PRESENT IN MEDICAL RECORD: ICD-10-PCS | Mod: CPTII,S$GLB,, | Performed by: INTERNAL MEDICINE

## 2023-10-25 PROCEDURE — 1160F RVW MEDS BY RX/DR IN RCRD: CPT | Mod: CPTII,S$GLB,, | Performed by: INTERNAL MEDICINE

## 2023-10-25 PROCEDURE — 3077F PR MOST RECENT SYSTOLIC BLOOD PRESSURE >= 140 MM HG: ICD-10-PCS | Mod: CPTII,S$GLB,, | Performed by: INTERNAL MEDICINE

## 2023-10-25 PROCEDURE — 3288F PR FALLS RISK ASSESSMENT DOCUMENTED: ICD-10-PCS | Mod: CPTII,S$GLB,, | Performed by: INTERNAL MEDICINE

## 2023-10-25 PROCEDURE — 1126F AMNT PAIN NOTED NONE PRSNT: CPT | Mod: CPTII,S$GLB,, | Performed by: INTERNAL MEDICINE

## 2023-10-25 PROCEDURE — 3079F PR MOST RECENT DIASTOLIC BLOOD PRESSURE 80-89 MM HG: ICD-10-PCS | Mod: CPTII,S$GLB,, | Performed by: INTERNAL MEDICINE

## 2023-10-25 PROCEDURE — 1157F ADVNC CARE PLAN IN RCRD: CPT | Mod: CPTII,S$GLB,, | Performed by: INTERNAL MEDICINE

## 2023-10-25 PROCEDURE — 3288F FALL RISK ASSESSMENT DOCD: CPT | Mod: CPTII,S$GLB,, | Performed by: INTERNAL MEDICINE

## 2023-10-25 PROCEDURE — 1101F PR PT FALLS ASSESS DOC 0-1 FALLS W/OUT INJ PAST YR: ICD-10-PCS | Mod: CPTII,S$GLB,, | Performed by: INTERNAL MEDICINE

## 2023-10-25 PROCEDURE — 1160F PR REVIEW ALL MEDS BY PRESCRIBER/CLIN PHARMACIST DOCUMENTED: ICD-10-PCS | Mod: CPTII,S$GLB,, | Performed by: INTERNAL MEDICINE

## 2023-10-25 PROCEDURE — 99214 OFFICE O/P EST MOD 30 MIN: CPT | Mod: S$GLB,,, | Performed by: INTERNAL MEDICINE

## 2023-10-25 NOTE — PROGRESS NOTES
ONCOLOGY FOLLOW UP VISIT    Reason for visit: Toxicity check on Opdivo for stage IIIA urothelial carcinoma    Cancer/Stage/TNM:    Cancer Staging   Endometrial ca  Staging form: Corpus Uteri - Carcinoma and Carcinosarcoma, AJCC 8th Edition  - Clinical: No stage assigned - Unsigned  - Pathologic stage from 7/18/2019: FIGO Stage IA (pT1a, pN0, cM0) - Signed by Lokesh Carias MD on 7/18/2019         Oncology History   Endometrial ca   6/26/2019 Initial Diagnosis    Endometrial ca     7/18/2019 Cancer Staged    Staging form: Corpus Uteri - Carcinoma and Carcinosarcoma, AJCC 8th Edition  - Pathologic stage from 7/18/2019: FIGO Stage IA (pT1a, pN0, cM0) - Signed by Lokesh Carias MD on 7/18/2019 7/18/2019 - 7/18/2019 Chemotherapy    Treatment Summary   Plan Name: OP GYN PACLITAXEL CARBOPLATIN (AUC 6) Q3W  Treatment Goal: Curative  Status: Inactive  Start Date:   End Date:   Provider: Lokesh Carias MD  Chemotherapy: CARBOplatin (PARAPLATIN) in sodium chloride 0.9% 250 mL chemo infusion, , Intravenous, Clinic/HOD 1 time, 0 of 6 cycles  PACLitaxel (TAXOL) 175 mg/m2 = 300 mg in sodium chloride 0.9% 500 mL chemo infusion, 175 mg/m2, Intravenous, Clinic/HOD 1 time, 0 of 6 cycles     Urothelial carcinoma of kidney, right   11/11/2021 Initial Diagnosis    Urothelial carcinoma of kidney, right     1/27/2022 Tumor Conference    Presenting Hospital / Clinic: [facility] [address]  Virtual Tumor Board Conference: Virtual  Presenter: Edmond Manuel  Date Presented to Tumor Board: 1/27/2022  Specialties Present: Medical Oncology; Radiation Oncology; Pathology; Navigation; Urology  Presentation at Cancer Conference: Prospective  Cancer Type: Other  Other Cancer: right upper tract urothelial  Recommended Plan: Chemotherapy; Surgery  Recommended Plan Note: neoadjuvant split dose MVAC + open nephroureterectomy    Oncology History   Endometrial ca   Urothelial carcinoma of kidney, right   1/27/2022 Tumor Conference    Presenting  Hospital / Clinic: [facility]  Virtual Tumor Board Conference: Virtual  Presenter: Edmond Manuel  Date Presented to Tumor Board: 1/27/2022  Specialties Present: Medical Oncology; Radiation Oncology; Pathology; Navigation; Urology  Presentation at Cancer Conference: Prospective  Cancer Type: Other  Other Cancer: right upper tract urothelial  Recommended Plan: Chemotherapy; Surgery  Recommended Plan Note: neoadjuvant split dose MVAC + open nephroureterectomy                  PATIENT SUMMARY:   Gita García is a 76 y.o. female with HG right upper tract UCC. History of endometrial cancer s/p neoadjuvant taxol+cisplatin and vaginal brachytherapy followed by robotic hysterectomy.    DISCUSSION:  Pathology review  Staging review    PERFORMANCE STATUS:  ECOG 0    Estimated GFR/CKD Stage: >60 (cr 0.7)    Clinical/Pathologic Stage (TNM): T1 N0 M0    FACULTY IN ATTENDANCE:    Urologic Oncology: Shane Diaz MD [x], Michael Finley MD [x] , Hever Pollack MD []    CONSULT NEEDED:     [] Urologic Oncology    [] Hem/Onc    [] Rad/Onc     [x] Treatment Guidelines (NCCN and AUA) reviewed and care planned is consistent with guidelines.    TUMOR BOARD RECOMMENDATIONS/PLAN/CONSENSUS:     Case discussed among group. Pathology and radiologic images were reviewed (if applicable).    Neoadjuvant split-dose DDMVAC + open nephroureterectomy  Referral to genetics      5/6/2022 -  Chemotherapy    Treatment Summary   Plan Name: OP NIVOLUMAB Q4W  Treatment Goal: Curative  Status: Active  Start Date: 5/6/2022  End Date: 3/10/2023 (Planned)  Provider: Urban aCncino MD  Chemotherapy: [No matching medication found in this treatment plan]          HPI:   78 y.o. female who presents to clinic to review imaging. Now on Creon for pancreatic insufficiency. Eating a little better with taking Creon better but still having trouble getting in enough calories. Has more desire to eat. Dry mouth has improved minimally since stopping  immunotherapy.     ROS:   Review of Systems   Constitutional:  Positive for appetite change and unexpected weight change (weight loss). Negative for activity change, chills, fatigue and fever.   HENT:  Negative for mouth sores, nosebleeds, sore throat and trouble swallowing.         Dry mouth; Thickened saliva - improving   Respiratory:  Negative for cough, shortness of breath and wheezing.    Cardiovascular:  Negative for chest pain, palpitations and leg swelling.   Gastrointestinal:  Negative for abdominal distention, abdominal pain and blood in stool.   Endocrine: Negative for cold intolerance and heat intolerance.   Genitourinary:  Negative for dysuria, flank pain and frequency.   Musculoskeletal:  Negative for arthralgias, joint swelling and myalgias.   Skin:  Negative for color change, rash and wound.   Neurological:  Negative for dizziness, light-headedness, numbness and headaches.   Hematological:  Negative for adenopathy. Does not bruise/bleed easily.        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        Past Surgical History:   Past Surgical History:   Procedure Laterality Date    ABLATION OF NEOPLASM OF URETER USING LASER Right 1/3/2022    Procedure: ABLATION, NEOPLASM, URETER, USING LASER;  Surgeon: Shane Diaz MD;  Location: [address];  Service: Urology;  Laterality: Right;    CYSTOSCOPY N/A 1/3/2022    Procedure: CYSTOSCOPY;  Surgeon: Shane Diaz MD;  Location: [facility] OR [address] [place] [address];  Service: Urology;  Laterality: N/A;    DILATION AND CURETTAGE OF UTERUS      GI  scope  2016    LAPAROTOMY N/A 7/8/2019    Procedure: LAPAROTOMY;  Surgeon: Lokesh Carias MD;  Location: [facility] OR 2ND FLR;  Service: OB/GYN;  Laterality: N/A;  mini    OMENTECTOMY N/A 7/8/2019    Procedure: OMENTECTOMY;  Surgeon: Lokesh Carias MD;  Location: [facility] OR 2ND FLR;  Service: OB/GYN;  Laterality: N/A;    ROBOT-ASSISTED LAPAROSCOPIC ABDOMINAL HYSTERECTOMY USING DA SIRISHA XI N/A 7/8/2019    Procedure: XI ROBOTIC HYSTERECTOMY;  Surgeon: Lokesh Carias MD;  Location: [facility] OR 2ND FLR;  Service: OB/GYN;  Laterality: N/A;    ROBOT-ASSISTED LAPAROSCOPIC PELVIC LYMPHADENECTOMY USING DA SIRISHA XI Bilateral 7/8/2019    Procedure: XI ROBOTIC LYMPHADENECTOMY, PELVIC;  Surgeon: Lokesh Carias MD;  Location: [facility] OR 2ND FLR;  Service: OB/GYN;  Laterality: Bilateral;    ROBOT-ASSISTED LAPAROSCOPIC RETROPERITONEAL LYMPHADENECTOMY USING DA SIRISHA XI N/A 7/8/2019    Procedure: XI ROBOTIC LYMPHADENECTOMY, RETROPERITONEUM;  Surgeon: Lokesh Carias MD;  Location: [facility] OR 2ND FLR;  Service: OB/GYN;  Laterality: N/A;    ROBOT-ASSISTED LAPAROSCOPIC SALPINGO-OOPHORECTOMY USING DA SIRISHA XI Bilateral 7/8/2019    Procedure: XI ROBOTIC SALPINGO-OOPHORECTOMY;  Surgeon: Lokesh Carias MD;  Location: [facility] OR 2ND FLR;  Service: OB/GYN;  Laterality: Bilateral;    ROBOT-ASSISTED LAPAROSCOPIC SURGICAL REMOVAL OF KIDNEY AND URETER USING DA SIRISHA XI Right 3/14/2022    Procedure: XI ROBOTIC NEPHROURETERECTOMY/ WITH BLADDER CUFF;  Surgeon: Shane Diaz MD;  Location: [facility] OR 2ND FLR;  Service: Urology;  Laterality: Right;  4hrs    URETEROSCOPY Right 1/3/2022    Procedure: URETEROSCOPY;  Surgeon: Shane Diaz MD;  Location: [facility] OR 1ST FLR;  Service: Urology;  Laterality: Right;  2hrs        Family History:   Family History   Problem Relation Age of Onset    Colon cancer Sister 53    Prostate cancer Brother     Breast cancer Sister 64    Kidney cancer Sister     Breast cancer Sister         in her 60s    Skin cancer Sister      Stroke Brother     Prostate cancer Brother     Heart disease Brother     Prostate cancer Brother     Heart disease Brother     Ovarian cancer Neg Hx     Uterine cancer Neg Hx     Anesthesia problems Neg Hx         Social History:   Social History     Tobacco Use    Smoking status: Never    Smokeless tobacco: Never   Substance Use Topics    Alcohol use: Never        Allergies:   Review of patient's allergies indicates:   Allergen Reactions    Asa [aspirin] Other (See Comments)     Stomach upset    Dilaudid (pf) [hydromorphone (pf)] Nausea Only    Sulfa (sulfonamide antibiotics) Itching        Medications:   Current Outpatient Medications   Medication Sig Dispense Refill    acetaminophen (TYLENOL) 500 MG tablet Take 500 mg by mouth every 6 (six) hours as needed for Pain.      ALPRAZolam (XANAX) 0.25 MG tablet Take 0.25 mg by mouth 2 (two) times daily as needed for Anxiety.       benzonatate (TESSALON) 100 MG capsule Take 100 mg by mouth 3 (three) times daily as needed for Cough.      bisoprolol-hydrochlorothiazide (ZIAC) 10-6.25 mg per tablet Take 1 tablet by mouth once daily.       budesonide (ENTOCORT EC) 3 mg capsule Take 9 mg by mouth.      CALCIUM CARB-MAG OX-ZINC SULF ORAL Take by mouth once daily.      CREON 36,000-114,000- 180,000 unit CpDR       hydroCHLOROthiazide (HYDRODIURIL) 12.5 MG Tab       mv-min/FA/vit K/lycop/lut/zeax (OCUVITE EYE PLUS MULTI ORAL) Take 1 tablet by mouth once daily.       omeprazole (PRILOSEC) 40 MG capsule       ondansetron (ZOFRAN-ODT) 8 MG TbDL Take by mouth.      pantoprazole (PROTONIX) 40 MG tablet Take 40 mg by mouth 2 (two) times daily.      propranoloL-hydrochlorothiazid (INDERIDE) 40-25 mg per tablet       raloxifene (EVISTA) 60 mg tablet Take 60 mg by mouth every evening.      sucralfate (CARAFATE) 1 gram tablet Take 1 g by mouth 2 (two) times daily.      triamcinolone acetonide 0.1% (KENALOG) 0.1 % cream APPLY TO THE AFFECTED AREA(S) UNDER breast TWICE DAILY AS NEEDED       "UNABLE TO FIND once daily. Tumeric capsule       No current facility-administered medications for this visit.        Physical Exam:   BP (!) 171/80 (BP Location: Left arm, Patient Position: Sitting, BP Method: Medium (Automatic))   Pulse (!) 59   Temp 98.3 °F (36.8 °C) (Oral)   Resp 18   Wt 58.8 kg (129 lb 10.1 oz)   SpO2 99%   BMI 22.25 kg/m²      ECOG Performance Status: (foot note - ECOG PS provided by Eastern Cooperative Oncology Group) 0 - Asymptomatic    Physical Exam  Constitutional:       Appearance: She is well-developed.   HENT:      Head: Normocephalic and atraumatic.   Eyes:      Conjunctiva/sclera: Conjunctivae normal.      Pupils: Pupils are equal, round, and reactive to light.   Pulmonary:      Effort: Pulmonary effort is normal. No respiratory distress.   Abdominal:      General: There is no distension.      Palpations: Abdomen is soft.   Musculoskeletal:         General: No swelling. Normal range of motion.      Cervical back: Normal range of motion and neck supple.   Lymphadenopathy:      Cervical: No cervical adenopathy.   Skin:     General: Skin is warm and dry.   Neurological:      General: No focal deficit present.      Mental Status: She is alert and oriented to person, place, and time.   Psychiatric:         Mood and Affect: Mood normal.         Behavior: Behavior normal.           Labs:   No results found for this or any previous visit (from the past 48 hour(s)).       Imaging: I have personally CT and MRI imaging showing no evidence of disease  CT Chest Without Contrast  Narrative: EXAMINATION:  CT CHEST WITHOUT CONTRAST    CLINICAL HISTORY:  "metastatic disease evaluation;"    COMPARISON:  07/18/2023    TECHNIQUE:  Volumetric data acquisition of the chest from the lung apices to the adrenals was obtained without intravenous contrast. Sagittal and coronal multiplanar reconstructions were performed. Lack of IV contrast material limits the assessment of mediastinal and abdominal " structures.    FINDINGS:  Lungs and large airways: Minimal biapical pleuroparenchymal scarring.  Small solid noncalcified 3 mm nodule in the subpleural area of the right upper lobe (axial image 109 series 4).  This area in the lungs was not included in the prior CT dated 07/18/2023.  Completely calcified nodules in the right lower lobe again noted.  No evidence of acute airspace disease.  Trachea and main bronchi are patent    Pleura: No pneumothorax or pleural effusion is noted    Heart and pericardium: Cardiac silhouette is within normal limits.  Trace of pericardial effusion    Mediastinum and homero: No evidence of mediastinal or hilar lymphadenopathy according to the CT criteria    Chest wall and lower neck: Thyroid gland is normal in size with low attenuated 6 mm nodule in the right thyroid lobe.  No evidence of lymphadenopathy in the axillary regions.  The calcifications in both breast tissues.    Vessels: Left-sided aortic arch.  The aorta is normal in caliber with mild atheromatous calcifications.  Main pulmonary and right and left pulmonary arteries are normal in size    Bones: Osteopenia.  The pectus excavatum.  Mild degenerative changes in the spine.  No acute bony abnormality is noted    Upper abdomen: Calcified granuloma in the spleen.  The liver and pancreas are normal in size with no evidence of focal lesion.  Left upper kidneys normal in size with no evidence of hydronephrosis.  Impression: 1. Small solid 3 mm noncalcified nodule in the right upper lobe.  2. Evidence of prior granulomatous disease.  3. Pectus excavatum.  4. Additional findings.  Please see the above discussion.    Electronically signed by: Jeff Estrada  Date:    10/16/2023  Time:    14:05         Diagnoses:       1. Urothelial carcinoma of kidney, right    2. Solitary kidney, acquired    3. Chemotherapy-induced neuropathy    4. Endometrial ca    5. Fatigue, unspecified type    6. Gastroesophageal reflux disease, unspecified  whether esophagitis present    7. Loss of appetite    8. Weight loss    9. Pancreatic insufficiency    10. Dry mouth        Assessment and Plan:         1. Stage IIIA Urothelial Carcinoma:   Discussed stage, adjuvant treatment options, and prognosis in detail with patient and daughter.  Due to below problems, I am recommending against cisplatin based adjuvant chemotherapy.  She would be a candidate for adjuvant Opdivo and discussed this treatment plan and the DFS benefit, though data is immature.  Patient and daughter agree to move forward with Opdivo adjuvant x 1 year per Checkmate 274 clinical trial.  - Consented for Opdivo Q4W. MRU reviewed las visit and shows no evidence of disease. Will now stop Opdivo for problems 7-9.   - Scans reviewed today and continues to show no evidence of disease recurrence. Re-staging scans in February ([facility]). Patient had normal cystoscope in July- next due in 6 months. Re-staging scans Q4 months x1 year (until 11/2024), then Q6 months.     2,3 Not a candidate for cisplatin chemotherapy due to these issues.    4 Continue to follow with Dr. Joe every 4 months.    5. Residual from covid. Will continue to monitor.    6-10. Issues continue to be progressive and patient desires to stop treatment. Now found to have pancreatic insufficiency which accounts for many of these problems. Nutrition referral ordered. Educated to increase water intake during meals. May also use Xylimelts and hard candies for dry mouth. Needs to supplement diet with Boost/Ensure. Try Mucinex for thick saliva.     she will return to clinic in 4 months, but knows to call in the interim if symptoms change or should a problem arise.    Patient was also seen and examined by Dr. Cancino. Patient is in agreement with the proposed treatment plan. All questions were answered to the patient's satisfaction. Pt knows to call clinic if anything is needed before the next clinic visit.    Joellen Valerio, MSN, APRN,  FNP-C  Hematology and Medical Oncology  Nurse Practitioner to Dr. Urban Cancino  Nurse Practitioner, [facility]      I have reviewed the notes, assessments, and/or procedures performed by Joellen CAMERON, as above.  I have personally interviewed and examined the patient at the beside, and rounded with Joellen. I concur with her assessment and plan and the documentation of Gita García.    MDM includes:    - Acute or chronic illness or injury that poses a threat to life or bodily function  - Independent review and explanation of 2 results from unique tests  - Discussion of management and ordering 2 unique tests  - Extensive discussion of treatment and management    Urban Cancino M.D.  Hematology/Oncology Attending  [facility]  [facility]          Route Chart for Scheduling    Med Onc Chart Routing      Follow up with physician 4 months. review imaging   Follow up with EVELYN    Infusion scheduling note    Injection scheduling note    Labs CBC, CMP, TSH and pharmacogenomics panel   Scheduling:  Preferred lab:  Lab interval:     Imaging MRI and other   CT chest and MRI urography in 4 months   Pharmacy appointment    Other referrals                  Treatment Plan Information   OP NIVOLUMAB Q4W   Urban Cancino MD   Upcoming Treatment Dates - OP NIVOLUMAB Q4W    12/16/2022       Chemotherapy       nivolumab 480 mg in sodium chloride 0.9% 148 mL infusion  1/13/2023       Chemotherapy       nivolumab 480 mg in sodium chloride 0.9% 148 mL infusion  2/10/2023       Chemotherapy       nivolumab 480 mg in sodium chloride 0.9% 148 mL infusion  3/10/2023       Chemotherapy       nivolumab 480 mg in sodium chloride 0.9% 148 mL infusion

## 2023-11-14 ENCOUNTER — LAB VISIT (OUTPATIENT)
Dept: LAB | Facility: HOSPITAL | Age: 78
End: 2023-11-14
Attending: OBSTETRICS & GYNECOLOGY
Payer: MEDICARE

## 2023-11-14 ENCOUNTER — OFFICE VISIT (OUTPATIENT)
Dept: GYNECOLOGIC ONCOLOGY | Facility: CLINIC | Age: 78
End: 2023-11-14
Payer: MEDICARE

## 2023-11-14 VITALS
HEART RATE: 58 BPM | HEIGHT: 64 IN | SYSTOLIC BLOOD PRESSURE: 166 MMHG | BODY MASS INDEX: 22.13 KG/M2 | DIASTOLIC BLOOD PRESSURE: 72 MMHG | WEIGHT: 129.63 LBS

## 2023-11-14 DIAGNOSIS — C54.1 ENDOMETRIAL CA: Primary | ICD-10-CM

## 2023-11-14 DIAGNOSIS — C54.1 ENDOMETRIAL CA: ICD-10-CM

## 2023-11-14 LAB — CANCER AG125 SERPL-ACNC: 23 U/ML (ref 0–30)

## 2023-11-14 PROCEDURE — 3288F FALL RISK ASSESSMENT DOCD: CPT | Mod: CPTII,S$GLB,, | Performed by: OBSTETRICS & GYNECOLOGY

## 2023-11-14 PROCEDURE — 1159F MED LIST DOCD IN RCRD: CPT | Mod: CPTII,S$GLB,, | Performed by: OBSTETRICS & GYNECOLOGY

## 2023-11-14 PROCEDURE — 1101F PR PT FALLS ASSESS DOC 0-1 FALLS W/OUT INJ PAST YR: ICD-10-PCS | Mod: CPTII,S$GLB,, | Performed by: OBSTETRICS & GYNECOLOGY

## 2023-11-14 PROCEDURE — 86304 IMMUNOASSAY TUMOR CA 125: CPT | Performed by: OBSTETRICS & GYNECOLOGY

## 2023-11-14 PROCEDURE — 3077F SYST BP >= 140 MM HG: CPT | Mod: CPTII,S$GLB,, | Performed by: OBSTETRICS & GYNECOLOGY

## 2023-11-14 PROCEDURE — 99999 PR PBB SHADOW E&M-EST. PATIENT-LVL III: ICD-10-PCS | Mod: PBBFAC,,, | Performed by: OBSTETRICS & GYNECOLOGY

## 2023-11-14 PROCEDURE — 1126F AMNT PAIN NOTED NONE PRSNT: CPT | Mod: CPTII,S$GLB,, | Performed by: OBSTETRICS & GYNECOLOGY

## 2023-11-14 PROCEDURE — 36415 COLL VENOUS BLD VENIPUNCTURE: CPT | Performed by: OBSTETRICS & GYNECOLOGY

## 2023-11-14 PROCEDURE — 1101F PT FALLS ASSESS-DOCD LE1/YR: CPT | Mod: CPTII,S$GLB,, | Performed by: OBSTETRICS & GYNECOLOGY

## 2023-11-14 PROCEDURE — 1157F PR ADVANCE CARE PLAN OR EQUIV PRESENT IN MEDICAL RECORD: ICD-10-PCS | Mod: CPTII,S$GLB,, | Performed by: OBSTETRICS & GYNECOLOGY

## 2023-11-14 PROCEDURE — 3288F PR FALLS RISK ASSESSMENT DOCUMENTED: ICD-10-PCS | Mod: CPTII,S$GLB,, | Performed by: OBSTETRICS & GYNECOLOGY

## 2023-11-14 PROCEDURE — 3077F PR MOST RECENT SYSTOLIC BLOOD PRESSURE >= 140 MM HG: ICD-10-PCS | Mod: CPTII,S$GLB,, | Performed by: OBSTETRICS & GYNECOLOGY

## 2023-11-14 PROCEDURE — 1126F PR PAIN SEVERITY QUANTIFIED, NO PAIN PRESENT: ICD-10-PCS | Mod: CPTII,S$GLB,, | Performed by: OBSTETRICS & GYNECOLOGY

## 2023-11-14 PROCEDURE — 3078F PR MOST RECENT DIASTOLIC BLOOD PRESSURE < 80 MM HG: ICD-10-PCS | Mod: CPTII,S$GLB,, | Performed by: OBSTETRICS & GYNECOLOGY

## 2023-11-14 PROCEDURE — 3078F DIAST BP <80 MM HG: CPT | Mod: CPTII,S$GLB,, | Performed by: OBSTETRICS & GYNECOLOGY

## 2023-11-14 PROCEDURE — 99214 OFFICE O/P EST MOD 30 MIN: CPT | Mod: S$GLB,,, | Performed by: OBSTETRICS & GYNECOLOGY

## 2023-11-14 PROCEDURE — 1157F ADVNC CARE PLAN IN RCRD: CPT | Mod: CPTII,S$GLB,, | Performed by: OBSTETRICS & GYNECOLOGY

## 2023-11-14 PROCEDURE — 99999 PR PBB SHADOW E&M-EST. PATIENT-LVL III: CPT | Mod: PBBFAC,,, | Performed by: OBSTETRICS & GYNECOLOGY

## 2023-11-14 PROCEDURE — 99214 PR OFFICE/OUTPT VISIT, EST, LEVL IV, 30-39 MIN: ICD-10-PCS | Mod: S$GLB,,, | Performed by: OBSTETRICS & GYNECOLOGY

## 2023-11-14 PROCEDURE — 1159F PR MEDICATION LIST DOCUMENTED IN MEDICAL RECORD: ICD-10-PCS | Mod: CPTII,S$GLB,, | Performed by: OBSTETRICS & GYNECOLOGY

## 2023-11-14 NOTE — PROGRESS NOTES
Subjective:      Patient ID: Gita García is a 78 y.o. female.    Chief Complaint: No chief complaint on file.      HPI  Presents today for routine surveillance visit for [facility]. Previously followed by Dr. Carias.  Diagnosis of urothelial carcinoma 2021. S/p right robotic nephroureterectomy and intraoperative administration of chemotherapeutic agent. Adjuvant Opdivo.     MRI pelvis/urography and CT chest 10/2023 negative.       26> pending   Disease free interval from endometrial cancer 4 years.   Scheduled for genetic testing.      MMG with Dr. Abdul  Colonoscopy: 2016: N  Repeat in 5 years due FH colon ca in sister. 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 Negative cytology. 2 mm invasion.   Aug 2019: VBT with Dr. Cui  Dec 2019: Records received from Dr. Ruddy Simental. Completed  6 cycles of Taxol and carboplatin.     2021: : 12.6 in [place].      Aug 2021.   in May. 2 sisters . One from Covid.   Review of Systems   Constitutional:  Negative for appetite change, chills, fatigue and fever.   HENT:  Negative for mouth sores.    Respiratory:  Negative for cough and shortness of breath.    Cardiovascular:  Negative for leg swelling.   Gastrointestinal:  Negative for abdominal pain, blood in stool, constipation and diarrhea.   Endocrine: Negative for cold intolerance.   Genitourinary:  Negative for dysuria and vaginal bleeding.   Musculoskeletal:  Negative for myalgias.   Skin:  Negative for rash.   Allergic/Immunologic: Negative.    Neurological:  Negative for weakness and numbness.   Hematological:  Negative for adenopathy. Does not bruise/bleed easily.   Psychiatric/Behavioral:  Negative for confusion.        Objective:   Physical Exam:   Constitutional: She is oriented to person, place, and time. She appears well-developed and  well-nourished.    HENT:   Head: Normocephalic and atraumatic.    Eyes: Pupils are equal, round, and reactive to light. EOM are normal.    Neck: No thyromegaly present.    Cardiovascular:  Normal rate, regular rhythm and intact distal pulses.             Pulmonary/Chest: Effort normal and breath sounds normal. No respiratory distress. She has no wheezes.        Abdominal: Soft. Bowel sounds are normal. She exhibits no distension and no mass. There is no abdominal tenderness.     Genitourinary:    Vagina and rectum normal.      Pelvic exam was performed with patient supine.   There is no lesion on the right labia. There is no lesion on the left labia. Right adnexum displays no mass. Left adnexum displays no mass. Vaginal cuff normal.  Cervix is absent.Uterus is absent.           Musculoskeletal: Normal range of motion and moves all extremeties.      Lymphadenopathy:     She has no cervical adenopathy. No inguinal adenopathy noted on the right or left side.        Right: No supraclavicular adenopathy present.        Left: No supraclavicular adenopathy present.    Neurological: She is alert and oriented to person, place, and time.    Skin: Skin is warm and dry. No rash noted.    Psychiatric: She has a normal mood and affect.       Assessment:     1. Endometrial ca        Plan:     Orders Placed This Encounter   Procedures         No evidence of disease on today's exam  Feels well. No new symptoms.   Disease free interval 4 years.    pending    Plan for continued surveillance. RTC 6 months with  or sooner if needed.      I spent approximately 30 minutes reviewing the available records and evaluating the patient, out of which over 50% of the time was spent face to face with the patient in counseling and coordinating this patient's care.

## 2024-01-23 ENCOUNTER — PROCEDURE VISIT (OUTPATIENT)
Dept: UROLOGY | Facility: CLINIC | Age: 79
End: 2024-01-23
Attending: UROLOGY
Payer: MEDICARE

## 2024-01-23 VITALS
BODY MASS INDEX: 22.45 KG/M2 | DIASTOLIC BLOOD PRESSURE: 89 MMHG | HEART RATE: 67 BPM | TEMPERATURE: 98 F | SYSTOLIC BLOOD PRESSURE: 175 MMHG | HEIGHT: 64 IN | RESPIRATION RATE: 18 BRPM | WEIGHT: 131.5 LBS

## 2024-01-23 DIAGNOSIS — C64.1 UROTHELIAL CARCINOMA OF KIDNEY, RIGHT: ICD-10-CM

## 2024-01-23 PROCEDURE — 52000 CYSTOURETHROSCOPY: CPT | Mod: S$GLB,,, | Performed by: UROLOGY

## 2024-01-23 RX ORDER — LIDOCAINE HYDROCHLORIDE 20 MG/ML
JELLY TOPICAL ONCE
Status: COMPLETED | OUTPATIENT
Start: 2024-01-23 | End: 2024-01-23

## 2024-01-23 RX ORDER — ROSUVASTATIN CALCIUM 5 MG/1
5 TABLET, COATED ORAL NIGHTLY
COMMUNITY
Start: 2024-01-08

## 2024-01-23 RX ORDER — OLMESARTAN MEDOXOMIL 40 MG/1
40 TABLET ORAL
COMMUNITY
Start: 2024-01-15 | End: 2024-03-07

## 2024-01-23 RX ORDER — ESCITALOPRAM OXALATE 10 MG/1
10 TABLET ORAL DAILY
COMMUNITY
Start: 2024-01-05

## 2024-01-23 RX ADMIN — LIDOCAINE HYDROCHLORIDE: 20 JELLY TOPICAL at 08:01

## 2024-01-23 NOTE — PROCEDURES
Cystoscopy    Date/Time: 1/23/2024 8:20 AM    Performed by: Shane Diaz MD  Authorized by: Shane Diaz MD    Consent Done?:  Yes (Written)  Timeout: prior to procedure the correct patient, procedure, and site was verified    Prep: patient was prepped and draped in usual sterile fashion    Indications: history bladder cancer    Position:  Other  Patient sedated?: No    Preparation: Patient was prepped and draped in usual sterile fashion    Scope type:  Flexible cystoscopeNoNo  Stent inserted: No    Stent removed: No    External exam normal: Yes    Digital exam performed: No    Urethra normal: Yes    Bladder neck normal: Yes    Bladder normal: Yes     patient tolerated the procedure well with no immediate complications  Comments:      1 year with cysto.

## 2024-01-23 NOTE — PATIENT INSTRUCTIONS
What to Expect After a Cystoscopy  For the next 24-48 hours, you may feel a mild burning when you urinate. This burning is normal and expected. Drink plenty of water to dilute the urine to help relieve the burning sensation. You may also see a small amount of blood in your urine after the procedure.    Unless you are already taking antibiotics, you may be given an antibiotic after the test to prevent infection.    Signs and Symptoms to Report  Call the [facility] at [phone] if you develop any of the following:  Fever of 101 degrees or higher  Chills or persistent bleeding  Inability to urinate

## 2024-02-19 ENCOUNTER — HOSPITAL ENCOUNTER (OUTPATIENT)
Dept: RADIOLOGY | Facility: HOSPITAL | Age: 79
Discharge: HOME | End: 2024-02-19
Attending: NURSE PRACTITIONER
Payer: MEDICARE

## 2024-02-19 DIAGNOSIS — C64.1 UROTHELIAL CARCINOMA OF KIDNEY, RIGHT: ICD-10-CM

## 2024-02-19 PROCEDURE — 72197 MRI PELVIS W/O & W/DYE: CPT | Mod: TC

## 2024-02-19 PROCEDURE — 71250 CT THORAX DX C-: CPT | Mod: 26,,, | Performed by: RADIOLOGY

## 2024-02-19 PROCEDURE — 71250 CT THORAX DX C-: CPT | Mod: TC

## 2024-02-19 PROCEDURE — 72197 MRI PELVIS W/O & W/DYE: CPT | Mod: 26,,, | Performed by: RADIOLOGY

## 2024-02-19 PROCEDURE — 25500020 PHARM REV CODE 255: Performed by: NURSE PRACTITIONER

## 2024-02-19 PROCEDURE — A9585 GADOBUTROL INJECTION: HCPCS | Performed by: NURSE PRACTITIONER

## 2024-02-19 PROCEDURE — 74183 MRI ABD W/O CNTR FLWD CNTR: CPT | Mod: 26,,, | Performed by: RADIOLOGY

## 2024-02-19 RX ORDER — GADOBUTROL 604.72 MG/ML
10 INJECTION INTRAVENOUS
Status: COMPLETED | OUTPATIENT
Start: 2024-02-19 | End: 2024-02-19

## 2024-02-19 RX ORDER — GADOBUTROL 604.72 MG/ML
10 INJECTION INTRAVENOUS
Status: DISCONTINUED | OUTPATIENT
Start: 2024-02-19 | End: 2024-02-19

## 2024-02-19 RX ADMIN — GADOBUTROL 10 ML: 604.72 INJECTION INTRAVENOUS at 10:02

## 2024-02-20 ENCOUNTER — PATIENT MESSAGE (OUTPATIENT)
Dept: HEMATOLOGY/ONCOLOGY | Facility: CLINIC | Age: 79
End: 2024-02-20
Payer: MEDICARE

## 2024-02-22 ENCOUNTER — OFFICE VISIT (OUTPATIENT)
Dept: HEMATOLOGY/ONCOLOGY | Facility: CLINIC | Age: 79
End: 2024-02-22
Payer: MEDICARE

## 2024-02-22 VITALS
HEIGHT: 64 IN | OXYGEN SATURATION: 99 % | RESPIRATION RATE: 18 BRPM | SYSTOLIC BLOOD PRESSURE: 156 MMHG | DIASTOLIC BLOOD PRESSURE: 83 MMHG | HEART RATE: 60 BPM | TEMPERATURE: 98 F | BODY MASS INDEX: 23.6 KG/M2 | WEIGHT: 138.25 LBS

## 2024-02-22 DIAGNOSIS — K21.9 GASTROESOPHAGEAL REFLUX DISEASE, UNSPECIFIED WHETHER ESOPHAGITIS PRESENT: ICD-10-CM

## 2024-02-22 DIAGNOSIS — C54.1 ENDOMETRIAL CA: ICD-10-CM

## 2024-02-22 DIAGNOSIS — T45.1X5A CHEMOTHERAPY-INDUCED NEUROPATHY: ICD-10-CM

## 2024-02-22 DIAGNOSIS — K86.89 PANCREATIC INSUFFICIENCY: ICD-10-CM

## 2024-02-22 DIAGNOSIS — C64.1 UROTHELIAL CARCINOMA OF KIDNEY, RIGHT: Primary | ICD-10-CM

## 2024-02-22 DIAGNOSIS — R63.0 LOSS OF APPETITE: ICD-10-CM

## 2024-02-22 DIAGNOSIS — R53.83 FATIGUE, UNSPECIFIED TYPE: ICD-10-CM

## 2024-02-22 DIAGNOSIS — G62.0 CHEMOTHERAPY-INDUCED NEUROPATHY: ICD-10-CM

## 2024-02-22 DIAGNOSIS — Z90.5 SOLITARY KIDNEY, ACQUIRED: ICD-10-CM

## 2024-02-22 DIAGNOSIS — R63.4 WEIGHT LOSS: ICD-10-CM

## 2024-02-22 DIAGNOSIS — R68.2 DRY MOUTH: ICD-10-CM

## 2024-02-22 PROCEDURE — 3077F SYST BP >= 140 MM HG: CPT | Mod: CPTII,S$GLB,, | Performed by: INTERNAL MEDICINE

## 2024-02-22 PROCEDURE — 99214 OFFICE O/P EST MOD 30 MIN: CPT | Mod: S$GLB,,, | Performed by: INTERNAL MEDICINE

## 2024-02-22 PROCEDURE — 3079F DIAST BP 80-89 MM HG: CPT | Mod: CPTII,S$GLB,, | Performed by: INTERNAL MEDICINE

## 2024-02-22 PROCEDURE — 1101F PT FALLS ASSESS-DOCD LE1/YR: CPT | Mod: CPTII,S$GLB,, | Performed by: INTERNAL MEDICINE

## 2024-02-22 PROCEDURE — 1159F MED LIST DOCD IN RCRD: CPT | Mod: CPTII,S$GLB,, | Performed by: INTERNAL MEDICINE

## 2024-02-22 PROCEDURE — 1157F ADVNC CARE PLAN IN RCRD: CPT | Mod: CPTII,S$GLB,, | Performed by: INTERNAL MEDICINE

## 2024-02-22 PROCEDURE — 99999 PR PBB SHADOW E&M-EST. PATIENT-LVL V: CPT | Mod: PBBFAC,,, | Performed by: INTERNAL MEDICINE

## 2024-02-22 PROCEDURE — 3288F FALL RISK ASSESSMENT DOCD: CPT | Mod: CPTII,S$GLB,, | Performed by: INTERNAL MEDICINE

## 2024-02-22 PROCEDURE — 1126F AMNT PAIN NOTED NONE PRSNT: CPT | Mod: CPTII,S$GLB,, | Performed by: INTERNAL MEDICINE

## 2024-02-22 RX ORDER — BISOPROLOL FUMARATE 10 MG/1
10 TABLET, FILM COATED ORAL DAILY
COMMUNITY
Start: 2024-02-21

## 2024-02-22 NOTE — PROGRESS NOTES
ONCOLOGY FOLLOW UP VISIT    Reason for visit: Surveillance for stage IIIA urothelial carcinoma    Cancer/Stage/TNM:    Cancer Staging   Endometrial ca  Staging form: Corpus Uteri - Carcinoma and Carcinosarcoma, AJCC 8th Edition  - Clinical: No stage assigned - Unsigned  - Pathologic stage from 7/18/2019: FIGO Stage IA (pT1a, pN0, cM0) - Signed by Lokesh Carias MD on 7/18/2019         Oncology History   Endometrial ca   6/26/2019 Initial Diagnosis    Endometrial ca     7/18/2019 Cancer Staged    Staging form: Corpus Uteri - Carcinoma and Carcinosarcoma, AJCC 8th Edition  - Pathologic stage from 7/18/2019: FIGO Stage IA (pT1a, pN0, cM0) - Signed by Lokesh Carias MD on 7/18/2019 7/18/2019 - 7/18/2019 Chemotherapy    Treatment Summary   Plan Name: OP GYN PACLITAXEL CARBOPLATIN (AUC 6) Q3W  Treatment Goal: Curative  Status: Inactive  Start Date:   End Date:   Provider: Lokesh Carias MD  Chemotherapy: CARBOplatin (PARAPLATIN) in sodium chloride 0.9% 250 mL chemo infusion, , Intravenous, Clinic/HOD 1 time, 0 of 6 cycles  PACLitaxel (TAXOL) 175 mg/m2 = 300 mg in sodium chloride 0.9% 500 mL chemo infusion, 175 mg/m2, Intravenous, Clinic/HOD 1 time, 0 of 6 cycles     Urothelial carcinoma of kidney, right   11/11/2021 Initial Diagnosis    Urothelial carcinoma of kidney, right     1/27/2022 Tumor Conference    Presenting Hospital / Clinic: [facility]  Virtual Tumor Board Conference: Virtual  Presenter: Edmond Manuel  Date Presented to Tumor Board: 1/27/2022  Specialties Present: Medical Oncology; Radiation Oncology; Pathology; Navigation; Urology  Presentation at Cancer Conference: Prospective  Cancer Type: Other  Other Cancer: right upper tract urothelial  Recommended Plan: Chemotherapy; Surgery  Recommended Plan Note: neoadjuvant split dose MVAC + open nephroureterectomy    Oncology History   Endometrial ca   Urothelial carcinoma of kidney, right   1/27/2022 Tumor Conference    Presenting Hospital /  Clinic: [facility] [address] [facility]  Virtual Tumor Board Conference: Virtual  Presenter: Edmond Manuel  Date Presented to Tumor Board: 1/27/2022  Specialties Present: Medical Oncology; Radiation Oncology; Pathology; Navigation; Urology  Presentation at Cancer Conference: Prospective  Cancer Type: Other  Other Cancer: right upper tract urothelial  Recommended Plan: Chemotherapy; Surgery  Recommended Plan Note: neoadjuvant split dose MVAC + open nephroureterectomy                  PATIENT SUMMARY:   Gita García is a 76 y.o. female with HG right upper tract UCC. History of endometrial cancer s/p neoadjuvant taxol+cisplatin and vaginal brachytherapy followed by robotic hysterectomy.    DISCUSSION:  Pathology review  Staging review    PERFORMANCE STATUS:  ECOG 0    Estimated GFR/CKD Stage: >60 (cr 0.7)    Clinical/Pathologic Stage (TNM): T1 N0 M0    FACULTY IN ATTENDANCE:    Urologic Oncology: Shane Diaz MD [x], Michael Finley MD [x] , Hever Pollack MD []    CONSULT NEEDED:     [] Urologic Oncology    [] Hem/Onc    [] Rad/Onc     [x] Treatment Guidelines (NCCN and AUA) reviewed and care planned is consistent with guidelines.    TUMOR BOARD RECOMMENDATIONS/PLAN/CONSENSUS:     Case discussed among group. Pathology and radiologic images were reviewed (if applicable).    Neoadjuvant split-dose DDMVAC + open nephroureterectomy  Referral to genetics      5/6/2022 -  Chemotherapy    Treatment Summary   Plan Name: OP NIVOLUMAB Q4W  Treatment Goal: Curative  Status: Active  Start Date: 5/6/2022  End Date: 3/10/2023 (Planned)  Provider: Urban Cancino MD  Chemotherapy: [No matching medication found in this treatment plan]          HPI:   78 y.o. female who presents to clinic to review imaging. Now on Creon for pancreatic insufficiency. Eating a little better with taking Creon better but still having trouble getting in enough calories. Has more desire to eat. Dry mouth has improved minimally since stopping immunotherapy.      ROS:   Review of Systems   Constitutional:  Positive for appetite change and unexpected weight change (weight loss). Negative for activity change, chills, fatigue and fever.   HENT:  Negative for mouth sores, nosebleeds, sore throat and trouble swallowing.         Dry mouth; Thickened saliva - improving   Respiratory:  Negative for cough, shortness of breath and wheezing.    Cardiovascular:  Negative for chest pain, palpitations and leg swelling.   Gastrointestinal:  Negative for abdominal distention, abdominal pain and blood in stool.   Endocrine: Negative for cold intolerance and heat intolerance.   Genitourinary:  Negative for dysuria, flank pain and frequency.   Musculoskeletal:  Negative for arthralgias, joint swelling and myalgias.   Skin:  Negative for color change, rash and wound.   Neurological:  Negative for dizziness, light-headedness, numbness and headaches.   Hematological:  Negative for adenopathy. Does not bruise/bleed easily.        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        Past Surgical History:   Past Surgical History:   Procedure Laterality Date    ABLATION OF NEOPLASM OF URETER USING LASER Right 1/3/2022    Procedure: ABLATION, NEOPLASM, URETER, USING LASER;  Surgeon: Shane Diaz MD;  Location: [facility] OR [address] [place] [address];  Service: Urology;  Laterality: Right;    CYSTOSCOPY N/A 1/3/2022    Procedure: CYSTOSCOPY;  Surgeon: Shane Diaz MD;  Location: [facility] OR [address] [place] [address];  Service: Urology;  Laterality: N/A;    DILATION AND CURETTAGE OF UTERUS      GI scope  2016     LAPAROTOMY N/A 7/8/2019    Procedure: LAPAROTOMY;  Surgeon: Lokesh Carias MD;  Location: [facility] OR 2ND FLR;  Service: OB/GYN;  Laterality: N/A;  mini    OMENTECTOMY N/A 7/8/2019    Procedure: OMENTECTOMY;  Surgeon: Lokesh Carias MD;  Location: [facility] OR 2ND FLR;  Service: OB/GYN;  Laterality: N/A;    ROBOT-ASSISTED LAPAROSCOPIC ABDOMINAL HYSTERECTOMY USING DA SIRISHA XI N/A 7/8/2019    Procedure: XI ROBOTIC HYSTERECTOMY;  Surgeon: Lokesh Carias MD;  Location: [facility] OR 2ND FLR;  Service: OB/GYN;  Laterality: N/A;    ROBOT-ASSISTED LAPAROSCOPIC PELVIC LYMPHADENECTOMY USING DA SIRISHA XI Bilateral 7/8/2019    Procedure: XI ROBOTIC LYMPHADENECTOMY, PELVIC;  Surgeon: Lokesh Carias MD;  Location: [facility] OR 2ND FLR;  Service: OB/GYN;  Laterality: Bilateral;    ROBOT-ASSISTED LAPAROSCOPIC RETROPERITONEAL LYMPHADENECTOMY USING DA SIRISHA XI N/A 7/8/2019    Procedure: XI ROBOTIC LYMPHADENECTOMY, RETROPERITONEUM;  Surgeon: Lokesh Carias MD;  Location: [facility] OR 2ND FLR;  Service: OB/GYN;  Laterality: N/A;    ROBOT-ASSISTED LAPAROSCOPIC SALPINGO-OOPHORECTOMY USING DA SIRISHA XI Bilateral 7/8/2019    Procedure: XI ROBOTIC SALPINGO-OOPHORECTOMY;  Surgeon: Lokesh Carias MD;  Location: [facility] OR 2ND FLR;  Service: OB/GYN;  Laterality: Bilateral;    ROBOT-ASSISTED LAPAROSCOPIC SURGICAL REMOVAL OF KIDNEY AND URETER USING DA SIRISHA XI Right 3/14/2022    Procedure: XI ROBOTIC NEPHROURETERECTOMY/ WITH BLADDER CUFF;  Surgeon: Shane Diaz MD;  Location: [facility] OR 2ND FLR;  Service: Urology;  Laterality: Right;  4hrs    URETEROSCOPY Right 1/3/2022    Procedure: URETEROSCOPY;  Surgeon: Shane Diaz MD;  Location: [facility] OR 1ST FLR;  Service: Urology;  Laterality: Right;  2hrs        Family History:   Family History   Problem Relation Age of Onset    Colon cancer Sister 53    Prostate cancer Brother     Breast cancer Sister 64    Kidney cancer Sister     Breast cancer Sister         in her 60s    Skin cancer Sister     Stroke Brother      Prostate cancer Brother     Heart disease Brother     Prostate cancer Brother     Heart disease Brother     Ovarian cancer Neg Hx     Uterine cancer Neg Hx     Anesthesia problems Neg Hx         Social History:   Social History     Tobacco Use    Smoking status: Never    Smokeless tobacco: Never   Substance Use Topics    Alcohol use: Never        Allergies:   Review of patient's allergies indicates:   Allergen Reactions    Asa [aspirin] Other (See Comments)     Stomach upset    Dilaudid (pf) [hydromorphone (pf)] Nausea Only    Sulfa (sulfonamide antibiotics) Itching        Medications:   Current Outpatient Medications   Medication Sig Dispense Refill    acetaminophen (TYLENOL) 500 MG tablet Take 500 mg by mouth every 6 (six) hours as needed for Pain.      ALPRAZolam (XANAX) 0.25 MG tablet Take 0.25 mg by mouth 2 (two) times daily as needed for Anxiety.       benzonatate (TESSALON) 100 MG capsule Take 100 mg by mouth 3 (three) times daily as needed for Cough.      bisoprolol (ZEBETA) 10 MG tablet Take 10 mg by mouth.      bisoprolol-hydrochlorothiazide (ZIAC) 10-6.25 mg per tablet Take 1 tablet by mouth once daily.       budesonide (ENTOCORT EC) 3 mg capsule Take 9 mg by mouth.      CALCIUM CARB-MAG OX-ZINC SULF ORAL Take by mouth once daily.      CREON 36,000-114,000- 180,000 unit CpDR       EScitalopram oxalate (LEXAPRO) 10 MG tablet Take 10 mg by mouth.      hydroCHLOROthiazide (HYDRODIURIL) 12.5 MG Tab       mv-min/FA/vit K/lycop/lut/zeax (OCUVITE EYE PLUS MULTI ORAL) Take 1 tablet by mouth once daily.       olmesartan (BENICAR) 40 MG tablet Take 40 mg by mouth.      omeprazole (PRILOSEC) 40 MG capsule       ondansetron (ZOFRAN-ODT) 8 MG TbDL Take by mouth.      pantoprazole (PROTONIX) 40 MG tablet Take 40 mg by mouth 2 (two) times daily.      propranoloL-hydrochlorothiazid (INDERIDE) 40-25 mg per tablet       raloxifene (EVISTA) 60 mg tablet Take 60 mg by mouth every evening.      rosuvastatin (CRESTOR) 5 MG  "tablet Take 5 mg by mouth.      sucralfate (CARAFATE) 1 gram tablet Take 1 g by mouth 2 (two) times daily.      triamcinolone acetonide 0.1% (KENALOG) 0.1 % cream APPLY TO THE AFFECTED AREA(S) UNDER breast TWICE DAILY AS NEEDED      UNABLE TO FIND once daily. Tumeric capsule       No current facility-administered medications for this visit.        Physical Exam:   BP (!) 156/83 (BP Location: Left arm, Patient Position: Sitting, BP Method: Medium (Automatic))   Pulse 60   Temp 98 °F (36.7 °C) (Oral)   Resp 18   Ht 5' 4" (1.626 m)   Wt 62.7 kg (138 lb 3.7 oz)   SpO2 99%   BMI 23.73 kg/m²      ECOG Performance Status: (foot note - ECOG PS provided by Eastern Cooperative Oncology Group) 0 - Asymptomatic    Physical Exam  Constitutional:       Appearance: She is well-developed.   HENT:      Head: Normocephalic and atraumatic.   Eyes:      Conjunctiva/sclera: Conjunctivae normal.      Pupils: Pupils are equal, round, and reactive to light.   Pulmonary:      Effort: Pulmonary effort is normal. No respiratory distress.   Abdominal:      General: There is no distension.      Palpations: Abdomen is soft.   Musculoskeletal:         General: No swelling. Normal range of motion.      Cervical back: Normal range of motion and neck supple.   Lymphadenopathy:      Cervical: No cervical adenopathy.   Skin:     General: Skin is warm and dry.   Neurological:      General: No focal deficit present.      Mental Status: She is alert and oriented to person, place, and time.   Psychiatric:         Mood and Affect: Mood normal.         Behavior: Behavior normal.           Labs:   No results found for this or any previous visit (from the past 48 hour(s)).       Imaging: I have personally CT and MRI imaging showing no evidence of disease  MRI Urography W W/O Contrast (XPD)  Narrative: EXAMINATION:  MRI UROGRAPHY W W/O CONTRAST (XPD)    CLINICAL HISTORY:  Ureteral cancer, monitor;  Malignant neoplasm of right kidney, except renal " pelvis    FINDINGS:  Comparison is 10/13/2023.    Patient was administered 8 cc of Gadavist.    Right kidney is been removed.  The left kidney is unremarkable.  No adenopathy is seen.  The liver, spleen, stomach, pancreas, and duodenum show nothing unusual.  There is a liver cyst.  There is a left renal cyst.  No adenopathy is seen.  No local recurrence or metastatic disease is seen.  Gallbladder, biliary tree, spleen, stomach, pancreas show nothing unusual.  The adrenal glands are not enlarged.  Impression: No evidence of recurrent or residual malignancy.    Liver cyst and left renal cyst.    Electronically signed by: Mark Serrano MD  Date:    02/19/2024  Time:    12:48  CT Chest Without Contrast  Narrative: EXAMINATION:  CT CHEST WITHOUT CONTRAST    CLINICAL HISTORY:  Kidney cancer, monitor;  Malignant neoplasm of right kidney, except renal pelvis    FINDINGS:  Comparison is 10/13/2023.    No mediastinal, hilar, or axillary adenopathy is seen.  There is a pectus deformity.  There are splenic granulomas.  Upper abdominal organs are otherwise unremarkable.  The trachea and bronchi are patent.  There is no evidence of emphysema, interstitial lung disease, or bronchiectasis.  There are calcified right lung base granulomas.  Bones demonstrate DJD.  Impression: No evidence of metastatic disease.    Electronically signed by: Mark Serrano MD  Date:    02/19/2024  Time:    12:18         Diagnoses:       1. Urothelial carcinoma of kidney, right    2. Solitary kidney, acquired    3. Chemotherapy-induced neuropathy    4. Endometrial ca    5. Fatigue, unspecified type    6. Gastroesophageal reflux disease, unspecified whether esophagitis present    7. Loss of appetite    8. Weight loss    9. Pancreatic insufficiency    10. Dry mouth        Assessment and Plan:         1. Stage IIIA Urothelial Carcinoma:   Discussed stage, adjuvant treatment options, and prognosis in detail with patient and daughter.  Due to below problems,  I am recommending against cisplatin based adjuvant chemotherapy.  She would be a candidate for adjuvant Opdivo and discussed this treatment plan and the DFS benefit, though data is immature.  Patient and daughter agree to move forward with Opdivo adjuvant x 1 year per Checkmate 274 clinical trial.  - Consented for Opdivo Q4W. MRU reviewed las visit and shows no evidence of disease. Will now stop Opdivo for problems 7-9.   - Scans reviewed today and continues to show no evidence of disease recurrence. Re-staging scans in June ([facility]). Patient had normal cystoscope in July- next due in 6 months. Re-staging scans Q4 months x1 year (until 11/2024), then Q6 months.     2,3 Not a candidate for cisplatin chemotherapy due to these issues.    4 Continue to follow with Dr. Joe every 4 months.    5-10. Issues continue to be progressive and patient decided to stop treatment. Now found to have pancreatic insufficiency which accounts for many of these problems. Nutrition referral ordered. Educated to increase water intake during meals. May also use Xylimelts and hard candies for dry mouth. Needs to supplement diet with Boost/Ensure. Mucinex for thick saliva.     she will return to clinic in 4 months, but knows to call in the interim if symptoms change or should a problem arise.      MDM includes:    - Acute or chronic illness or injury that poses a threat to life or bodily function  - Independent review and explanation of 2 results from unique tests  - Discussion of management and ordering 2 unique tests  - Extensive discussion of treatment and management    Urban Cancino M.D.  Hematology/Oncology Attending  [facility]  [facility]          Route Chart for Scheduling    Med Onc Chart Routing      Follow up with physician 4 months. RTC virtual visit a few days after CT, MRI, and labs   Follow up with EVELYN    Infusion scheduling note    Injection scheduling note    Labs  CBC and CMP   Scheduling:  Preferred lab:  Lab interval:     Imaging MRI and other   CT chest and MRI urography - [facility]   Pharmacy appointment    Other referrals                  Treatment Plan Information   OP NIVOLUMAB Q4W   Urban Cancino MD   Upcoming Treatment Dates - OP NIVOLUMAB Q4W    12/16/2022       Chemotherapy       nivolumab 480 mg in sodium chloride 0.9% 148 mL infusion  1/13/2023       Chemotherapy       nivolumab 480 mg in sodium chloride 0.9% 148 mL infusion  2/10/2023       Chemotherapy       nivolumab 480 mg in sodium chloride 0.9% 148 mL infusion  3/10/2023       Chemotherapy       nivolumab 480 mg in sodium chloride 0.9% 148 mL infusion

## 2024-03-07 ENCOUNTER — OFFICE VISIT (OUTPATIENT)
Dept: NEUROLOGY | Facility: CLINIC | Age: 79
End: 2024-03-07
Payer: MEDICARE

## 2024-03-07 VITALS
RESPIRATION RATE: 14 BRPM | WEIGHT: 139.31 LBS | HEIGHT: 64 IN | BODY MASS INDEX: 23.78 KG/M2 | DIASTOLIC BLOOD PRESSURE: 76 MMHG | SYSTOLIC BLOOD PRESSURE: 122 MMHG | HEART RATE: 60 BPM

## 2024-03-07 DIAGNOSIS — E78.5 DYSLIPIDEMIA: ICD-10-CM

## 2024-03-07 DIAGNOSIS — I10 PRIMARY HYPERTENSION: ICD-10-CM

## 2024-03-07 DIAGNOSIS — I61.9 HEMORRHAGIC STROKE: Primary | ICD-10-CM

## 2024-03-07 PROCEDURE — 99204 OFFICE O/P NEW MOD 45 MIN: CPT | Mod: S$GLB,,, | Performed by: PSYCHIATRY & NEUROLOGY

## 2024-03-07 PROCEDURE — 99999 PR PBB SHADOW E&M-EST. PATIENT-LVL III: CPT | Mod: PBBFAC,,, | Performed by: PSYCHIATRY & NEUROLOGY

## 2024-03-07 PROCEDURE — 3074F SYST BP LT 130 MM HG: CPT | Mod: CPTII,S$GLB,, | Performed by: PSYCHIATRY & NEUROLOGY

## 2024-03-07 PROCEDURE — 3078F DIAST BP <80 MM HG: CPT | Mod: CPTII,S$GLB,, | Performed by: PSYCHIATRY & NEUROLOGY

## 2024-03-07 PROCEDURE — 1157F ADVNC CARE PLAN IN RCRD: CPT | Mod: CPTII,S$GLB,, | Performed by: PSYCHIATRY & NEUROLOGY

## 2024-03-07 PROCEDURE — 1126F AMNT PAIN NOTED NONE PRSNT: CPT | Mod: CPTII,S$GLB,, | Performed by: PSYCHIATRY & NEUROLOGY

## 2024-03-07 PROCEDURE — 1159F MED LIST DOCD IN RCRD: CPT | Mod: CPTII,S$GLB,, | Performed by: PSYCHIATRY & NEUROLOGY

## 2024-03-07 NOTE — PROGRESS NOTES
HPI: Gita García is a 78 y.o. female here for evaluation of headaches and blurred vision      She had CT head an MRI brain with PCP in December/ see below regarding this complaint    She states in December she was sitting in her recliner and saw 4 straight wavy lines on the left side of her visual field. This persisted    BP was 200s/100s    She thought she may have had a CVA.    Symptoms lasted less than 15 minutes with visual but head pressure lasted a few more days    She went to her PCP 4 days later/ did not immediately seek care    She had CT head a few days later then an MRI     Fully back to herself    She followed with eye doctor.     No trauma    No other causes found. Dr Pillai saw her    She has continued to drive without difficulty    Cardiologist has addressed her BP/ added medication    No further symptoms to her      Not on ASA    She does take anti-HTN and crestor    No migraine history    Son has a stroke history, etiology unknown    States she has neuropathy from chemo prior (2 cancers by history prior) and has some chronic tingling in the feet    Here with daughter     Review of Systems   Constitutional:  Negative for fever.   HENT:  Negative for nosebleeds.    Eyes:  Negative for blurred vision.   Respiratory:  Negative for hemoptysis.    Cardiovascular:  Negative for leg swelling.   Gastrointestinal:  Negative for blood in stool.   Genitourinary:  Negative for hematuria.   Musculoskeletal:  Negative for falls.   Skin:  Negative for rash.   Neurological:  Negative for headaches.   Endo/Heme/Allergies:  Does not bruise/bleed easily.         I have reviewed all of this patient's past medical and surgical histories as well as family and social histories and active allergies and medications as documented in the electronic medical record.        Exam:  Gen Appearance, well developed/nourished in no apparent distress  CV: 2+ distal pulses with no edema or swelling  Neuro:  MS: Awake, alert,  oriented to place, person, time, situation. Sustains attention. Recent/remote memory intact, Language is full to spontaneous speech/repetition/naming/comprehension. Fund of Knowledge is full  CN: Optic discs are flat with normal vasculature, PERRL, Extraoccular movements and visual fields are full. Normal facial sensation and strength, Hearing symmetric, Tongue and Palate are midline and strong. Shoulder Shrug symmetric and strong.  Motor: Normal bulk, tone, no abnormal movements. 5/5 strength bilateral upper/lower extremities with 2+ reflexes and bilateral plantar response  Sensory: symmetric to light touch, pain, temp, and vibration/proprioception. Romberg negative  Cerebellar: Finger-nose,Heal-shin, Rapid alternating movements intact except mild dysmetria on finger to nose bilateral  Gait: Normal stance, no ataxia    Imaging:    CT head 12/2023: ? Area of necrosis in the left frontal parietal lobe, not demonstrated by subsequent MRI  12/2023 MRI brain: single site of hemosiderin deposit in the right occipital white matter suggesting a prior small intraparenchymal hemorrhage  Mild white changes  Labs:      Assessment/Plan: Gita García is a 78 y.o. female who suffered less than 15 minutes of left eye visual changes with a few days of headaches in 12/2023. MRI showed small left occipital hemorrhage  I recommend:     12/2023 MRI brain: single site of hemosiderin deposit in the right occipital white matter suggesting a prior small intraparenchymal hemorrhage  2.  Agree- this explains her symptoms: Small hemorrhagic stroke as discussed. Symptoms have resolved  3.  Possibly HTN in nature. No other report of bleeding to suggest amyloid at this point. Can repeat MRI if any worse symptoms  -Cardiology changed her Anti-HTN meds  since and started statin: Agree  -Avoid ASA for now if able  4. The patient was instructed to dial 911 for any signs or symptoms of stroke such as sudden weakness, numbness, dizziness, speech,  gait, or visual changes  -Seek care for BP over 190/110.   5. States she has neuropathy from chemo prior (2 cancers by history prior) and has some chronic tingling in the feet  RTC 6 months

## 2024-06-04 ENCOUNTER — LAB VISIT (OUTPATIENT)
Dept: LAB | Facility: HOSPITAL | Age: 79
End: 2024-06-04
Attending: OBSTETRICS & GYNECOLOGY
Payer: MEDICARE

## 2024-06-04 ENCOUNTER — OFFICE VISIT (OUTPATIENT)
Dept: GYNECOLOGIC ONCOLOGY | Facility: CLINIC | Age: 79
End: 2024-06-04
Payer: MEDICARE

## 2024-06-04 VITALS
HEART RATE: 57 BPM | BODY MASS INDEX: 25.06 KG/M2 | WEIGHT: 146.81 LBS | HEIGHT: 64 IN | SYSTOLIC BLOOD PRESSURE: 148 MMHG | DIASTOLIC BLOOD PRESSURE: 75 MMHG

## 2024-06-04 DIAGNOSIS — C54.1 ENDOMETRIAL CA: Primary | ICD-10-CM

## 2024-06-04 DIAGNOSIS — C54.1 ENDOMETRIAL CA: ICD-10-CM

## 2024-06-04 LAB — CANCER AG125 SERPL-ACNC: 17 U/ML (ref 0–30)

## 2024-06-04 PROCEDURE — 36415 COLL VENOUS BLD VENIPUNCTURE: CPT | Performed by: OBSTETRICS & GYNECOLOGY

## 2024-06-04 PROCEDURE — 1101F PT FALLS ASSESS-DOCD LE1/YR: CPT | Mod: CPTII,S$GLB,, | Performed by: OBSTETRICS & GYNECOLOGY

## 2024-06-04 PROCEDURE — 1126F AMNT PAIN NOTED NONE PRSNT: CPT | Mod: CPTII,S$GLB,, | Performed by: OBSTETRICS & GYNECOLOGY

## 2024-06-04 PROCEDURE — 86304 IMMUNOASSAY TUMOR CA 125: CPT | Performed by: OBSTETRICS & GYNECOLOGY

## 2024-06-04 PROCEDURE — 1157F ADVNC CARE PLAN IN RCRD: CPT | Mod: CPTII,S$GLB,, | Performed by: OBSTETRICS & GYNECOLOGY

## 2024-06-04 PROCEDURE — 3288F FALL RISK ASSESSMENT DOCD: CPT | Mod: CPTII,S$GLB,, | Performed by: OBSTETRICS & GYNECOLOGY

## 2024-06-04 PROCEDURE — 1159F MED LIST DOCD IN RCRD: CPT | Mod: CPTII,S$GLB,, | Performed by: OBSTETRICS & GYNECOLOGY

## 2024-06-04 PROCEDURE — 3078F DIAST BP <80 MM HG: CPT | Mod: CPTII,S$GLB,, | Performed by: OBSTETRICS & GYNECOLOGY

## 2024-06-04 PROCEDURE — 99214 OFFICE O/P EST MOD 30 MIN: CPT | Mod: S$GLB,,, | Performed by: OBSTETRICS & GYNECOLOGY

## 2024-06-04 PROCEDURE — 99999 PR PBB SHADOW E&M-EST. PATIENT-LVL III: CPT | Mod: PBBFAC,,, | Performed by: OBSTETRICS & GYNECOLOGY

## 2024-06-04 PROCEDURE — 3077F SYST BP >= 140 MM HG: CPT | Mod: CPTII,S$GLB,, | Performed by: OBSTETRICS & GYNECOLOGY

## 2024-06-04 NOTE — PROGRESS NOTES
Subjective:      Patient ID: Gita García is a 79 y.o. female.    Chief Complaint: No chief complaint on file.      HPI  Presents today for routine surveillance visit for [facility]. Previously followed by Dr. Carias.  Diagnosis of urothelial carcinoma 2021. S/p right robotic nephroureterectomy and intraoperative administration of chemotherapeutic agent. Adjuvant Opdivo completed. In surveillance with imaging with Dr. Cancino.       Imaging 2024 shows no evidence of disease.      26> 23> 17  Disease free interval from endometrial cancer 4.5 years.      MMG with Dr. Abdul  Colonoscopy: 2016: N  Repeat in 5 years due FH colon ca in sister. 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 Negative cytology. 2 mm invasion.   Aug 2019: VBT with Dr. Cui  Dec 2019: Records received from Dr. Ruddy Simental. Completed  6 cycles of Taxol and carboplatin.     2021: : 12.6 in [place].      Aug 2021.   in May. 2 sisters . One from Covid.   Review of Systems   Constitutional:  Negative for appetite change, chills, fatigue and fever.   HENT:  Negative for mouth sores.    Respiratory:  Negative for cough and shortness of breath.    Cardiovascular:  Negative for leg swelling.   Gastrointestinal:  Negative for abdominal pain, blood in stool, constipation and diarrhea.   Endocrine: Negative for cold intolerance.   Genitourinary:  Negative for dysuria and vaginal bleeding.   Musculoskeletal:  Negative for myalgias.   Skin:  Negative for rash.   Allergic/Immunologic: Negative.    Neurological:  Negative for weakness and numbness.   Hematological:  Negative for adenopathy. Does not bruise/bleed easily.   Psychiatric/Behavioral:  Negative for confusion.        Objective:   Physical Exam:   Constitutional: She is oriented to person, place, and time. She appears  well-developed and well-nourished.    HENT:   Head: Normocephalic and atraumatic.    Eyes: Pupils are equal, round, and reactive to light. EOM are normal.    Neck: No thyromegaly present.    Cardiovascular:  Normal rate, regular rhythm and intact distal pulses.             Pulmonary/Chest: Effort normal and breath sounds normal. No respiratory distress. She has no wheezes.        Abdominal: Soft. Bowel sounds are normal. She exhibits no distension and no mass. There is no abdominal tenderness.     Genitourinary:    Vagina and rectum normal.      Pelvic exam was performed with patient supine.   There is no lesion on the right labia. There is no lesion on the left labia. Right adnexum displays no mass. Left adnexum displays no mass. Cervix is absent.Uterus is absent.           Musculoskeletal: Normal range of motion and moves all extremeties.      Lymphadenopathy:     She has no cervical adenopathy. No inguinal adenopathy noted on the right or left side.        Right: No supraclavicular adenopathy present.        Left: No supraclavicular adenopathy present.    Neurological: She is alert and oriented to person, place, and time.    Skin: Skin is warm and dry. No rash noted.    Psychiatric: She has a normal mood and affect.       Assessment:     1. Endometrial ca          Plan:     Orders Placed This Encounter   Procedures         No evidence of disease on today's exam  Feels well. No new symptoms.   Disease free interval 4.5 years.    normal.     Plan for continued surveillance. RTC 6 months with  or sooner if needed.      I spent approximately 30 minutes reviewing the available records and evaluating the patient, out of which over 50% of the time was spent face to face with the patient in counseling and coordinating this patient's care.

## 2024-06-07 ENCOUNTER — TELEPHONE (OUTPATIENT)
Dept: HEMATOLOGY/ONCOLOGY | Facility: CLINIC | Age: 79
End: 2024-06-07
Payer: MEDICARE

## 2024-06-07 NOTE — TELEPHONE ENCOUNTER
"----- Message from Nuvia Torres sent at 6/7/2024 10:49 AM CDT -----  Regarding: Reschedule Existing Appointment        Appt Date:   06/19 & 06/21    Type of appt:    MRI, CT Scan, Labs, & Virtual Ep appt    Physician:     Barak    Reason for rescheduling?   Pt doesn't have a ride. She will need to complete the tests before her appt with Dr. Johnson    Caller:   Gita    Contact Preference:     [phone]        Additional Information:  "Thank you for all that you do for our patients"  "

## 2024-06-28 ENCOUNTER — HOSPITAL ENCOUNTER (OUTPATIENT)
Dept: RADIOLOGY | Facility: HOSPITAL | Age: 79
Discharge: HOME | End: 2024-06-28
Attending: INTERNAL MEDICINE
Payer: MEDICARE

## 2024-06-28 DIAGNOSIS — C64.1 UROTHELIAL CARCINOMA OF KIDNEY, RIGHT: ICD-10-CM

## 2024-06-28 PROCEDURE — 71250 CT THORAX DX C-: CPT | Mod: TC

## 2024-06-28 PROCEDURE — 71250 CT THORAX DX C-: CPT | Mod: 26,,, | Performed by: RADIOLOGY

## 2024-06-28 PROCEDURE — 72195 MRI PELVIS W/O DYE: CPT | Mod: 26,,, | Performed by: RADIOLOGY

## 2024-06-28 PROCEDURE — 74181 MRI ABDOMEN W/O CONTRAST: CPT | Mod: TC

## 2024-06-28 PROCEDURE — 74181 MRI ABDOMEN W/O CONTRAST: CPT | Mod: 26,,, | Performed by: RADIOLOGY

## 2024-07-02 ENCOUNTER — OFFICE VISIT (OUTPATIENT)
Dept: HEMATOLOGY/ONCOLOGY | Facility: CLINIC | Age: 79
End: 2024-07-02
Payer: MEDICARE

## 2024-07-02 DIAGNOSIS — R63.4 WEIGHT LOSS: ICD-10-CM

## 2024-07-02 DIAGNOSIS — K86.89 PANCREATIC INSUFFICIENCY: ICD-10-CM

## 2024-07-02 DIAGNOSIS — C54.1 ENDOMETRIAL CA: ICD-10-CM

## 2024-07-02 DIAGNOSIS — D64.9 ANEMIA, UNSPECIFIED TYPE: ICD-10-CM

## 2024-07-02 DIAGNOSIS — C64.1 UROTHELIAL CARCINOMA OF KIDNEY, RIGHT: Primary | ICD-10-CM

## 2024-07-02 DIAGNOSIS — T45.1X5A CHEMOTHERAPY-INDUCED NEUROPATHY: ICD-10-CM

## 2024-07-02 DIAGNOSIS — K21.9 GASTROESOPHAGEAL REFLUX DISEASE, UNSPECIFIED WHETHER ESOPHAGITIS PRESENT: ICD-10-CM

## 2024-07-02 DIAGNOSIS — Z90.5 SOLITARY KIDNEY, ACQUIRED: ICD-10-CM

## 2024-07-02 DIAGNOSIS — G62.0 CHEMOTHERAPY-INDUCED NEUROPATHY: ICD-10-CM

## 2024-07-02 DIAGNOSIS — R63.0 LOSS OF APPETITE: ICD-10-CM

## 2024-07-02 DIAGNOSIS — R68.2 DRY MOUTH: ICD-10-CM

## 2024-07-02 DIAGNOSIS — R53.83 FATIGUE, UNSPECIFIED TYPE: ICD-10-CM

## 2024-07-02 PROCEDURE — G2211 COMPLEX E/M VISIT ADD ON: HCPCS | Mod: 95,,, | Performed by: INTERNAL MEDICINE

## 2024-07-02 PROCEDURE — 99214 OFFICE O/P EST MOD 30 MIN: CPT | Mod: 95,,, | Performed by: INTERNAL MEDICINE

## 2024-07-02 PROCEDURE — 1157F ADVNC CARE PLAN IN RCRD: CPT | Mod: CPTII,95,, | Performed by: INTERNAL MEDICINE

## 2024-07-02 NOTE — PROGRESS NOTES
ONCOLOGY FOLLOW UP VISIT    The patient location is: home  The chief complaint leading to consultation is: Surveillance for stage IIIA urothelial carcinoma    Visit type: audiovisual    Face to Face time with patient: 10  20 minutes of total time spent on the encounter, which includes face to face time and non-face to face time preparing to see the patient (eg, review of tests), Obtaining and/or reviewing separately obtained history, Documenting clinical information in the electronic or other health record, Independently interpreting results (not separately reported) and communicating results to the patient/family/caregiver, or Care coordination (not separately reported).     Each patient to whom he or she provides medical services by telemedicine is:  (1) informed of the relationship between the physician and patient and the respective role of any other health care provider with respect to management of the patient; and (2) notified that he or she may decline to receive medical services by telemedicine and may withdraw from such care at any time.    Cancer/Stage/TNM:    Cancer Staging   Endometrial ca  Staging form: Corpus Uteri - Carcinoma and Carcinosarcoma, AJCC 8th Edition  - Clinical: No stage assigned - Unsigned  - Pathologic stage from 7/18/2019: FIGO Stage IA (pT1a, pN0, cM0) - Signed by Lokesh Carias MD on 7/18/2019         Oncology History   Endometrial ca   6/26/2019 Initial Diagnosis    Endometrial ca     7/18/2019 Cancer Staged    Staging form: Corpus Uteri - Carcinoma and Carcinosarcoma, AJCC 8th Edition  - Pathologic stage from 7/18/2019: FIGO Stage IA (pT1a, pN0, cM0) - Signed by Lokesh Carias MD on 7/18/2019 7/18/2019 - 7/18/2019 Chemotherapy    Treatment Summary   Plan Name: OP GYN PACLITAXEL CARBOPLATIN (AUC 6) Q3W  Treatment Goal: Curative  Status: Inactive  Start Date:   End Date:   Provider: Lokesh Carias MD  Chemotherapy: CARBOplatin (PARAPLATIN) in sodium chloride 0.9% 250 mL  chemo infusion, , Intravenous, Clinic/HOD 1 time, 0 of 6 cycles  PACLitaxel (TAXOL) 175 mg/m2 = 300 mg in sodium chloride 0.9% 500 mL chemo infusion, 175 mg/m2, Intravenous, Clinic/HOD 1 time, 0 of 6 cycles     Urothelial carcinoma of kidney, right   11/11/2021 Initial Diagnosis    Urothelial carcinoma of kidney, right     1/27/2022 Tumor Conference    Presenting Hospital / Clinic: [facility] [address] [facility]  Virtual Tumor Board Conference: Virtual  Presenter: Edmond Manuel  Date Presented to Tumor Board: 1/27/2022  Specialties Present: Medical Oncology; Radiation Oncology; Pathology; Navigation; Urology  Presentation at Cancer Conference: Prospective  Cancer Type: Other  Other Cancer: right upper tract urothelial  Recommended Plan: Chemotherapy; Surgery  Recommended Plan Note: neoadjuvant split dose MVAC + open nephroureterectomy    Oncology History   Endometrial ca   Urothelial carcinoma of kidney, right   1/27/2022 Tumor Conference    Presenting Hospital / Clinic: [facility] [address]  Virtual Tumor Board Conference: Virtual  Presenter: Edmond Manuel  Date Presented to Tumor Board: 1/27/2022  Specialties Present: Medical Oncology; Radiation Oncology; Pathology; Navigation; Urology  Presentation at Cancer Conference: Prospective  Cancer Type: Other  Other Cancer: right upper tract urothelial  Recommended Plan: Chemotherapy; Surgery  Recommended Plan Note: neoadjuvant split dose MVAC + open nephroureterectomy                  PATIENT SUMMARY:   Gita García is a 76 y.o. female with HG right upper tract UCC. History of endometrial cancer s/p neoadjuvant taxol+cisplatin and vaginal brachytherapy followed by robotic hysterectomy.    DISCUSSION:  Pathology review  Staging review    PERFORMANCE STATUS:  ECOG 0    Estimated GFR/CKD Stage: >60 (cr 0.7)    Clinical/Pathologic Stage (TNM): T1 N0 M0    FACULTY IN ATTENDANCE:    Urologic Oncology: Shane Diaz MD [x], Michael Finley MD [x] , Hever Pollack MD []    CONSULT NEEDED:      [] Urologic Oncology    [] Hem/Onc    [] Rad/Onc     [x] Treatment Guidelines (NCCN and AUA) reviewed and care planned is consistent with guidelines.    TUMOR BOARD RECOMMENDATIONS/PLAN/CONSENSUS:     Case discussed among group. Pathology and radiologic images were reviewed (if applicable).    Neoadjuvant split-dose DDMVAC + open nephroureterectomy  Referral to genetics      5/6/2022 -  Chemotherapy    Treatment Summary   Plan Name: OP NIVOLUMAB Q4W  Treatment Goal: Curative  Status: Active  Start Date: 5/6/2022  End Date: 3/10/2023 (Planned)  Provider: Urban Cancino MD  Chemotherapy: [No matching medication found in this treatment plan]          HPI:   79 y.o. female who presents to clinic to review imaging. No new symptoms since last visit    ROS:   Review of Systems   Constitutional:  Positive for appetite change and unexpected weight change (weight loss). Negative for activity change, chills, fatigue and fever.   HENT:  Negative for mouth sores, nosebleeds, sore throat and trouble swallowing.         Dry mouth; Thickened saliva - improving   Respiratory:  Negative for cough, shortness of breath and wheezing.    Cardiovascular:  Negative for chest pain, palpitations and leg swelling.   Gastrointestinal:  Negative for abdominal distention, abdominal pain and blood in stool.   Endocrine: Negative for cold intolerance and heat intolerance.   Genitourinary:  Negative for dysuria, flank pain and frequency.   Musculoskeletal:  Negative for arthralgias, joint swelling and myalgias.   Skin:  Negative for color change, rash and wound.   Neurological:  Negative for dizziness, light-headedness, numbness and headaches.   Hematological:  Negative for adenopathy. Does not bruise/bleed easily.        Past Medical History:   Past Medical History:   Diagnosis Date    Acid reflux     Arthritis     Chemotherapy induced nausea and vomiting 8/9/2019    Endometrial ca 6/26/2019    Essential hypertension 6/27/2019    Estrogen  deficiency     Hormone deficiency     Hypertension     Liver mass 7/1/2019    Neuropathy due to chemotherapeutic drug 2/21/2020    Osteopenia     Seizures     post partal. 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        Past Surgical History:   Past Surgical History:   Procedure Laterality Date    ABLATION OF NEOPLASM OF URETER USING LASER Right 1/3/2022    Procedure: ABLATION, NEOPLASM, URETER, USING LASER;  Surgeon: Shane Diaz MD;  Location: [facility] OR 1ST FLR;  Service: Urology;  Laterality: Right;    CYSTOSCOPY N/A 1/3/2022    Procedure: CYSTOSCOPY;  Surgeon: Shane Diaz MD;  Location: [facility] OR 1ST FLR;  Service: Urology;  Laterality: N/A;    DILATION AND CURETTAGE OF UTERUS      GI scope  2016    LAPAROTOMY N/A 7/8/2019    Procedure: LAPAROTOMY;  Surgeon: Lokesh Carias MD;  Location: [facility] OR 2ND FLR;  Service: OB/GYN;  Laterality: N/A;  mini    OMENTECTOMY N/A 7/8/2019    Procedure: OMENTECTOMY;  Surgeon: Lokesh Carias MD;  Location: [facility] OR 2ND FLR;  Service: OB/GYN;  Laterality: N/A;    ROBOT-ASSISTED LAPAROSCOPIC ABDOMINAL HYSTERECTOMY USING DA SIRISHA XI N/A 7/8/2019    Procedure: XI ROBOTIC HYSTERECTOMY;  Surgeon: Lokesh Carias MD;  Location: [facility] OR 2ND FLR;  Service: OB/GYN;  Laterality: N/A;    ROBOT-ASSISTED LAPAROSCOPIC PELVIC LYMPHADENECTOMY USING DA SIRISHA XI Bilateral 7/8/2019    Procedure: XI ROBOTIC LYMPHADENECTOMY, PELVIC;  Surgeon: Lokesh Carias MD;  Location: [facility] OR 2ND FLR;  Service: OB/GYN;  Laterality: Bilateral;    ROBOT-ASSISTED LAPAROSCOPIC RETROPERITONEAL LYMPHADENECTOMY USING DA SIRISHA XI N/A 7/8/2019    Procedure: XI ROBOTIC LYMPHADENECTOMY, RETROPERITONEUM;  Surgeon: Lokesh Carias MD;  Location: [facility] OR 2ND FLR;  Service: OB/GYN;  Laterality: N/A;    ROBOT-ASSISTED LAPAROSCOPIC SALPINGO-OOPHORECTOMY USING DA SIRISHA XI Bilateral 7/8/2019    Procedure: XI ROBOTIC SALPINGO-OOPHORECTOMY;  Surgeon: Lokesh Carias MD;   Location: [facility] OR 2ND FLR;  Service: OB/GYN;  Laterality: Bilateral;    ROBOT-ASSISTED LAPAROSCOPIC SURGICAL REMOVAL OF KIDNEY AND URETER USING DA SIRISHA XI Right 3/14/2022    Procedure: XI ROBOTIC NEPHROURETERECTOMY/ WITH BLADDER CUFF;  Surgeon: Shane Diaz MD;  Location: [facility] OR 2ND FLR;  Service: Urology;  Laterality: Right;  4hrs    URETEROSCOPY Right 1/3/2022    Procedure: URETEROSCOPY;  Surgeon: Shane Diaz MD;  Location: [facility] OR 1ST FLR;  Service: Urology;  Laterality: Right;  2hrs        Family History:   Family History   Problem Relation Name Age of Onset    Colon cancer Sister  53    Prostate cancer Brother      Breast cancer Sister  64    Kidney cancer Sister      Breast cancer Sister          in her 60s    Skin cancer Sister      Stroke Brother      Prostate cancer Brother      Heart disease Brother      Prostate cancer Brother      Heart disease Brother      Ovarian cancer Neg Hx      Uterine cancer Neg Hx      Anesthesia problems Neg Hx          Social History:   Social History     Tobacco Use    Smoking status: Never    Smokeless tobacco: Never   Substance Use Topics    Alcohol use: Never        Allergies:   Review of patient's allergies indicates:   Allergen Reactions    Asa [aspirin] Other (See Comments)     Stomach upset    Dilaudid (pf) [hydromorphone (pf)] Nausea Only    Sulfa (sulfonamide antibiotics) Itching        Medications:   Current Outpatient Medications   Medication Sig Dispense Refill    acetaminophen (TYLENOL) 500 MG tablet Take 500 mg by mouth every 6 (six) hours as needed for Pain.      ALPRAZolam (XANAX) 0.25 MG tablet Take 0.25 mg by mouth 2 (two) times daily as needed for Anxiety.       bisoprolol (ZEBETA) 10 MG tablet Take 10 mg by mouth once daily.      EScitalopram oxalate (LEXAPRO) 10 MG tablet Take 10 mg by mouth once daily.      hydroCHLOROthiazide (HYDRODIURIL) 12.5 MG Tab Take 12.5 mg by mouth once daily.      mv-min/FA/vit K/lycop/lut/zeax (OCUVITE EYE PLUS  "MULTI ORAL) Take 1 tablet by mouth once daily.       omeprazole (PRILOSEC) 40 MG capsule Take 40 mg by mouth every morning.      pantoprazole (PROTONIX) 40 MG tablet Take 40 mg by mouth 2 (two) times daily.      raloxifene (EVISTA) 60 mg tablet Take 60 mg by mouth every evening.      rosuvastatin (CRESTOR) 5 MG tablet Take 5 mg by mouth every evening.      sucralfate (CARAFATE) 1 gram tablet Take 1 g by mouth 2 (two) times daily.       No current facility-administered medications for this visit.        Physical Exam:   There were no vitals taken for this visit.     ECOG Performance Status: (foot note - ECOG PS provided by Eastern Cooperative Oncology Group) 0 - Asymptomatic    Physical Exam  Constitutional:       Appearance: She is well-developed.   HENT:      Head: Normocephalic and atraumatic.   Eyes:      Conjunctiva/sclera: Conjunctivae normal.      Pupils: Pupils are equal, round, and reactive to light.   Pulmonary:      Effort: Pulmonary effort is normal. No respiratory distress.   Abdominal:      General: There is no distension.      Palpations: Abdomen is soft.   Musculoskeletal:         General: No swelling. Normal range of motion.      Cervical back: Normal range of motion and neck supple.   Lymphadenopathy:      Cervical: No cervical adenopathy.   Skin:     General: Skin is warm and dry.   Neurological:      General: No focal deficit present.      Mental Status: She is alert and oriented to person, place, and time.   Psychiatric:         Mood and Affect: Mood normal.         Behavior: Behavior normal.           Labs:   No results found for this or any previous visit (from the past 48 hour(s)).       Imaging: I have personally CT and MRI imaging showing no evidence of disease  CT Chest Without Contrast  Narrative: EXAMINATION:  CT CHEST WITHOUT CONTRAST    CLINICAL HISTORY:  "Bladder cancer, invasive, assess treatment response;"    COMPARISON:  CT of the abdomen dated 02/19/2024.    TECHNIQUE:  Volumetric " data acquisition of the chest from the lung apices to the adrenals was obtained without intravenous contrast. Sagittal and coronal multiplanar reconstructions were performed. Lack of IV contrast material limits the assessment of mediastinal and abdominal structures.    FINDINGS:  Lungs and large airways: Small calcified nodules in the right lower lobe again noted and grossly unchanged.  No evidence of other pulmonary nodule.  The minimal linear atelectasis/scarring in the left lower lobe and lingula.  The minimal linear atelectasis in the right middle lobe.  No evidence of acute airspace disease.  Trachea and main bronchi are patent    Pleura: No pneumothorax or pleural effusion is noted.    Heart and pericardium: Cardiac silhouette is within normal limits with no evidence of pericardial effusion    Mediastinum and homero: No evidence of mediastinal hilar lymphadenopathy according to the CT criteria.    Chest wall and lower neck: Thyroid gland is normal in size with grossly unchanged low attenuated nodule measuring 9 x 6 mm in the right thyroid lobe.  No evidence of lymphadenopathy in the axillary regions.    Vessels: Left-sided aortic arch.  Aorta is normal in caliber.  The mild aortic and coronary atheromatous calcifications.  Main pulmonary and right and left pulmonary arteries are normal in size.    Bones: Mild degenerative changes in the spine.  Pectus excavatum with a Fortunato index of 2.67    Upper abdomen: Calcified granulomas in the spleen.  The visualized liver and pancreas are normal in size with no evidence of focal lesion.  Adrenal glands are normal in size.  Left upper kidney is grossly unremarkable with no evidence of hydronephrosis.  Impression: 1. No evidence of metastatic disease.  2. Mild pectus excavatum with Fortunato index of 2.67.  3. Evidence of prior granulomatous disease.  4. Additional findings.  Please see the above discussion.    Electronically signed by: Jeff  Natalie  Date:    06/29/2024  Time:    18:04         Diagnoses:       1. Urothelial carcinoma of kidney, right    2. Chemotherapy-induced neuropathy    3. Solitary kidney, acquired    4. Endometrial ca    5. Anemia, unspecified type    6. Fatigue, unspecified type    7. Gastroesophageal reflux disease, unspecified whether esophagitis present    8. Loss of appetite    9. Weight loss    10. Pancreatic insufficiency    11. Dry mouth        Assessment and Plan:         1. Stage IIIA Urothelial Carcinoma:   Discussed stage, adjuvant treatment options, and prognosis in detail with patient and daughter.  Due to below problems, I am recommending against cisplatin based adjuvant chemotherapy.  She would be a candidate for adjuvant Opdivo and discussed this treatment plan and the DFS benefit, though data is immature.  Patient and daughter agree to move forward with Opdivo adjuvant x 1 year per Checkmate 274 clinical trial.  - Consented for Opdivo Q4W. MRU reviewed las visit and shows no evidence of disease. Will now stop Opdivo for problems 7-9.   - Scans reviewed today and continues to show no evidence of disease recurrence. Re-staging scans in June ([facility]). Patient had normal cystoscope in July- next due in 6 months. Re-staging scans Q4 months x1 year (until 11/2024), then Q6 months.     2,3 Not a candidate for cisplatin chemotherapy due to these issues.    4 Continue to follow with Dr. Joe every 4 months.    5 Checking iron prior to next visit.    6-11. Issues continue to be progressive and patient decided to stop treatment. Now found to have pancreatic insufficiency which accounts for many of these problems. Educated to increase water intake during meals. May also use Xylimelts and hard candies for dry mouth. Needs to supplement diet with Boost/Ensure. Mucinex for thick saliva.     she will return to clinic in 4 months, but knows to call in the interim if symptoms change or should a problem arise.      MDM  includes:    - Acute or chronic illness or injury that poses a threat to life or bodily function  - Independent review and explanation of 2 results from unique tests  - Discussion of management and ordering 2 unique tests  - Extensive discussion of treatment and management    Urban Cancino M.D.  Hematology/Oncology Attending  [facility]  [facility]          Route Chart for Scheduling    Med Onc Chart Routing      Follow up with physician 4 months. virtual with labs, MRI, and CT scans a few days prior - schedule scans and labs in AM before 10 AM per pateint request   Follow up with EVELYN    Infusion scheduling note    Injection scheduling note    Labs CBC, CMP, iron and TIBC and ferritin   Scheduling:  Preferred lab:  Lab interval:     Imaging MRI and other   CT chest   Pharmacy appointment    Other referrals                Treatment Plan Information   OP NIVOLUMAB Q4W   Urban Cancino MD   Upcoming Treatment Dates - OP NIVOLUMAB Q4W    12/16/2022       Chemotherapy       nivolumab 480 mg in sodium chloride 0.9% 148 mL infusion  1/13/2023       Chemotherapy       nivolumab 480 mg in sodium chloride 0.9% 148 mL infusion  2/10/2023       Chemotherapy       nivolumab 480 mg in sodium chloride 0.9% 148 mL infusion  3/10/2023       Chemotherapy       nivolumab 480 mg in sodium chloride 0.9% 148 mL infusion

## 2024-09-26 ENCOUNTER — OFFICE VISIT (OUTPATIENT)
Dept: NEUROLOGY | Facility: CLINIC | Age: 79
End: 2024-09-26
Payer: MEDICARE

## 2024-09-26 VITALS
HEART RATE: 60 BPM | SYSTOLIC BLOOD PRESSURE: 138 MMHG | BODY MASS INDEX: 25.52 KG/M2 | RESPIRATION RATE: 16 BRPM | HEIGHT: 64 IN | DIASTOLIC BLOOD PRESSURE: 80 MMHG | WEIGHT: 149.5 LBS

## 2024-09-26 DIAGNOSIS — E78.5 DYSLIPIDEMIA: ICD-10-CM

## 2024-09-26 DIAGNOSIS — I61.9 HEMORRHAGIC STROKE: Primary | ICD-10-CM

## 2024-09-26 DIAGNOSIS — I10 PRIMARY HYPERTENSION: ICD-10-CM

## 2024-09-26 PROCEDURE — 99214 OFFICE O/P EST MOD 30 MIN: CPT | Mod: S$GLB,,, | Performed by: PSYCHIATRY & NEUROLOGY

## 2024-09-26 PROCEDURE — 3075F SYST BP GE 130 - 139MM HG: CPT | Mod: CPTII,S$GLB,, | Performed by: PSYCHIATRY & NEUROLOGY

## 2024-09-26 PROCEDURE — 1157F ADVNC CARE PLAN IN RCRD: CPT | Mod: CPTII,S$GLB,, | Performed by: PSYCHIATRY & NEUROLOGY

## 2024-09-26 PROCEDURE — 3079F DIAST BP 80-89 MM HG: CPT | Mod: CPTII,S$GLB,, | Performed by: PSYCHIATRY & NEUROLOGY

## 2024-09-26 PROCEDURE — 1160F RVW MEDS BY RX/DR IN RCRD: CPT | Mod: CPTII,S$GLB,, | Performed by: PSYCHIATRY & NEUROLOGY

## 2024-09-26 PROCEDURE — 99999 PR PBB SHADOW E&M-EST. PATIENT-LVL IV: CPT | Mod: PBBFAC,,, | Performed by: PSYCHIATRY & NEUROLOGY

## 2024-09-26 PROCEDURE — 1159F MED LIST DOCD IN RCRD: CPT | Mod: CPTII,S$GLB,, | Performed by: PSYCHIATRY & NEUROLOGY

## 2024-09-26 PROCEDURE — 1126F AMNT PAIN NOTED NONE PRSNT: CPT | Mod: CPTII,S$GLB,, | Performed by: PSYCHIATRY & NEUROLOGY

## 2024-09-26 RX ORDER — OLMESARTAN MEDOXOMIL 40 MG/1
40 TABLET ORAL DAILY
COMMUNITY
Start: 2024-07-08

## 2024-09-26 NOTE — PROGRESS NOTES
HPI: Gita García is a 79 y.o. 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   HENT:  Negative for nosebleeds.    Eyes:  Negative for blurred vision.   Respiratory:  Negative for hemoptysis.    Cardiovascular:  Negative for leg swelling.   Gastrointestinal:  Negative for blood in stool.   Genitourinary:  Negative for hematuria.   Musculoskeletal:  Negative for falls.   Skin:  Negative for rash.   Neurological:  Negative for headaches.   Endo/Heme/Allergies:  Does not bruise/bleed easily.         I have reviewed all of this patient's past medical and surgical histories as well as family and social histories and active allergies and medications as documented in the electronic medical record.        Exam:  Gen Appearance, well developed/nourished in no apparent distress  CV: 2+ distal pulses with no edema or swelling  Neuro:  MS: Awake, alert,Sustains attention. Recent/remote memory intact, Language is full to spontaneous speech/g/comprehension. Fund of Knowledge is full  CN: Optic discs are flat with normal vasculature, PERRL, Extraoccular movements and visual fields are full. Normal facial sensation and strength, Hearing symmetric, Tongue and Palate are midline and strong. Shoulder Shrug symmetric and strong.  Motor: Normal bulk, tone, no abnormal movements. 5/5 strength bilateral upper/lower extremities with 2+ reflexes and no clonus  Sensory: symmetric to  temp, and vibration Romberg negative  Cerebellar: Finger-nose,Heal-shin, Rapid alternating movements intact except mild dysmetria on finger to nose  bilaterally as prior  Gait: Normal stance, no ataxia    Imaging:    CT head 12/2023: ? Area of necrosis in the left frontal parietal lobe, not demonstrated by subsequent MRI  12/2023 MRI brain: single site of hemosiderin deposit in the right occipital white matter suggesting a prior small intraparenchymal hemorrhage  Mild white changes  Labs:      Assessment/Plan: Gita García is a 79 y.o. female who suffered less than 15 minutes of left eye visual changes with a few days of headaches in 12/2023. MRI showed small left occipital hemorrhage  I recommend:     12/2023 MRI brain: single site of hemosiderin deposit in the right occipital white matter suggesting a prior small intraparenchymal hemorrhage  2.  Agree- this explains her symptoms: Small hemorrhagic stroke as discussed. Symptoms have resolved  3.  Possibly Hypertensive in nature. No other report of bleeding to suggest amyloid at this point. Can repeat MRI if any worse symptoms  -Cardiology changed her Anti-HTN meds  since and started statin: Agree  -Avoid ASA for now if able  4. The patient was instructed to dial 911 for any signs or symptoms of stroke such as sudden weakness, numbness, dizziness, speech, gait, or visual changes  -Seek care for BP over 190/110.   5. 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

## 2024-10-25 ENCOUNTER — TELEPHONE (OUTPATIENT)
Dept: GYNECOLOGIC ONCOLOGY | Facility: CLINIC | Age: 79
End: 2024-10-25
Payer: MEDICARE

## 2024-10-25 NOTE — TELEPHONE ENCOUNTER
----- Message from Imelda Gallardo NP sent at 10/25/2024 11:15 AM CDT -----  Regarding: RE: Request for a referral  Contact: [phone]  Joyce that would be through her PCP. We follow bone density for patients on an aromatase inhibitor (letrozole, femara) because it reduces estrogen and can affect bones but not for a routine screening.  Thanks  ----- Message -----  From: Rylee Martell RN  Sent: 10/25/2024  11:09 AM CDT  To: Imelda Gallardo NP  Subject: FW: Request for a referral                       Hello,    Is this something we can accommodate or should she go through PCP? Please advise, thanks.    RP  ----- Message -----  From: Natalie Boone  Sent: 10/25/2024  11:04 AM CDT  To: [facility] Staff  Subject: Request for a referral                           Type:  Needs Medical Advice    Who Called: Gita    Patient is calling to request a bone density test done before her scheduled visit  Needing a referral  Would the patient rather a call back or a response via [facility]? Call back  Best Call Back Number: [phone] or  [phone]  Additional Information: Please schedule in [place]

## 2024-11-21 DIAGNOSIS — Z85.50 ENCOUNTER FOR FOLLOW-UP SURVEILLANCE OF UROTHELIAL CARCINOMA OF UPPER URINARY TRACT: Primary | ICD-10-CM

## 2024-11-21 DIAGNOSIS — Z08 ENCOUNTER FOR FOLLOW-UP SURVEILLANCE OF UROTHELIAL CARCINOMA OF UPPER URINARY TRACT: Primary | ICD-10-CM

## 2024-11-29 ENCOUNTER — HOSPITAL ENCOUNTER (OUTPATIENT)
Dept: RADIOLOGY | Facility: HOSPITAL | Age: 79
Discharge: HOME | End: 2024-11-29
Attending: INTERNAL MEDICINE
Payer: MEDICARE

## 2024-11-29 DIAGNOSIS — C64.1 UROTHELIAL CARCINOMA OF KIDNEY, RIGHT: ICD-10-CM

## 2024-11-29 PROCEDURE — 71250 CT THORAX DX C-: CPT | Mod: TC

## 2024-11-29 PROCEDURE — 72197 MRI PELVIS W/O & W/DYE: CPT | Mod: 26,,, | Performed by: RADIOLOGY

## 2024-11-29 PROCEDURE — 74183 MRI ABD W/O CNTR FLWD CNTR: CPT | Mod: TC

## 2024-11-29 PROCEDURE — 25500020 PHARM REV CODE 255: Performed by: INTERNAL MEDICINE

## 2024-11-29 PROCEDURE — A9585 GADOBUTROL INJECTION: HCPCS | Performed by: INTERNAL MEDICINE

## 2024-11-29 PROCEDURE — 74183 MRI ABD W/O CNTR FLWD CNTR: CPT | Mod: 26,,, | Performed by: RADIOLOGY

## 2024-11-29 PROCEDURE — 71250 CT THORAX DX C-: CPT | Mod: 26,,, | Performed by: RADIOLOGY

## 2024-11-29 RX ORDER — GADOBUTROL 604.72 MG/ML
7 INJECTION INTRAVENOUS
Status: COMPLETED | OUTPATIENT
Start: 2024-11-29 | End: 2024-11-29

## 2024-11-29 RX ADMIN — GADOBUTROL 7 ML: 604.72 INJECTION INTRAVENOUS at 09:11

## 2024-12-03 ENCOUNTER — TELEPHONE (OUTPATIENT)
Dept: HEMATOLOGY/ONCOLOGY | Facility: CLINIC | Age: 79
End: 2024-12-03
Payer: MEDICARE

## 2024-12-06 ENCOUNTER — TELEPHONE (OUTPATIENT)
Dept: HEMATOLOGY/ONCOLOGY | Facility: CLINIC | Age: 79
End: 2024-12-06
Payer: MEDICARE

## 2024-12-09 ENCOUNTER — PATIENT MESSAGE (OUTPATIENT)
Dept: HEMATOLOGY/ONCOLOGY | Facility: CLINIC | Age: 79
End: 2024-12-09

## 2024-12-09 ENCOUNTER — OFFICE VISIT (OUTPATIENT)
Dept: HEMATOLOGY/ONCOLOGY | Facility: CLINIC | Age: 79
End: 2024-12-09
Payer: MEDICARE

## 2024-12-09 ENCOUNTER — OFFICE VISIT (OUTPATIENT)
Dept: UROLOGY | Facility: CLINIC | Age: 79
End: 2024-12-09
Payer: MEDICARE

## 2024-12-09 VITALS
HEART RATE: 56 BPM | WEIGHT: 149.69 LBS | OXYGEN SATURATION: 98 % | TEMPERATURE: 98 F | RESPIRATION RATE: 16 BRPM | SYSTOLIC BLOOD PRESSURE: 165 MMHG | HEIGHT: 64 IN | BODY MASS INDEX: 25.56 KG/M2 | DIASTOLIC BLOOD PRESSURE: 71 MMHG

## 2024-12-09 DIAGNOSIS — C64.1 UROTHELIAL CARCINOMA OF KIDNEY, RIGHT: Primary | ICD-10-CM

## 2024-12-09 DIAGNOSIS — D64.9 ANEMIA, UNSPECIFIED TYPE: ICD-10-CM

## 2024-12-09 DIAGNOSIS — Z08 ENCOUNTER FOR FOLLOW-UP SURVEILLANCE OF UROTHELIAL CARCINOMA OF UPPER URINARY TRACT: Primary | ICD-10-CM

## 2024-12-09 DIAGNOSIS — Z85.50 ENCOUNTER FOR FOLLOW-UP SURVEILLANCE OF UROTHELIAL CARCINOMA OF UPPER URINARY TRACT: Primary | ICD-10-CM

## 2024-12-09 DIAGNOSIS — Z90.5 SOLITARY KIDNEY, ACQUIRED: ICD-10-CM

## 2024-12-09 DIAGNOSIS — T45.1X5A CHEMOTHERAPY-INDUCED NEUROPATHY: ICD-10-CM

## 2024-12-09 DIAGNOSIS — R31.9 HEMATURIA, UNSPECIFIED TYPE: ICD-10-CM

## 2024-12-09 DIAGNOSIS — C54.1 ENDOMETRIAL CA: ICD-10-CM

## 2024-12-09 DIAGNOSIS — G62.0 CHEMOTHERAPY-INDUCED NEUROPATHY: ICD-10-CM

## 2024-12-09 PROCEDURE — 3288F FALL RISK ASSESSMENT DOCD: CPT | Mod: CPTII,S$GLB,, | Performed by: INTERNAL MEDICINE

## 2024-12-09 PROCEDURE — 1159F MED LIST DOCD IN RCRD: CPT | Mod: CPTII,S$GLB,, | Performed by: INTERNAL MEDICINE

## 2024-12-09 PROCEDURE — 99214 OFFICE O/P EST MOD 30 MIN: CPT | Mod: S$GLB,,, | Performed by: INTERNAL MEDICINE

## 2024-12-09 PROCEDURE — 1157F ADVNC CARE PLAN IN RCRD: CPT | Mod: CPTII,S$GLB,, | Performed by: INTERNAL MEDICINE

## 2024-12-09 PROCEDURE — 1126F AMNT PAIN NOTED NONE PRSNT: CPT | Mod: CPTII,S$GLB,, | Performed by: INTERNAL MEDICINE

## 2024-12-09 PROCEDURE — 88112 CYTOPATH CELL ENHANCE TECH: CPT | Performed by: PATHOLOGY

## 2024-12-09 PROCEDURE — G2211 COMPLEX E/M VISIT ADD ON: HCPCS | Mod: S$GLB,,, | Performed by: INTERNAL MEDICINE

## 2024-12-09 PROCEDURE — 87086 URINE CULTURE/COLONY COUNT: CPT

## 2024-12-09 PROCEDURE — 99499 UNLISTED E&M SERVICE: CPT | Mod: S$GLB,,, | Performed by: UROLOGY

## 2024-12-09 PROCEDURE — 3077F SYST BP >= 140 MM HG: CPT | Mod: CPTII,S$GLB,, | Performed by: INTERNAL MEDICINE

## 2024-12-09 PROCEDURE — 3078F DIAST BP <80 MM HG: CPT | Mod: CPTII,S$GLB,, | Performed by: INTERNAL MEDICINE

## 2024-12-09 PROCEDURE — 1101F PT FALLS ASSESS-DOCD LE1/YR: CPT | Mod: CPTII,S$GLB,, | Performed by: INTERNAL MEDICINE

## 2024-12-09 PROCEDURE — 99999 PR PBB SHADOW E&M-EST. PATIENT-LVL IV: CPT | Mod: PBBFAC,,, | Performed by: INTERNAL MEDICINE

## 2024-12-09 NOTE — H&P (VIEW-ONLY)
Urology ([facility]) H&P  Staff: Luis Diaz MD        CC: Patient with gross hematuria needing gross hematuria workup.     HPI:  Gita García is a 79 y.o. female with history of UTUC s/p R nephroureterectomy in 2022.     Has had gross hematuria 2-3 weeks ago. Had MRU on 11/29/24 no evidence of disease recurrence.     Last Cr 0.9. Denies any recent dysuria/n/v/f/c.     Did have stroke in December 2023, no residual deficits, not on any blood thinners.     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       Past Surgical History:   Procedure Laterality Date    ABLATION OF NEOPLASM OF URETER USING LASER Right 1/3/2022    Procedure: ABLATION, NEOPLASM, URETER, USING LASER;  Surgeon: Shane Diaz MD;  [facility];  Service: Urology;  Laterality: Right;    CYSTOSCOPY N/A 1/3/2022    Procedure: CYSTOSCOPY;  Surgeon: Shane Diaz MD;  Location: [facility] OR 1ST FLR;  Service: Urology;  Laterality: N/A;    DILATION AND CURETTAGE OF UTERUS      GI scope  2016    LAPAROTOMY N/A 7/8/2019    Procedure: LAPAROTOMY;  Surgeon: Lokesh Carias MD;  Location: [facility] OR 2ND FLR;  Service: OB/GYN;  Laterality: N/A;  mini    OMENTECTOMY N/A 7/8/2019    Procedure: OMENTECTOMY;  Surgeon: Lokesh Carias MD;  Location: [facility] OR 2ND FLR;  Service: OB/GYN;  Laterality: N/A;    ROBOT-ASSISTED LAPAROSCOPIC ABDOMINAL HYSTERECTOMY USING DA SIRISHA XI N/A 7/8/2019    Procedure: XI ROBOTIC HYSTERECTOMY;  Surgeon: Lokesh Carias MD;  Location: [facility] OR 2ND FLR;  Service: OB/GYN;  Laterality: N/A;     ROBOT-ASSISTED LAPAROSCOPIC PELVIC LYMPHADENECTOMY USING DA SIRISHA XI Bilateral 7/8/2019    Procedure: XI ROBOTIC LYMPHADENECTOMY, PELVIC;  Surgeon: Lokesh Carias MD;  Location: [facility] OR 2ND FLR;  Service: OB/GYN;  Laterality: Bilateral;    ROBOT-ASSISTED LAPAROSCOPIC RETROPERITONEAL LYMPHADENECTOMY USING DA SIRISHA XI N/A 7/8/2019    Procedure: XI ROBOTIC LYMPHADENECTOMY, RETROPERITONEUM;  Surgeon: Lokesh Carias MD;  Location: [facility] OR 2ND FLR;  Service: OB/GYN;  Laterality: N/A;    ROBOT-ASSISTED LAPAROSCOPIC SALPINGO-OOPHORECTOMY USING DA SIRISHA XI Bilateral 7/8/2019    Procedure: XI ROBOTIC SALPINGO-OOPHORECTOMY;  Surgeon: Lokesh Carias MD;  Location: [facility] OR 2ND FLR;  Service: OB/GYN;  Laterality: Bilateral;    ROBOT-ASSISTED LAPAROSCOPIC SURGICAL REMOVAL OF KIDNEY AND URETER USING DA SIRISHA XI Right 3/14/2022    Procedure: XI ROBOTIC NEPHROURETERECTOMY/ WITH BLADDER CUFF;  Surgeon: Shane Diaz MD;  Location: [facility] OR 2ND FLR;  Service: Urology;  Laterality: Right;  4hrs    URETEROSCOPY Right 1/3/2022    Procedure: URETEROSCOPY;  Surgeon: Shane Diaz MD;  Location: [facility] OR 1ST FLR;  Service: Urology;  Laterality: Right;  2hrs       Social History     Socioeconomic History    Marital status: Other   Tobacco Use    Smoking status: Never    Smokeless tobacco: Never   Substance and Sexual Activity    Alcohol use: Never    Drug use: Never    Sexual activity: Not Currently     Social Drivers of Health     Financial Resource Strain: Low Risk  (7/1/2024)    Overall Financial Resource Strain (CARDIA)     Difficulty of Paying Living Expenses: Not hard at all   Food Insecurity: No Food Insecurity (7/1/2024)    Hunger Vital Sign     Worried About Running Out of Food in the Last Year: Never true     Ran Out of Food in the Last Year: Never true   Physical Activity: Insufficiently Active (7/1/2024)    Exercise Vital Sign     Days of Exercise per Week: 3 days     Minutes of Exercise per Session: 20 min    Stress: No Stress Concern Present (7/1/2024)    [place] [employer] - Occupational Stress Questionnaire     Feeling of Stress : Not at all   Housing Stability: Unknown (7/1/2024)    Housing Stability Vital Sign     Unable to Pay for Housing in the Last Year: No       Family History   Problem Relation Name Age of Onset    Colon cancer Sister  53    Prostate cancer Brother      Breast cancer Sister  64    Kidney cancer Sister      Breast cancer Sister          in her 60s    Skin cancer Sister      Stroke Brother      Prostate cancer Brother      Heart disease Brother      Prostate cancer Brother      Heart disease Brother      Ovarian cancer Neg Hx      Uterine cancer Neg Hx      Anesthesia problems Neg Hx         Review of patient's allergies indicates:   Allergen Reactions    Asa [aspirin] Other (See Comments)     Stomach upset    Dilaudid (pf) [hydromorphone (pf)] Nausea Only    Sulfa (sulfonamide antibiotics) Itching       Current Outpatient Medications on File Prior to Visit   Medication Sig Dispense Refill    acetaminophen (TYLENOL) 500 MG tablet Take 500 mg by mouth every 6 (six) hours as needed for Pain.      ALPRAZolam (XANAX) 0.25 MG tablet Take 0.25 mg by mouth 2 (two) times daily as needed for Anxiety.  (Patient not taking: Reported on 9/26/2024)      bisoprolol (ZEBETA) 10 MG tablet Take 10 mg by mouth once daily.      EScitalopram oxalate (LEXAPRO) 10 MG tablet Take 10 mg by mouth once daily.      hydroCHLOROthiazide (HYDRODIURIL) 12.5 MG Tab Take 12.5 mg by mouth once daily.      mv-min/FA/vit K/lycop/lut/zeax (OCUVITE EYE PLUS MULTI ORAL) Take 1 tablet by mouth once daily.       olmesartan (BENICAR) 40 MG tablet Take 40 mg by mouth once daily.      pantoprazole (PROTONIX) 40 MG tablet Take 40 mg by mouth once daily.      raloxifene (EVISTA) 60 mg tablet Take 60 mg by mouth every evening.      rosuvastatin (CRESTOR) 5 MG tablet Take 5 mg by mouth every evening.      sucralfate  "(CARAFATE) 1 gram tablet Take 1 g by mouth 2 (two) times daily. (Patient not taking: Reported on 9/26/2024)      [DISCONTINUED] oxybutynin (DITROPAN) 5 MG Tab Take 1 tablet (5 mg total) by mouth 3 (three) times daily as needed (bladder spasm). (Patient not taking: No sig reported) 30 tablet 0    [DISCONTINUED] tamsulosin (FLOMAX) 0.4 mg Cap Take 1 capsule (0.4 mg total) by mouth once daily. (Patient not taking: No sig reported) 30 capsule 0     No current facility-administered medications on file prior to visit.       Anticoagulation:  No    Physical Exam:  Physical Exam  Constitutional:       General: She is not in acute distress.     Appearance: Normal appearance.   HENT:      Head: Normocephalic and atraumatic.      Nose: Nose normal.   Eyes:      Conjunctiva/sclera: Conjunctivae normal.   Cardiovascular:      Rate and Rhythm: Normal rate.   Pulmonary:      Effort: Pulmonary effort is normal. No respiratory distress.   Abdominal:      General: There is no distension.   Musculoskeletal:         General: No deformity.      Cervical back: Normal range of motion.   Skin:     General: Skin is warm and dry.   Neurological:      General: No focal deficit present.      Mental Status: She is alert.   Psychiatric:         Mood and Affect: Mood normal.         Behavior: Behavior normal.           Labs:    Urine dipstick today - negative for all components     Lab Results   Component Value Date    WBC 7.81 11/29/2024    HGB 11.6 (L) 11/29/2024    HCT 36.9 (L) 11/29/2024    MCV 90 11/29/2024     11/29/2024           BMP  Lab Results   Component Value Date     11/29/2024    K 3.8 11/29/2024     11/29/2024    CO2 29 11/29/2024    BUN 23 11/29/2024    CREATININE 0.9 11/29/2024    CALCIUM 9.4 11/29/2024    ANIONGAP 9 11/29/2024    EGFRNORACEVR >60.0 11/29/2024       No results found for: "PSA"        Assessment: Gita García is a 79 y.o. female with gross hematuria     Plan:     1. To OR on 12/16/24 for " cystoscopy with left retrograde pyelogram  2. Consents signed   3. I have explained the risk, benefits, and alternatives of the procedure in detail. The patient voices understanding and all questions have been answered. The patient agrees to proceed as planned.   4. Urine cytology today  5. UA today non concerning for infection, will send for culture   6. Will obtain EKG    Morales Espinosa MD

## 2024-12-09 NOTE — PROGRESS NOTES
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    Cancer/Stage/TNM:    Cancer Staging   Endometrial ca  Staging form: Corpus Uteri - Carcinoma and Carcinosarcoma, AJCC 8th Edition  - Clinical: No stage assigned - Unsigned  - Pathologic stage from 7/18/2019: FIGO Stage IA (pT1a, pN0, cM0) - Signed by Lokesh Carias MD on 7/18/2019         Oncology History   Endometrial ca   6/26/2019 Initial Diagnosis    Endometrial ca     7/18/2019 Cancer Staged    Staging form: Corpus Uteri - Carcinoma and Carcinosarcoma, AJCC 8th Edition  - Pathologic stage from 7/18/2019: FIGO Stage IA (pT1a, pN0, cM0) - Signed by Lokesh Carias MD on 7/18/2019 7/18/2019 - 7/18/2019 Chemotherapy    Treatment Summary   Plan Name: OP GYN PACLITAXEL CARBOPLATIN (AUC 6) Q3W  Treatment Goal: Curative  Status: Inactive  Start Date:   End Date:   Provider: Lokesh Carias MD  Chemotherapy: CARBOplatin (PARAPLATIN) in sodium chloride 0.9% 250 mL chemo infusion, , Intravenous, Clinic/HOD 1 time, 0 of 6 cycles  PACLitaxel (TAXOL) 175 mg/m2 = 300 mg in sodium chloride 0.9% 500 mL chemo infusion, 175 mg/m2, Intravenous, Clinic/HOD 1 time, 0 of 6 cycles     Urothelial carcinoma of kidney, right   11/11/2021 Initial Diagnosis    Urothelial carcinoma of kidney, right     1/27/2022 Tumor Conference    Presenting Hospital / Clinic: [facility] [address]  Virtual Tumor Board Conference: Virtual  Presenter: Edmond Manuel  Date Presented to Tumor Board: 1/27/2022  Specialties Present: Medical Oncology; Radiation Oncology; Pathology; Navigation; Urology  Presentation at Cancer Conference: Prospective  Cancer Type: Other  Other Cancer: right upper tract urothelial  Recommended Plan: Chemotherapy; Surgery  Recommended Plan Note: neoadjuvant split dose MVAC + open nephroureterectomy    Oncology History   Endometrial ca   Urothelial carcinoma of kidney, right   1/27/2022 Tumor Conference     Presenting Hospital / Clinic: [facility] [address]  Virtual Tumor Board Conference: Virtual  Presenter: Edmond Moyricharbebeto  Date Presented to Tumor Board: 1/27/2022  Specialties Present: Medical Oncology; Radiation Oncology; Pathology; Navigation; Urology  Presentation at Cancer Conference: Prospective  Cancer Type: Other  Other Cancer: right upper tract urothelial  Recommended Plan: Chemotherapy; Surgery  Recommended Plan Note: neoadjuvant split dose MVAC + open nephroureterectomy                  PATIENT SUMMARY:   Gita García is a 76 y.o. female with HG right upper tract UCC. History of endometrial cancer s/p neoadjuvant taxol+cisplatin and vaginal brachytherapy followed by robotic hysterectomy.    DISCUSSION:  Pathology review  Staging review    PERFORMANCE STATUS:  ECOG 0    Estimated GFR/CKD Stage: >60 (cr 0.7)    Clinical/Pathologic Stage (TNM): T1 N0 M0    FACULTY IN ATTENDANCE:    Urologic Oncology: Shane Diaz MD [x], Michael Finley MD [x] , Hever Pollack MD []    CONSULT NEEDED:     [] Urologic Oncology    [] Hem/Onc    [] Rad/Onc     [x] Treatment Guidelines (NCCN and AUA) reviewed and care planned is consistent with guidelines.    TUMOR BOARD RECOMMENDATIONS/PLAN/CONSENSUS:     Case discussed among group. Pathology and radiologic images were reviewed (if applicable).    Neoadjuvant split-dose DDMVAC + open nephroureterectomy  Referral to genetics      5/6/2022 -  Chemotherapy    Treatment Summary   Plan Name: OP NIVOLUMAB Q4W  Treatment Goal: Curative  Status: Active  Start Date: 5/6/2022  End Date: 3/10/2023 (Planned)  Provider: Urban Cancino MD  Chemotherapy: [No matching medication found in this treatment plan]          HPI:   79 y.o. female who presents to clinic to review imaging. No new symptoms since last visit    ROS:   Review of Systems   Constitutional:  Positive for appetite change and unexpected weight change (weight loss). Negative for activity change, chills, fatigue and fever.    HENT:  Negative for mouth sores, nosebleeds, sore throat and trouble swallowing.         Dry mouth; Thickened saliva - improving   Respiratory:  Negative for cough, shortness of breath and wheezing.    Cardiovascular:  Negative for chest pain, palpitations and leg swelling.   Gastrointestinal:  Negative for abdominal distention, abdominal pain and blood in stool.   Endocrine: Negative for cold intolerance and heat intolerance.   Genitourinary:  Negative for dysuria, flank pain and frequency.   Musculoskeletal:  Negative for arthralgias, joint swelling and myalgias.   Skin:  Negative for color change, rash and wound.   Neurological:  Negative for dizziness, light-headedness, numbness and headaches.   Hematological:  Negative for adenopathy. Does not bruise/bleed easily.        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 several days after delivery    Urothelial carcinoma of kidney, right 11/11/2021        Past Surgical History:   Past Surgical History:   Procedure Laterality Date    ABLATION OF NEOPLASM OF URETER USING LASER Right 1/3/2022    Procedure: ABLATION, NEOPLASM, URETER, USING LASER;  Surgeon: Shane Diaz MD;  Location: [facility] OR [address] [place] [address];  Service: Urology;  Laterality: Right;    CYSTOSCOPY N/A 1/3/2022    Procedure: CYSTOSCOPY;  Surgeon: Shane Diaz MD;  Location: [facility] OR 1ST FLR;  Service: Urology;  Laterality: N/A;    DILATION AND CURETTAGE OF UTERUS      GI scope  2016    LAPAROTOMY N/A 7/8/2019    Procedure: LAPAROTOMY;  Surgeon: Lokesh Carias MD;  Location: [facility] OR 2ND FLR;  Service: OB/GYN;  Laterality: N/A;  mini     OMENTECTOMY N/A 7/8/2019    Procedure: OMENTECTOMY;  Surgeon: Lokesh Carias MD;  Location: [facility] OR 2ND FLR;  Service: OB/GYN;  Laterality: N/A;    ROBOT-ASSISTED LAPAROSCOPIC ABDOMINAL HYSTERECTOMY USING DA SIRISHA XI N/A 7/8/2019    Procedure: XI ROBOTIC HYSTERECTOMY;  Surgeon: Lokesh Carias MD;  Location: [facility] OR 2ND FLR;  Service: OB/GYN;  Laterality: N/A;    ROBOT-ASSISTED LAPAROSCOPIC PELVIC LYMPHADENECTOMY USING DA SIRISHA XI Bilateral 7/8/2019    Procedure: XI ROBOTIC LYMPHADENECTOMY, PELVIC;  Surgeon: Lokesh Carias MD;  Location: [facility] OR 2ND FLR;  Service: OB/GYN;  Laterality: Bilateral;    ROBOT-ASSISTED LAPAROSCOPIC RETROPERITONEAL LYMPHADENECTOMY USING DA SIRISHA XI N/A 7/8/2019    Procedure: XI ROBOTIC LYMPHADENECTOMY, RETROPERITONEUM;  Surgeon: Lokesh Carias MD;  Location: [facility] OR 2ND FLR;  Service: OB/GYN;  Laterality: N/A;    ROBOT-ASSISTED LAPAROSCOPIC SALPINGO-OOPHORECTOMY USING DA SIRISHA XI Bilateral 7/8/2019    Procedure: XI ROBOTIC SALPINGO-OOPHORECTOMY;  Surgeon: Lokesh Carias MD;  Location: [facility] OR 2ND FLR;  Service: OB/GYN;  Laterality: Bilateral;    ROBOT-ASSISTED LAPAROSCOPIC SURGICAL REMOVAL OF KIDNEY AND URETER USING DA SIRISHA XI Right 3/14/2022    Procedure: XI ROBOTIC NEPHROURETERECTOMY/ WITH BLADDER CUFF;  Surgeon: Shane Diaz MD;  Location: [facility] OR 2ND FLR;  Service: Urology;  Laterality: Right;  4hrs    URETEROSCOPY Right 1/3/2022    Procedure: URETEROSCOPY;  Surgeon: Shane Diaz MD;  Location: [facility] OR 1ST FLR;  Service: Urology;  Laterality: Right;  2hrs        Family History:   Family History   Problem Relation Name Age of Onset    Colon cancer Sister  53    Prostate cancer Brother      Breast cancer Sister  64    Kidney cancer Sister      Breast cancer Sister          in her 60s    Skin cancer Sister      Stroke Brother      Prostate cancer Brother      Heart disease Brother      Prostate cancer Brother      Heart disease Brother      Ovarian  "cancer Neg Hx      Uterine cancer Neg Hx      Anesthesia problems Neg Hx          Social History:   Social History     Tobacco Use    Smoking status: Never    Smokeless tobacco: Never   Substance Use Topics    Alcohol use: Never        Allergies:   Review of patient's allergies indicates:   Allergen Reactions    Asa [aspirin] Other (See Comments)     Stomach upset    Dilaudid (pf) [hydromorphone (pf)] Nausea Only    Sulfa (sulfonamide antibiotics) Itching        Medications:   Current Outpatient Medications   Medication Sig Dispense Refill    acetaminophen (TYLENOL) 500 MG tablet Take 500 mg by mouth every 6 (six) hours as needed for Pain.      ALPRAZolam (XANAX) 0.25 MG tablet Take 0.25 mg by mouth 2 (two) times daily as needed for Anxiety.      bisoprolol (ZEBETA) 10 MG tablet Take 10 mg by mouth once daily.      EScitalopram oxalate (LEXAPRO) 10 MG tablet Take 10 mg by mouth once daily.      hydroCHLOROthiazide (HYDRODIURIL) 12.5 MG Tab Take 25 mg by mouth once daily.      mv-min/FA/vit K/lycop/lut/zeax (OCUVITE EYE PLUS MULTI ORAL) Take 1 tablet by mouth once daily.       olmesartan (BENICAR) 40 MG tablet Take 40 mg by mouth once daily.      pantoprazole (PROTONIX) 40 MG tablet Take 40 mg by mouth once daily.      raloxifene (EVISTA) 60 mg tablet Take 60 mg by mouth every evening.      rosuvastatin (CRESTOR) 5 MG tablet Take 5 mg by mouth every evening.      sucralfate (CARAFATE) 1 gram tablet Take 1 g by mouth 2 (two) times daily.       No current facility-administered medications for this visit.        Physical Exam:   BP (!) 165/71 (BP Location: Left arm, Patient Position: Sitting)   Pulse (!) 56   Temp 98 °F (36.7 °C) (Oral)   Resp 16   Ht 5' 4" (1.626 m)   Wt 67.9 kg (149 lb 11.1 oz)   SpO2 98%   BMI 25.69 kg/m²      ECOG Performance Status: (foot note - ECOG PS provided by Eastern Cooperative Oncology Group) 0 - Asymptomatic    Physical Exam  Constitutional:       Appearance: She " is well-developed.   HENT:      Head: Normocephalic and atraumatic.   Eyes:      Conjunctiva/sclera: Conjunctivae normal.      Pupils: Pupils are equal, round, and reactive to light.   Pulmonary:      Effort: Pulmonary effort is normal. No respiratory distress.   Abdominal:      General: There is no distension.      Palpations: Abdomen is soft.   Musculoskeletal:         General: No swelling. Normal range of motion.      Cervical back: Normal range of motion and neck supple.   Lymphadenopathy:      Cervical: No cervical adenopathy.   Skin:     General: Skin is warm and dry.   Neurological:      General: No focal deficit present.      Mental Status: She is alert and oriented to person, place, and time.   Psychiatric:         Mood and Affect: Mood normal.         Behavior: Behavior normal.         Labs:   No results found for this or any previous visit (from the past 48 hours).       Imaging: I have personally CT and MRI imaging showing no evidence of disease  MRI Urography W W/O Contrast (XPD)  Narrative: EXAMINATION:  MRI UROGRAPHY W W/O CONTRAST (XPD)    CLINICAL HISTORY:  Bladder cancer, invasive, assess treatment response;  Malignant neoplasm of right kidney, except renal pelvis    TECHNIQUE:  Multisequence, multiplanar MRI of the abdomen and pelvis performed per MR urogram protocol before and after the administration of 7 mL of Gadavist intravenous contrast.    COMPARISON:  MR urogram 06/28/2024, 02/19/2024    FINDINGS:  LOWER THORAX: Unremarkable.    LIVER: No global hepatic signal abnormality. Unchanged hepatic cyst (series 29, image 5).  Additional unchanged hyperintense focus in the left hepatic lobe (series 29, image 5 and 8).    BILIARY: Normal gallbladder. No biliary ductal dilatation.    SPLEEN: No splenomegaly.    PANCREAS: No focal masses or ductal dilatation.    ADRENALS: No adrenal nodules.    KIDNEYS/URETERS: Right nephrectomy.  Unchanged left renal cyst.  Few additional punctate T2  hyperintensities, unchanged.    BLADDER/PELVIC ORGANS: Hysterectomy.  Bladder is unremarkable.    PERITONEUM / RETROPERITONEUM: No free fluid.    LYMPH NODES: No lymphadenopathy.    VESSELS: Aortic aneurysm.  Venous system is patent.    GI TRACT: No distention or wall thickening.    BONES AND SOFT TISSUES: Degenerative changes within the spine.  Impression: 1. History of urothelial carcinoma the right kidney status post right nephrectomy.  No evidence of residual/recurrent disease.  2. Additional findings as above.    Electronically signed by resident: Leif Mora  Date:    11/29/2024  Time:    13:03    Electronically signed by: Shane Cancino MD  Date:    11/29/2024  Time:    14:40  CT Chest Without Contrast  Narrative: EXAMINATION:  CT CHEST WITHOUT CONTRAST    CLINICAL HISTORY:  Bladder cancer, invasive, assess treatment response; Malignant neoplasm of right kidney, except renal pelvis    TECHNIQUE:  Low dose axial images, sagittal and coronal reformations were obtained from the thoracic inlet to the lung bases. Contrast was not administered.    COMPARISON:  06/28/2024.    FINDINGS:  There is a subcentimeter right lobe thyroid nodule present as before with no detrimental interval change noted.  Soft tissue structures within the base of the neck otherwise appear unremarkable.  The heart is not enlarged.  There is atherosclerotic calcification present within the thoracic aorta without evidence for aneurysmal dilatation.  There is minimal atherosclerotic calcification within the coronary arteries.  No hilar or mediastinal adenopathy is identified.  No axillary adenopathy is identified.  There is mild apical parenchymal scarring present.  There is a calcified granuloma within the right lower lobe medially.  There are dependent atelectatic changes within the lung bases.  No suspicious pulmonary nodules are identified.  There is no evidence for pneumothorax or pleural effusions.  Visualized upper abdomen is  unremarkable.  Bony structures appear intact.  There is no evidence for acute fracture or bone destruction.  There is pectus excavatum present as before.  Impression: No evidence for metastatic disease.    Pectus excavatum.    No detrimental interval change noted.    Electronically signed by: Cody Davalos MD  Date:    11/29/2024  Time:    09:27         Diagnoses:       1. Urothelial carcinoma of kidney, right    2. Hematuria, unspecified type    3. Chemotherapy-induced neuropathy    4. Solitary kidney, acquired    5. Endometrial ca    6. Anemia, unspecified type        Assessment and Plan:         1. Stage IIIA Urothelial Carcinoma:   Discussed stage, adjuvant treatment options, and prognosis in detail with patient and daughter.  Due to below problems, I am recommending against cisplatin based adjuvant chemotherapy.  She would be a candidate for adjuvant Opdivo and discussed this treatment plan and the DFS benefit, though data is immature.  Patient and daughter agree to move forward with Opdivo adjuvant x 1 year per Checkmate 274 clinical trial.  - Consented for Opdivo Q4W. MRU reviewed las visit and shows no evidence of disease. Will now stop Opdivo for problems 7-9.   - Scans reviewed today and continues to show no evidence of disease recurrence. Re-staging scans in June ([facility]). Cystoscopy next week. Re-staging scans Q4 months x2 years (until 11/2026), then Q6 months.     2 May be due to UTI. Cystoscope next week    3,4 Was not a candidate for cisplatin chemotherapy due to these issues.    5 Continue to follow with Dr. Joe every 4 months.    6 Ferritin now 27. Will need oral iron supplementation.    she will return to clinic in 6 months, but knows to call in the interim if symptoms change or should a problem arise.      MDM includes:    - Acute or chronic illness or injury that poses a threat to life or bodily function  - Independent review and explanation of 2 results from unique tests  - Discussion of  management and ordering 2 unique tests  - Extensive discussion of treatment and management    Urban Cancino M.D.  Hematology/Oncology Attending  [facility] [facility]  [facility]          Route Chart for Scheduling    Med Onc Chart Routing      Follow up with physician 6 months. virtual with labs, MRI, and CT a few days prior   Follow up with EVELYN    Infusion scheduling note    Injection scheduling note    Labs CBC, CMP, ferritin and iron and TIBC   Scheduling:  Preferred lab:  Lab interval:     Imaging    Pharmacy appointment    Other referrals            Treatment Plan Information   OP NIVOLUMAB Q4W Urban Cancino MD   Associated diagnosis: Urothelial carcinoma of kidney, right   noted on 11/11/2021   Line of treatment: Adjuvant  Treatment Goal: Curative     Upcoming Treatment Dates - OP NIVOLUMAB Q4W    12/16/2022       Chemotherapy       nivolumab 480 mg in sodium chloride 0.9% 148 mL infusion  1/13/2023       Chemotherapy       nivolumab 480 mg in sodium chloride 0.9% 148 mL infusion  2/10/2023       Chemotherapy       nivolumab 480 mg in sodium chloride 0.9% 148 mL infusion  3/10/2023       Chemotherapy       nivolumab 480 mg in sodium chloride 0.9% 148 mL infusion

## 2024-12-09 NOTE — PROGRESS NOTES
Urology ([facility]) H&P  Staff: Luis Diaz MD        CC: Patient with gross hematuria needing gross hematuria workup.     HPI:  Gita García is a 79 y.o. female with history of UTUC s/p R nephroureterectomy in 2022.     Has had gross hematuria 2-3 weeks ago. Had MRU on 11/29/24 no evidence of disease recurrence.     Last Cr 0.9. Denies any recent dysuria/n/v/f/c.     Did have stroke in December 2023, no residual deficits, not on any blood thinners.     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       Past Surgical History:   Procedure Laterality Date    ABLATION OF NEOPLASM OF URETER USING LASER Right 1/3/2022    Procedure: ABLATION, NEOPLASM, URETER, USING LASER;  Surgeon: Shane Diaz MD;  [facility];  Service: Urology;  Laterality: Right;    CYSTOSCOPY N/A 1/3/2022    Procedure: CYSTOSCOPY;  Surgeon: Shane Diaz MD;  Location: [facility] OR 1ST FLR;  Service: Urology;  Laterality: N/A;    DILATION AND CURETTAGE OF UTERUS      GI scope  2016    LAPAROTOMY N/A 7/8/2019    Procedure: LAPAROTOMY;  Surgeon: Lokesh Carias MD;  Location: [facility] OR 2ND FLR;  Service: OB/GYN;  Laterality: N/A;  mini    OMENTECTOMY N/A 7/8/2019    Procedure: OMENTECTOMY;  Surgeon: Lokesh Carias MD;  Location: [facility] OR 2ND FLR;  Service: OB/GYN;  Laterality: N/A;    ROBOT-ASSISTED LAPAROSCOPIC ABDOMINAL HYSTERECTOMY USING DA SIRISHA XI N/A 7/8/2019    Procedure: XI ROBOTIC HYSTERECTOMY;  Surgeon: Lokesh Carias MD;  Location: [facility] OR 2ND FLR;  Service: OB/GYN;  Laterality: N/A;     ROBOT-ASSISTED LAPAROSCOPIC PELVIC LYMPHADENECTOMY USING DA SIRISHA XI Bilateral 7/8/2019    Procedure: XI ROBOTIC LYMPHADENECTOMY, PELVIC;  Surgeon: Lokesh Carias MD;  Location: [facility] OR 2ND FLR;  Service: OB/GYN;  Laterality: Bilateral;    ROBOT-ASSISTED LAPAROSCOPIC RETROPERITONEAL LYMPHADENECTOMY USING DA SIRISHA XI N/A 7/8/2019    Procedure: XI ROBOTIC LYMPHADENECTOMY, RETROPERITONEUM;  Surgeon: Lokesh Carias MD;  Location: [facility] OR 2ND FLR;  Service: OB/GYN;  Laterality: N/A;    ROBOT-ASSISTED LAPAROSCOPIC SALPINGO-OOPHORECTOMY USING DA SIRISHA XI Bilateral 7/8/2019    Procedure: XI ROBOTIC SALPINGO-OOPHORECTOMY;  Surgeon: Lokesh Carias MD;  Location: [facility] OR 2ND FLR;  Service: OB/GYN;  Laterality: Bilateral;    ROBOT-ASSISTED LAPAROSCOPIC SURGICAL REMOVAL OF KIDNEY AND URETER USING DA SIRISHA XI Right 3/14/2022    Procedure: XI ROBOTIC NEPHROURETERECTOMY/ WITH BLADDER CUFF;  Surgeon: Shane Diaz MD;  Location: [facility] OR 2ND FLR;  Service: Urology;  Laterality: Right;  4hrs    URETEROSCOPY Right 1/3/2022    Procedure: URETEROSCOPY;  Surgeon: Shane Diaz MD;  Location: [facility] OR 1ST FLR;  Service: Urology;  Laterality: Right;  2hrs       Social History     Socioeconomic History    Marital status: Other   Tobacco Use    Smoking status: Never    Smokeless tobacco: Never   Substance and Sexual Activity    Alcohol use: Never    Drug use: Never    Sexual activity: Not Currently     Social Drivers of Health     Financial Resource Strain: Low Risk  (7/1/2024)    Overall Financial Resource Strain (CARDIA)     Difficulty of Paying Living Expenses: Not hard at all   Food Insecurity: No Food Insecurity (7/1/2024)    Hunger Vital Sign     Worried About Running Out of Food in the Last Year: Never true     Ran Out of Food in the Last Year: Never true   Physical Activity: Insufficiently Active (7/1/2024)    Exercise Vital Sign     Days of Exercise per Week: 3 days     Minutes of Exercise per Session: 20 min    Stress: No Stress Concern Present (7/1/2024)    [employer] - Occupational Stress Questionnaire     Feeling of Stress : Not at all   Housing Stability: Unknown (7/1/2024)    Housing Stability Vital Sign     Unable to Pay for Housing in the Last Year: No       Family History   Problem Relation Name Age of Onset    Colon cancer Sister  53    Prostate cancer Brother      Breast cancer Sister  64    Kidney cancer Sister      Breast cancer Sister          in her 60s    Skin cancer Sister      Stroke Brother      Prostate cancer Brother      Heart disease Brother      Prostate cancer Brother      Heart disease Brother      Ovarian cancer Neg Hx      Uterine cancer Neg Hx      Anesthesia problems Neg Hx         Review of patient's allergies indicates:   Allergen Reactions    Asa [aspirin] Other (See Comments)     Stomach upset    Dilaudid (pf) [hydromorphone (pf)] Nausea Only    Sulfa (sulfonamide antibiotics) Itching       Current Outpatient Medications on File Prior to Visit   Medication Sig Dispense Refill    acetaminophen (TYLENOL) 500 MG tablet Take 500 mg by mouth every 6 (six) hours as needed for Pain.      ALPRAZolam (XANAX) 0.25 MG tablet Take 0.25 mg by mouth 2 (two) times daily as needed for Anxiety.  (Patient not taking: Reported on 9/26/2024)      bisoprolol (ZEBETA) 10 MG tablet Take 10 mg by mouth once daily.      EScitalopram oxalate (LEXAPRO) 10 MG tablet Take 10 mg by mouth once daily.      hydroCHLOROthiazide (HYDRODIURIL) 12.5 MG Tab Take 12.5 mg by mouth once daily.      mv-min/FA/vit K/lycop/lut/zeax (OCUVITE EYE PLUS MULTI ORAL) Take 1 tablet by mouth once daily.       olmesartan (BENICAR) 40 MG tablet Take 40 mg by mouth once daily.      pantoprazole (PROTONIX) 40 MG tablet Take 40 mg by mouth once daily.      raloxifene (EVISTA) 60 mg tablet Take 60 mg by mouth every evening.      rosuvastatin (CRESTOR) 5 MG tablet Take 5 mg by mouth every evening.      sucralfate  "(CARAFATE) 1 gram tablet Take 1 g by mouth 2 (two) times daily. (Patient not taking: Reported on 9/26/2024)      [DISCONTINUED] oxybutynin (DITROPAN) 5 MG Tab Take 1 tablet (5 mg total) by mouth 3 (three) times daily as needed (bladder spasm). (Patient not taking: No sig reported) 30 tablet 0    [DISCONTINUED] tamsulosin (FLOMAX) 0.4 mg Cap Take 1 capsule (0.4 mg total) by mouth once daily. (Patient not taking: No sig reported) 30 capsule 0     No current facility-administered medications on file prior to visit.       Anticoagulation:  No    Physical Exam:  Physical Exam  Constitutional:       General: She is not in acute distress.     Appearance: Normal appearance.   HENT:      Head: Normocephalic and atraumatic.      Nose: Nose normal.   Eyes:      Conjunctiva/sclera: Conjunctivae normal.   Cardiovascular:      Rate and Rhythm: Normal rate.   Pulmonary:      Effort: Pulmonary effort is normal. No respiratory distress.   Abdominal:      General: There is no distension.   Musculoskeletal:         General: No deformity.      Cervical back: Normal range of motion.   Skin:     General: Skin is warm and dry.   Neurological:      General: No focal deficit present.      Mental Status: She is alert.   Psychiatric:         Mood and Affect: Mood normal.         Behavior: Behavior normal.           Labs:    Urine dipstick today - negative for all components     Lab Results   Component Value Date    WBC 7.81 11/29/2024    HGB 11.6 (L) 11/29/2024    HCT 36.9 (L) 11/29/2024    MCV 90 11/29/2024     11/29/2024           BMP  Lab Results   Component Value Date     11/29/2024    K 3.8 11/29/2024     11/29/2024    CO2 29 11/29/2024    BUN 23 11/29/2024    CREATININE 0.9 11/29/2024    CALCIUM 9.4 11/29/2024    ANIONGAP 9 11/29/2024    EGFRNORACEVR >60.0 11/29/2024       No results found for: "PSA"        Assessment: Gita García is a 79 y.o. female with gross hematuria     Plan:     1. To OR on 12/16/24 for " cystoscopy with left retrograde pyelogram  2. Consents signed   3. I have explained the risk, benefits, and alternatives of the procedure in detail. The patient voices understanding and all questions have been answered. The patient agrees to proceed as planned.   4. Urine cytology today  5. UA today non concerning for infection, will send for culture   6. Will obtain EKG    Morales Espinosa MD

## 2024-12-10 ENCOUNTER — OFFICE VISIT (OUTPATIENT)
Dept: GYNECOLOGIC ONCOLOGY | Facility: CLINIC | Age: 79
End: 2024-12-10
Payer: MEDICARE

## 2024-12-10 ENCOUNTER — HOSPITAL ENCOUNTER (OUTPATIENT)
Dept: CARDIOLOGY | Facility: CLINIC | Age: 79
Discharge: HOME | End: 2024-12-10
Payer: MEDICARE

## 2024-12-10 VITALS
WEIGHT: 149.94 LBS | BODY MASS INDEX: 25.6 KG/M2 | HEIGHT: 64 IN | TEMPERATURE: 99 F | OXYGEN SATURATION: 99 % | DIASTOLIC BLOOD PRESSURE: 74 MMHG | SYSTOLIC BLOOD PRESSURE: 143 MMHG | HEART RATE: 73 BPM

## 2024-12-10 DIAGNOSIS — C54.1 ENDOMETRIAL CA: Primary | ICD-10-CM

## 2024-12-10 LAB
BACTERIA UR CULT: NORMAL
FINAL PATHOLOGIC DIAGNOSIS: ABNORMAL
Lab: ABNORMAL
OHS QRS DURATION: 76 MS
OHS QTC CALCULATION: 449 MS

## 2024-12-10 PROCEDURE — 99214 OFFICE O/P EST MOD 30 MIN: CPT | Mod: S$GLB,,, | Performed by: OBSTETRICS & GYNECOLOGY

## 2024-12-10 PROCEDURE — 3288F FALL RISK ASSESSMENT DOCD: CPT | Mod: CPTII,S$GLB,, | Performed by: OBSTETRICS & GYNECOLOGY

## 2024-12-10 PROCEDURE — 93010 ELECTROCARDIOGRAM REPORT: CPT | Mod: S$GLB,,, | Performed by: INTERNAL MEDICINE

## 2024-12-10 PROCEDURE — 1157F ADVNC CARE PLAN IN RCRD: CPT | Mod: CPTII,S$GLB,, | Performed by: OBSTETRICS & GYNECOLOGY

## 2024-12-10 PROCEDURE — 3078F DIAST BP <80 MM HG: CPT | Mod: CPTII,S$GLB,, | Performed by: OBSTETRICS & GYNECOLOGY

## 2024-12-10 PROCEDURE — 1126F AMNT PAIN NOTED NONE PRSNT: CPT | Mod: CPTII,S$GLB,, | Performed by: OBSTETRICS & GYNECOLOGY

## 2024-12-10 PROCEDURE — 3077F SYST BP >= 140 MM HG: CPT | Mod: CPTII,S$GLB,, | Performed by: OBSTETRICS & GYNECOLOGY

## 2024-12-10 PROCEDURE — 1101F PT FALLS ASSESS-DOCD LE1/YR: CPT | Mod: CPTII,S$GLB,, | Performed by: OBSTETRICS & GYNECOLOGY

## 2024-12-10 PROCEDURE — 99999 PR PBB SHADOW E&M-EST. PATIENT-LVL III: CPT | Mod: PBBFAC,,, | Performed by: OBSTETRICS & GYNECOLOGY

## 2024-12-10 NOTE — PROGRESS NOTES
Subjective:      Patient ID: Gita García is a 79 y.o. female.    Chief Complaint: Follow-up (6 month )      HPI  Presents today for routine surveillance visit for [facility]. Previously followed by Dr. Carias.  Diagnosis of urothelial carcinoma 2021. S/p right robotic nephroureterectomy and intraoperative administration of chemotherapeutic agent. Adjuvant Opdivo completed. In surveillance with imaging with Dr. Cancino.       Imaging 2024 without obvious evidence of disease.   Will be undergoing a procedure with Dr. Diaz in the near future.       26> 23> 17> pending  Disease free interval from endometrial cancer 5 years.      MMG with Dr. Abdul  Colonoscopy: 2016: N  Repeat in 5 years due FH colon ca in sister. 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 Negative cytology. 2 mm invasion.   Aug 2019: VBT with Dr. Cui  Dec 2019: Records received from Dr. Ruddy Simental. Completed  6 cycles of Taxol and carboplatin.     2021: : 12.6 in [place].      Aug 2021.   in May. 2 sisters . One from Covid.     Review of Systems   Constitutional:  Negative for appetite change, chills, fatigue and fever.   HENT:  Negative for mouth sores.    Respiratory:  Negative for cough and shortness of breath.    Cardiovascular:  Negative for leg swelling.   Gastrointestinal:  Negative for abdominal pain, blood in stool, constipation and diarrhea.   Endocrine: Negative for cold intolerance.   Genitourinary:  Negative for dysuria and vaginal bleeding.   Musculoskeletal:  Negative for myalgias.   Skin:  Negative for rash.   Allergic/Immunologic: Negative.    Neurological:  Negative for weakness and numbness.   Hematological:  Negative for adenopathy. Does not bruise/bleed easily.   Psychiatric/Behavioral:  Negative for confusion.      BP (!) 143/74 (Patient  "Position: Sitting)   Pulse 73   Temp 98.9 °F (37.2 °C)   Ht 5' 4" (1.626 m)   Wt 68 kg (149 lb 14.6 oz)   SpO2 99%   BMI 25.73 kg/m²     Objective:   Physical Exam:   Constitutional: She is oriented to person, place, and time. She appears well-developed and well-nourished. No distress.    HENT:   Head: Normocephalic and atraumatic.    Eyes: No scleral icterus.     Cardiovascular:       Exam reveals no cyanosis and no edema.        Pulmonary/Chest: Effort normal. No respiratory distress.        Abdominal: Soft. She exhibits no distension and no mass. There is no abdominal tenderness.     Genitourinary:    Vagina normal.      Pelvic exam was performed with patient supine.   There is no lesion on the right labia. There is no lesion on the left labia. Right adnexum displays no mass. Left adnexum displays no mass. No vaginal discharge or bleeding in the vagina. Cervix is absent.Uterus is absent.           Musculoskeletal: Normal range of motion and moves all extremeties. No edema.      Lymphadenopathy:     She has no cervical adenopathy. No inguinal adenopathy noted on the right or left side.        Right: No supraclavicular adenopathy present.        Left: No supraclavicular adenopathy present.    Neurological: She is alert and oriented to person, place, and time.    Skin: Skin is warm and dry. No rash noted. No cyanosis. No pallor.    Psychiatric: She has a normal mood and affect.       Assessment:     1. Endometrial ca            Plan:     Orders Placed This Encounter   Procedures         No evidence of disease on today's exam  Feels well. No new symptoms.   Disease free interval 5 years.     Has completed 5 years of gynecologic oncologic surveillance. She desires to transfer ongoing annual surveillance/well woman care locally.     I spent approximately 30 minutes reviewing the available records and evaluating the patient, out of which over 50% of the time was spent face to face with the patient in " counseling and coordinating this patient's care.

## 2024-12-13 ENCOUNTER — TELEPHONE (OUTPATIENT)
Dept: UROLOGY | Facility: CLINIC | Age: 79
End: 2024-12-13
Payer: MEDICARE

## 2024-12-23 ENCOUNTER — TELEPHONE (OUTPATIENT)
Dept: UROLOGY | Facility: CLINIC | Age: 79
End: 2024-12-23
Payer: MEDICARE

## 2024-12-23 DIAGNOSIS — Z08 ENCOUNTER FOR FOLLOW-UP SURVEILLANCE OF UROTHELIAL CARCINOMA OF UPPER URINARY TRACT: Primary | ICD-10-CM

## 2024-12-23 DIAGNOSIS — Z85.50 ENCOUNTER FOR FOLLOW-UP SURVEILLANCE OF UROTHELIAL CARCINOMA OF UPPER URINARY TRACT: Primary | ICD-10-CM

## 2024-12-26 NOTE — ANESTHESIA PAT ROS NOTE
12/26/2024  Gita García is a 79 y.o., female.      Pre-op Assessment          Review of Systems  Anesthesia Hx:  No problems with previous Anesthesia   History of prior surgery of interest to airway management or planning:  Previous anesthesia: MAC       05/11/2023 EGD ([facility]) with MAC.   Denies Family Hx of Anesthesia complications.    Denies Personal Hx of Anesthesia complications.                    Social:  Non-Smoker, Social Alcohol Use       Hematology/Oncology:  Hematology Normal                       --  Cancer in past history:              chemotherapy   Oncology Comments: Hx of Endometrial CA   Hx of Urothelial CA of R Kidney     EENT/Dental:  EENT/Dental Normal           Cardiovascular:        Denies MI.        Denies Angina.     hyperlipidemia       Functional Capacity 3.5 METS                   Hypertension , Well Controlled on Rx , Recent typical home B/P of 120s/70s       Pulmonary:       Denies Shortness of breath.   Denies Sleep Apnea.                Renal/:  Chronic Renal Disease renal calculi  Hx of right kidney cancer             Hepatic/GI:     GERD Liver Disease, (hx of liver mass)               Musculoskeletal:  Arthritis               Neurological:   CVA (12/2023), no residual symptoms    Seizures (hx of post partl several days after delivery)          Peripheral Neuropathy (hx of neuropathy due to chemo drugs)                          Endocrine:     Hx of hormone deficiency   Hx of Estrogen deficient         Dermatological:  Skin Normal    Psych:  Psychiatric Normal                         Anesthesia Assessment: Preoperative EQUATION    Planned Procedure: Procedure(s) (LRB):  CYSTOSCOPY, WITH RETROGRADE PYELOGRAM (Left)  URETEROSCOPY (Left)  CYSTOURETEROSCOPY, WITH HOLMIUM LASER LITHOTRIPSY OF URETERAL CALCULUS AND STENT INSERTION (Left)  Requested Anesthesia  Type:General  Surgeon: Shane Diaz MD  Service: Urology  Known or anticipated Date of Surgery:1/6/2025    Surgeon notes: reviewed    Electronic QUestionnaire Assessment completed via nurse interview with patient.        Triage considerations:     The patient has no apparent active cardiac condition (No unstable coronary Syndrome such as severe unstable angina or recent [<1 month] myocardial infarction, decompensated CHF, severe valvular   disease or significant arrhythmia)    Previous anesthesia records:MAC and No problems   05/11/2023 EGD ([facility])    Airway   TM distance: >3 FB  Neck ROM: full  Mallampati: II     Last PCP note: within 1 month , outside [facility]   Subspecialty notes: Gastroenterology, Gyn/ONC, Hematology/Oncology, Neurology, Radiology/ONC, Urology    Other important co-morbidities: GERD, HLD, HTN, and Endometrial CA, Neuropathy due to chemo, Hx of Seizure, Chemo induced N/V, Hx Liver Mass , Hormone deficiency, Osteopenia, Hx CVA, Hx of Right Kidney CA     Tests already available:  Available tests,  within 3 months , within [facility] .   11/29/2024 CMP, CBC Oncology  12/19/2024 BMP (OS scanned to Media 12/23/24)              Instructions given. (See in Nurse's note)    Optimization:  Anesthesia Preop Clinic Assessment  Indicated not required    Medical Opinion Indicated           Plan:    Testing:  None Needed     Consultation:Patient's PCP for a statement of optimization      Patient  has previously scheduled Medical Appointment: none    Navigation:           Consults scheduled.             Results will be tracked by Preop Clinic.     12/26/2024  Clearance from PCP Dr. Natarajan noted ( Media 12/24/2024 )

## 2024-12-27 ENCOUNTER — TELEPHONE (OUTPATIENT)
Dept: UROLOGY | Facility: CLINIC | Age: 79
End: 2024-12-27
Payer: MEDICARE

## 2025-01-03 ENCOUNTER — TELEPHONE (OUTPATIENT)
Dept: UROLOGY | Facility: CLINIC | Age: 80
End: 2025-01-03
Payer: MEDICARE

## 2025-01-06 ENCOUNTER — ANESTHESIA (OUTPATIENT)
Dept: SURGERY | Facility: HOSPITAL | Age: 80
End: 2025-01-06
Payer: MEDICARE

## 2025-01-06 ENCOUNTER — HOSPITAL ENCOUNTER (OUTPATIENT)
Facility: HOSPITAL | Age: 80
Discharge: HOME | End: 2025-01-06
Attending: UROLOGY | Admitting: UROLOGY
Payer: MEDICARE

## 2025-01-06 ENCOUNTER — TELEPHONE (OUTPATIENT)
Dept: UROLOGY | Facility: CLINIC | Age: 80
End: 2025-01-06
Payer: MEDICARE

## 2025-01-06 ENCOUNTER — ANESTHESIA EVENT (OUTPATIENT)
Dept: SURGERY | Facility: HOSPITAL | Age: 80
End: 2025-01-06
Payer: MEDICARE

## 2025-01-06 ENCOUNTER — PATIENT MESSAGE (OUTPATIENT)
Dept: UROLOGY | Facility: CLINIC | Age: 80
End: 2025-01-06
Payer: MEDICARE

## 2025-01-06 VITALS
WEIGHT: 164 LBS | TEMPERATURE: 98 F | RESPIRATION RATE: 20 BRPM | HEIGHT: 64 IN | SYSTOLIC BLOOD PRESSURE: 141 MMHG | BODY MASS INDEX: 28 KG/M2 | HEART RATE: 61 BPM | DIASTOLIC BLOOD PRESSURE: 59 MMHG | OXYGEN SATURATION: 97 %

## 2025-01-06 DIAGNOSIS — C68.9 UROTHELIAL CARCINOMA: Primary | ICD-10-CM

## 2025-01-06 PROCEDURE — D9220A PRA ANESTHESIA: Mod: CRNA,,, | Performed by: NURSE ANESTHETIST, CERTIFIED REGISTERED

## 2025-01-06 PROCEDURE — 52351 CYSTOURETERO & OR PYELOSCOPE: CPT | Mod: ,,, | Performed by: UROLOGY

## 2025-01-06 PROCEDURE — 36000708 HC OR TIME LEV III 1ST 15 MIN: Performed by: UROLOGY

## 2025-01-06 PROCEDURE — 25000003 PHARM REV CODE 250: Performed by: UROLOGY

## 2025-01-06 PROCEDURE — 27201423 OPTIME MED/SURG SUP & DEVICES STERILE SUPPLY: Performed by: UROLOGY

## 2025-01-06 PROCEDURE — C1769 GUIDE WIRE: HCPCS | Performed by: UROLOGY

## 2025-01-06 PROCEDURE — 71000044 HC DOSC ROUTINE RECOVERY FIRST HOUR: Performed by: UROLOGY

## 2025-01-06 PROCEDURE — 74420 UROGRAPHY RTRGR +-KUB: CPT | Mod: 26,,, | Performed by: UROLOGY

## 2025-01-06 PROCEDURE — 63600175 PHARM REV CODE 636 W HCPCS: Performed by: STUDENT IN AN ORGANIZED HEALTH CARE EDUCATION/TRAINING PROGRAM

## 2025-01-06 PROCEDURE — 37000008 HC ANESTHESIA 1ST 15 MINUTES: Performed by: UROLOGY

## 2025-01-06 PROCEDURE — 36000709 HC OR TIME LEV III EA ADD 15 MIN: Performed by: UROLOGY

## 2025-01-06 PROCEDURE — 37000009 HC ANESTHESIA EA ADD 15 MINS: Performed by: UROLOGY

## 2025-01-06 PROCEDURE — 63600175 PHARM REV CODE 636 W HCPCS: Performed by: ANESTHESIOLOGY

## 2025-01-06 PROCEDURE — 52234 CYSTOSCOPY AND TREATMENT: CPT | Mod: 51,,, | Performed by: UROLOGY

## 2025-01-06 PROCEDURE — D9220A PRA ANESTHESIA: Mod: ANES,,, | Performed by: ANESTHESIOLOGY

## 2025-01-06 PROCEDURE — 88307 TISSUE EXAM BY PATHOLOGIST: CPT | Performed by: PATHOLOGY

## 2025-01-06 PROCEDURE — 71000015 HC POSTOP RECOV 1ST HR: Performed by: UROLOGY

## 2025-01-06 PROCEDURE — C1758 CATHETER, URETERAL: HCPCS | Performed by: UROLOGY

## 2025-01-06 PROCEDURE — 25000003 PHARM REV CODE 250: Performed by: NURSE ANESTHETIST, CERTIFIED REGISTERED

## 2025-01-06 PROCEDURE — 63600175 PHARM REV CODE 636 W HCPCS: Performed by: NURSE ANESTHETIST, CERTIFIED REGISTERED

## 2025-01-06 RX ORDER — CEFAZOLIN 2 G/1
2 INJECTION, POWDER, FOR SOLUTION INTRAMUSCULAR; INTRAVENOUS ONCE
Status: COMPLETED | OUTPATIENT
Start: 2025-01-06 | End: 2025-01-06

## 2025-01-06 RX ORDER — EPHEDRINE SULFATE 50 MG/ML
INJECTION, SOLUTION INTRAVENOUS
Status: DISCONTINUED | OUTPATIENT
Start: 2025-01-06 | End: 2025-01-06

## 2025-01-06 RX ORDER — ROCURONIUM BROMIDE 10 MG/ML
INJECTION, SOLUTION INTRAVENOUS
Status: DISCONTINUED | OUTPATIENT
Start: 2025-01-06 | End: 2025-01-06

## 2025-01-06 RX ORDER — FENTANYL CITRATE 50 UG/ML
INJECTION, SOLUTION INTRAMUSCULAR; INTRAVENOUS
Status: DISCONTINUED | OUTPATIENT
Start: 2025-01-06 | End: 2025-01-06

## 2025-01-06 RX ORDER — OXYBUTYNIN CHLORIDE 5 MG/1
5 TABLET ORAL 3 TIMES DAILY PRN
Qty: 30 TABLET | Refills: 0 | Status: SHIPPED | OUTPATIENT
Start: 2025-01-06 | End: 2025-01-06

## 2025-01-06 RX ORDER — ONDANSETRON HYDROCHLORIDE 2 MG/ML
INJECTION, SOLUTION INTRAVENOUS
Status: DISCONTINUED | OUTPATIENT
Start: 2025-01-06 | End: 2025-01-06

## 2025-01-06 RX ORDER — DEXAMETHASONE SODIUM PHOSPHATE 4 MG/ML
INJECTION, SOLUTION INTRA-ARTICULAR; INTRALESIONAL; INTRAMUSCULAR; INTRAVENOUS; SOFT TISSUE
Status: DISCONTINUED | OUTPATIENT
Start: 2025-01-06 | End: 2025-01-06

## 2025-01-06 RX ORDER — SODIUM CHLORIDE 9 MG/ML
INJECTION, SOLUTION INTRAVENOUS CONTINUOUS
Status: DISCONTINUED | OUTPATIENT
Start: 2025-01-06 | End: 2025-01-06 | Stop reason: HOSPADM

## 2025-01-06 RX ORDER — LIDOCAINE HYDROCHLORIDE 20 MG/ML
INJECTION, SOLUTION EPIDURAL; INFILTRATION; INTRACAUDAL; PERINEURAL
Status: DISCONTINUED | OUTPATIENT
Start: 2025-01-06 | End: 2025-01-06

## 2025-01-06 RX ORDER — FENTANYL CITRATE 50 UG/ML
25 INJECTION, SOLUTION INTRAMUSCULAR; INTRAVENOUS EVERY 5 MIN PRN
Status: DISCONTINUED | OUTPATIENT
Start: 2025-01-06 | End: 2025-01-06 | Stop reason: HOSPADM

## 2025-01-06 RX ORDER — LIDOCAINE HYDROCHLORIDE 10 MG/ML
1 INJECTION, SOLUTION EPIDURAL; INFILTRATION; INTRACAUDAL; PERINEURAL ONCE AS NEEDED
Status: DISCONTINUED | OUTPATIENT
Start: 2025-01-06 | End: 2025-01-06 | Stop reason: HOSPADM

## 2025-01-06 RX ORDER — OXYBUTYNIN CHLORIDE 5 MG/1
5 TABLET ORAL 3 TIMES DAILY PRN
Qty: 30 TABLET | Refills: 0 | Status: SHIPPED | OUTPATIENT
Start: 2025-01-06 | End: 2026-01-06

## 2025-01-06 RX ORDER — LIDOCAINE HYDROCHLORIDE 20 MG/ML
JELLY TOPICAL
Status: DISCONTINUED | OUTPATIENT
Start: 2025-01-06 | End: 2025-01-06 | Stop reason: HOSPADM

## 2025-01-06 RX ORDER — PROPOFOL 10 MG/ML
VIAL (ML) INTRAVENOUS
Status: DISCONTINUED | OUTPATIENT
Start: 2025-01-06 | End: 2025-01-06

## 2025-01-06 RX ORDER — GLUCAGON 1 MG
1 KIT INJECTION
Status: DISCONTINUED | OUTPATIENT
Start: 2025-01-06 | End: 2025-01-06 | Stop reason: HOSPADM

## 2025-01-06 RX ORDER — MEPERIDINE HYDROCHLORIDE 50 MG/ML
12.5 INJECTION INTRAMUSCULAR; INTRAVENOUS; SUBCUTANEOUS ONCE AS NEEDED
Status: DISCONTINUED | OUTPATIENT
Start: 2025-01-06 | End: 2025-01-06 | Stop reason: HOSPADM

## 2025-01-06 RX ADMIN — LIDOCAINE HYDROCHLORIDE 50 MG: 20 INJECTION, SOLUTION EPIDURAL; INFILTRATION; INTRACAUDAL; PERINEURAL at 08:01

## 2025-01-06 RX ADMIN — DEXAMETHASONE SODIUM PHOSPHATE 8 MG: 4 INJECTION, SOLUTION INTRAMUSCULAR; INTRAVENOUS at 08:01

## 2025-01-06 RX ADMIN — FENTANYL CITRATE 25 MCG: 50 INJECTION, SOLUTION INTRAMUSCULAR; INTRAVENOUS at 09:01

## 2025-01-06 RX ADMIN — SUGAMMADEX 200 MG: 100 INJECTION, SOLUTION INTRAVENOUS at 09:01

## 2025-01-06 RX ADMIN — ROCURONIUM BROMIDE 50 MG: 10 INJECTION, SOLUTION INTRAVENOUS at 08:01

## 2025-01-06 RX ADMIN — EPHEDRINE SULFATE 10 MG: 50 INJECTION INTRAVENOUS at 08:01

## 2025-01-06 RX ADMIN — ONDANSETRON 4 MG: 2 INJECTION INTRAMUSCULAR; INTRAVENOUS at 08:01

## 2025-01-06 RX ADMIN — FENTANYL CITRATE 100 MCG: 50 INJECTION, SOLUTION INTRAMUSCULAR; INTRAVENOUS at 08:01

## 2025-01-06 RX ADMIN — PROPOFOL 80 MG: 10 INJECTION, EMULSION INTRAVENOUS at 08:01

## 2025-01-06 RX ADMIN — CEFAZOLIN 2 G: 2 INJECTION, POWDER, FOR SOLUTION INTRAMUSCULAR; INTRAVENOUS at 08:01

## 2025-01-06 NOTE — OP NOTE
[facility] - Surgery (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 female with a bladder tumor. She has been counseled about the risks, benefits, and alternatives and presents today for surgical resection.      Findings:   2 cm bladder tumor, low grade in appearance, 12 o clock near bladder neck  Negative ureteroscopy, no evidence of upper tract disease    Estimated Blood Loss: min    Drains: None    Procedure in detail:  After the risks, benefits and possible complications of the procedure were explained, consents were obtained. The patient was taken to the operating room and placed under anesthesia. Pre-operative antibiotics were administered 30 minutes prior to expected start time. The patient was placed in the dorsal lithotomy position and prepped and draped in the normal and sterile fashion. Time out was performed.      The cystoscope was passed into the bladder without issue. There was a small urethral caruncle present. The bladder was surveyed systematically which revealed a small, papillary tumor at the 12 o clock position near the bladder neck.     Our attention was turned to the left ureteral orifice. A 5 Fr open ended catheter was passed into the left ureteral orifice  without issue. Contrast was instilled under live fluoroscopy. This revealed normal course and caliber of the ureter, and delicate calyces. There were no filling defects or strictures noted. The ureteral catheter was exchanged for a wire and the rigid scope was passed into the ureter. There was no evidence of ureteral tumors. The flexible scope was passed over a wire into the renal pelvis and the kidney was surveyed. There was no evidence of any disease in the left upper tract. The ureteroscope and wires were removed.     The resectoscope was assembled with the visual obturator. This was placed into the bladder via the urethra and the visual obturator was exchanged for the resecting mechanism. The tumor was then resected, in its entirety.  There was muscle evident in the bed of the resection. Specimens were then removed and passed off the field for pathologic analysis.     The bladder was drained and hemostasis was achieved.  The resectoscope was removed.     The patient tolerated the procedure well and was transferred to recovery in stable condition.    Disposition:  She will be discharged home after a voiding trial and will follow up for pathology results.     Leif Go MD

## 2025-01-06 NOTE — INTERVAL H&P NOTE
The patient has been examined and the H&P has been reviewed:    I concur with the findings and no changes have occurred since H&P was written.    Surgery risks, benefits and alternative options discussed and understood by patient/family.    Urine negative for all tested components.    Patient was assessed preoperatively, found to be appropriate in behavior. The risks, benefits, and alternatives to procedures were discussed, and questions were answered. Plan to proceed with described procedure.    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

## 2025-01-06 NOTE — ANESTHESIA POSTPROCEDURE EVALUATION
Anesthesia Post Evaluation    Patient: Gita García    Procedure(s) Performed: Procedure(s) (LRB):  URETEROSCOPY (Left)  CYSTOSCOPY (N/A)  PYELOGRAM, RETROGRADE (Left)  NEPHROSCOPY (Left)  TURBT (TRANSURETHRAL RESECTION OF BLADDER TUMOR) (N/A)    Final Anesthesia Type: general      Patient location during evaluation: PACU  Patient participation: Yes- Able to Participate  Level of consciousness: awake and alert  Post-procedure vital signs: reviewed and stable  Pain management: adequate  Airway patency: patent    PONV status at discharge: No PONV  Anesthetic complications: no      Cardiovascular status: blood pressure returned to baseline  Respiratory status: spontaneous ventilation and room air  Hydration status: euvolemic  Follow-up not needed.              Vitals Value Taken Time   /59 01/06/25 1016   Temp 36.6 °C (97.9 °F) 01/06/25 0917   Pulse 61 01/06/25 1030   Resp 20 01/06/25 1030   SpO2 97 % 01/06/25 1030         No case tracking events are documented in the log.      Pain/Salma Score: Pain Rating Prior to Med Admin: 6 (1/6/2025  9:55 AM)  Pain Rating Post Med Admin: 2 (1/6/2025 10:20 AM)  Salma Score: 10 (1/6/2025 10:00 AM)

## 2025-01-06 NOTE — ANESTHESIA PROCEDURE NOTES
Intubation    Date/Time: 1/6/2025 8:30 AM    Performed by: Andrea Crowley CRNA  Authorized by: Neal Villanueva Jr., MD    Intubation:     Induction:  Intravenous    Intubated:  Postinduction    Mask Ventilation:  Easy mask    Attempts:  1    Attempted By:  CRNA    Method of Intubation:  Direct    Blade:  [place] 2    Laryngeal View Grade: Grade I - full view of cords      Difficult Airway Encountered?: No      Complications:  None    Airway Device:  Oral endotracheal tube    Airway Device Size:  7.0    Style/Cuff Inflation:  Cuffed (inflated to minimal occlusive pressure)    Tube secured:  18    Secured at:  The teeth    Placement Verified By:  Capnometry    Complicating Factors:  None    Findings Post-Intubation:  BS equal bilateral and atraumatic/condition of teeth unchanged

## 2025-01-06 NOTE — TELEPHONE ENCOUNTER
----- Message from WANDA Alvarez sent at 1/6/2025  1:14 PM CST -----  Regarding: FW: Pt called states below Rx was sent to incorrect Phar needs changed to Below [facility]    ----- Message -----  From: Amira Bush  Sent: 1/6/2025   1:05 PM CST  To: Joe COTTON Staff  Subject: Pt called states below Rx was sent to incorr#      Can the clinic reply in [facility]:        Please refill the medication(s) listed below. Pt called states below Rx was sent to incorrect Ph[facility] needs changed to [facility]. Please advise      Medication #1 oxybutynin (DITROPAN) 5 MG Tab      Medication #2          Preferred Pharmacy:   [address] [address]  Phone: [phone] Fax: [phone]

## 2025-01-06 NOTE — PLAN OF CARE
Patient arrived ambulating onto the unit  Patient appears to be in no immediate distress. Patient prepared for surgery. Perioperative period discussed and all questions addressed. Family at the bedside, call bell within reach, and bed in lowest position.

## 2025-01-06 NOTE — DISCHARGE INSTRUCTIONS
Post Ureteroscopy Instructions  Do not strain to have a bowel movement  No strenuous exercise x 7 days  No driving while you are on narcotic pain medications or if you have a Jama catheter    You can expect:  To pass stone fragments if you had a stone procedure  Have pain when you void from your stent if you have a stent in place  See blood in your urine if you have a stent in place    If you have a catheter, please return to the ER if your catheter stops draining or you are having abdominal pain.    Call the doctor if:  Temperature is greater than 101F  Persistent vomiting and inability to keep food down  Inability to void if you do not have a catheter    If you have any questions or concerns during normal business hours, please call the urology clinic of your surgery.  If you are having an emergency after 5 pm or weekends, you may call [phone] and ask them to page the urology resident on call.

## 2025-01-06 NOTE — TRANSFER OF CARE
"Anesthesia Transfer of Care Note    Patient: Gita Gracía    Procedure(s) Performed: Procedure(s) (LRB):  URETEROSCOPY (Left)  CYSTOSCOPY (N/A)  PYELOGRAM, RETROGRADE (Left)  NEPHROSCOPY (Left)  TURBT (TRANSURETHRAL RESECTION OF BLADDER TUMOR) (N/A)    Patient location: PACU    Anesthesia Type: general    Transport from OR: Transported from OR on room air with adequate spontaneous ventilation    Post pain: adequate analgesia    Post assessment: no apparent anesthetic complications    Post vital signs: stable    Level of consciousness: awake    Complications: none    Transfer of care protocol was followed      Last vitals: Visit Vitals  BP (!) 154/76   Pulse (!) 54   Temp 36.6 °C (97.9 °F)   Ht 5' 4" (1.626 m)   Wt 74.4 kg (164 lb)   Breastfeeding No   BMI 28.15 kg/m²     "

## 2025-01-06 NOTE — BRIEF OP NOTE
Anibal Rojo - Surgery ([facility])  Brief Operative Note    Surgery Date: 1/6/2025     Surgeons and Role:     * Shane Diaz MD - Primary     * Leif Go MD - Resident - Assisting     * Hiro Bhatia MD - Resident - Assisting        Pre-op Diagnosis:  Encounter for follow-up surveillance of urothelial carcinoma of upper urinary tract [Z08, Z85.50]    Post-op Diagnosis:  Post-Op Diagnosis Codes:     * Encounter for follow-up surveillance of urothelial carcinoma of upper urinary tract [Z08, Z85.50]    Procedure(s) (LRB):  URETEROSCOPY (Left)  CYSTOSCOPY (N/A)  PYELOGRAM, RETROGRADE (Left)  NEPHROSCOPY (Left)  TURBT (TRANSURETHRAL RESECTION OF BLADDER TUMOR) (N/A)    Anesthesia: General    Operative Findings:   Negative ureteroscopy, upper tract without evidence of disease  Small, low grade appearing bladder tumor at 12 o clock near bladder neck    Estimated Blood Loss: * No values recorded between 1/6/2025  8:36 AM and 1/6/2025  9:06 AM *         Specimens:   Specimen (24h ago, onward)       Start     Ordered    01/06/25 0906  Specimen to Pathology, Surgery Urology  Once        Comments: Pre-op Diagnosis: Encounter for follow-up surveillance of urothelial carcinoma of upper urinary tract [Z08, Z85.50]Procedure(s):URETEROSCOPYCYSTOSCOPYPYELOGRAM, RETROGRADENEPHROSCOPYTURBT (TRANSURETHRAL RESECTION OF BLADDER TUMOR) Number of specimens: 1Name of specimens: Bladder Tumor     References:    Click here for ordering Quick Tip   Question Answer Comment   Procedure Type: Urology    Release to patient Immediate        01/06/25 0906                      Discharge Note    OUTCOME: Patient tolerated treatment/procedure well without complication and is now ready for discharge.    DISPOSITION: Home or Self Care    FINAL DIAGNOSIS:  Urothelial carcinoma of kidney, right    FOLLOWUP: In clinic    DISCHARGE INSTRUCTIONS:    Discharge Procedure Orders   No driving until:   Order Comments: No driving while taking opioid pain  medications.     Notify your health care provider if you experience any of the following:  temperature >100.4     Notify your health care provider if you experience any of the following:  persistent nausea and vomiting or diarrhea     Notify your health care provider if you experience any of the following:  severe uncontrolled pain     Notify your health care provider if you experience any of the following:  difficulty breathing or increased cough     Notify your health care provider if you experience any of the following:  severe persistent headache     Notify your health care provider if you experience any of the following:  persistent dizziness, light-headedness, or visual disturbances     Notify your health care provider if you experience any of the following:  increased confusion or weakness     Activity as tolerated

## 2025-01-06 NOTE — PLAN OF CARE
Discharge instructions given and explained to patient and family. Patient and family verbalized understanding with no further questions. VS WDL. Pt voided 100cc. Peripheral IV D/C'd with catheter tip intact. Patient is discharging home with family via wheelchair.

## 2025-01-08 LAB
COMMENT: NORMAL
FINAL PATHOLOGIC DIAGNOSIS: NORMAL
GROSS: NORMAL
Lab: NORMAL

## 2025-01-22 ENCOUNTER — TELEPHONE (OUTPATIENT)
Dept: UROLOGY | Facility: CLINIC | Age: 80
End: 2025-01-22
Payer: MEDICARE

## 2025-01-23 ENCOUNTER — OFFICE VISIT (OUTPATIENT)
Dept: UROLOGY | Facility: CLINIC | Age: 80
End: 2025-01-23
Payer: MEDICARE

## 2025-01-23 ENCOUNTER — TELEPHONE (OUTPATIENT)
Dept: UROLOGY | Facility: CLINIC | Age: 80
End: 2025-01-23

## 2025-01-23 DIAGNOSIS — C64.1 UROTHELIAL CARCINOMA OF KIDNEY, RIGHT: Primary | ICD-10-CM

## 2025-01-23 PROCEDURE — 99499 UNLISTED E&M SERVICE: CPT | Mod: 95,,, | Performed by: UROLOGY

## 2025-01-23 NOTE — PROGRESS NOTES
Clinic Note  1/23/2025      Subjective:         Chief Complaint:   HPI  Giat García is a 79 y.o. female with a history of endometrial carcinoma. Recently had hematuria. CT scan 10/18/2021 showed right renal pelvis filling defect.  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 Not a complete ablatation per notes.   treated for bladder ca by Dr. Diaz [id number].      6 rounds of chemo, 5 rounds of radiation Oct 2019-Jan 2020.     Denies hematuria. Some left abd pain. Urethral caruncle.     Sister had kidney cancer 25 years ago. Nephrectomy.  Scoliosis. Hysterectomy, no other surgery.  Extensive review and interpretation of imaging and laboratory studies. Explained findings to patient and the impact on treatment plan.    Had ureteroscopy 1/3 2022 with ablation of right upper tract tumor. Cytology from barbotage specimen HG.        3/22/2022- Robotic nephroureterectomy with bladder cuff + intravesical mitomycin.  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 [id number].  Cystogram shows no leak.     4/5/2022- walking more daily. Appetite good. She can resume driving. No lifting over 15 pounds for 3 more weeks.  Has used no opioids postop.    1/23/2025- TURBT (transurethral resection of bladder tumor) on 1/6/2025. Path- HGTa, no detrusor present.  MRIU-11/29/2024- no recurrence or metastasis.  CT chest-11/29/2024-no metastasis.  Discussed path report, re-resection, BCG.    Past Medical History:   Diagnosis Date    Acid reflux     Arthritis     Chemotherapy induced nausea and vomiting 8/9/2019    Endometrial ca 6/26/2019    Essential hypertension 6/27/2019    Estrogen deficiency     Hormone deficiency     Hypertension     Liver mass 7/1/2019    Neuropathy due to chemotherapeutic drug 2/21/2020    Osteopenia     Seizures     post partal. several days after delivery    Urothelial carcinoma of  kidney, right 11/11/2021     Family History   Problem Relation Name Age of Onset    Colon cancer Sister  53    Prostate cancer Brother      Breast cancer Sister  64    Kidney cancer Sister      Breast cancer Sister          in her 60s    Skin cancer Sister      Stroke Brother      Prostate cancer Brother      Heart disease Brother      Prostate cancer Brother      Heart disease Brother      Ovarian cancer Neg Hx      Uterine cancer Neg Hx      Anesthesia problems Neg Hx       Social History     Socioeconomic History    Marital status: Other   Tobacco Use    Smoking status: Never    Smokeless tobacco: Never   Substance and Sexual Activity    Alcohol use: Never    Drug use: Never    Sexual activity: Not Currently     Social Drivers of Health     Financial Resource Strain: Low Risk  (7/1/2024)    Overall Financial Resource Strain (CARDIA)     Difficulty of Paying Living Expenses: Not hard at all   Food Insecurity: No Food Insecurity (7/1/2024)    Hunger Vital Sign     Worried About Running Out of Food in the Last Year: Never true     Ran Out of Food in the Last Year: Never true   Physical Activity: Insufficiently Active (7/1/2024)    Exercise Vital Sign     Days of Exercise per Week: 3 days     Minutes of Exercise per Session: 20 min   Stress: No Stress Concern Present (7/1/2024)    [employer] - Occupational Stress Questionnaire     Feeling of Stress : Not at all   Housing Stability: Unknown (7/1/2024)    Housing Stability Vital Sign     Unable to Pay for Housing in the Last Year: No     Past Surgical History:   Procedure Laterality Date    ABLATION OF NEOPLASM OF URETER USING LASER Right 1/3/2022    Procedure: ABLATION, NEOPLASM, URETER, USING LASER;  Surgeon: Shane Diaz MD;  Location: [address];  Service: Urology;  Laterality: Right;    CYSTOSCOPY N/A 1/3/2022    Procedure: CYSTOSCOPY;  Surgeon: Shane Diaz MD;  Location: [facility] OR [address] [place] [address];  Service: Urology;   Laterality: N/A;    CYSTOSCOPY N/A 1/6/2025    Procedure: CYSTOSCOPY;  Surgeon: Shane Diaz MD;  Location: [facility] OR 1ST FLR;  Service: Urology;  Laterality: N/A;    DILATION AND CURETTAGE OF UTERUS      GI scope  2016    LAPAROTOMY N/A 7/8/2019    Procedure: LAPAROTOMY;  Surgeon: Lokesh Carias MD;  Location: [facility] OR 2ND FLR;  Service: OB/GYN;  Laterality: N/A;  mini    NEPHROSCOPY Left 1/6/2025    Procedure: NEPHROSCOPY;  Surgeon: Shane Diaz MD;  Location: [facility] OR 1ST FLR;  Service: Urology;  Laterality: Left;    OMENTECTOMY N/A 7/8/2019    Procedure: OMENTECTOMY;  Surgeon: Lokesh Carias MD;  Location: [facility] OR 2ND FLR;  Service: OB/GYN;  Laterality: N/A;    RETROGRADE PYELOGRAPHY Left 1/6/2025    Procedure: PYELOGRAM, RETROGRADE;  Surgeon: Shane Diaz MD;  Location: [facility] OR 1ST FLR;  Service: Urology;  Laterality: Left;    ROBOT-ASSISTED LAPAROSCOPIC ABDOMINAL HYSTERECTOMY USING DA SIRISHA XI N/A 7/8/2019    Procedure: XI ROBOTIC HYSTERECTOMY;  Surgeon: Lokesh Carias MD;  [facility] 2ND FLR;  Service: OB/GYN;  Laterality: N/A;    ROBOT-ASSISTED LAPAROSCOPIC PELVIC LYMPHADENECTOMY USING DA SIRISHA XI Bilateral 7/8/2019    Procedure: XI ROBOTIC LYMPHADENECTOMY, PELVIC;  Surgeon: Lokesh Carias MD;  [facility]: [facility] 2ND FLR;  Service: OB/GYN;  Laterality: Bilateral;    ROBOT-ASSISTED LAPAROSCOPIC RETROPERITONEAL LYMPHADENECTOMY USING DA SIRISHA XI N/A 7/8/2019    Procedure: XI ROBOTIC LYMPHADENECTOMY, RETROPERITONEUM;  Surgeon: Lokesh Carias MD;  [facility]: [facility] 2ND FLR;  Service: OB/GYN;  Laterality: N/A;    ROBOT-ASSISTED LAPAROSCOPIC SALPINGO-OOPHORECTOMY USING DA SIRISHA XI Bilateral 7/8/2019    Procedure: XI ROBOTIC SALPINGO-OOPHORECTOMY;  Surgeon: Lokesh Carias MD;  Location: [facility] OR 2ND FLR;  Service: OB/GYN;  Laterality: Bilateral;    ROBOT-ASSISTED LAPAROSCOPIC SURGICAL REMOVAL OF KIDNEY AND URETER USING DA SIRISHA XI Right 3/14/2022    Procedure: XI ROBOTIC  "NEPHROURETERECTOMY/ WITH BLADDER CUFF;  Surgeon: Shane Diaz MD;  Location: [facility] OR 2ND FLR;  Service: Urology;  Laterality: Right;  4hrs    TURBT (TRANSURETHRAL RESECTION OF BLADDER TUMOR) N/A 1/6/2025    Procedure: TURBT (TRANSURETHRAL RESECTION OF BLADDER TUMOR);  Surgeon: Shane Diaz MD;  Location: [facility] OR 1ST FLR;  Service: Urology;  Laterality: N/A;    URETEROSCOPY Right 1/3/2022    Procedure: URETEROSCOPY;  Surgeon: Shane Diaz MD;  Location: [facility] OR 1ST FLR;  Service: Urology;  Laterality: Right;  2hrs    URETEROSCOPY Left 1/6/2025    Procedure: URETEROSCOPY;  Surgeon: Shane Diaz MD;  Location: [facility] OR 1ST FLR;  Service: Urology;  Laterality: Left;     Patient Active Problem List   Diagnosis    Endometrial ca    Essential hypertension    Liver hemangioma    s/p RA-TLH/BSO/BPLD, omentectomy    Neuropathy due to chemotherapeutic drug    Urothelial carcinoma of kidney, right     Review of Systems      Objective:      There were no vitals taken for this visit.  Estimated body mass index is 28.15 kg/m² as calculated from the following:    Height as of 1/6/25: 5' 4" (1.626 m).    Weight as of 1/6/25: 74.4 kg (164 lb).  Physical Exam      Assessment and Plan:     Schedule TURBT (transurethral resection of bladder tumor).      Problem List Items Addressed This Visit       Urothelial carcinoma of kidney, right - Primary       Follow up:       Shane Diaz        "

## 2025-01-23 NOTE — Clinical Note
I will fill out sheet for TURBT (transurethral resection of bladder tumor) later this morning. Please call them to find a date.

## 2025-01-24 ENCOUNTER — PATIENT MESSAGE (OUTPATIENT)
Dept: UROLOGY | Facility: CLINIC | Age: 80
End: 2025-01-24
Payer: MEDICARE

## 2025-01-24 DIAGNOSIS — C67.9 MALIGNANT NEOPLASM OF URINARY BLADDER, UNSPECIFIED SITE: Primary | ICD-10-CM

## 2025-01-27 RX ORDER — POTASSIUM CHLORIDE 20 MEQ/1
20 TABLET, EXTENDED RELEASE ORAL DAILY
COMMUNITY
Start: 2024-12-12

## 2025-01-27 NOTE — ANESTHESIA PAT ROS NOTE
01/27/2025  Gita García is a 79 y.o., female.      Pre-op Assessment          Review of Systems           Anesthesia Assessment: Preoperative EQUATION    Planned Procedure: Procedure(s) (LRB):  TURBT (TRANSURETHRAL RESECTION OF BLADDER TUMOR) (N/A)  Requested Anesthesia Type:General  Surgeon: Shane Diaz MD  Service: Urology  Known or anticipated Date of Surgery:2/24/2025    Surgeon notes: reviewed    Electronic QUestionnaire Assessment completed via nurse interview with patient.        Triage considerations:         Previous anesthesia records:GETA and No problems  01/06/25 URETEROSCOPY (Left: Ureter) CYSTOSCOPY (Bladder)   PYELOGRAM, RETROGRADE (Left: Kidney) NEPHROSCOPY (Left: Kidney) TURBT (TRANSURETHRAL RESECTION OF BLADDER TUMOR) (Bladder), general anesthesia, ASA 3   Intubation     Date/Time: 1/6/2025 8:30 AM     Performed by: Andrea Crowley CRNA  Authorized by: Neal Villanueva Jr., MD    Intubation:     Induction:  Intravenous    Intubated:  Postinduction    Mask Ventilation:  Easy mask    Attempts:  1    Attempted By:  CRNA    Method of Intubation:  Direct    Blade:  [place] 2    Laryngeal View Grade: Grade I - full view of cords      Difficult Airway Encountered?: No      Complications:  None    Airway Device:  Oral endotracheal tube    Airway Device Size:  7.0    Style/Cuff Inflation:  Cuffed (inflated to minimal occlusive pressure)    Tube secured:  18    Secured at:  The teeth    Placement Verified By:  Capnometry    Complicating Factors:  None    Findings Post-Intubation:  BS equal bilateral and atraumatic/condition of teeth unchanged    [facility]   Subspecialty notes: Gyn/ONC, Hematology/Oncology, Urology    Other important co-morbidities: GERD, HLD, HTN, and h/o endometrial cancer s/p RA TLH/BSO/BPLD & omentectomy, right LG UTUC s/p 3/22/22 RA right  nephroureterectomy + 6 rounds of chemo and 5 rounds radiation, arthritis, chemo induced N/V, neuropathy d/t chemo, hormone deficiency (estrogen), h/o small left occipital hemorrhagic stroke 12/2023 (less than 15 mins of left eye visual changes and a few days of headaches, no other sx)       Tests already available:  Available tests,  within 3 months , within [facility] .   12/10/24 EKG  11/29/24 CBC ONC, CMP, FERRITIN, IRON & TIBC            Instructions     Optimization:  Anesthesia Preop Clinic Assessment Indicated    Medical Opinion Indicated       Sub-specialist consult indicated: TBD       Plan:    Testing:  CBC, CMP, and PT/INR   Pre-anesthesia  visit       Visit focus:  clearance requested by surgeonMarycarmen 2/ASA 3     Consultation:IM Perioperative Hospitalist    Navigation: Tests Scheduled.              Consults scheduled.             Results will be tracked by Preop Clinic.

## 2025-01-31 ENCOUNTER — TELEPHONE (OUTPATIENT)
Dept: PREADMISSION TESTING | Facility: HOSPITAL | Age: 80
End: 2025-01-31
Payer: MEDICARE

## 2025-02-17 PROBLEM — C67.9 MALIGNANT NEOPLASM OF BLADDER: Status: ACTIVE | Noted: 2025-02-17

## 2025-02-17 PROBLEM — K21.9 GASTROESOPHAGEAL REFLUX DISEASE: Status: ACTIVE | Noted: 2017-12-27

## 2025-02-17 NOTE — ASSESSMENT & PLAN NOTE
Current BP  uncontrolled today.    Taking: HCTZ/olmesartan- did not take meds this AM   Patient reports home BP readings of: 117/70s  Will follow-up with a home BP reading      Lifestyle changes to reduce systolic BP:  exercise 30 minutes per day,  5 days per week or 150 minutes weekly; sodium reduction and avoidance of high salt foods such as processed meats, frozen meals and  fast foods.   Keeping a healthy weight/BMI can help with better BP control    I recommend monitoring BP during perioperative period as uncontrolled pain can elevate blood pressure.

## 2025-02-17 NOTE — HPI
This is a 79 y.o. female  who presents today with daughter  for a preoperative evaluation in preparation for transurethral resection of bladder tumor.   Surgery is indicated for malignant neoplasm of urinary bladder .   Patient is new to me.  The history has been obtained by speaking with the patient and reviewing the electronic medical record and/or outside health information. Significant health conditions for the perioperative period are discussed below in assessment and plan.   Patient reports current health status to be Good.  Denies any new symptoms before surgery.

## 2025-02-17 NOTE — H&P (VIEW-ONLY)
[facility]  Progress Note    Patient Name: Gita García  MRN: [id number]  Date of Evaluation- 02/19/2025  PCP- JOHN Natarajan MD    Future cases for Gita García [[id number]]       Case ID Status Date Time [place] Procedure Provider Location    [id number] [facility] 2/24/2025 10:15 AM 75 TURBT (TRANSURETHRAL RESECTION OF BLADDER TUMOR) Shane Diaz MD [[id number]] [facility] OR 1ST FLR            HPI:  This is a 79 y.o. female  who presents today with daughter  for a preoperative evaluation in preparation for transurethral resection of bladder tumor.   Surgery is indicated for malignant neoplasm of urinary bladder .   Patient is new to me.  The history has been obtained by speaking with the patient and reviewing the electronic medical record and/or outside health information. Significant health conditions for the perioperative period are discussed below in assessment and plan.   Patient reports current health status to be Good.  Denies any new symptoms before surgery.       Subjective/ Objective:     Chief Complaint: Preoperative evaulation, perioperative medical management, and complication reduction plan.     Functional Capacity: parked in garage and walked to POC without CP/SOB.       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         Past Medical History Pertinent Negatives:   Diagnosis Date Noted     Asthma 02/18/2025    COPD (chronic obstructive pulmonary disease) 02/18/2025    Coronary artery disease 02/18/2025    Deep vein thrombosis 02/18/2025    Depression 06/27/2019    Diabetes mellitus 06/27/2019    Diabetes mellitus, type 2 02/18/2025    Myocardial infarction 06/27/2019    Stroke 06/27/2019    Substance abuse 06/27/2019    Thyroid disease 02/18/2025         Past Surgical History:   Procedure Laterality Date    ABLATION OF NEOPLASM OF URETER USING LASER Right 1/3/2022    Procedure: ABLATION, NEOPLASM, URETER, USING LASER;  Surgeon: Shane Diaz MD;  Location: [facility] OR 1ST FLR;  Service: Urology;  Laterality: Right;    CYSTOSCOPY N/A 1/3/2022    Procedure: CYSTOSCOPY;  Surgeon: Shane Diaz MD;  Location: [facility] OR 1ST FLR;  Service: Urology;  Laterality: N/A;    CYSTOSCOPY N/A 1/6/2025    Procedure: CYSTOSCOPY;  Surgeon: Shane Diaz MD;  Location: [facility] OR 1ST FLR;  Service: Urology;  Laterality: N/A;    DILATION AND CURETTAGE OF UTERUS      GI scope  2016    LAPAROTOMY N/A 7/8/2019    Procedure: LAPAROTOMY;  Surgeon: Lokesh Carias MD;  Location: [facility] OR 2ND FLR;  Service: OB/GYN;  Laterality: N/A;  mini    NEPHROSCOPY Left 1/6/2025    Procedure: NEPHROSCOPY;  Surgeon: Shane Diaz MD;  Location: [facility] OR 1ST FLR;  Service: Urology;  Laterality: Left;    OMENTECTOMY N/A 7/8/2019    Procedure: OMENTECTOMY;  Surgeon: Lokesh Carias MD;  Location: [facility] OR 2ND FLR;  Service: OB/GYN;  Laterality: N/A;    RETROGRADE PYELOGRAPHY Left 1/6/2025    Procedure: PYELOGRAM, RETROGRADE;  Surgeon: Shane Diaz MD;  Location: [facility] OR 1ST FLR;  Service: Urology;  Laterality: Left;    ROBOT-ASSISTED LAPAROSCOPIC ABDOMINAL HYSTERECTOMY USING DA SIRISHA XI N/A 7/8/2019    Procedure: XI ROBOTIC HYSTERECTOMY;  Surgeon: Lokesh Carias MD;  Location: [facility] OR 2ND FLR;  Service: OB/GYN;  Laterality: N/A;    ROBOT-ASSISTED LAPAROSCOPIC PELVIC LYMPHADENECTOMY USING DA SIRISHA XI Bilateral 7/8/2019     Procedure: XI ROBOTIC LYMPHADENECTOMY, PELVIC;  Surgeon: Lokesh Carias MD;  Location: [facility] OR 2ND FLR;  Service: OB/GYN;  Laterality: Bilateral;    ROBOT-ASSISTED LAPAROSCOPIC RETROPERITONEAL LYMPHADENECTOMY USING DA SIRISHA XI N/A 7/8/2019    Procedure: XI ROBOTIC LYMPHADENECTOMY, RETROPERITONEUM;  Surgeon: Lokesh Carias MD;  Location: [facility] OR 2ND FLR;  Service: OB/GYN;  Laterality: N/A;    ROBOT-ASSISTED LAPAROSCOPIC SALPINGO-OOPHORECTOMY USING DA SIRISHA XI Bilateral 7/8/2019    Procedure: XI ROBOTIC SALPINGO-OOPHORECTOMY;  Surgeon: Lokesh Carias MD;  Location: [facility] OR 2ND FLR;  Service: OB/GYN;  Laterality: Bilateral;    ROBOT-ASSISTED LAPAROSCOPIC SURGICAL REMOVAL OF KIDNEY AND URETER USING DA SIRISHA XI Right 3/14/2022    Procedure: XI ROBOTIC NEPHROURETERECTOMY/ WITH BLADDER CUFF;  Surgeon: Shane Diaz MD;  Location: [facility] OR 2ND FLR;  Service: Urology;  Laterality: Right;  4hrs    TURBT (TRANSURETHRAL RESECTION OF BLADDER TUMOR) N/A 1/6/2025    Procedure: TURBT (TRANSURETHRAL RESECTION OF BLADDER TUMOR);  Surgeon: Shane Diaz MD;  Location: [facility] OR 1ST FLR;  Service: Urology;  Laterality: N/A;    URETEROSCOPY Right 1/3/2022    Procedure: URETEROSCOPY;  Surgeon: Shane Diaz MD;  Location: [facility] OR 1ST FLR;  Service: Urology;  Laterality: Right;  2hrs    URETEROSCOPY Left 1/6/2025    Procedure: URETEROSCOPY;  Surgeon: Shane Diaz MD;  [facility];  Service: Urology;  Laterality: Left;       Review of Systems   Constitutional:  Negative for chills, fatigue, fever and unexpected weight change.   HENT:  Positive for hearing loss (mild). Negative for congestion, rhinorrhea, sore throat, tinnitus and trouble swallowing.    Eyes:  Negative for visual disturbance.        Dry eyes   Respiratory:  Negative for cough, chest tightness, shortness of breath and wheezing.         STOP BANG risk factors:  Snoring   Cardiovascular:  Negative for chest pain, palpitations and leg  "swelling.   Gastrointestinal:  Negative for constipation and diarrhea.        Denies Fatty liver, Hepatitis   Genitourinary:  Positive for urgency. Negative for decreased urine volume, difficulty urinating, dysuria, frequency and hematuria.   Musculoskeletal:  Negative for arthralgias, back pain, neck pain and neck stiffness.   Neurological:  Positive for numbness (BLE> BUE). Negative for dizziness, syncope, weakness and headaches.   Hematological:  Does not bruise/bleed easily.   Psychiatric/Behavioral:  Negative for sleep disturbance and suicidal ideas.               VITALS  Visit Vitals  BP (!) 191/75 (BP Location: Left arm, Patient Position: Sitting)   Pulse 63   Temp 97.8 °F (36.6 °C) (Oral)   Ht 5' 4" (1.626 m)   Wt 68.3 kg (150 lb 9.2 oz)   SpO2 98%   BMI 25.85 kg/m²          Physical Exam  Vitals reviewed.   Constitutional:       General: She is not in acute distress.     Appearance: She is well-developed.   HENT:      Head: Normocephalic.      Nose: Nose normal.      Mouth/Throat:      Pharynx: No oropharyngeal exudate.   Eyes:      General:         Right eye: No discharge.         Left eye: No discharge.      Conjunctiva/sclera: Conjunctivae normal.      Pupils: Pupils are equal, round, and reactive to light.   Neck:      Thyroid: No thyromegaly.      Vascular: No carotid bruit or JVD.      Trachea: No tracheal deviation.   Cardiovascular:      Rate and Rhythm: Regular rhythm. Bradycardia present.      Pulses:           Carotid pulses are 2+ on the right side and 2+ on the left side.       Dorsalis pedis pulses are 2+ on the right side and 2+ on the left side.        Posterior tibial pulses are 2+ on the right side and 2+ on the left side.      Heart sounds: Normal heart sounds. No murmur heard.  Pulmonary:      Effort: Pulmonary effort is normal. No respiratory distress.      Breath sounds: Normal breath sounds. No stridor. No wheezing, rhonchi or rales.   Abdominal:      General: Bowel sounds are normal. " There is no distension.      Palpations: Abdomen is soft.      Tenderness: There is no abdominal tenderness. There is no guarding.   Musculoskeletal:      Cervical back: Normal range of motion.      Right lower leg: Edema (trace) present.      Left lower leg: Edema (trace) present.   Lymphadenopathy:      Cervical: No cervical adenopathy.   Skin:     General: Skin is warm and dry.      Capillary Refill: Capillary refill takes less than 2 seconds.      Findings: No erythema or rash.   Neurological:      Mental Status: She is alert and oriented to person, place, and time.   Psychiatric:         Behavior: Behavior normal. Behavior is cooperative.          Significant Labs:  Lab Results   Component Value Date    WBC 7.81 11/29/2024    HGB 11.6 (L) 11/29/2024    HCT 36.9 (L) 11/29/2024     11/29/2024    ALT 10 11/29/2024    AST 16 11/29/2024     11/29/2024    K 3.8 11/29/2024     11/29/2024    CREATININE 0.9 11/29/2024    BUN 23 11/29/2024    CO2 29 11/29/2024    TSH 1.439 10/13/2023           EKG:   Results for orders placed or performed in visit on 12/09/24   IN OFFICE EKG 12-LEAD (to [place])    Collection Time: 12/10/24  3:02 PM   Result Value Ref Range    QRS Duration 76 ms    [facility] QTC Calculation 449 ms    Narrative    Test Reason : Z08,Z85.50,    Vent. Rate :  71 BPM     Atrial Rate :  71 BPM     P-R Int : 190 ms          QRS Dur :  76 ms      QT Int : 414 ms       P-R-T Axes :  29  24  13 degrees    QTcB Int : 449 ms    Normal sinus rhythm  Low septal forces ; Abnormal R wave progression in the precordial leads  -Cannot exclude Septal infarct (cited on or before 27-Jun-2019) but doubt  Abnormal ECG  When compared with ECG of 07-Mar-2022 12:53,  No significant change was found  Confirmed by Issac Diaz ([id number]) on 12/10/2024 3:54:44 PM    Referred By:            Confirmed By: Issac Diaz             Active Cardiac Conditions: None      Revised Cardiac Risk Index   High -Risk Surgery  Intraperitoneal;  Intrathoracic; suprainguinal vascular Yes- + 1 No- 0   History of Ischemic Heart Disease   (Hx of MI/positive exercise test/current chest pain due to ischemia/use of nitrate therapy/EKG with pathological Q waves) Yes- + 1 No- 0   History of CHF  (Pulmonary edema/bilateral rales or S3 gallop/PND/CXR showing pulmonary vascular redistribution) Yes- + 1 No- 0   History of CVA   (Prior stroke or TIA) Yes- + 1 No- 0   Pre-operative treatment with insulin Yes- + 1 No- 0   Pre-operative creatinine > 2mg/dl Yes- + 1 No- 0   Total: 1      Risk Status:  Estimated risk of cardiac complications after non-cardiac surgery using the Revised Cardiac Risk Index for Preoperative risk is 6.0 %      ARISCAT (Canet) risk index: Low: 1.6% risk of post-op pulmonary complications.    American Society of Anesthesiologists Physical Status classification (ASA): 3                            Assessment/Plan:     Malignant neoplasm of bladder  Scheduled with Dr. Diaz on 2/24/25 for TURBT.    Essential hypertension  Current BP  uncontrolled today.    Taking: HCTZ/olmesartan- did not take meds this AM   Patient reports home BP readings of: 117/70s  Will follow-up with a home BP reading      Lifestyle changes to reduce systolic BP:  exercise 30 minutes per day,  5 days per week or 150 minutes weekly; sodium reduction and avoidance of high salt foods such as processed meats, frozen meals and  fast foods.   Keeping a healthy weight/BMI can help with better BP control    I recommend monitoring BP during perioperative period as uncontrolled pain can elevate blood pressure.         Endometrial ca  Dx'd in 2019  S/P Hysterectomy July 2019  Completed 6 cycles of Taxol and carboplatin 12/2019  Followed per Gyn-Onc; last seen 1/6/25    Urothelial carcinoma of kidney, right  Dx'd in November 2021; S/P right robotic nephrouterectomy   Followed per Urology and Hem-Onc    Gastroesophageal reflux disease  Currently being treated with Protonix; controlled.  "    History of stroke  Number of strokes:  12/2023- "small left occiptial hemorrhage" per Neurology note.   Most recent stroke:    Residual deficits: No; deficits are:   Followed by Neurology; last seen September 2024  Carotid studies not available    MRI Brain 12/2023      CT Head 12/2023          Discussion/Management of Perioperative Care    Thromboembolic prophylaxis (VTE) Care: Risk factors for thrombosis include: age and surgical procedure.  I recommend prophylaxis of thromboembolism with the use of compression stockings/pneumatic devices, and/or pharmacologic agents. The benefits should outweigh the risks for pharmacologic prophylaxis in the perioperative period. I also encourage early ambulation if not contraindicated during the post-operative period.    Risk factors for post-operative pulmonary complications include:age > 65 years, HTN, and vascular disease. To reduce the risk of pulmonary complications, prophylactic recommendations include: incentive spirometry use/deep breathing, end tidal carbon dioxide monitoring, and pain control.    I recommend monitoring sodium during the perioperative period. Pt. is currently on an SSRI which can be associated with SIADH. Surgical procedures are associated with hypersecretion of ADH as well.    Risk factors for renal complications include: age, HTN, and vascular disease. To reduce the risk of postoperative renal complications, I recommend the patient maintain adequate hydration.  Avoid/reduce NSAIDS and HUANG-2 inhibitors use as well as IV contrast for renal protection.    I recommend the use of appropriate prophylactic antibiotics to reduce the risk of surgical site infections.    Delirium risk factors include advanced age. I recommend to avoid/reduce use of benzodiazepine use (not for patients who take on a regular basis), anticholinergics, Benadryl,  and agents that may cause postoperative serotonin syndrome.  Controlled pain can decrease the risk for postop " delirium and since opioids are used for postoperative pain control, I suggest using the lowest dose for the shortest amount of time necessary for pain management.     The patient is at an increased risk for urinary retention due to : urological procedure and advanced age. I recommend to avoid/decrease the use of benzodiazepines, anticholinergics, and Benadryl in the perioperative period. I also recommend using opioids for the shortest period of time if possible.          This visit was focused on Preoperative evaluation, Perioperative Medical management, complication reduction plans. I suggest that the patient follows up with primary care or relevant sub specialists for ongoing health care.    I appreciate the opportunity to be involved in this patients care. Please feel free to contact me if there were any questions about this consultation.      I spent a total of 66 minutes on the day of the visit.  This includes face to face time and non-face to face time preparing to see the patient (e.g., review of tests), obtaining and/or reviewing separately obtained history, documenting clinical information in the electronic or other health record, independently interpreting results and communicating results to the patient/family/caregiver, or care coordinator.       2/19/25: Home BP is 126/76 today.    Patient is optimized for surgery.        Bebeto Forbes NP  Perioperative Medicine  [facility]

## 2025-02-17 NOTE — OUTPATIENT SUBJECTIVE & OBJECTIVE
Outpatient Subjective & Objective      Chief Complaint: Preoperative evaulation, perioperative medical management, and complication reduction plan.     Functional Capacity: parked in garage and walked to POC without CP/SOB.       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         Past Medical History Pertinent Negatives:   Diagnosis Date Noted    Asthma 02/18/2025    COPD (chronic obstructive pulmonary disease) 02/18/2025    Coronary artery disease 02/18/2025    Deep vein thrombosis 02/18/2025    Depression 06/27/2019    Diabetes mellitus 06/27/2019    Diabetes mellitus, type 2 02/18/2025    Myocardial infarction 06/27/2019    Stroke 06/27/2019    Substance abuse 06/27/2019    Thyroid disease 02/18/2025         Past Surgical History:   Procedure Laterality Date    ABLATION OF NEOPLASM OF URETER USING LASER Right 1/3/2022    Procedure: ABLATION, NEOPLASM, URETER, USING LASER;  Surgeon: Shane Diaz MD;  Location: [address];  Service: Urology;  Laterality: Right;    CYSTOSCOPY N/A 1/3/2022    Procedure: CYSTOSCOPY;  Surgeon: Shane Diaz MD;  Location: [facility] OR [address] [place] [address];  Service: Urology;  Laterality: N/A;    CYSTOSCOPY N/A 1/6/2025    Procedure: CYSTOSCOPY;  Surgeon: Shane Diaz MD;  Location: [facility] OR [address] [place] [address];  Service: Urology;  Laterality: N/A;    DILATION AND CURETTAGE OF UTERUS      GI  scope  2016    LAPAROTOMY N/A 7/8/2019    Procedure: LAPAROTOMY;  Surgeon: Lokesh Carias MD;  Location: [facility] OR 2ND FLR;  Service: OB/GYN;  Laterality: N/A;  mini    NEPHROSCOPY Left 1/6/2025    Procedure: NEPHROSCOPY;  Surgeon: Shane Diaz MD;  Location: [facility] OR 1ST FLR;  Service: Urology;  Laterality: Left;    OMENTECTOMY N/A 7/8/2019    Procedure: OMENTECTOMY;  Surgeon: Lokesh Carias MD;  Location: [facility] OR 2ND FLR;  Service: OB/GYN;  Laterality: N/A;    RETROGRADE PYELOGRAPHY Left 1/6/2025    Procedure: PYELOGRAM, RETROGRADE;  Surgeon: Shane Diaz MD;  Location: [facility] OR 1ST FLR;  Service: Urology;  Laterality: Left;    ROBOT-ASSISTED LAPAROSCOPIC ABDOMINAL HYSTERECTOMY USING DA SIRISHA XI N/A 7/8/2019    Procedure: XI ROBOTIC HYSTERECTOMY;  Surgeon: Lokesh Carias MD;  [facility] 2ND FLR;  Service: OB/GYN;  Laterality: N/A;    ROBOT-ASSISTED LAPAROSCOPIC PELVIC LYMPHADENECTOMY USING DA SIRISHA XI Bilateral 7/8/2019    Procedure: XI ROBOTIC LYMPHADENECTOMY, PELVIC;  Surgeon: Lokesh Carias MD;  [facility]: [facility] 2ND FLR;  Service: OB/GYN;  Laterality: Bilateral;    ROBOT-ASSISTED LAPAROSCOPIC RETROPERITONEAL LYMPHADENECTOMY USING DA SIRISHA XI N/A 7/8/2019    Procedure: XI ROBOTIC LYMPHADENECTOMY, RETROPERITONEUM;  Surgeon: Lokesh Carias MD;  Location: [facility] 2ND FLR;  Service: OB/GYN;  Laterality: N/A;    ROBOT-ASSISTED LAPAROSCOPIC SALPINGO-OOPHORECTOMY USING DA SIRISHA XI Bilateral 7/8/2019    Procedure: XI ROBOTIC SALPINGO-OOPHORECTOMY;  Surgeon: Lokesh Carias MD;  Location: [facility] 2ND FLR;  Service: OB/GYN;  Laterality: Bilateral;    ROBOT-ASSISTED LAPAROSCOPIC SURGICAL REMOVAL OF KIDNEY AND URETER USING DA SIRISHA XI Right 3/14/2022    Procedure: XI ROBOTIC NEPHROURETERECTOMY/ WITH BLADDER CUFF;  Surgeon: Shane Diaz MD;  Location: [facility] OR 2ND FLR;  Service: Urology;  Laterality: Right;  4hrs    TURBT (TRANSURETHRAL RESECTION OF BLADDER TUMOR) N/A 1/6/2025    Procedure:  "TURBT (TRANSURETHRAL RESECTION OF BLADDER TUMOR);  Surgeon: Shane Diaz MD;  Location: [facility] OR [address] [place] [address];  Service: Urology;  Laterality: N/A;    URETEROSCOPY Right 1/3/2022    Procedure: URETEROSCOPY;  Surgeon: Shane Diaz MD;  Location: [facility] OR [address] [place] [address];  Service: Urology;  Laterality: Right;  2hrs    URETEROSCOPY Left 1/6/2025    Procedure: URETEROSCOPY;  Surgeon: Shane Diaz MD;  Location: [facility] OR [address] [place] [address];  Service: Urology;  Laterality: Left;       Review of Systems   Constitutional:  Negative for chills, fatigue, fever and unexpected weight change.   HENT:  Positive for hearing loss (mild). Negative for congestion, rhinorrhea, sore throat, tinnitus and trouble swallowing.    Eyes:  Negative for visual disturbance.        Dry eyes   Respiratory:  Negative for cough, chest tightness, shortness of breath and wheezing.         STOP BANG risk factors:  Snoring   Cardiovascular:  Negative for chest pain, palpitations and leg swelling.   Gastrointestinal:  Negative for constipation and diarrhea.        Denies Fatty liver, Hepatitis   Genitourinary:  Positive for urgency. Negative for decreased urine volume, difficulty urinating, dysuria, frequency and hematuria.   Musculoskeletal:  Negative for arthralgias, back pain, neck pain and neck stiffness.   Neurological:  Positive for numbness (BLE> BUE). Negative for dizziness, syncope, weakness and headaches.   Hematological:  Does not bruise/bleed easily.   Psychiatric/Behavioral:  Negative for sleep disturbance and suicidal ideas.               VITALS  Visit Vitals  BP (!) 191/75 (BP Location: Left arm, Patient Position: Sitting)   Pulse 63   Temp 97.8 °F (36.6 °C) (Oral)   Ht 5' 4" (1.626 m)   Wt 68.3 kg (150 lb 9.2 oz)   SpO2 98%   BMI 25.85 kg/m²          Physical Exam  Vitals reviewed.   Constitutional:       General: She is not in acute distress.     Appearance: She is well-developed.   HENT:      Head: Normocephalic.      Nose: Nose normal.      " Mouth/Throat:      Pharynx: No oropharyngeal exudate.   Eyes:      General:         Right eye: No discharge.         Left eye: No discharge.      Conjunctiva/sclera: Conjunctivae normal.      Pupils: Pupils are equal, round, and reactive to light.   Neck:      Thyroid: No thyromegaly.      Vascular: No carotid bruit or JVD.      Trachea: No tracheal deviation.   Cardiovascular:      Rate and Rhythm: Regular rhythm. Bradycardia present.      Pulses:           Carotid pulses are 2+ on the right side and 2+ on the left side.       Dorsalis pedis pulses are 2+ on the right side and 2+ on the left side.        Posterior tibial pulses are 2+ on the right side and 2+ on the left side.      Heart sounds: Normal heart sounds. No murmur heard.  Pulmonary:      Effort: Pulmonary effort is normal. No respiratory distress.      Breath sounds: Normal breath sounds. No stridor. No wheezing, rhonchi or rales.   Abdominal:      General: Bowel sounds are normal. There is no distension.      Palpations: Abdomen is soft.      Tenderness: There is no abdominal tenderness. There is no guarding.   Musculoskeletal:      Cervical back: Normal range of motion.      Right lower leg: Edema (trace) present.      Left lower leg: Edema (trace) present.   Lymphadenopathy:      Cervical: No cervical adenopathy.   Skin:     General: Skin is warm and dry.      Capillary Refill: Capillary refill takes less than 2 seconds.      Findings: No erythema or rash.   Neurological:      Mental Status: She is alert and oriented to person, place, and time.   Psychiatric:         Behavior: Behavior normal. Behavior is cooperative.          Significant Labs:  Lab Results   Component Value Date    WBC 7.81 11/29/2024    HGB 11.6 (L) 11/29/2024    HCT 36.9 (L) 11/29/2024     11/29/2024    ALT 10 11/29/2024    AST 16 11/29/2024     11/29/2024    K 3.8 11/29/2024     11/29/2024    CREATININE 0.9 11/29/2024    BUN 23 11/29/2024    CO2 29 11/29/2024     TSH 1.439 10/13/2023           EKG:   Results for orders placed or performed in visit on 12/09/24   IN OFFICE EKG 12-LEAD (to [place])    Collection Time: 12/10/24  3:02 PM   Result Value Ref Range    QRS Duration 76 ms    [facility] QTC Calculation 449 ms    Narrative    Test Reason : Z08,Z85.50,    Vent. Rate :  71 BPM     Atrial Rate :  71 BPM     P-R Int : 190 ms          QRS Dur :  76 ms      QT Int : 414 ms       P-R-T Axes :  29  24  13 degrees    QTcB Int : 449 ms    Normal sinus rhythm  Low septal forces ; Abnormal R wave progression in the precordial leads  -Cannot exclude Septal infarct (cited on or before 27-Jun-2019) but doubt  Abnormal ECG  When compared with ECG of 07-Mar-2022 12:53,  No significant change was found  Confirmed by Issac Diaz ([id number]) on 12/10/2024 3:54:44 PM    Referred By:            Confirmed By: Issac Diaz             Active Cardiac Conditions: None      Revised Cardiac Risk Index   High -Risk Surgery  Intraperitoneal; Intrathoracic; suprainguinal vascular Yes- + 1 No- 0   History of Ischemic Heart Disease   (Hx of MI/positive exercise test/current chest pain due to ischemia/use of nitrate therapy/EKG with pathological Q waves) Yes- + 1 No- 0   History of CHF  (Pulmonary edema/bilateral rales or S3 gallop/PND/CXR showing pulmonary vascular redistribution) Yes- + 1 No- 0   History of CVA   (Prior stroke or TIA) Yes- + 1 No- 0   Pre-operative treatment with insulin Yes- + 1 No- 0   Pre-operative creatinine > 2mg/dl Yes- + 1 No- 0   Total: 1      Risk Status:  Estimated risk of cardiac complications after non-cardiac surgery using the Revised Cardiac Risk Index for Preoperative risk is 6.0 %      ARISCAT ([place]) risk index: Low: 1.6% risk of post-op pulmonary complications.    [employer] Society of Anesthesiologists Physical Status classification (ASA): 3             Outpatient Subjective & Objective

## 2025-02-17 NOTE — PROGRESS NOTES
[facility]  Progress Note    Patient Name: Gita García  MRN: [id number]  Date of Evaluation- 02/19/2025  PCP- JOHN Natarajan MD    Future cases for Gita García [[id number]]       Case ID Status Date Time [place] Procedure Provider Location    [id number] [facility] 2/24/2025 10:15 AM 75 TURBT (TRANSURETHRAL RESECTION OF BLADDER TUMOR) Shane Diaz MD [[id number]] [facility] OR 1ST FLR            HPI:  This is a 79 y.o. female  who presents today with daughter  for a preoperative evaluation in preparation for transurethral resection of bladder tumor.   Surgery is indicated for malignant neoplasm of urinary bladder .   Patient is new to me.  The history has been obtained by speaking with the patient and reviewing the electronic medical record and/or outside health information. Significant health conditions for the perioperative period are discussed below in assessment and plan.   Patient reports current health status to be Good.  Denies any new symptoms before surgery.       Subjective/ Objective:     Chief Complaint: Preoperative evaulation, perioperative medical management, and complication reduction plan.     Functional Capacity: parked in garage and walked to POC without CP/SOB.       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         Past Medical History Pertinent Negatives:   Diagnosis Date Noted     Asthma 02/18/2025    COPD (chronic obstructive pulmonary disease) 02/18/2025    Coronary artery disease 02/18/2025    Deep vein thrombosis 02/18/2025    Depression 06/27/2019    Diabetes mellitus 06/27/2019    Diabetes mellitus, type 2 02/18/2025    Myocardial infarction 06/27/2019    Stroke 06/27/2019    Substance abuse 06/27/2019    Thyroid disease 02/18/2025         Past Surgical History:   Procedure Laterality Date    ABLATION OF NEOPLASM OF URETER USING LASER Right 1/3/2022    Procedure: ABLATION, NEOPLASM, URETER, USING LASER;  Surgeon: Shane Diaz MD;  Location: [facility] OR 1ST FLR;  Service: Urology;  Laterality: Right;    CYSTOSCOPY N/A 1/3/2022    Procedure: CYSTOSCOPY;  Surgeon: Shane Diaz MD;  Location: [facility] OR 1ST FLR;  Service: Urology;  Laterality: N/A;    CYSTOSCOPY N/A 1/6/2025    Procedure: CYSTOSCOPY;  Surgeon: Shane Diaz MD;  Location: [facility] OR 1ST FLR;  Service: Urology;  Laterality: N/A;    DILATION AND CURETTAGE OF UTERUS      GI scope  2016    LAPAROTOMY N/A 7/8/2019    Procedure: LAPAROTOMY;  Surgeon: Lokesh Carias MD;  Location: [facility] OR 2ND FLR;  Service: OB/GYN;  Laterality: N/A;  mini    NEPHROSCOPY Left 1/6/2025    Procedure: NEPHROSCOPY;  Surgeon: Shane Diaz MD;  Location: [facility] OR 1ST FLR;  Service: Urology;  Laterality: Left;    OMENTECTOMY N/A 7/8/2019    Procedure: OMENTECTOMY;  Surgeon: Lokesh Carias MD;  Location: [facility] OR 2ND FLR;  Service: OB/GYN;  Laterality: N/A;    RETROGRADE PYELOGRAPHY Left 1/6/2025    Procedure: PYELOGRAM, RETROGRADE;  Surgeon: Shane Diaz MD;  Location: [facility] OR 1ST FLR;  Service: Urology;  Laterality: Left;    ROBOT-ASSISTED LAPAROSCOPIC ABDOMINAL HYSTERECTOMY USING DA SIRISHA XI N/A 7/8/2019    Procedure: XI ROBOTIC HYSTERECTOMY;  Surgeon: Lokesh Carias MD;  Location: [facility] OR 2ND FLR;  Service: OB/GYN;  Laterality: N/A;    ROBOT-ASSISTED LAPAROSCOPIC PELVIC LYMPHADENECTOMY USING DA SIRISHA XI Bilateral 7/8/2019     Procedure: XI ROBOTIC LYMPHADENECTOMY, PELVIC;  Surgeon: Lokesh Carias MD;  Location: [facility] OR 2ND FLR;  Service: OB/GYN;  Laterality: Bilateral;    ROBOT-ASSISTED LAPAROSCOPIC RETROPERITONEAL LYMPHADENECTOMY USING DA SIRISHA XI N/A 7/8/2019    Procedure: XI ROBOTIC LYMPHADENECTOMY, RETROPERITONEUM;  Surgeon: Lokesh Carias MD;  Location: [facility] OR 2ND FLR;  Service: OB/GYN;  Laterality: N/A;    ROBOT-ASSISTED LAPAROSCOPIC SALPINGO-OOPHORECTOMY USING DA SIRISHA XI Bilateral 7/8/2019    Procedure: XI ROBOTIC SALPINGO-OOPHORECTOMY;  Surgeon: Lokesh Carias MD;  Location: [facility] OR 2ND FLR;  Service: OB/GYN;  Laterality: Bilateral;    ROBOT-ASSISTED LAPAROSCOPIC SURGICAL REMOVAL OF KIDNEY AND URETER USING DA SIRISHA XI Right 3/14/2022    Procedure: XI ROBOTIC NEPHROURETERECTOMY/ WITH BLADDER CUFF;  Surgeon: Shane Diaz MD;  Location: [facility] OR 2ND FLR;  Service: Urology;  Laterality: Right;  4hrs    TURBT (TRANSURETHRAL RESECTION OF BLADDER TUMOR) N/A 1/6/2025    Procedure: TURBT (TRANSURETHRAL RESECTION OF BLADDER TUMOR);  Surgeon: Shane Diaz MD;  Location: [facility] OR 1ST FLR;  Service: Urology;  Laterality: N/A;    URETEROSCOPY Right 1/3/2022    Procedure: URETEROSCOPY;  Surgeon: Shane Diaz MD;  Location: [facility] OR 1ST FLR;  Service: Urology;  Laterality: Right;  2hrs    URETEROSCOPY Left 1/6/2025    Procedure: URETEROSCOPY;  Surgeon: Shane Diaz MD;  [facility];  Service: Urology;  Laterality: Left;       Review of Systems   Constitutional:  Negative for chills, fatigue, fever and unexpected weight change.   HENT:  Positive for hearing loss (mild). Negative for congestion, rhinorrhea, sore throat, tinnitus and trouble swallowing.    Eyes:  Negative for visual disturbance.        Dry eyes   Respiratory:  Negative for cough, chest tightness, shortness of breath and wheezing.         STOP BANG risk factors:  Snoring   Cardiovascular:  Negative for chest pain, palpitations and leg  "swelling.   Gastrointestinal:  Negative for constipation and diarrhea.        Denies Fatty liver, Hepatitis   Genitourinary:  Positive for urgency. Negative for decreased urine volume, difficulty urinating, dysuria, frequency and hematuria.   Musculoskeletal:  Negative for arthralgias, back pain, neck pain and neck stiffness.   Neurological:  Positive for numbness (BLE> BUE). Negative for dizziness, syncope, weakness and headaches.   Hematological:  Does not bruise/bleed easily.   Psychiatric/Behavioral:  Negative for sleep disturbance and suicidal ideas.               VITALS  Visit Vitals  BP (!) 191/75 (BP Location: Left arm, Patient Position: Sitting)   Pulse 63   Temp 97.8 °F (36.6 °C) (Oral)   Ht 5' 4" (1.626 m)   Wt 68.3 kg (150 lb 9.2 oz)   SpO2 98%   BMI 25.85 kg/m²          Physical Exam  Vitals reviewed.   Constitutional:       General: She is not in acute distress.     Appearance: She is well-developed.   HENT:      Head: Normocephalic.      Nose: Nose normal.      Mouth/Throat:      Pharynx: No oropharyngeal exudate.   Eyes:      General:         Right eye: No discharge.         Left eye: No discharge.      Conjunctiva/sclera: Conjunctivae normal.      Pupils: Pupils are equal, round, and reactive to light.   Neck:      Thyroid: No thyromegaly.      Vascular: No carotid bruit or JVD.      Trachea: No tracheal deviation.   Cardiovascular:      Rate and Rhythm: Regular rhythm. Bradycardia present.      Pulses:           Carotid pulses are 2+ on the right side and 2+ on the left side.       Dorsalis pedis pulses are 2+ on the right side and 2+ on the left side.        Posterior tibial pulses are 2+ on the right side and 2+ on the left side.      Heart sounds: Normal heart sounds. No murmur heard.  Pulmonary:      Effort: Pulmonary effort is normal. No respiratory distress.      Breath sounds: Normal breath sounds. No stridor. No wheezing, rhonchi or rales.   Abdominal:      General: Bowel sounds are normal. " There is no distension.      Palpations: Abdomen is soft.      Tenderness: There is no abdominal tenderness. There is no guarding.   Musculoskeletal:      Cervical back: Normal range of motion.      Right lower leg: Edema (trace) present.      Left lower leg: Edema (trace) present.   Lymphadenopathy:      Cervical: No cervical adenopathy.   Skin:     General: Skin is warm and dry.      Capillary Refill: Capillary refill takes less than 2 seconds.      Findings: No erythema or rash.   Neurological:      Mental Status: She is alert and oriented to person, place, and time.   Psychiatric:         Behavior: Behavior normal. Behavior is cooperative.          Significant Labs:  Lab Results   Component Value Date    WBC 7.81 11/29/2024    HGB 11.6 (L) 11/29/2024    HCT 36.9 (L) 11/29/2024     11/29/2024    ALT 10 11/29/2024    AST 16 11/29/2024     11/29/2024    K 3.8 11/29/2024     11/29/2024    CREATININE 0.9 11/29/2024    BUN 23 11/29/2024    CO2 29 11/29/2024    TSH 1.439 10/13/2023           EKG:   Results for orders placed or performed in visit on 12/09/24   IN OFFICE EKG 12-LEAD (to [place])    Collection Time: 12/10/24  3:02 PM   Result Value Ref Range    QRS Duration 76 ms    OHS QTC Calculation 449 ms    Narrative    Test Reason : Z08,Z85.50,    Vent. Rate :  71 BPM     Atrial Rate :  71 BPM     P-R Int : 190 ms          QRS Dur :  76 ms      QT Int : 414 ms       P-R-T Axes :  29  24  13 degrees    QTcB Int : 449 ms    Normal sinus rhythm  Low septal forces ; Abnormal R wave progression in the precordial leads  -Cannot exclude Septal infarct (cited on or before 27-Jun-2019) but doubt  Abnormal ECG  When compared with ECG of 07-Mar-2022 12:53,  No significant change was found  Confirmed by Issac Diaz ([id number]) on 12/10/2024 3:54:44 PM    Referred By:            Confirmed By: Issac Diaz             Active Cardiac Conditions: None      Revised Cardiac Risk Index   High -Risk Surgery  Intraperitoneal;  Intrathoracic; suprainguinal vascular Yes- + 1 No- 0   History of Ischemic Heart Disease   (Hx of MI/positive exercise test/current chest pain due to ischemia/use of nitrate therapy/EKG with pathological Q waves) Yes- + 1 No- 0   History of CHF  (Pulmonary edema/bilateral rales or S3 gallop/PND/CXR showing pulmonary vascular redistribution) Yes- + 1 No- 0   History of CVA   (Prior stroke or TIA) Yes- + 1 No- 0   Pre-operative treatment with insulin Yes- + 1 No- 0   Pre-operative creatinine > 2mg/dl Yes- + 1 No- 0   Total: 1      Risk Status:  Estimated risk of cardiac complications after non-cardiac surgery using the Revised Cardiac Risk Index for Preoperative risk is 6.0 %      ARISCAT (Canet) risk index: Low: 1.6% risk of post-op pulmonary complications.    American Society of Anesthesiologists Physical Status classification (ASA): 3                            Assessment/Plan:     Malignant neoplasm of bladder  Scheduled with Dr. Diaz on 2/24/25 for TURBT.    Essential hypertension  Current BP  uncontrolled today.    Taking: HCTZ/olmesartan- did not take meds this AM   Patient reports home BP readings of: 117/70s  Will follow-up with a home BP reading      Lifestyle changes to reduce systolic BP:  exercise 30 minutes per day,  5 days per week or 150 minutes weekly; sodium reduction and avoidance of high salt foods such as processed meats, frozen meals and  fast foods.   Keeping a healthy weight/BMI can help with better BP control    I recommend monitoring BP during perioperative period as uncontrolled pain can elevate blood pressure.         Endometrial ca  Dx'd in 2019  S/P Hysterectomy July 2019  Completed 6 cycles of Taxol and carboplatin 12/2019  Followed per Gyn-Onc; last seen 1/6/25    Urothelial carcinoma of kidney, right  Dx'd in November 2021; S/P right robotic nephrouterectomy   Followed per Urology and Hem-Onc    Gastroesophageal reflux disease  Currently being treated with Protonix; controlled.  "    History of stroke  Number of strokes:  12/2023- "small left occiptial hemorrhage" per Neurology note.   Most recent stroke:    Residual deficits: No; deficits are:   Followed by Neurology; last seen September 2024  Carotid studies not available    MRI Brain 12/2023      CT Head 12/2023          Discussion/Management of Perioperative Care    Thromboembolic prophylaxis (VTE) Care: Risk factors for thrombosis include: age and surgical procedure.  I recommend prophylaxis of thromboembolism with the use of compression stockings/pneumatic devices, and/or pharmacologic agents. The benefits should outweigh the risks for pharmacologic prophylaxis in the perioperative period. I also encourage early ambulation if not contraindicated during the post-operative period.    Risk factors for post-operative pulmonary complications include:age > 65 years, HTN, and vascular disease. To reduce the risk of pulmonary complications, prophylactic recommendations include: incentive spirometry use/deep breathing, end tidal carbon dioxide monitoring, and pain control.    I recommend monitoring sodium during the perioperative period. Pt. is currently on an SSRI which can be associated with SIADH. Surgical procedures are associated with hypersecretion of ADH as well.    Risk factors for renal complications include: age, HTN, and vascular disease. To reduce the risk of postoperative renal complications, I recommend the patient maintain adequate hydration.  Avoid/reduce NSAIDS and HUANG-2 inhibitors use as well as IV contrast for renal protection.    I recommend the use of appropriate prophylactic antibiotics to reduce the risk of surgical site infections.    Delirium risk factors include advanced age. I recommend to avoid/reduce use of benzodiazepine use (not for patients who take on a regular basis), anticholinergics, Benadryl,  and agents that may cause postoperative serotonin syndrome.  Controlled pain can decrease the risk for postop " delirium and since opioids are used for postoperative pain control, I suggest using the lowest dose for the shortest amount of time necessary for pain management.     The patient is at an increased risk for urinary retention due to : urological procedure and advanced age. I recommend to avoid/decrease the use of benzodiazepines, anticholinergics, and Benadryl in the perioperative period. I also recommend using opioids for the shortest period of time if possible.          This visit was focused on Preoperative evaluation, Perioperative Medical management, complication reduction plans. I suggest that the patient follows up with primary care or relevant sub specialists for ongoing health care.    I appreciate the opportunity to be involved in this patients care. Please feel free to contact me if there were any questions about this consultation.      I spent a total of 66 minutes on the day of the visit.  This includes face to face time and non-face to face time preparing to see the patient (e.g., review of tests), obtaining and/or reviewing separately obtained history, documenting clinical information in the electronic or other health record, independently interpreting results and communicating results to the patient/family/caregiver, or care coordinator.       2/19/25: Home BP is 126/76 today.    Patient is optimized for surgery.        Bebeto Forbes NP  Perioperative Medicine  [facility]

## 2025-02-17 NOTE — ASSESSMENT & PLAN NOTE
Dx'd in 2019  S/P Hysterectomy July 2019  Completed 6 cycles of Taxol and carboplatin 12/2019  Followed per Gyn-Onc; last seen 1/6/25

## 2025-02-18 ENCOUNTER — OFFICE VISIT (OUTPATIENT)
Dept: INTERNAL MEDICINE | Facility: CLINIC | Age: 80
End: 2025-02-18
Payer: MEDICARE

## 2025-02-18 ENCOUNTER — LAB VISIT (OUTPATIENT)
Dept: LAB | Facility: HOSPITAL | Age: 80
End: 2025-02-18
Attending: ANESTHESIOLOGY
Payer: MEDICARE

## 2025-02-18 VITALS
OXYGEN SATURATION: 98 % | TEMPERATURE: 98 F | HEIGHT: 64 IN | WEIGHT: 150.56 LBS | HEART RATE: 63 BPM | BODY MASS INDEX: 25.7 KG/M2 | DIASTOLIC BLOOD PRESSURE: 75 MMHG | SYSTOLIC BLOOD PRESSURE: 191 MMHG

## 2025-02-18 DIAGNOSIS — C64.1 UROTHELIAL CARCINOMA OF KIDNEY, RIGHT: ICD-10-CM

## 2025-02-18 DIAGNOSIS — R16.0 LIVER MASS: ICD-10-CM

## 2025-02-18 DIAGNOSIS — I10 ESSENTIAL HYPERTENSION: ICD-10-CM

## 2025-02-18 DIAGNOSIS — C67.9 MALIGNANT NEOPLASM OF URINARY BLADDER, UNSPECIFIED SITE: ICD-10-CM

## 2025-02-18 DIAGNOSIS — K21.9 GASTROESOPHAGEAL REFLUX DISEASE, UNSPECIFIED WHETHER ESOPHAGITIS PRESENT: ICD-10-CM

## 2025-02-18 DIAGNOSIS — Z86.73 HISTORY OF STROKE: ICD-10-CM

## 2025-02-18 DIAGNOSIS — C54.1 ENDOMETRIAL CA: ICD-10-CM

## 2025-02-18 DIAGNOSIS — Z01.818 PREOP TESTING: ICD-10-CM

## 2025-02-18 DIAGNOSIS — Z01.818 PREOPERATIVE EXAMINATION: Primary | ICD-10-CM

## 2025-02-18 LAB
INR PPP: 0.9 (ref 0.8–1.2)
PROTHROMBIN TIME: 10.3 SEC (ref 9–12.5)

## 2025-02-18 PROCEDURE — 36415 COLL VENOUS BLD VENIPUNCTURE: CPT | Performed by: ANESTHESIOLOGY

## 2025-02-18 PROCEDURE — 85610 PROTHROMBIN TIME: CPT | Performed by: ANESTHESIOLOGY

## 2025-02-18 NOTE — DISCHARGE INSTRUCTIONS
Your surgery has been scheduled for:_____2/24/25_____________________________________    You [facility] to:  [facility], located on the [place] side of the first floor of the           [facility] ([phone])  ____[facility], located on the [facility] side of the            Second floor of the [facility] ([phone])  ____3rd Floor [facility] located on the [facility] side of the [facility] [phone]  ____[facility]: located at [address] [place] [address]. [address]     Please Note   Tell your doctor if you take Aspirin, products containing Aspirin, herbal medications  or blood thinners, such as Coumadin, Ticlid, or Plavix.  (Consult your provider regarding holding or stopping before surgery).  Arrange for someone to drive you home following surgery.  You will not be allowed to leave the surgical facility alone or drive yourself home following sedation and anesthesia.    Before Surgery  Stop taking all herbal medications, vitamins, and supplements 7 days prior to surgery  No Motrin/Advil (Ibuprofen) 7 days before surgery  No Aleve (Naproxen) 7 days before surgery   No Goody's/BC Powder 7 days before surgery  Refrain from drinking alcoholic beverages for 24 hours before and after surgery  Stop or limit smoking at least 24 hours prior to surgery  You may take Tylenol for pain    Night before Surgery  Do not eat or drink after midnight  Take a shower or bath (shower is recommended).  Bathe with Hibiclens soap or an antibacterial soap from the neck down.  If not supplied by your surgeon, hibiclens soap will need to be purchased over the counter in pharmacy.  Rinse soap off thoroughly.  Shampoo your hair with your regular shampoo    The Day of Surgery  Take another bath or shower with hibiclens or any antibacterial soap, to reduce the chance of infection.  Take heart  and blood pressure medications with a small sip of water, as advised by the perioperative team.  Do not take fluid pills  You may brush your teeth and rinse your mouth, but do not swallow any additional water.   Do not apply perfumes, powder, body lotions or deodorant on the day of surgery.  Nail polish should be removed.  Do not wear makeup or moisturizer  Wear comfortable clothes, such as a button front shirt and loose fitting pants.  Leave all jewelry, including body piercings, and valuables at home.    Bring any devices you will need after surgery such as crutches or canes.  If you have sleep apnea, please bring your CPAP machine  In the event that your physical condition changes including the onset of a cold or respiratory illness, or if you have to delay or cancel your surgery, please notify your surgeon.

## 2025-02-18 NOTE — ASSESSMENT & PLAN NOTE
"Number of strokes:  12/2023- "small left occiptial hemorrhage" per Neurology note.   Most recent stroke:    Residual deficits: No; deficits are:   Followed by Neurology; last seen September 2024  Carotid studies not available    MRI Brain 12/2023      CT Head 12/2023      "

## 2025-02-21 ENCOUNTER — TELEPHONE (OUTPATIENT)
Dept: UROLOGY | Facility: CLINIC | Age: 80
End: 2025-02-21
Payer: MEDICARE

## 2025-02-24 ENCOUNTER — HOSPITAL ENCOUNTER (OUTPATIENT)
Facility: HOSPITAL | Age: 80
Discharge: HOME | End: 2025-02-24
Attending: UROLOGY | Admitting: UROLOGY
Payer: MEDICARE

## 2025-02-24 ENCOUNTER — ANESTHESIA EVENT (OUTPATIENT)
Dept: SURGERY | Facility: HOSPITAL | Age: 80
End: 2025-02-24
Payer: MEDICARE

## 2025-02-24 ENCOUNTER — ANESTHESIA (OUTPATIENT)
Dept: SURGERY | Facility: HOSPITAL | Age: 80
End: 2025-02-24
Payer: MEDICARE

## 2025-02-24 VITALS
SYSTOLIC BLOOD PRESSURE: 184 MMHG | RESPIRATION RATE: 20 BRPM | BODY MASS INDEX: 25.7 KG/M2 | HEIGHT: 64 IN | DIASTOLIC BLOOD PRESSURE: 86 MMHG | HEART RATE: 57 BPM | OXYGEN SATURATION: 96 % | WEIGHT: 150.56 LBS | TEMPERATURE: 98 F

## 2025-02-24 DIAGNOSIS — R16.0 LIVER MASS: ICD-10-CM

## 2025-02-24 DIAGNOSIS — Z01.818 PREOP TESTING: ICD-10-CM

## 2025-02-24 DIAGNOSIS — C67.9 BLADDER CANCER: Primary | ICD-10-CM

## 2025-02-24 DIAGNOSIS — D18.03 LIVER HEMANGIOMA: ICD-10-CM

## 2025-02-24 PROCEDURE — 88307 TISSUE EXAM BY PATHOLOGIST: CPT | Performed by: PATHOLOGY

## 2025-02-24 PROCEDURE — 36000706: Performed by: UROLOGY

## 2025-02-24 PROCEDURE — D9220A PRA ANESTHESIA: Mod: ANES,,, | Performed by: ANESTHESIOLOGY

## 2025-02-24 PROCEDURE — 88307 TISSUE EXAM BY PATHOLOGIST: CPT | Mod: 26,,, | Performed by: PATHOLOGY

## 2025-02-24 PROCEDURE — 25000003 PHARM REV CODE 250: Performed by: NURSE ANESTHETIST, CERTIFIED REGISTERED

## 2025-02-24 PROCEDURE — 52234 CYSTOSCOPY AND TREATMENT: CPT | Mod: ,,, | Performed by: UROLOGY

## 2025-02-24 PROCEDURE — 71000015 HC POSTOP RECOV 1ST HR: Performed by: UROLOGY

## 2025-02-24 PROCEDURE — 37000008 HC ANESTHESIA 1ST 15 MINUTES: Performed by: UROLOGY

## 2025-02-24 PROCEDURE — D9220A PRA ANESTHESIA: Mod: CRNA,,, | Performed by: NURSE ANESTHETIST, CERTIFIED REGISTERED

## 2025-02-24 PROCEDURE — 71000016 HC POSTOP RECOV ADDL HR: Performed by: UROLOGY

## 2025-02-24 PROCEDURE — 71000044 HC DOSC ROUTINE RECOVERY FIRST HOUR: Performed by: UROLOGY

## 2025-02-24 PROCEDURE — 63600175 PHARM REV CODE 636 W HCPCS: Performed by: NURSE ANESTHETIST, CERTIFIED REGISTERED

## 2025-02-24 PROCEDURE — 27201423 OPTIME MED/SURG SUP & DEVICES STERILE SUPPLY: Performed by: UROLOGY

## 2025-02-24 PROCEDURE — 37000009 HC ANESTHESIA EA ADD 15 MINS: Performed by: UROLOGY

## 2025-02-24 PROCEDURE — 36000707: Performed by: UROLOGY

## 2025-02-24 RX ORDER — HALOPERIDOL 5 MG/ML
0.5 INJECTION INTRAMUSCULAR EVERY 10 MIN PRN
Status: DISCONTINUED | OUTPATIENT
Start: 2025-02-24 | End: 2025-02-24 | Stop reason: HOSPADM

## 2025-02-24 RX ORDER — KETOROLAC TROMETHAMINE 10 MG/1
10 TABLET, FILM COATED ORAL EVERY 6 HOURS PRN
Qty: 12 TABLET | Refills: 0 | Status: SHIPPED | OUTPATIENT
Start: 2025-02-24

## 2025-02-24 RX ORDER — EPHEDRINE SULFATE 50 MG/ML
INJECTION, SOLUTION INTRAVENOUS
Status: DISCONTINUED | OUTPATIENT
Start: 2025-02-24 | End: 2025-02-24

## 2025-02-24 RX ORDER — CEFAZOLIN 2 G/1
2 INJECTION, POWDER, FOR SOLUTION INTRAMUSCULAR; INTRAVENOUS
Status: DISCONTINUED | OUTPATIENT
Start: 2025-02-24 | End: 2025-02-24 | Stop reason: HOSPADM

## 2025-02-24 RX ORDER — SODIUM CHLORIDE 9 MG/ML
INJECTION, SOLUTION INTRAVENOUS CONTINUOUS
Status: DISCONTINUED | OUTPATIENT
Start: 2025-02-24 | End: 2025-02-24 | Stop reason: HOSPADM

## 2025-02-24 RX ORDER — OXYBUTYNIN CHLORIDE 5 MG/1
5 TABLET ORAL 3 TIMES DAILY
Qty: 21 TABLET | Refills: 0 | Status: SHIPPED | OUTPATIENT
Start: 2025-02-24 | End: 2025-03-03

## 2025-02-24 RX ORDER — PHENAZOPYRIDINE HYDROCHLORIDE 100 MG/1
100 TABLET, FILM COATED ORAL 3 TIMES DAILY PRN
Qty: 21 TABLET | Refills: 0 | Status: SHIPPED | OUTPATIENT
Start: 2025-02-24 | End: 2025-03-03

## 2025-02-24 RX ORDER — LIDOCAINE HYDROCHLORIDE 20 MG/ML
INJECTION, SOLUTION EPIDURAL; INFILTRATION; INTRACAUDAL; PERINEURAL
Status: DISCONTINUED | OUTPATIENT
Start: 2025-02-24 | End: 2025-02-24

## 2025-02-24 RX ORDER — ONDANSETRON 4 MG/1
4 TABLET, ORALLY DISINTEGRATING ORAL EVERY 8 HOURS PRN
Qty: 10 TABLET | Refills: 0 | Status: SHIPPED | OUTPATIENT
Start: 2025-02-24

## 2025-02-24 RX ORDER — KETOROLAC TROMETHAMINE 30 MG/ML
INJECTION, SOLUTION INTRAMUSCULAR; INTRAVENOUS
Status: DISCONTINUED | OUTPATIENT
Start: 2025-02-24 | End: 2025-02-24

## 2025-02-24 RX ORDER — ROCURONIUM BROMIDE 10 MG/ML
INJECTION, SOLUTION INTRAVENOUS
Status: DISCONTINUED | OUTPATIENT
Start: 2025-02-24 | End: 2025-02-24

## 2025-02-24 RX ORDER — GLUCAGON 1 MG
1 KIT INJECTION
Status: DISCONTINUED | OUTPATIENT
Start: 2025-02-24 | End: 2025-02-24 | Stop reason: HOSPADM

## 2025-02-24 RX ORDER — DEXAMETHASONE SODIUM PHOSPHATE 4 MG/ML
INJECTION, SOLUTION INTRA-ARTICULAR; INTRALESIONAL; INTRAMUSCULAR; INTRAVENOUS; SOFT TISSUE
Status: DISCONTINUED | OUTPATIENT
Start: 2025-02-24 | End: 2025-02-24

## 2025-02-24 RX ORDER — ONDANSETRON 4 MG/1
4 TABLET, ORALLY DISINTEGRATING ORAL ONCE
Qty: 1 TABLET | Refills: 0 | Status: SHIPPED | OUTPATIENT
Start: 2025-02-24 | End: 2025-02-24

## 2025-02-24 RX ORDER — PROPOFOL 10 MG/ML
VIAL (ML) INTRAVENOUS
Status: DISCONTINUED | OUTPATIENT
Start: 2025-02-24 | End: 2025-02-24

## 2025-02-24 RX ORDER — ONDANSETRON HYDROCHLORIDE 2 MG/ML
INJECTION, SOLUTION INTRAVENOUS
Status: DISCONTINUED | OUTPATIENT
Start: 2025-02-24 | End: 2025-02-24

## 2025-02-24 RX ORDER — FENTANYL CITRATE 50 UG/ML
INJECTION, SOLUTION INTRAMUSCULAR; INTRAVENOUS
Status: DISCONTINUED | OUTPATIENT
Start: 2025-02-24 | End: 2025-02-24

## 2025-02-24 RX ORDER — FENTANYL CITRATE 50 UG/ML
25 INJECTION, SOLUTION INTRAMUSCULAR; INTRAVENOUS EVERY 5 MIN PRN
Status: DISCONTINUED | OUTPATIENT
Start: 2025-02-24 | End: 2025-02-24 | Stop reason: HOSPADM

## 2025-02-24 RX ADMIN — FENTANYL CITRATE 25 MCG: 50 INJECTION, SOLUTION INTRAMUSCULAR; INTRAVENOUS at 06:02

## 2025-02-24 RX ADMIN — SUGAMMADEX 200 MG: 100 INJECTION, SOLUTION INTRAVENOUS at 07:02

## 2025-02-24 RX ADMIN — DEXAMETHASONE SODIUM PHOSPHATE 4 MG: 4 INJECTION, SOLUTION INTRAMUSCULAR; INTRAVENOUS at 07:02

## 2025-02-24 RX ADMIN — ROCURONIUM BROMIDE 40 MG: 10 INJECTION, SOLUTION INTRAVENOUS at 07:02

## 2025-02-24 RX ADMIN — FENTANYL CITRATE 25 MCG: 50 INJECTION, SOLUTION INTRAMUSCULAR; INTRAVENOUS at 07:02

## 2025-02-24 RX ADMIN — EPHEDRINE SULFATE 5 MG: 50 INJECTION INTRAVENOUS at 07:02

## 2025-02-24 RX ADMIN — ONDANSETRON 4 MG: 2 INJECTION INTRAMUSCULAR; INTRAVENOUS at 07:02

## 2025-02-24 RX ADMIN — KETOROLAC TROMETHAMINE 15 MG: 30 INJECTION, SOLUTION INTRAMUSCULAR; INTRAVENOUS at 07:02

## 2025-02-24 RX ADMIN — SODIUM CHLORIDE: 0.9 INJECTION, SOLUTION INTRAVENOUS at 06:02

## 2025-02-24 RX ADMIN — LIDOCAINE HYDROCHLORIDE 75 MG: 20 INJECTION, SOLUTION EPIDURAL; INFILTRATION; INTRACAUDAL; PERINEURAL at 07:02

## 2025-02-24 RX ADMIN — PROPOFOL 140 MG: 10 INJECTION, EMULSION INTRAVENOUS at 07:02

## 2025-02-24 NOTE — OP NOTE
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 female with a hx of HGMIUC now with recently diagnosed HG Ta bladder cancer. She has been counseled about the risks, benefits, and alternatives and presents today for surgical resection.      Findings:   2 cm bladder tumor scar identified, 9 o'clock position along the bladder neck    Estimated Blood Loss: min    Drains: None    Procedure in detail:  After the risks, benefits and possible complications of the procedure were explained, consents were obtained. The patient was taken to the operating room and placed under anesthesia. Pre-operative antibiotics were administered 30 minutes prior to expected start time. The patient was placed in the dorsal lithotomy position and prepped and draped in the normal and sterile fashion. Time out was performed.      The cystoscope was passed into the bladder without issue. There was a small urethral caruncle present. The bladder was surveyed systematically which revealed a prior resection scar from a the previously resected bladder tumor at the 9 o'clock position near the bladder neck. No new tumors were identified.    The resectoscope was assembled with the  visual obturator. This was placed into the bladder via the urethra and the visual obturator was exchanged for the resecting mechanism. The tumor scar was then resected, in its entirety.  There was muscle evident in the bed of the resection. Specimens were then removed and passed off the field for pathologic analysis.     The bladder was drained and hemostasis was achieved.  The resectoscope was removed.     The patient tolerated the procedure well and was transferred to recovery in stable condition.    Disposition:  She will be discharged home after a voiding trial and will follow up for pathology results.     Richard Pimentel MD

## 2025-02-24 NOTE — DISCHARGE INSTRUCTIONS
Post Cystoscopy and Transurethral resection of Bladder Tumor Instructions  Do not strain to have a bowel movement  No strenuous exercise x 7 days  No driving while you are on narcotic pain medications or if your hidalgo  catheter is in place    You can expect:  To see blood in your urine.    Go to the ER if:  Your catheter stops draining  You are having severe abdominal pain  Inability to void if you do not have a catheter    Call the doctor if:  Temperature is greater than 101F  Persistent vomiting and inability to keep food down    
triphasic doppler signals bilaterally

## 2025-02-24 NOTE — ANESTHESIA PROCEDURE NOTES
Intubation    Date/Time: 2/24/2025 7:14 AM    Performed by: Kim Smith CRNA  Authorized by: Jayden Wallace MD    Intubation:     Induction:  Intravenous    Intubated:  Postinduction    Mask Ventilation:  Easy mask    Attempts:  1    Attempted By:  Student    Method of Intubation:  Video laryngoscopy    Blade:  Burton 3    Laryngeal View Grade: Grade I - full view of cords      Difficult Airway Encountered?: No      Complications:  None    Airway Device:  Oral endotracheal tube    Airway Device Size:  7.0    Style/Cuff Inflation:  Cuffed (inflated to minimal occlusive pressure)    Tube secured:  22    Secured at:  The lips    Placement Verified By:  Capnometry    Complicating Factors:  None    Findings Post-Intubation:  BS equal bilateral and atraumatic/condition of teeth unchanged

## 2025-02-24 NOTE — PROGRESS NOTES
Pt discharged to home . Pt IV removed. Pt family has all discharge instructions and personal belongings. Prescriptions delivered to BS. Tolerating clear liquids well. No further questions. Adequate for discharge.

## 2025-02-24 NOTE — TRANSFER OF CARE
"Anesthesia Transfer of Care Note    Patient: Gita García    Procedure(s) Performed: Procedure(s) (LRB):  TURBT (TRANSURETHRAL RESECTION OF BLADDER TUMOR) (N/A)  CYSTOSCOPY    Patient location: PACU    Anesthesia Type: general    Transport from OR: Transported from OR on 6-10 L/min O2 by face mask with adequate spontaneous ventilation    Post pain: adequate analgesia    Post assessment: no apparent anesthetic complications and tolerated procedure well    Post vital signs: stable    Level of consciousness: awake, alert and oriented    Nausea/Vomiting: no nausea/vomiting    Complications: none    Transfer of care protocol was followed      Last vitals: Visit Vitals  BP (!) 173/79 (BP Location: Left arm)   Pulse 61   Temp 36.5 °C (97.7 °F) (Temporal)   Resp 18   Ht 5' 4" (1.626 m)   Wt 68.3 kg (150 lb 9.2 oz)   SpO2 100%   Breastfeeding No   BMI 25.85 kg/m²     "

## 2025-02-24 NOTE — ANESTHESIA PREPROCEDURE EVALUATION
02/24/2025  Gita García is a 79 y.o., female.      Pre-op Assessment          Review of Systems      Physical Exam  General: Cooperative, Alert and Oriented    Airway:  Mallampati: II           Anesthesia Assessment: Preoperative EQUATION    Planned Procedure: Procedure(s) (LRB):  TURBT (TRANSURETHRAL RESECTION OF BLADDER TUMOR) (N/A)  Requested Anesthesia Type:General  Surgeon: Shane Diaz MD  Service: Urology  Known or anticipated Date of Surgery:2/24/2025    Surgeon notes: reviewed    Electronic QUestionnaire Assessment completed via nurse interview with patient.        Triage considerations:         Previous anesthesia records:GETA and No problems  01/06/25 URETEROSCOPY (Left: Ureter) CYSTOSCOPY (Bladder)   PYELOGRAM, RETROGRADE (Left: Kidney) NEPHROSCOPY (Left: Kidney) TURBT (TRANSURETHRAL RESECTION OF BLADDER TUMOR) (Bladder), general anesthesia, ASA 3   Intubation     Date/Time: 1/6/2025 8:30 AM     Performed by: Andrea Crowley CRNA  Authorized by: Neal Villanueva Jr., MD    Intubation:     Induction:  Intravenous    Intubated:  Postinduction    Mask Ventilation:  Easy mask    Attempts:  1    Attempted By:  CRNA    Method of Intubation:  Direct    Blade:  [place] 2    Laryngeal View Grade: Grade I - full view of cords      Difficult Airway Encountered?: No      Complications:  None    Airway Device:  Oral endotracheal tube    Airway Device Size:  7.0    Style/Cuff Inflation:  Cuffed (inflated to minimal occlusive pressure)    Tube secured:  18    Secured at:  The teeth    Placement Verified By:  Capnometry    Complicating Factors:  None    Findings Post-Intubation:  BS equal bilateral and atraumatic/condition of teeth unchanged    Last PCP note: outside [facility]   Subspecialty notes: Gyn/ONC, Hematology/Oncology, Urology    Other important co-morbidities: GERD, HLD, HTN, and h/o  endometrial cancer s/p RA TLH/BSO/BPLD & omentectomy, 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   12/10/24 EKG  11/29/24 CBC ONC, CMP, FERRITIN, IRON & TIBC            Instructions     Optimization:  Anesthesia Preop Clinic Assessment Indicated    Medical Opinion Indicated       Sub-specialist consult indicated: TBD       Plan:    Testing:  CBC, CMP, and PT/INR   Pre-anesthesia  visit       Visit focus:  clearance requested by surgeonMarycarmen 2/ASA 3     Consultation:IM Perioperative Hospitalist    Navigation: Tests Scheduled.              Consults scheduled.             Results will be tracked by Preop Clinic.              Anesthesia Plan  Type of Anesthesia, risks & benefits discussed:    Anesthesia Type: Gen ETT  Intra-op Monitoring Plan: Standard ASA Monitors  Induction:  IV  Informed Consent: Informed consent signed with the Patient and all parties understand the risks and agree with anesthesia plan.  All questions answered.   ASA Score: 3  Day of Surgery Review of History & Physical: H&P Update referred to the surgeon/provider.    Ready For Surgery From Anesthesia Perspective.     .

## 2025-02-24 NOTE — DISCHARGE SUMMARY
[facility] - [facility] (1st Fl)  Discharge Note  Short Stay    Procedure(s) (LRB):  TURBT (TRANSURETHRAL RESECTION OF BLADDER TUMOR) (N/A)  CYSTOSCOPY      OUTCOME: Patient tolerated treatment/procedure well without complication and is now ready for discharge.    DISPOSITION: Home or Self Care    FINAL DIAGNOSIS:  Malignant neoplasm of bladder    FOLLOWUP: In clinic    DISCHARGE INSTRUCTIONS:    Discharge Procedure Orders   CBC Without Differential   Standing Status: Future Standing Exp. Date: 03/28/26     Comprehensive Metabolic Panel   Standing Status: Future Standing Exp. Date: 03/28/26     Protime-INR   Standing Status: Future Number of Occurrences: 1 Standing Exp. Date: 03/28/26     No dressing needed     Notify your health care provider if you experience any of the following:  temperature >100.4     Notify your health care provider if you experience any of the following:  persistent nausea and vomiting or diarrhea     Notify your health care provider if you experience any of the following:  severe uncontrolled pain     Notify your health care provider if you experience any of the following:  difficulty breathing or increased cough     Notify your health care provider if you experience any of the following:  severe persistent headache     Notify your health care provider if you experience any of the following:  worsening rash     Notify your health care provider if you experience any of the following:  persistent dizziness, light-headedness, or visual disturbances     Activity as tolerated        TIME SPENT ON DISCHARGE: 15 minutes

## 2025-02-26 ENCOUNTER — PATIENT MESSAGE (OUTPATIENT)
Dept: UROLOGY | Facility: CLINIC | Age: 80
End: 2025-02-26
Payer: MEDICARE

## 2025-02-26 LAB
FINAL PATHOLOGIC DIAGNOSIS: NORMAL
GROSS: NORMAL
Lab: NORMAL

## 2025-02-26 NOTE — ANESTHESIA POSTPROCEDURE EVALUATION
Anesthesia Post Evaluation    Patient: Gita García    Procedure(s) Performed: Procedure(s) (LRB):  TURBT (TRANSURETHRAL RESECTION OF BLADDER TUMOR) (N/A)  CYSTOSCOPY    Final Anesthesia Type: general      Patient location during evaluation: PACU  Patient participation: Yes- Able to Participate  Level of consciousness: awake and alert  Post-procedure vital signs: reviewed and stable  Pain management: adequate  Airway patency: patent    PONV status at discharge: No PONV  Anesthetic complications: no      Cardiovascular status: blood pressure returned to baseline  Respiratory status: unassisted  Hydration status: euvolemic  Follow-up not needed.              Vitals Value Taken Time   /86 02/24/25 09:30   Temp 36.6 °C (97.9 °F) 02/24/25 09:30   Pulse 57 02/24/25 09:30   Resp 20 02/24/25 09:30   SpO2 96 % 02/24/25 09:30         No case tracking events are documented in the log.      Pain/Salma Score: No data recorded

## 2025-03-17 NOTE — PROGRESS NOTES
Clinic Note  3/17/2025      Subjective:         Chief Complaint:   HPI  Gita García is a 79 y.o. female with a history of endometrial carcinoma. Recently had hematuria. CT scan 10/18/2021 showed right renal pelvis filling defect.  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 Not a complete ablatation per notes.   treated for bladder ca by Dr. Diaz [phone].      6 rounds of chemo, 5 rounds of radiation Oct 2019-Jan 2020.     Denies hematuria. Some left abd pain. Urethral caruncle.     Sister had kidney cancer 25 years ago. Nephrectomy.  Scoliosis. Hysterectomy, no other surgery.  Extensive review and interpretation of imaging and laboratory studies. Explained findings to patient and the impact on treatment plan.    Had ureteroscopy 1/3 2022 with ablation of right upper tract tumor. Cytology from barbotage specimen HG.        3/22/2022- Robotic nephroureterectomy with bladder cuff + intravesical mitomycin.  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 [id number].  Cystogram shows no leak.     4/5/2022- walking more daily. Appetite good. She can resume driving. No lifting over 15 pounds for 3 more weeks.  Has used no opioids postop.     1/23/2025- TURBT (transurethral resection of bladder tumor) on 1/6/2025. Path- HGTa, no detrusor present.  MRIU-11/29/2024- no recurrence or metastasis.  CT chest-11/29/2024-no metastasis.  Discussed path report, re-resection, BCG.    3/18/2025- TURBT (transurethral resection of bladder tumor) 2/24/2025-pT0.  No hematuria or voiding symptoms since.    Past Medical History:   Diagnosis Date    Acid reflux     Arthritis     Chemotherapy induced nausea and vomiting 08/09/2019    Endometrial ca 06/26/2019    Essential hypertension 06/27/2019    Estrogen deficiency     Hormone deficiency     Hypertension     Liver mass 07/01/2019    Neuropathy due to  chemotherapeutic drug 02/21/2020    Osteopenia     Seizures     post partal. 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     Family History   Problem Relation Name Age of Onset    No Known Problems Mother      No Known Problems Father      Colon cancer Sister  53    Breast cancer Sister  64    Kidney cancer Sister      Breast cancer Sister          in her 60s    Skin cancer Sister      Prostate cancer Brother      Stroke Brother      Prostate cancer Brother      Heart disease Brother      Prostate cancer Brother      Heart disease Brother      Ovarian cancer Neg Hx      Uterine cancer Neg Hx      Anesthesia problems Neg Hx       Social History[1]  Past Surgical History:   Procedure Laterality Date    ABLATION OF NEOPLASM OF URETER USING LASER Right 1/3/2022    Procedure: ABLATION, NEOPLASM, URETER, USING LASER;  Surgeon: Shane Diaz MD;  Location: [facility] OR 1ST FLR;  Service: Urology;  Laterality: Right;    CYSTOSCOPY N/A 1/3/2022    Procedure: CYSTOSCOPY;  Surgeon: Shane Diaz MD;  Location: [facility] OR 1ST FLR;  Service: Urology;  Laterality: N/A;    CYSTOSCOPY N/A 1/6/2025    Procedure: CYSTOSCOPY;  Surgeon: Shane Diaz MD;  Location: [facility] OR 1ST FLR;  Service: Urology;  Laterality: N/A;    CYSTOSCOPY  2/24/2025    Procedure: CYSTOSCOPY;  Surgeon: Shane Diaz MD;  Location: [facility] OR 1ST FLR;  Service: Urology;;    DILATION AND CURETTAGE OF UTERUS      GI scope  2016    LAPAROTOMY N/A 7/8/2019    Procedure: LAPAROTOMY;  Surgeon: Lokesh Carias MD;  Location: [facility] OR 2ND FLR;  Service: OB/GYN;  Laterality: N/A;  mini    NEPHROSCOPY Left 1/6/2025    Procedure: NEPHROSCOPY;  Surgeon: Shane Diaz MD;  Location: [facility] OR 1ST FLR;  Service: Urology;  Laterality: Left;    OMENTECTOMY N/A 7/8/2019    Procedure: OMENTECTOMY;  Surgeon: Lokesh Carias MD;  Location: [facility] OR 2ND FLR;  Service: OB/GYN;  Laterality: N/A;    RETROGRADE PYELOGRAPHY Left  1/6/2025    Procedure: PYELOGRAM, RETROGRADE;  Surgeon: Shane Diaz MD;  Location: [facility] OR 1ST FLR;  Service: Urology;  Laterality: Left;    ROBOT-ASSISTED LAPAROSCOPIC ABDOMINAL HYSTERECTOMY USING DA SIRISHA XI N/A 7/8/2019    Procedure: XI ROBOTIC HYSTERECTOMY;  Surgeon: Lokesh Carias MD;  Location: [facility] OR 2ND FLR;  Service: OB/GYN;  Laterality: N/A;    ROBOT-ASSISTED LAPAROSCOPIC PELVIC LYMPHADENECTOMY USING DA SIRISHA XI Bilateral 7/8/2019    Procedure: XI ROBOTIC LYMPHADENECTOMY, PELVIC;  Surgeon: Lokesh Carias MD;  Location: [facility] OR 2ND FLR;  Service: OB/GYN;  Laterality: Bilateral;    ROBOT-ASSISTED LAPAROSCOPIC RETROPERITONEAL LYMPHADENECTOMY USING DA SIRISHA XI N/A 7/8/2019    Procedure: XI ROBOTIC LYMPHADENECTOMY, RETROPERITONEUM;  Surgeon: Lokesh Carias MD;  Location: [facility] OR 2ND FLR;  Service: OB/GYN;  Laterality: N/A;    ROBOT-ASSISTED LAPAROSCOPIC SALPINGO-OOPHORECTOMY USING DA SIRISHA XI Bilateral 7/8/2019    Procedure: XI ROBOTIC SALPINGO-OOPHORECTOMY;  Surgeon: Lokesh Carias MD;  Location: [facility] OR 2ND FLR;  Service: OB/GYN;  Laterality: Bilateral;    ROBOT-ASSISTED LAPAROSCOPIC SURGICAL REMOVAL OF KIDNEY AND URETER USING DA SIRISHA XI Right 3/14/2022    Procedure: XI ROBOTIC NEPHROURETERECTOMY/ WITH BLADDER CUFF;  Surgeon: Shane Diaz MD;  Location: [facility] OR 2ND FLR;  Service: Urology;  Laterality: Right;  4hrs    TURBT (TRANSURETHRAL RESECTION OF BLADDER TUMOR) N/A 1/6/2025    Procedure: TURBT (TRANSURETHRAL RESECTION OF BLADDER TUMOR);  Surgeon: Shane Diaz MD;  Location: [facility] OR 1ST FLR;  Service: Urology;  Laterality: N/A;    TURBT (TRANSURETHRAL RESECTION OF BLADDER TUMOR) N/A 2/24/2025    Procedure: TURBT (TRANSURETHRAL RESECTION OF BLADDER TUMOR);  Surgeon: Shane Diaz MD;  Location: [facility] OR 1ST FLR;  Service: Urology;  Laterality: N/A;  surgery clearance    URETEROSCOPY Right 1/3/2022    Procedure: URETEROSCOPY;  Surgeon: Shane Diaz MD;  Location:  "[facility] OR 1ST FLR;  Service: Urology;  Laterality: Right;  2hrs    URETEROSCOPY Left 1/6/2025    Procedure: URETEROSCOPY;  Surgeon: Shane Diaz MD;  [facility]  Service: Urology;  Laterality: Left;     Problem List[2]  Review of Systems      Objective:      There were no vitals taken for this visit.  Estimated body mass index is 25.85 kg/m² as calculated from the following:    Height as of 2/24/25: 5' 4" (1.626 m).    Weight as of 2/24/25: 68.3 kg (150 lb 9.2 oz).  Physical Exam      Assessment and Plan:           Problem List Items Addressed This Visit       Urothelial carcinoma of kidney, right - Primary    Malignant neoplasm of bladder       Follow up:       Shane Diaz             [1]   Social History  Socioeconomic History    Marital status: Other   Tobacco Use    Smoking status: Never    Smokeless tobacco: Never   Substance and Sexual Activity    Alcohol use: Never    Drug use: Never    Sexual activity: Not Currently     Social Drivers of Health     Financial Resource Strain: Low Risk  (7/1/2024)    Overall Financial Resource Strain (CARDIA)     Difficulty of Paying Living Expenses: Not hard at all   Food Insecurity: No Food Insecurity (7/1/2024)    Hunger Vital Sign     Worried About Running Out of Food in the Last Year: Never true     Ran Out of Food in the Last Year: Never true   Physical Activity: Insufficiently Active (7/1/2024)    Exercise Vital Sign     Days of Exercise per Week: 3 days     Minutes of Exercise per Session: 20 min   Stress: No Stress Concern Present (7/1/2024)    [employer] [place] [employer] - Occupational Stress Questionnaire     Feeling of Stress : Not at all   Housing Stability: Unknown (7/1/2024)    Housing Stability Vital Sign     Unable to Pay for Housing in the Last Year: No   [2]   Patient Active Problem List  Diagnosis    Endometrial ca    Essential hypertension    Liver hemangioma    s/p RA-TLH/BSO/BPLD, omentectomy    Neuropathy due to " chemotherapeutic drug    Urothelial carcinoma of kidney, right    Malignant neoplasm of bladder    Gastroesophageal reflux disease    History of stroke

## 2025-03-18 ENCOUNTER — OFFICE VISIT (OUTPATIENT)
Dept: UROLOGY | Facility: CLINIC | Age: 80
End: 2025-03-18
Payer: MEDICARE

## 2025-03-18 VITALS
DIASTOLIC BLOOD PRESSURE: 66 MMHG | SYSTOLIC BLOOD PRESSURE: 151 MMHG | BODY MASS INDEX: 26.69 KG/M2 | WEIGHT: 156.31 LBS | HEIGHT: 64 IN | HEART RATE: 64 BPM

## 2025-03-18 DIAGNOSIS — C67.2 MALIGNANT NEOPLASM OF LATERAL WALL OF URINARY BLADDER: ICD-10-CM

## 2025-03-18 DIAGNOSIS — C64.1 UROTHELIAL CARCINOMA OF KIDNEY, RIGHT: Primary | ICD-10-CM

## 2025-03-18 PROCEDURE — 99999 PR PBB SHADOW E&M-EST. PATIENT-LVL III: CPT | Mod: PBBFAC,,, | Performed by: UROLOGY

## 2025-03-18 PROCEDURE — 99499 UNLISTED E&M SERVICE: CPT | Mod: S$GLB,,, | Performed by: UROLOGY

## 2025-03-20 ENCOUNTER — PATIENT MESSAGE (OUTPATIENT)
Dept: UROLOGY | Facility: CLINIC | Age: 80
End: 2025-03-20
Payer: MEDICARE

## 2025-03-27 ENCOUNTER — OFFICE VISIT (OUTPATIENT)
Dept: UROLOGY | Facility: CLINIC | Age: 80
End: 2025-03-27
Payer: MEDICARE

## 2025-03-27 VITALS
HEIGHT: 64 IN | DIASTOLIC BLOOD PRESSURE: 76 MMHG | BODY MASS INDEX: 26.46 KG/M2 | SYSTOLIC BLOOD PRESSURE: 132 MMHG | WEIGHT: 155 LBS | HEART RATE: 58 BPM

## 2025-03-27 DIAGNOSIS — C64.1 UROTHELIAL CARCINOMA OF KIDNEY, RIGHT: ICD-10-CM

## 2025-03-27 DIAGNOSIS — C67.2 MALIGNANT NEOPLASM OF LATERAL WALL OF URINARY BLADDER: Primary | ICD-10-CM

## 2025-03-27 LAB
BILIRUB SERPL-MCNC: NORMAL MG/DL
BLOOD URINE, POC: NORMAL
CLARITY, POC UA: CLEAR
COLOR, POC UA: YELLOW
GLUCOSE UR QL STRIP: NORMAL
KETONES UR QL STRIP: NORMAL
LEUKOCYTE ESTERASE URINE, POC: NORMAL
NITRITE, POC UA: NORMAL
PH, POC UA: 7
PROTEIN, POC: NORMAL
SPECIFIC GRAVITY, POC UA: 1.01
UROBILINOGEN, POC UA: 0.2

## 2025-03-27 PROCEDURE — 1101F PT FALLS ASSESS-DOCD LE1/YR: CPT | Mod: CPTII,S$GLB,,

## 2025-03-27 PROCEDURE — 3075F SYST BP GE 130 - 139MM HG: CPT | Mod: CPTII,S$GLB,,

## 2025-03-27 PROCEDURE — 99214 OFFICE O/P EST MOD 30 MIN: CPT | Mod: S$GLB,,,

## 2025-03-27 PROCEDURE — 81002 URINALYSIS NONAUTO W/O SCOPE: CPT | Mod: S$GLB,,,

## 2025-03-27 PROCEDURE — 99999 PR PBB SHADOW E&M-EST. PATIENT-LVL III: CPT | Mod: PBBFAC,,,

## 2025-03-27 PROCEDURE — 1159F MED LIST DOCD IN RCRD: CPT | Mod: CPTII,S$GLB,,

## 2025-03-27 PROCEDURE — 3288F FALL RISK ASSESSMENT DOCD: CPT | Mod: CPTII,S$GLB,,

## 2025-03-27 PROCEDURE — 1126F AMNT PAIN NOTED NONE PRSNT: CPT | Mod: CPTII,S$GLB,,

## 2025-03-27 PROCEDURE — 1157F ADVNC CARE PLAN IN RCRD: CPT | Mod: CPTII,S$GLB,,

## 2025-03-27 PROCEDURE — 3078F DIAST BP <80 MM HG: CPT | Mod: CPTII,S$GLB,,

## 2025-03-27 NOTE — PROGRESS NOTES
CHIEF COMPLAINT:  BCG dose 1      HISTORY OF PRESENTING ILLINESS:  Gita García is a 79 y.o. female is an established patient of Dr Diaz's who she last saw on 3/18/2025. She a history of endometrial carcinoma. Recently had hematuria. CT scan 10/18/2021 showed right renal pelvis filling defect.  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 Not a complete ablatation per notes.   treated for bladder ca by Dr. Diaz [phone].   6 rounds of chemo, 5 rounds of radiation Oct 2019-Jan 2020.     Denies hematuria. Some left abd pain. Urethral caruncle.     Sister had kidney cancer 25 years ago. Nephrectomy.  Scoliosis. Hysterectomy, no other surgery.  Extensive review and interpretation of imaging and laboratory studies. Explained findings to patient and the impact on treatment plan.   Had ureteroscopy 1/3 2022 with ablation of right upper tract tumor. Cytology from barbotage specimen HG.     3/22/2022- Robotic nephroureterectomy with bladder cuff + intravesical mitomycin.  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 [id number].  Cystogram shows no leak.     4/5/2022- walking more daily. Appetite good. She can resume driving. No lifting over 15 pounds for 3 more weeks.  Has used no opioids postop.     1/23/2025- TURBT (transurethral resection of bladder tumor) on 1/6/2025. Path- HGTa, no detrusor present.  MRIU-11/29/2024- no recurrence or metastasis.  CT chest-11/29/2024-no metastasis.  Discussed path report, re-resection, BCG.     3/18/2025- TURBT (transurethral resection of bladder tumor) 2/24/2025-pT0.    3/27/2025- She is here today for her Induction BCG x 5.       Induction BCG; dose 1 of 5    REVIEW OF SYSTEMS:  Review of Systems   Constitutional:  Negative for chills and fever.   Gastrointestinal:  Negative for abdominal pain, nausea and vomiting.   Genitourinary:  Negative for dysuria,  flank pain, frequency, hematuria and urgency.         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       Past Surgical History:   Procedure Laterality Date    ABLATION OF NEOPLASM OF URETER USING LASER Right 1/3/2022    Procedure: ABLATION, NEOPLASM, URETER, USING LASER;  Surgeon: Shane Diaz MD;  Location: [facility] OR 1ST FLR;  Service: Urology;  Laterality: Right;    CYSTOSCOPY N/A 1/3/2022    Procedure: CYSTOSCOPY;  Surgeon: Shane Diaz MD;  Location: [facility] OR 1ST FLR;  Service: Urology;  Laterality: N/A;    CYSTOSCOPY N/A 1/6/2025    Procedure: CYSTOSCOPY;  Surgeon: Shane Diaz MD;  Location: [facility] OR 1ST FLR;  Service: Urology;  Laterality: N/A;    CYSTOSCOPY  2/24/2025    Procedure: CYSTOSCOPY;  Surgeon: Shane Diaz MD;  Location: [facility] OR 1ST FLR;  Service: Urology;;    DILATION AND CURETTAGE OF UTERUS      GI scope  2016    LAPAROTOMY N/A 7/8/2019    Procedure: LAPAROTOMY;  Surgeon: Lokesh Carias MD;  Location: [facility] OR 2ND FLR;  Service: OB/GYN;  Laterality: N/A;  mini    NEPHROSCOPY Left 1/6/2025    Procedure: NEPHROSCOPY;  Surgeon: Shane Diaz MD;  Location: [facility] OR 1ST FLR;  Service: Urology;  Laterality: Left;    OMENTECTOMY N/A 7/8/2019    Procedure: OMENTECTOMY;  Surgeon: Lokesh Carias MD;  Location: [facility] OR 2ND FLR;  Service: OB/GYN;  Laterality: N/A;    RETROGRADE PYELOGRAPHY Left 1/6/2025    Procedure: PYELOGRAM, RETROGRADE;  Surgeon: Shane Diaz MD;  Location: [facility] OR 1ST FLR;  Service: Urology;  Laterality: Left;     ROBOT-ASSISTED LAPAROSCOPIC ABDOMINAL HYSTERECTOMY USING DA SIRISHA XI N/A 7/8/2019    Procedure: XI ROBOTIC HYSTERECTOMY;  Surgeon: Lokesh Carias MD;  Location: [facility] OR 2ND FLR;  Service: OB/GYN;  Laterality: N/A;    ROBOT-ASSISTED LAPAROSCOPIC PELVIC LYMPHADENECTOMY USING DA SIRISHA XI Bilateral 7/8/2019    Procedure: XI ROBOTIC LYMPHADENECTOMY, PELVIC;  Surgeon: Lokesh Carias MD;  Location: [facility] OR 2ND FLR;  Service: OB/GYN;  Laterality: Bilateral;    ROBOT-ASSISTED LAPAROSCOPIC RETROPERITONEAL LYMPHADENECTOMY USING DA SIRISHA XI N/A 7/8/2019    Procedure: XI ROBOTIC LYMPHADENECTOMY, RETROPERITONEUM;  Surgeon: Lokesh Carias MD;  Location: [facility] OR 2ND FLR;  Service: OB/GYN;  Laterality: N/A;    ROBOT-ASSISTED LAPAROSCOPIC SALPINGO-OOPHORECTOMY USING DA SIRISHA XI Bilateral 7/8/2019    Procedure: XI ROBOTIC SALPINGO-OOPHORECTOMY;  Surgeon: Lokesh Carias MD;  Location: [facility] OR 2ND FLR;  Service: OB/GYN;  Laterality: Bilateral;    ROBOT-ASSISTED LAPAROSCOPIC SURGICAL REMOVAL OF KIDNEY AND URETER USING DA SIRISHA XI Right 3/14/2022    Procedure: XI ROBOTIC NEPHROURETERECTOMY/ WITH BLADDER CUFF;  Surgeon: Shane Diaz MD;  Location: [facility] OR 2ND FLR;  Service: Urology;  Laterality: Right;  4hrs    TURBT (TRANSURETHRAL RESECTION OF BLADDER TUMOR) N/A 1/6/2025    Procedure: TURBT (TRANSURETHRAL RESECTION OF BLADDER TUMOR);  Surgeon: Shane Diaz MD;  Location: [facility] OR 1ST FLR;  Service: Urology;  Laterality: N/A;    TURBT (TRANSURETHRAL RESECTION OF BLADDER TUMOR) N/A 2/24/2025    Procedure: TURBT (TRANSURETHRAL RESECTION OF BLADDER TUMOR);  Surgeon: Shane Diaz MD;  Location: [facility] OR 1ST FLR;  Service: Urology;  Laterality: N/A;  surgery clearance    URETEROSCOPY Right 1/3/2022    Procedure: URETEROSCOPY;  Surgeon: Shane Diaz MD;  Location: [facility] OR 1ST FLR;  Service: Urology;  Laterality: Right;  2hrs    URETEROSCOPY Left 1/6/2025    Procedure: URETEROSCOPY;  Surgeon: Shane Diaz  "MD;  Location: [facility] OR [address] [place] [address];  Service: Urology;  Laterality: Left;       Family History   Problem Relation Name Age of Onset    No Known Problems Mother      No Known Problems Father      Colon cancer Sister  53    Breast cancer Sister  64    Kidney cancer Sister      Breast cancer Sister          in her 60s    Skin cancer Sister      Prostate cancer Brother      Stroke Brother      Prostate cancer Brother      Heart disease Brother      Prostate cancer Brother      Heart disease Brother      Ovarian cancer Neg Hx      Uterine cancer Neg Hx      Anesthesia problems Neg Hx         Social History[1]    Allergies:  Asa [aspirin], Dilaudid (pf) [hydromorphone (pf)], and Sulfa (sulfonamide antibiotics)    Medications:  Current Medications[2]    PHYSICAL EXAMINATION:  Physical Exam  Vitals reviewed.   Constitutional:       Appearance: Normal appearance.   HENT:      Head: Normocephalic and atraumatic.   Pulmonary:      Effort: Pulmonary effort is normal. No respiratory distress.   Skin:     General: Skin is warm and dry.      Capillary Refill: Capillary refill takes less than 2 seconds.   Neurological:      General: No focal deficit present.      Mental Status: She is alert.   Psychiatric:         Mood and Affect: Mood normal.         Behavior: Behavior normal.         Thought Content: Thought content normal.           LABS:      In office UA today was free of active infection and visible blood.      No results found for: "PSA", "PSADIAG", "PSATOTAL", "PHIND"    Lab Results   Component Value Date    CREATININE 0.9 11/29/2024    EGFRNORACEVR >60.0 11/29/2024       Induction BCG; dose 1 of 5        IMPRESSION:    Encounter Diagnoses   Name Primary?    Malignant neoplasm of lateral wall of urinary bladder Yes    Urothelial carcinoma of kidney, right          Assessment:       1. Malignant neoplasm of lateral wall of urinary bladder    2. Urothelial carcinoma of kidney, right        Plan:       I spent a total of 30 " minutes on the day of the visit. This includes face to face time and non-face to face time preparing to see the patient (eg, review of tests), obtaining and/or reviewing separately obtained history, documenting clinical information in the electronic or other health record, independently interpreting results and communicating results to the patient/family/caregiver, or care coordinator  10 minutes pre counseling including last installation  10 minutes prep, travel to get medication & installation of the BCG  10 minutes post installation instructions and room clean up  We addressed his UA  Education and recommendations of today's plan of care with the BCG Bladder Installations; including home remedies and needed follow up with PCP.   BCG dose 1 was instilled using sterile and BCG precautions.  Tolerated well   We discussed his Bladder Cancer; discussed after TURBT; cancer was removed.  Discussed BCG and the expectations, benefits, risks, as well as importance of post clean up for 6 hours after the 2 hour urination.  They was given detailed written instructions as well.   Diet modifications; no caffeine the morning of installation; increase water intake at the 2 hour void to flush out.  No sex for 48 hours after installation; use of condom during the 3/6 week installations.  RTC one week for dose 2.  -Pamphlet provided on post instillation care    The visit today is associated with current or anticipated ongoing medical care related to this patient's single serious condition/complex condition.          [1]   Social History  Socioeconomic History    Marital status: Other   Tobacco Use    Smoking status: Never    Smokeless tobacco: Never   Substance and Sexual Activity    Alcohol use: Never    Drug use: Never    Sexual activity: Not Currently     Social Drivers of Health     Financial Resource Strain: Low Risk  (7/1/2024)    Overall Financial Resource Strain (CARDIA)     Difficulty of Paying Living Expenses: Not hard at  all   Food Insecurity: No Food Insecurity (7/1/2024)    Hunger Vital Sign     Worried About Running Out of Food in the Last Year: Never true     Ran Out of Food in the Last Year: Never true   Physical Activity: Insufficiently Active (7/1/2024)    Exercise Vital Sign     Days of Exercise per Week: 3 days     Minutes of Exercise per Session: 20 min   Stress: No Stress Concern Present (7/1/2024)    [employer] - Occupational Stress Questionnaire     Feeling of Stress : Not at all   Housing Stability: Unknown (7/1/2024)    Housing Stability Vital Sign     Unable to Pay for Housing in the Last Year: No   [2]   Current Outpatient Medications:     acetaminophen (TYLENOL) 500 MG tablet, Take 500 mg by mouth every 6 (six) hours as needed for Pain., Disp: , Rfl:     ALPRAZolam (XANAX) 0.25 MG tablet, Take by mouth 2 (two) times daily as needed for Anxiety., Disp: , Rfl:     bisoprolol (ZEBETA) 10 MG tablet, Take 10 mg by mouth once daily., Disp: , Rfl:     EScitalopram oxalate (LEXAPRO) 10 MG tablet, Take 10 mg by mouth once daily., Disp: , Rfl:     hydroCHLOROthiazide (HYDRODIURIL) 12.5 MG Tab, Take 25 mg by mouth once daily., Disp: , Rfl:     ketorolac (TORADOL) 10 mg tablet, Take 1 tablet (10 mg total) by mouth every 6 (six) hours as needed for Pain., Disp: 12 tablet, Rfl: 0    mv-min/FA/vit K/lycop/lut/zeax (OCUVITE EYE PLUS MULTI ORAL), Take 1 tablet by mouth once daily. , Disp: , Rfl:     olmesartan (BENICAR) 40 MG tablet, Take 40 mg by mouth once daily., Disp: , Rfl:     ondansetron (ZOFRAN-ODT) 4 MG TbDL, DISSOLVE 1 tablet (4 mg total) by mouth every 8 (eight) hours as needed., Disp: 10 tablet, Rfl: 0    oxybutynin (DITROPAN) 5 MG Tab, Take 1 tablet (5 mg total) by mouth 3 (three) times daily as needed (bladder spasms)., Disp: 30 tablet, Rfl: 0    pantoprazole (PROTONIX) 40 MG tablet, Take 40 mg by mouth once daily., Disp: , Rfl:     potassium chloride SA (K-DUR,KLOR-CON) 20 MEQ tablet,  Take 20 mEq by mouth once daily., Disp: , Rfl:     raloxifene (EVISTA) 60 mg tablet, Take 60 mg by mouth every evening., Disp: , Rfl:     rosuvastatin (CRESTOR) 5 MG tablet, Take 5 mg by mouth every evening., Disp: , Rfl:     sucralfate (CARAFATE) 1 gram tablet, Take 1 g by mouth 2 (two) times daily as needed., Disp: , Rfl:     Current Facility-Administered Medications:     BCG live (HAYDEE) 50 mg in 0.9% NaCl 50 mL bladder instillation, 50 mg, Bladder Instillation, Weekly,     BCG live (HAYDEE) 50 mg in 0.9% NaCl 50 mL bladder instillation, 50 mg, Intravesical, Once, Michelle Cristobal, NP

## 2025-04-03 ENCOUNTER — OFFICE VISIT (OUTPATIENT)
Dept: UROLOGY | Facility: CLINIC | Age: 80
End: 2025-04-03
Payer: MEDICARE

## 2025-04-03 VITALS
HEART RATE: 62 BPM | HEIGHT: 63 IN | BODY MASS INDEX: 27.11 KG/M2 | WEIGHT: 153 LBS | SYSTOLIC BLOOD PRESSURE: 133 MMHG | DIASTOLIC BLOOD PRESSURE: 78 MMHG

## 2025-04-03 DIAGNOSIS — C64.1 UROTHELIAL CARCINOMA OF KIDNEY, RIGHT: Primary | ICD-10-CM

## 2025-04-03 LAB
BILIRUB SERPL-MCNC: NORMAL MG/DL
BLOOD URINE, POC: NORMAL
CLARITY, POC UA: CLEAR
COLOR, POC UA: YELLOW
GLUCOSE UR QL STRIP: NORMAL
KETONES UR QL STRIP: NORMAL
LEUKOCYTE ESTERASE URINE, POC: NORMAL
NITRITE, POC UA: NORMAL
PH, POC UA: 5.5
PROTEIN, POC: NORMAL
SPECIFIC GRAVITY, POC UA: 1.01
UROBILINOGEN, POC UA: 0.2

## 2025-04-03 PROCEDURE — 99999 PR PBB SHADOW E&M-EST. PATIENT-LVL IV: CPT | Mod: PBBFAC,,,

## 2025-04-03 NOTE — PROGRESS NOTES
CHIEF COMPLAINT:  BCG dose 2      HISTORY OF PRESENTING ILLINESS:  Gita García is a 79 y.o. female is an established patient of Dr Diaz's who she last saw on 3/18/2025. She a history of endometrial carcinoma. Recently had hematuria. CT scan 10/18/2021 showed right renal pelvis filling defect.  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 Not a complete ablatation per notes.   treated for bladder ca by Dr. Diaz [phone].   6 rounds of chemo, 5 rounds of radiation Oct 2019-Jan 2020.     Denies hematuria. Some left abd pain. Urethral caruncle.     Sister had kidney cancer 25 years ago. Nephrectomy.  Scoliosis. Hysterectomy, no other surgery.  Extensive review and interpretation of imaging and laboratory studies. Explained findings to patient and the impact on treatment plan.   Had ureteroscopy 1/3 2022 with ablation of right upper tract tumor. Cytology from barbotage specimen HG.     3/22/2022- Robotic nephroureterectomy with bladder cuff + intravesical mitomycin.  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 [id number].  Cystogram shows no leak.     4/5/2022- walking more daily. Appetite good. She can resume driving. No lifting over 15 pounds for 3 more weeks.  Has used no opioids postop.     1/23/2025- TURBT (transurethral resection of bladder tumor) on 1/6/2025. Path- HGTa, no detrusor present.  MRIU-11/29/2024- no recurrence or metastasis.  CT chest-11/29/2024-no metastasis.  Discussed path report, re-resection, BCG.     3/18/2025- TURBT (transurethral resection of bladder tumor) 2/24/2025-pT0.    3/27/2025- She is here today for her Induction BCG x 5.       Induction BCG; dose 2 of 5    REVIEW OF SYSTEMS:  Review of Systems   Constitutional:  Negative for chills and fever.   Gastrointestinal:  Negative for abdominal pain, nausea and vomiting.   Genitourinary:  Negative for dysuria,  flank pain, frequency, hematuria and urgency.         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       Past Surgical History:   Procedure Laterality Date    ABLATION OF NEOPLASM OF URETER USING LASER Right 1/3/2022    Procedure: ABLATION, NEOPLASM, URETER, USING LASER;  Surgeon: Shane Diaz MD;  Location: [facility] OR 1ST FLR;  Service: Urology;  Laterality: Right;    CYSTOSCOPY N/A 1/3/2022    Procedure: CYSTOSCOPY;  Surgeon: Shane Diaz MD;  Location: [facility] OR 1ST FLR;  Service: Urology;  Laterality: N/A;    CYSTOSCOPY N/A 1/6/2025    Procedure: CYSTOSCOPY;  Surgeon: Shane Diaz MD;  Location: [facility] OR 1ST FLR;  Service: Urology;  Laterality: N/A;    CYSTOSCOPY  2/24/2025    Procedure: CYSTOSCOPY;  Surgeon: Shane Diaz MD;  Location: [facility] OR 1ST FLR;  Service: Urology;;    DILATION AND CURETTAGE OF UTERUS      GI scope  2016    LAPAROTOMY N/A 7/8/2019    Procedure: LAPAROTOMY;  Surgeon: Lokesh Carias MD;  Location: [facility] OR 2ND FLR;  Service: OB/GYN;  Laterality: N/A;  mini    NEPHROSCOPY Left 1/6/2025    Procedure: NEPHROSCOPY;  Surgeon: Shane Diaz MD;  Location: [facility] OR 1ST FLR;  Service: Urology;  Laterality: Left;    OMENTECTOMY N/A 7/8/2019    Procedure: OMENTECTOMY;  Surgeon: Lokesh Carias MD;  Location: [facility] OR 2ND FLR;  Service: OB/GYN;  Laterality: N/A;    RETROGRADE PYELOGRAPHY Left 1/6/2025    Procedure: PYELOGRAM, RETROGRADE;  Surgeon: Shane Diaz MD;  Location: [facility] OR 1ST FLR;  Service: Urology;  Laterality: Left;     ROBOT-ASSISTED LAPAROSCOPIC ABDOMINAL HYSTERECTOMY USING DA SIRISHA XI N/A 7/8/2019    Procedure: XI ROBOTIC HYSTERECTOMY;  Surgeon: Lokesh Carias MD;  Location: [facility] OR 2ND FLR;  Service: OB/GYN;  Laterality: N/A;    ROBOT-ASSISTED LAPAROSCOPIC PELVIC LYMPHADENECTOMY USING DA SIRISHA XI Bilateral 7/8/2019    Procedure: XI ROBOTIC LYMPHADENECTOMY, PELVIC;  Surgeon: Lokesh Carias MD;  Location: [facility] OR 2ND FLR;  Service: OB/GYN;  Laterality: Bilateral;    ROBOT-ASSISTED LAPAROSCOPIC RETROPERITONEAL LYMPHADENECTOMY USING DA SIRISHA XI N/A 7/8/2019    Procedure: XI ROBOTIC LYMPHADENECTOMY, RETROPERITONEUM;  Surgeon: Lokesh Carias MD;  Location: [facility] OR 2ND FLR;  Service: OB/GYN;  Laterality: N/A;    ROBOT-ASSISTED LAPAROSCOPIC SALPINGO-OOPHORECTOMY USING DA SIRISHA XI Bilateral 7/8/2019    Procedure: XI ROBOTIC SALPINGO-OOPHORECTOMY;  Surgeon: Lokesh Carias MD;  Location: [facility] OR 2ND FLR;  Service: OB/GYN;  Laterality: Bilateral;    ROBOT-ASSISTED LAPAROSCOPIC SURGICAL REMOVAL OF KIDNEY AND URETER USING DA SIRISHA XI Right 3/14/2022    Procedure: XI ROBOTIC NEPHROURETERECTOMY/ WITH BLADDER CUFF;  Surgeon: Shane Diaz MD;  Location: [facility] OR 2ND FLR;  Service: Urology;  Laterality: Right;  4hrs    TURBT (TRANSURETHRAL RESECTION OF BLADDER TUMOR) N/A 1/6/2025    Procedure: TURBT (TRANSURETHRAL RESECTION OF BLADDER TUMOR);  Surgeon: Shane Diaz MD;  Location: [facility] OR 1ST FLR;  Service: Urology;  Laterality: N/A;    TURBT (TRANSURETHRAL RESECTION OF BLADDER TUMOR) N/A 2/24/2025    Procedure: TURBT (TRANSURETHRAL RESECTION OF BLADDER TUMOR);  Surgeon: Shane Diaz MD;  Location: [facility] OR 1ST FLR;  Service: Urology;  Laterality: N/A;  surgery clearance    URETEROSCOPY Right 1/3/2022    Procedure: URETEROSCOPY;  Surgeon: Shane Diaz MD;  Location: [facility] OR 1ST FLR;  Service: Urology;  Laterality: Right;  2hrs    URETEROSCOPY Left 1/6/2025    Procedure: URETEROSCOPY;  Surgeon: Shane Diaz  "MD;  Location: [facility] OR [address] [place] [address];  Service: Urology;  Laterality: Left;       Family History   Problem Relation Name Age of Onset    No Known Problems Mother      No Known Problems Father      Colon cancer Sister  53    Breast cancer Sister  64    Kidney cancer Sister      Breast cancer Sister          in her 60s    Skin cancer Sister      Prostate cancer Brother      Stroke Brother      Prostate cancer Brother      Heart disease Brother      Prostate cancer Brother      Heart disease Brother      Ovarian cancer Neg Hx      Uterine cancer Neg Hx      Anesthesia problems Neg Hx         Social History[1]    Allergies:  Asa [aspirin], Dilaudid (pf) [hydromorphone (pf)], and Sulfa (sulfonamide antibiotics)    Medications:  Current Medications[2]    PHYSICAL EXAMINATION:  Physical Exam  Vitals reviewed.   Constitutional:       Appearance: Normal appearance.   HENT:      Head: Normocephalic and atraumatic.   Pulmonary:      Effort: Pulmonary effort is normal. No respiratory distress.   Skin:     General: Skin is warm and dry.      Capillary Refill: Capillary refill takes less than 2 seconds.   Neurological:      General: No focal deficit present.      Mental Status: She is alert.   Psychiatric:         Mood and Affect: Mood normal.         Behavior: Behavior normal.         Thought Content: Thought content normal.           LABS:      In office UA today was free of active infection and visible blood.      No results found for: "PSA", "PSADIAG", "PSATOTAL", "PHIND"    Lab Results   Component Value Date    CREATININE 0.9 11/29/2024    EGFRNORACEVR >60.0 11/29/2024       Induction BCG; dose 2 of 5        IMPRESSION:    Encounter Diagnoses   Name Primary?    Urothelial carcinoma of kidney, right Yes         Assessment:       1. Urothelial carcinoma of kidney, right        Plan:       I spent a total of 30 minutes on the day of the visit. This includes face to face time and non-face to face time preparing to see the patient (eg, " review of tests), obtaining and/or reviewing separately obtained history, documenting clinical information in the electronic or other health record, independently interpreting results and communicating results to the patient/family/caregiver, or care coordinator  10 minutes pre counseling including last installation  10 minutes prep, travel to get medication & installation of the BCG  10 minutes post installation instructions and room clean up  We addressed his UA  Education and recommendations of today's plan of care with the BCG Bladder Installations; including home remedies and needed follow up with PCP.   BCG dose 2 was instilled using sterile and BCG precautions.  Tolerated well   We discussed his Bladder Cancer; discussed after TURBT; cancer was removed.  Discussed BCG and the expectations, benefits, risks, as well as importance of post clean up for 6 hours after the 2 hour urination.  They was given detailed written instructions as well.   Diet modifications; no caffeine the morning of installation; increase water intake at the 2 hour void to flush out.  No sex for 48 hours after installation; use of condom during the 3/6 week installations.  RTC one week for dose 3.    The visit today is associated with current or anticipated ongoing medical care related to this patient's single serious condition/complex condition.            [1]   Social History  Socioeconomic History    Marital status: Other   Tobacco Use    Smoking status: Never    Smokeless tobacco: Never   Substance and Sexual Activity    Alcohol use: Never    Drug use: Never    Sexual activity: Not Currently     Social Drivers of Health     Financial Resource Strain: Low Risk  (7/1/2024)    Overall Financial Resource Strain (CARDIA)     Difficulty of Paying Living Expenses: Not hard at all   Food Insecurity: No Food Insecurity (7/1/2024)    Hunger Vital Sign     Worried About Running Out of Food in the Last Year: Never true     Ran Out of Food in the  Last Year: Never true   Physical Activity: Insufficiently Active (7/1/2024)    Exercise Vital Sign     Days of Exercise per Week: 3 days     Minutes of Exercise per Session: 20 min   Stress: No Stress Concern Present (7/1/2024)    [place] [place] [employer] [facility] [employer] [facility] - Occupational Stress Questionnaire     Feeling of Stress : Not at all   Housing Stability: Unknown (7/1/2024)    Housing Stability Vital Sign     Unable to Pay for Housing in the Last Year: No   [2]   Current Outpatient Medications:     acetaminophen (TYLENOL) 500 MG tablet, Take 500 mg by mouth every 6 (six) hours as needed for Pain., Disp: , Rfl:     ALPRAZolam (XANAX) 0.25 MG tablet, Take by mouth 2 (two) times daily as needed for Anxiety., Disp: , Rfl:     bisoprolol (ZEBETA) 10 MG tablet, Take 10 mg by mouth once daily., Disp: , Rfl:     EScitalopram oxalate (LEXAPRO) 10 MG tablet, Take 10 mg by mouth once daily., Disp: , Rfl:     hydroCHLOROthiazide (HYDRODIURIL) 12.5 MG Tab, Take 25 mg by mouth once daily., Disp: , Rfl:     ketorolac (TORADOL) 10 mg tablet, Take 1 tablet (10 mg total) by mouth every 6 (six) hours as needed for Pain., Disp: 12 tablet, Rfl: 0    mv-min/FA/vit K/lycop/lut/zeax (OCUVITE EYE PLUS MULTI ORAL), Take 1 tablet by mouth once daily. , Disp: , Rfl:     olmesartan (BENICAR) 40 MG tablet, Take 40 mg by mouth once daily., Disp: , Rfl:     ondansetron (ZOFRAN-ODT) 4 MG TbDL, DISSOLVE 1 tablet (4 mg total) by mouth every 8 (eight) hours as needed., Disp: 10 tablet, Rfl: 0    oxybutynin (DITROPAN) 5 MG Tab, Take 1 tablet (5 mg total) by mouth 3 (three) times daily as needed (bladder spasms)., Disp: 30 tablet, Rfl: 0    pantoprazole (PROTONIX) 40 MG tablet, Take 40 mg by mouth once daily., Disp: , Rfl:     potassium chloride SA (K-DUR,KLOR-CON) 20 MEQ tablet, Take 20 mEq by mouth once daily., Disp: , Rfl:     raloxifene (EVISTA) 60 mg tablet, Take 60 mg by mouth every evening., Disp: , Rfl:     rosuvastatin (CRESTOR) 5 MG  tablet, Take 5 mg by mouth every evening., Disp: , Rfl:     sucralfate (CARAFATE) 1 gram tablet, Take 1 g by mouth 2 (two) times daily as needed., Disp: , Rfl:     Current Facility-Administered Medications:     BCG live (HAYDEE) 50 mg in 0.9% NaCl 50 mL bladder instillation, 50 mg, Bladder Instillation, Weekly,     BCG live (HAYDEE) 50 mg in 0.9% NaCl 50 mL bladder instillation, 50 mg, Intravesical, Once, Michelle Cristobal, NP

## 2025-04-08 ENCOUNTER — TELEPHONE (OUTPATIENT)
Dept: UROLOGY | Facility: CLINIC | Age: 80
End: 2025-04-08
Payer: MEDICARE

## 2025-04-10 ENCOUNTER — OFFICE VISIT (OUTPATIENT)
Dept: UROLOGY | Facility: CLINIC | Age: 80
End: 2025-04-10
Payer: MEDICARE

## 2025-04-10 VITALS
WEIGHT: 154 LBS | SYSTOLIC BLOOD PRESSURE: 120 MMHG | HEART RATE: 67 BPM | HEIGHT: 64 IN | DIASTOLIC BLOOD PRESSURE: 70 MMHG | BODY MASS INDEX: 26.29 KG/M2

## 2025-04-10 DIAGNOSIS — C64.1 UROTHELIAL CARCINOMA OF KIDNEY, RIGHT: Primary | ICD-10-CM

## 2025-04-10 LAB
BILIRUB SERPL-MCNC: NORMAL MG/DL
BLOOD URINE, POC: NORMAL
CLARITY, POC UA: CLEAR
COLOR, POC UA: YELLOW
GLUCOSE UR QL STRIP: NORMAL
KETONES UR QL STRIP: NORMAL
LEUKOCYTE ESTERASE URINE, POC: NORMAL
NITRITE, POC UA: NORMAL
PH, POC UA: 6.5
PROTEIN, POC: NORMAL
SPECIFIC GRAVITY, POC UA: 1.01
UROBILINOGEN, POC UA: 0.2

## 2025-04-10 PROCEDURE — 99999 PR PBB SHADOW E&M-EST. PATIENT-LVL IV: CPT | Mod: PBBFAC,,,

## 2025-04-10 NOTE — PROGRESS NOTES
CHIEF COMPLAINT:  BCG dose 3      HISTORY OF PRESENTING ILLINESS:  Gita García is a 79 y.o. female is an established patient of Dr Diaz's who she last saw on 3/18/2025. She a history of endometrial carcinoma. Recently had hematuria. CT scan 10/18/2021 showed right renal pelvis filling defect.  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 Not a complete ablatation per notes.   treated for bladder ca by Dr. Diaz [phone].   6 rounds of chemo, 5 rounds of radiation Oct 2019-Jan 2020.     Denies hematuria. Some left abd pain. Urethral caruncle.     Sister had kidney cancer 25 years ago. Nephrectomy.  Scoliosis. Hysterectomy, no other surgery.  Extensive review and interpretation of imaging and laboratory studies. Explained findings to patient and the impact on treatment plan.   Had ureteroscopy 1/3 2022 with ablation of right upper tract tumor. Cytology from barbotage specimen HG.     3/22/2022- Robotic nephroureterectomy with bladder cuff + intravesical mitomycin.  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 [id number].  Cystogram shows no leak.     4/5/2022- walking more daily. Appetite good. She can resume driving. No lifting over 15 pounds for 3 more weeks.  Has used no opioids postop.     1/23/2025- TURBT (transurethral resection of bladder tumor) on 1/6/2025. Path- HGTa, no detrusor present.  MRIU-11/29/2024- no recurrence or metastasis.  CT chest-11/29/2024-no metastasis.  Discussed path report, re-resection, BCG.     3/18/2025- TURBT (transurethral resection of bladder tumor) 2/24/2025-pT0.    3/27/2025- She is here today for her Induction BCG x 5.       Induction BCG; dose 3 of 5  No complaints today. She has had no issues holding her bladder for 2 hours.    REVIEW OF SYSTEMS:  Review of Systems   Constitutional:  Negative for chills and fever.   Gastrointestinal:  Negative for  abdominal pain, nausea and vomiting.   Genitourinary:  Negative for dysuria, flank pain, frequency, hematuria and urgency.         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       Past Surgical History:   Procedure Laterality Date    ABLATION OF NEOPLASM OF URETER USING LASER Right 1/3/2022    Procedure: ABLATION, NEOPLASM, URETER, USING LASER;  Surgeon: Shane Diaz MD;  Location: [facility] OR 1ST FLR;  Service: Urology;  Laterality: Right;    CYSTOSCOPY N/A 1/3/2022    Procedure: CYSTOSCOPY;  Surgeon: Shane Diaz MD;  Location: [facility] OR 1ST FLR;  Service: Urology;  Laterality: N/A;    CYSTOSCOPY N/A 1/6/2025    Procedure: CYSTOSCOPY;  Surgeon: Shane Diaz MD;  Location: [facility] OR 1ST FLR;  Service: Urology;  Laterality: N/A;    CYSTOSCOPY  2/24/2025    Procedure: CYSTOSCOPY;  Surgeon: Shane Diaz MD;  Location: [facility] OR 1ST FLR;  Service: Urology;;    DILATION AND CURETTAGE OF UTERUS      GI scope  2016    LAPAROTOMY N/A 7/8/2019    Procedure: LAPAROTOMY;  Surgeon: Lokesh Carias MD;  Location: [facility] OR 2ND FLR;  Service: OB/GYN;  Laterality: N/A;  mini    NEPHROSCOPY Left 1/6/2025    Procedure: NEPHROSCOPY;  Surgeon: Shane Diaz MD;  Location: [facility] OR 1ST FLR;  Service: Urology;  Laterality: Left;    OMENTECTOMY N/A 7/8/2019    Procedure: OMENTECTOMY;  Surgeon: Lokesh Carias MD;  Location: [facility] OR 2ND FLR;  Service: OB/GYN;  Laterality: N/A;    RETROGRADE PYELOGRAPHY Left 1/6/2025    Procedure: PYELOGRAM, RETROGRADE;  Surgeon: Shane Diaz MD;   Location: [facility] OR 1ST FLR;  Service: Urology;  Laterality: Left;    ROBOT-ASSISTED LAPAROSCOPIC ABDOMINAL HYSTERECTOMY USING DA SIRISHA XI N/A 7/8/2019    Procedure: XI ROBOTIC HYSTERECTOMY;  Surgeon: Lokesh Carias MD;  Location: [facility] OR 2ND FLR;  Service: OB/GYN;  Laterality: N/A;    ROBOT-ASSISTED LAPAROSCOPIC PELVIC LYMPHADENECTOMY USING DA SIRISHA XI Bilateral 7/8/2019    Procedure: XI ROBOTIC LYMPHADENECTOMY, PELVIC;  Surgeon: Lokesh Carias MD;  Location: [facility] OR 2ND FLR;  Service: OB/GYN;  Laterality: Bilateral;    ROBOT-ASSISTED LAPAROSCOPIC RETROPERITONEAL LYMPHADENECTOMY USING DA SIRISHA XI N/A 7/8/2019    Procedure: XI ROBOTIC LYMPHADENECTOMY, RETROPERITONEUM;  Surgeon: Lokesh Carias MD;  Location: [facility] OR 2ND FLR;  Service: OB/GYN;  Laterality: N/A;    ROBOT-ASSISTED LAPAROSCOPIC SALPINGO-OOPHORECTOMY USING DA SIRISHA XI Bilateral 7/8/2019    Procedure: XI ROBOTIC SALPINGO-OOPHORECTOMY;  Surgeon: Lokesh Carias MD;  Location: [facility] OR 2ND FLR;  Service: OB/GYN;  Laterality: Bilateral;    ROBOT-ASSISTED LAPAROSCOPIC SURGICAL REMOVAL OF KIDNEY AND URETER USING DA SIRISHA XI Right 3/14/2022    Procedure: XI ROBOTIC NEPHROURETERECTOMY/ WITH BLADDER CUFF;  Surgeon: Shane Diaz MD;  Location: [facility] OR 2ND FLR;  Service: Urology;  Laterality: Right;  4hrs    TURBT (TRANSURETHRAL RESECTION OF BLADDER TUMOR) N/A 1/6/2025    Procedure: TURBT (TRANSURETHRAL RESECTION OF BLADDER TUMOR);  Surgeon: Shane Diaz MD;  Location: [facility] OR 1ST FLR;  Service: Urology;  Laterality: N/A;    TURBT (TRANSURETHRAL RESECTION OF BLADDER TUMOR) N/A 2/24/2025    Procedure: TURBT (TRANSURETHRAL RESECTION OF BLADDER TUMOR);  Surgeon: Shane Diaz MD;  Location: [facility] OR 1ST FLR;  Service: Urology;  Laterality: N/A;  surgery clearance    URETEROSCOPY Right 1/3/2022    Procedure: URETEROSCOPY;  Surgeon: Shane Diaz MD;  Location: [facility] OR 1ST FLR;  Service: Urology;  Laterality: Right;  2hrs    URETEROSCOPY Left  "1/6/2025    Procedure: URETEROSCOPY;  Surgeon: Shane Diaz MD;  Location: [facility] OR [address] [place] [address];  Service: Urology;  Laterality: Left;       Family History   Problem Relation Name Age of Onset    No Known Problems Mother      No Known Problems Father      Colon cancer Sister  53    Breast cancer Sister  64    Kidney cancer Sister      Breast cancer Sister          in her 60s    Skin cancer Sister      Prostate cancer Brother      Stroke Brother      Prostate cancer Brother      Heart disease Brother      Prostate cancer Brother      Heart disease Brother      Ovarian cancer Neg Hx      Uterine cancer Neg Hx      Anesthesia problems Neg Hx         Social History[1]    Allergies:  Asa [aspirin], Dilaudid (pf) [hydromorphone (pf)], and Sulfa (sulfonamide antibiotics)    Medications:  Current Medications[2]    PHYSICAL EXAMINATION:  Physical Exam  Vitals reviewed.   Constitutional:       Appearance: Normal appearance.   HENT:      Head: Normocephalic and atraumatic.   Pulmonary:      Effort: Pulmonary effort is normal. No respiratory distress.   Skin:     General: Skin is warm and dry.      Capillary Refill: Capillary refill takes less than 2 seconds.   Neurological:      General: No focal deficit present.      Mental Status: She is alert.   Psychiatric:         Mood and Affect: Mood normal.         Behavior: Behavior normal.         Thought Content: Thought content normal.           LABS:      In office UA today was free of active infection and visible blood.      No results found for: "PSA", "PSADIAG", "PSATOTAL", "PHIND"    Lab Results   Component Value Date    CREATININE 0.9 11/29/2024    EGFRNORACEVR >60.0 11/29/2024       Induction BCG; dose 3 of 5        IMPRESSION:    Encounter Diagnoses   Name Primary?    Urothelial carcinoma of kidney, right Yes         Assessment:       1. Urothelial carcinoma of kidney, right        Plan:       I spent a total of 30 minutes on the day of the visit. This includes face to " face time and non-face to face time preparing to see the patient (eg, review of tests), obtaining and/or reviewing separately obtained history, documenting clinical information in the electronic or other health record, independently interpreting results and communicating results to the patient/family/caregiver, or care coordinator  10 minutes pre counseling including last installation  10 minutes prep, travel to get medication & installation of the BCG  10 minutes post installation instructions and room clean up  We addressed his UA  Education and recommendations of today's plan of care with the BCG Bladder Installations; including home remedies and needed follow up with PCP.   BCG dose 3 was instilled using sterile and BCG precautions.  Tolerated well   We discussed his Bladder Cancer; discussed after TURBT; cancer was removed.  Discussed BCG and the expectations, benefits, risks, as well as importance of post clean up for 6 hours after the 2 hour urination.  They was given detailed written instructions as well.   Diet modifications; no caffeine the morning of installation; increase water intake at the 2 hour void to flush out.  No sex for 48 hours after installation; use of condom during the 3/6 week installations.  RTC one week for dose 4.    The visit today is associated with current or anticipated ongoing medical care related to this patient's single serious condition/complex condition.              [1]   Social History  Socioeconomic History    Marital status: Other   Tobacco Use    Smoking status: Never    Smokeless tobacco: Never   Substance and Sexual Activity    Alcohol use: Never    Drug use: Never    Sexual activity: Not Currently     Social Drivers of Health     Financial Resource Strain: Low Risk  (7/1/2024)    Overall Financial Resource Strain (CARDIA)     Difficulty of Paying Living Expenses: Not hard at all   Food Insecurity: No Food Insecurity (7/1/2024)    Hunger Vital Sign     Worried About  Running Out of Food in the Last Year: Never true     Ran Out of Food in the Last Year: Never true   Physical Activity: Insufficiently Active (7/1/2024)    Exercise Vital Sign     Days of Exercise per Week: 3 days     Minutes of Exercise per Session: 20 min   Stress: No Stress Concern Present (7/1/2024)    [employer] - Occupational Stress Questionnaire     Feeling of Stress : Not at all   Housing Stability: Unknown (7/1/2024)    Housing Stability Vital Sign     Unable to Pay for Housing in the Last Year: No   [2]   Current Outpatient Medications:     acetaminophen (TYLENOL) 500 MG tablet, Take 500 mg by mouth every 6 (six) hours as needed for Pain., Disp: , Rfl:     ALPRAZolam (XANAX) 0.25 MG tablet, Take by mouth 2 (two) times daily as needed for Anxiety., Disp: , Rfl:     bisoprolol (ZEBETA) 10 MG tablet, Take 10 mg by mouth once daily., Disp: , Rfl:     EScitalopram oxalate (LEXAPRO) 10 MG tablet, Take 10 mg by mouth once daily., Disp: , Rfl:     hydroCHLOROthiazide (HYDRODIURIL) 12.5 MG Tab, Take 25 mg by mouth once daily., Disp: , Rfl:     ketorolac (TORADOL) 10 mg tablet, Take 1 tablet (10 mg total) by mouth every 6 (six) hours as needed for Pain., Disp: 12 tablet, Rfl: 0    mv-min/FA/vit K/lycop/lut/zeax (OCUVITE EYE PLUS MULTI ORAL), Take 1 tablet by mouth once daily. , Disp: , Rfl:     olmesartan (BENICAR) 40 MG tablet, Take 40 mg by mouth once daily., Disp: , Rfl:     ondansetron (ZOFRAN-ODT) 4 MG TbDL, DISSOLVE 1 tablet (4 mg total) by mouth every 8 (eight) hours as needed., Disp: 10 tablet, Rfl: 0    oxybutynin (DITROPAN) 5 MG Tab, Take 1 tablet (5 mg total) by mouth 3 (three) times daily as needed (bladder spasms)., Disp: 30 tablet, Rfl: 0    pantoprazole (PROTONIX) 40 MG tablet, Take 40 mg by mouth once daily., Disp: , Rfl:     potassium chloride SA (K-DUR,KLOR-CON) 20 MEQ tablet, Take 20 mEq by mouth once daily., Disp: , Rfl:     raloxifene (EVISTA) 60 mg tablet, Take 60  mg by mouth every evening., Disp: , Rfl:     rosuvastatin (CRESTOR) 5 MG tablet, Take 5 mg by mouth every evening., Disp: , Rfl:     sucralfate (CARAFATE) 1 gram tablet, Take 1 g by mouth 2 (two) times daily as needed., Disp: , Rfl:     Current Facility-Administered Medications:     BCG live (HAYDEE) 50 mg in 0.9% NaCl 50 mL bladder instillation, 50 mg, Bladder Instillation, Weekly,     BCG live (HAYDEE) 50 mg in 0.9% NaCl 50 mL bladder instillation, 50 mg, Intravesical, Once, Michelle Cristobal, NP

## 2025-04-15 ENCOUNTER — TELEPHONE (OUTPATIENT)
Dept: UROLOGY | Facility: CLINIC | Age: 80
End: 2025-04-15
Payer: MEDICARE

## 2025-04-15 NOTE — TELEPHONE ENCOUNTER
This call is to remind you of your upcoming appointment on 4/17/2025  at 10:20 am located on the 4th floor clinic [facility] on [facility].  Thanks VS

## 2025-04-17 ENCOUNTER — OFFICE VISIT (OUTPATIENT)
Dept: UROLOGY | Facility: CLINIC | Age: 80
End: 2025-04-17
Payer: MEDICARE

## 2025-04-17 VITALS
WEIGHT: 157 LBS | HEIGHT: 64 IN | SYSTOLIC BLOOD PRESSURE: 141 MMHG | BODY MASS INDEX: 26.8 KG/M2 | HEART RATE: 64 BPM | DIASTOLIC BLOOD PRESSURE: 75 MMHG

## 2025-04-17 DIAGNOSIS — C64.1 UROTHELIAL CARCINOMA OF KIDNEY, RIGHT: Primary | ICD-10-CM

## 2025-04-17 DIAGNOSIS — C67.9 MALIGNANT NEOPLASM OF URINARY BLADDER, UNSPECIFIED SITE: ICD-10-CM

## 2025-04-17 PROCEDURE — 99999 PR PBB SHADOW E&M-EST. PATIENT-LVL IV: CPT | Mod: PBBFAC,,,

## 2025-04-17 NOTE — PROGRESS NOTES
CHIEF COMPLAINT:  BCG dose 4      HISTORY OF PRESENTING ILLINESS:  Gita García is a 79 y.o. female is an established patient of Dr Diaz's who she last saw on 3/18/2025. She a history of endometrial carcinoma. Recently had hematuria. CT scan 10/18/2021 showed right renal pelvis filling defect.  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 Not a complete ablatation per notes.   treated for bladder ca by Dr. Diaz [phone].   6 rounds of chemo, 5 rounds of radiation Oct 2019-Jan 2020.     Denies hematuria. Some left abd pain. Urethral caruncle.     Sister had kidney cancer 25 years ago. Nephrectomy.  Scoliosis. Hysterectomy, no other surgery.  Extensive review and interpretation of imaging and laboratory studies. Explained findings to patient and the impact on treatment plan.   Had ureteroscopy 1/3 2022 with ablation of right upper tract tumor. Cytology from barbotage specimen HG.     3/22/2022- Robotic nephroureterectomy with bladder cuff + intravesical mitomycin.  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 [id number].  Cystogram shows no leak.     4/5/2022- walking more daily. Appetite good. She can resume driving. No lifting over 15 pounds for 3 more weeks.  Has used no opioids postop.     1/23/2025- TURBT (transurethral resection of bladder tumor) on 1/6/2025. Path- HGTa, no detrusor present.  MRIU-11/29/2024- no recurrence or metastasis.  CT chest-11/29/2024-no metastasis.  Discussed path report, re-resection, BCG.     3/18/2025- TURBT (transurethral resection of bladder tumor) 2/24/2025-pT0.    3/27/2025- She is here today for her Induction BCG x 5.       Induction BCG; dose 4 of 5  No complaints today. She has had no issues holding her bladder for 2 hours.    REVIEW OF SYSTEMS:  Review of Systems   Constitutional:  Negative for chills and fever.   Gastrointestinal:  Negative for  abdominal pain, nausea and vomiting.   Genitourinary:  Negative for dysuria, flank pain, frequency, hematuria and urgency.         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       Past Surgical History:   Procedure Laterality Date    ABLATION OF NEOPLASM OF URETER USING LASER Right 1/3/2022    Procedure: ABLATION, NEOPLASM, URETER, USING LASER;  Surgeon: Shane Diaz MD;  Location: [facility] OR 1ST FLR;  Service: Urology;  Laterality: Right;    CYSTOSCOPY N/A 1/3/2022    Procedure: CYSTOSCOPY;  Surgeon: Shane Diaz MD;  Location: [facility] OR 1ST FLR;  Service: Urology;  Laterality: N/A;    CYSTOSCOPY N/A 1/6/2025    Procedure: CYSTOSCOPY;  Surgeon: Shane Diaz MD;  Location: [facility] OR 1ST FLR;  Service: Urology;  Laterality: N/A;    CYSTOSCOPY  2/24/2025    Procedure: CYSTOSCOPY;  Surgeon: Shane Diaz MD;  Location: [facility] OR 1ST FLR;  Service: Urology;;    DILATION AND CURETTAGE OF UTERUS      GI scope  2016    LAPAROTOMY N/A 7/8/2019    Procedure: LAPAROTOMY;  Surgeon: Lokesh Carias MD;  Location: [facility] OR 2ND FLR;  Service: OB/GYN;  Laterality: N/A;  mini    NEPHROSCOPY Left 1/6/2025    Procedure: NEPHROSCOPY;  Surgeon: Shane Diaz MD;  Location: [facility] OR 1ST FLR;  Service: Urology;  Laterality: Left;    OMENTECTOMY N/A 7/8/2019    Procedure: OMENTECTOMY;  Surgeon: Lokesh Carias MD;  Location: [facility] OR 2ND FLR;  Service: OB/GYN;  Laterality: N/A;    RETROGRADE PYELOGRAPHY Left 1/6/2025    Procedure: PYELOGRAM, RETROGRADE;  Surgeon: Shane Diaz MD;   Location: [facility] OR 1ST FLR;  Service: Urology;  Laterality: Left;    ROBOT-ASSISTED LAPAROSCOPIC ABDOMINAL HYSTERECTOMY USING DA SIRISHA XI N/A 7/8/2019    Procedure: XI ROBOTIC HYSTERECTOMY;  Surgeon: Lokesh Carias MD;  Location: [facility] OR 2ND FLR;  Service: OB/GYN;  Laterality: N/A;    ROBOT-ASSISTED LAPAROSCOPIC PELVIC LYMPHADENECTOMY USING DA SIRISHA XI Bilateral 7/8/2019    Procedure: XI ROBOTIC LYMPHADENECTOMY, PELVIC;  Surgeon: Lokesh Carias MD;  Location: [facility] OR 2ND FLR;  Service: OB/GYN;  Laterality: Bilateral;    ROBOT-ASSISTED LAPAROSCOPIC RETROPERITONEAL LYMPHADENECTOMY USING DA SIRISHA XI N/A 7/8/2019    Procedure: XI ROBOTIC LYMPHADENECTOMY, RETROPERITONEUM;  Surgeon: Lokesh Carias MD;  Location: [facility] OR 2ND FLR;  Service: OB/GYN;  Laterality: N/A;    ROBOT-ASSISTED LAPAROSCOPIC SALPINGO-OOPHORECTOMY USING DA SIRISHA XI Bilateral 7/8/2019    Procedure: XI ROBOTIC SALPINGO-OOPHORECTOMY;  Surgeon: Lokesh Carias MD;  Location: [facility] OR 2ND FLR;  Service: OB/GYN;  Laterality: Bilateral;    ROBOT-ASSISTED LAPAROSCOPIC SURGICAL REMOVAL OF KIDNEY AND URETER USING DA SIRISHA XI Right 3/14/2022    Procedure: XI ROBOTIC NEPHROURETERECTOMY/ WITH BLADDER CUFF;  Surgeon: Shane Diaz MD;  Location: [facility] OR 2ND FLR;  Service: Urology;  Laterality: Right;  4hrs    TURBT (TRANSURETHRAL RESECTION OF BLADDER TUMOR) N/A 1/6/2025    Procedure: TURBT (TRANSURETHRAL RESECTION OF BLADDER TUMOR);  Surgeon: Shane Diaz MD;  Location: [facility] OR 1ST FLR;  Service: Urology;  Laterality: N/A;    TURBT (TRANSURETHRAL RESECTION OF BLADDER TUMOR) N/A 2/24/2025    Procedure: TURBT (TRANSURETHRAL RESECTION OF BLADDER TUMOR);  Surgeon: Shane Diaz MD;  Location: [facility] OR 1ST FLR;  Service: Urology;  Laterality: N/A;  surgery clearance    URETEROSCOPY Right 1/3/2022    Procedure: URETEROSCOPY;  Surgeon: Shane Diaz MD;  Location: [facility] OR 1ST FLR;  Service: Urology;  Laterality: Right;  2hrs    URETEROSCOPY Left  "1/6/2025    Procedure: URETEROSCOPY;  Surgeon: Shane Diaz MD;  Location: [facility] OR [address] [place] [address];  Service: Urology;  Laterality: Left;       Family History   Problem Relation Name Age of Onset    No Known Problems Mother      No Known Problems Father      Colon cancer Sister  53    Breast cancer Sister  64    Kidney cancer Sister      Breast cancer Sister          in her 60s    Skin cancer Sister      Prostate cancer Brother      Stroke Brother      Prostate cancer Brother      Heart disease Brother      Prostate cancer Brother      Heart disease Brother      Ovarian cancer Neg Hx      Uterine cancer Neg Hx      Anesthesia problems Neg Hx         Social History[1]    Allergies:  Asa [aspirin], Dilaudid (pf) [hydromorphone (pf)], and Sulfa (sulfonamide antibiotics)    Medications:  Current Medications[2]    PHYSICAL EXAMINATION:  Physical Exam  Vitals reviewed.   Constitutional:       Appearance: Normal appearance.   HENT:      Head: Normocephalic and atraumatic.   Pulmonary:      Effort: Pulmonary effort is normal. No respiratory distress.   Skin:     General: Skin is warm and dry.      Capillary Refill: Capillary refill takes less than 2 seconds.   Neurological:      General: No focal deficit present.      Mental Status: She is alert.   Psychiatric:         Mood and Affect: Mood normal.         Behavior: Behavior normal.         Thought Content: Thought content normal.           LABS:      In office UA today was free of active infection and visible blood.      No results found for: "PSA", "PSADIAG", "PSATOTAL", "PHIND"    Lab Results   Component Value Date    CREATININE 0.9 11/29/2024    EGFRNORACEVR >60.0 11/29/2024       Induction BCG; dose 4 of 5        IMPRESSION:    Encounter Diagnoses   Name Primary?    Urothelial carcinoma of kidney, right Yes    Malignant neoplasm of urinary bladder, unspecified site          Assessment:       1. Urothelial carcinoma of kidney, right    2. Malignant neoplasm of urinary " bladder, unspecified site        Plan:       I spent a total of 30 minutes on the day of the visit. This includes face to face time and non-face to face time preparing to see the patient (eg, review of tests), obtaining and/or reviewing separately obtained history, documenting clinical information in the electronic or other health record, independently interpreting results and communicating results to the patient/family/caregiver, or care coordinator  10 minutes pre counseling including last installation  10 minutes prep, travel to get medication & installation of the BCG  10 minutes post installation instructions and room clean up  We addressed his UA  Education and recommendations of today's plan of care with the BCG Bladder Installations; including home remedies and needed follow up with PCP.   BCG dose 4 was instilled using sterile and BCG precautions.  Tolerated well   We discussed his Bladder Cancer; discussed after TURBT; cancer was removed.  Discussed BCG and the expectations, benefits, risks, as well as importance of post clean up for 6 hours after the 2 hour urination.  They was given detailed written instructions as well.   Diet modifications; no caffeine the morning of installation; increase water intake at the 2 hour void to flush out.  No sex for 48 hours after installation; use of condom during the 3/6 week installations.  RTC one week for dose 5.    The visit today is associated with current or anticipated ongoing medical care related to this patient's single serious condition/complex condition.                [1]   Social History  Socioeconomic History    Marital status: Other   Tobacco Use    Smoking status: Never    Smokeless tobacco: Never   Substance and Sexual Activity    Alcohol use: Never    Drug use: Never    Sexual activity: Not Currently     Social Drivers of Health     Financial Resource Strain: Low Risk  (7/1/2024)    Overall Financial Resource Strain (CARDIA)     Difficulty of Paying  Living Expenses: Not hard at all   Food Insecurity: No Food Insecurity (7/1/2024)    Hunger Vital Sign     Worried About Running Out of Food in the Last Year: Never true     Ran Out of Food in the Last Year: Never true   Physical Activity: Insufficiently Active (7/1/2024)    Exercise Vital Sign     Days of Exercise per Week: 3 days     Minutes of Exercise per Session: 20 min   Stress: No Stress Concern Present (7/1/2024)    Citizen of [place] [employer] Occup[employer] - Occupational Stress Questionnaire     Feeling of Stress : Not at all   Housing Stability: Unknown (7/1/2024)    Housing Stability Vital Sign     Unable to Pay for Housing in the Last Year: No   [2]   Current Outpatient Medications:     acetaminophen (TYLENOL) 500 MG tablet, Take 500 mg by mouth every 6 (six) hours as needed for Pain., Disp: , Rfl:     ALPRAZolam (XANAX) 0.25 MG tablet, Take by mouth 2 (two) times daily as needed for Anxiety., Disp: , Rfl:     bisoprolol (ZEBETA) 10 MG tablet, Take 10 mg by mouth once daily., Disp: , Rfl:     EScitalopram oxalate (LEXAPRO) 10 MG tablet, Take 10 mg by mouth once daily., Disp: , Rfl:     hydroCHLOROthiazide (HYDRODIURIL) 12.5 MG Tab, Take 25 mg by mouth once daily., Disp: , Rfl:     ketorolac (TORADOL) 10 mg tablet, Take 1 tablet (10 mg total) by mouth every 6 (six) hours as needed for Pain., Disp: 12 tablet, Rfl: 0    mv-min/FA/vit K/lycop/lut/zeax (OCUVITE EYE PLUS MULTI ORAL), Take 1 tablet by mouth once daily. , Disp: , Rfl:     olmesartan (BENICAR) 40 MG tablet, Take 40 mg by mouth once daily., Disp: , Rfl:     ondansetron (ZOFRAN-ODT) 4 MG TbDL, DISSOLVE 1 tablet (4 mg total) by mouth every 8 (eight) hours as needed., Disp: 10 tablet, Rfl: 0    oxybutynin (DITROPAN) 5 MG Tab, Take 1 tablet (5 mg total) by mouth 3 (three) times daily as needed (bladder spasms)., Disp: 30 tablet, Rfl: 0    pantoprazole (PROTONIX) 40 MG tablet, Take 40 mg by mouth once daily., Disp: , Rfl:     potassium chloride SA  (K-DUR,KLOR-CON) 20 MEQ tablet, Take 20 mEq by mouth once daily., Disp: , Rfl:     raloxifene (EVISTA) 60 mg tablet, Take 60 mg by mouth every evening., Disp: , Rfl:     rosuvastatin (CRESTOR) 5 MG tablet, Take 5 mg by mouth every evening., Disp: , Rfl:     sucralfate (CARAFATE) 1 gram tablet, Take 1 g by mouth 2 (two) times daily as needed., Disp: , Rfl:     Current Facility-Administered Medications:     BCG live (HAYDEE) 50 mg in 0.9% NaCl 50 mL bladder instillation, 50 mg, Bladder Instillation, Weekly,     BCG live (HAYDEE) 50 mg in 0.9% NaCl 50 mL bladder instillation, 50 mg, Intravesical, Once, Michelle Cristobal, NP

## 2025-04-23 ENCOUNTER — TELEPHONE (OUTPATIENT)
Dept: UROLOGY | Facility: CLINIC | Age: 80
End: 2025-04-23
Payer: MEDICARE

## 2025-04-23 NOTE — TELEPHONE ENCOUNTER
This call is to remind you of your upcoming appointment on 4/24/2025  at 8:00 am located on the 4th floor clinic [facility] on [facility].  Thanks VS

## 2025-04-24 ENCOUNTER — OFFICE VISIT (OUTPATIENT)
Dept: UROLOGY | Facility: CLINIC | Age: 80
End: 2025-04-24
Payer: MEDICARE

## 2025-04-24 VITALS
WEIGHT: 156 LBS | DIASTOLIC BLOOD PRESSURE: 88 MMHG | SYSTOLIC BLOOD PRESSURE: 153 MMHG | HEART RATE: 68 BPM | HEIGHT: 64 IN | BODY MASS INDEX: 26.63 KG/M2

## 2025-04-24 DIAGNOSIS — C67.9 MALIGNANT NEOPLASM OF URINARY BLADDER, UNSPECIFIED SITE: ICD-10-CM

## 2025-04-24 DIAGNOSIS — C64.1 UROTHELIAL CARCINOMA OF KIDNEY, RIGHT: Primary | ICD-10-CM

## 2025-04-24 PROCEDURE — 99999 PR PBB SHADOW E&M-EST. PATIENT-LVL IV: CPT | Mod: PBBFAC,,,

## 2025-04-24 RX ORDER — CIPROFLOXACIN 500 MG/1
500 TABLET ORAL ONCE
OUTPATIENT
Start: 2025-04-24 | End: 2025-04-24

## 2025-04-24 RX ORDER — LIDOCAINE HYDROCHLORIDE 20 MG/ML
JELLY TOPICAL ONCE
OUTPATIENT
Start: 2025-04-24 | End: 2025-04-24

## 2025-04-24 NOTE — PROGRESS NOTES
CHIEF COMPLAINT:  BCG dose 5      HISTORY OF PRESENTING ILLINESS:  Gita García is a 79 y.o. female is an established patient of Dr Diaz's who she last saw on 3/18/2025. She a history of endometrial carcinoma. Recently had hematuria. CT scan 10/18/2021 showed right renal pelvis filling defect.  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 Not a complete ablatation per notes.   treated for bladder ca by Dr. Diaz [id number].   6 rounds of chemo, 5 rounds of radiation Oct 2019-Jan 2020.     Denies hematuria. Some left abd pain. Urethral caruncle.     Sister had kidney cancer 25 years ago. Nephrectomy.  Scoliosis. Hysterectomy, no other surgery.  Extensive review and interpretation of imaging and laboratory studies. Explained findings to patient and the impact on treatment plan.   Had ureteroscopy 1/3 2022 with ablation of right upper tract tumor. Cytology from barbotage specimen HG.     3/22/2022- Robotic nephroureterectomy with bladder cuff + intravesical mitomycin.  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 [id number].  Cystogram shows no leak.     4/5/2022- walking more daily. Appetite good. She can resume driving. No lifting over 15 pounds for 3 more weeks.  Has used no opioids postop.     1/23/2025- TURBT (transurethral resection of bladder tumor) on 1/6/2025. Path- HGTa, no detrusor present.  MRIU-11/29/2024- no recurrence or metastasis.  CT chest-11/29/2024-no metastasis.  Discussed path report, re-resection, BCG.     3/18/2025- TURBT (transurethral resection of bladder tumor) 2/24/2025-pT0.    3/27/2025- She is here today for her Induction BCG x 5.   4/24/2025-Completed induction BCG       Induction BCG; dose 5 of 5  No complaints today. She has had no issues holding her bladder for 2 hours.    REVIEW OF SYSTEMS:  Review of Systems   Constitutional:  Negative for chills and fever.    Gastrointestinal:  Negative for abdominal pain, nausea and vomiting.   Genitourinary:  Negative for dysuria, flank pain, frequency, hematuria and urgency.         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       Past Surgical History:   Procedure Laterality Date    ABLATION OF NEOPLASM OF URETER USING LASER Right 1/3/2022    Procedure: ABLATION, NEOPLASM, URETER, USING LASER;  Surgeon: Shane Diaz MD;  Location: [facility] OR 1ST FLR;  Service: Urology;  Laterality: Right;    CYSTOSCOPY N/A 1/3/2022    Procedure: CYSTOSCOPY;  Surgeon: Shane Diaz MD;  Location: [facility] OR 1ST FLR;  Service: Urology;  Laterality: N/A;    CYSTOSCOPY N/A 1/6/2025    Procedure: CYSTOSCOPY;  Surgeon: Shane Diaz MD;  Location: [facility] OR 1ST FLR;  Service: Urology;  Laterality: N/A;    CYSTOSCOPY  2/24/2025    Procedure: CYSTOSCOPY;  Surgeon: Shane Diaz MD;  Location: [facility] OR 1ST FLR;  Service: Urology;;    DILATION AND CURETTAGE OF UTERUS      GI scope  2016    LAPAROTOMY N/A 7/8/2019    Procedure: LAPAROTOMY;  Surgeon: Lokesh Carias MD;  Location: [facility] OR 2ND FLR;  Service: OB/GYN;  Laterality: N/A;  mini    NEPHROSCOPY Left 1/6/2025    Procedure: NEPHROSCOPY;  Surgeon: Shane Diaz MD;  Location: [facility] OR 1ST FLR;  Service: Urology;  Laterality: Left;    OMENTECTOMY N/A 7/8/2019    Procedure: OMENTECTOMY;  Surgeon: Lokesh Carias MD;  Location: [facility] OR 2ND FLR;  Service: OB/GYN;  Laterality: N/A;    RETROGRADE PYELOGRAPHY Left 1/6/2025    Procedure: PYELOGRAM, RETROGRADE;   Surgeon: Shane Diaz MD;  Location: [facility] OR 1ST FLR;  Service: Urology;  Laterality: Left;    ROBOT-ASSISTED LAPAROSCOPIC ABDOMINAL HYSTERECTOMY USING DA SIRISHA XI N/A 7/8/2019    Procedure: XI ROBOTIC HYSTERECTOMY;  Surgeon: Lokesh Carias MD;  Location: [facility] OR 2ND FLR;  Service: OB/GYN;  Laterality: N/A;    ROBOT-ASSISTED LAPAROSCOPIC PELVIC LYMPHADENECTOMY USING DA SIRISHA XI Bilateral 7/8/2019    Procedure: XI ROBOTIC LYMPHADENECTOMY, PELVIC;  Surgeon: Lokesh Carias MD;  Location: [facility] OR 2ND FLR;  Service: OB/GYN;  Laterality: Bilateral;    ROBOT-ASSISTED LAPAROSCOPIC RETROPERITONEAL LYMPHADENECTOMY USING DA SIRISHA XI N/A 7/8/2019    Procedure: XI ROBOTIC LYMPHADENECTOMY, RETROPERITONEUM;  Surgeon: Lokesh Carias MD;  Location: [facility] OR 2ND FLR;  Service: OB/GYN;  Laterality: N/A;    ROBOT-ASSISTED LAPAROSCOPIC SALPINGO-OOPHORECTOMY USING DA SIRISHA XI Bilateral 7/8/2019    Procedure: XI ROBOTIC SALPINGO-OOPHORECTOMY;  Surgeon: Lokesh Carias MD;  Location: [facility] OR 2ND FLR;  Service: OB/GYN;  Laterality: Bilateral;    ROBOT-ASSISTED LAPAROSCOPIC SURGICAL REMOVAL OF KIDNEY AND URETER USING DA SIRISHA XI Right 3/14/2022    Procedure: XI ROBOTIC NEPHROURETERECTOMY/ WITH BLADDER CUFF;  Surgeon: Shane Diaz MD;  Location: [facility] OR 2ND FLR;  Service: Urology;  Laterality: Right;  4hrs    TURBT (TRANSURETHRAL RESECTION OF BLADDER TUMOR) N/A 1/6/2025    Procedure: TURBT (TRANSURETHRAL RESECTION OF BLADDER TUMOR);  Surgeon: Shane Diaz MD;  Location: [facility] OR 1ST FLR;  Service: Urology;  Laterality: N/A;    TURBT (TRANSURETHRAL RESECTION OF BLADDER TUMOR) N/A 2/24/2025    Procedure: TURBT (TRANSURETHRAL RESECTION OF BLADDER TUMOR);  Surgeon: Shane Diaz MD;  Location: [facility] OR 1ST FLR;  Service: Urology;  Laterality: N/A;  surgery clearance    URETEROSCOPY Right 1/3/2022    Procedure: URETEROSCOPY;  Surgeon: Shane Diaz MD;  [facility];  Service: Urology;  Laterality:  "Right;  2hrs    URETEROSCOPY Left 1/6/2025    Procedure: URETEROSCOPY;  Surgeon: Shane Diaz MD;  Location: [facility] OR [address] [place] [address];  Service: Urology;  Laterality: Left;       Family History   Problem Relation Name Age of Onset    No Known Problems Mother      No Known Problems Father      Colon cancer Sister  53    Breast cancer Sister  64    Kidney cancer Sister      Breast cancer Sister          in her 60s    Skin cancer Sister      Prostate cancer Brother      Stroke Brother      Prostate cancer Brother      Heart disease Brother      Prostate cancer Brother      Heart disease Brother      Ovarian cancer Neg Hx      Uterine cancer Neg Hx      Anesthesia problems Neg Hx         Social History[1]    Allergies:  Asa [aspirin], Dilaudid (pf) [hydromorphone (pf)], and Sulfa (sulfonamide antibiotics)    Medications:  Current Medications[2]    PHYSICAL EXAMINATION:  Physical Exam  Vitals reviewed.   Constitutional:       Appearance: Normal appearance.   HENT:      Head: Normocephalic and atraumatic.   Pulmonary:      Effort: Pulmonary effort is normal. No respiratory distress.   Skin:     General: Skin is warm and dry.      Capillary Refill: Capillary refill takes less than 2 seconds.   Neurological:      General: No focal deficit present.      Mental Status: She is alert.   Psychiatric:         Mood and Affect: Mood normal.         Behavior: Behavior normal.         Thought Content: Thought content normal.           LABS:      In office UA today was free of active infection and visible blood.      No results found for: "PSA", "PSADIAG", "PSATOTAL", "PHIND"    Lab Results   Component Value Date    CREATININE 0.9 11/29/2024    EGFRNORACEVR >60.0 11/29/2024       Induction BCG; dose 5 of 5        IMPRESSION:    Encounter Diagnoses   Name Primary?    Urothelial carcinoma of kidney, right Yes    Malignant neoplasm of urinary bladder, unspecified site          Assessment:       1. Urothelial carcinoma of kidney, right  "   2. Malignant neoplasm of urinary bladder, unspecified site        Plan:       I spent a total of 30 minutes on the day of the visit. This includes face to face time and non-face to face time preparing to see the patient (eg, review of tests), obtaining and/or reviewing separately obtained history, documenting clinical information in the electronic or other health record, independently interpreting results and communicating results to the patient/family/caregiver, or care coordinator  10 minutes pre counseling including last installation  10 minutes prep, travel to get medication & installation of the BCG  10 minutes post installation instructions and room clean up  We addressed his UA  Education and recommendations of today's plan of care with the BCG Bladder Installations; including home remedies and needed follow up with PCP.   BCG dose 5 was instilled using sterile and BCG precautions.  Tolerated well   We discussed his Bladder Cancer; discussed after TURBT; cancer was removed.  Discussed BCG and the expectations, benefits, risks, as well as importance of post clean up for 6 hours after the 2 hour urination.  They was given detailed written instructions as well.   Diet modifications; no caffeine the morning of installation; increase water intake at the 2 hour void to flush out.  No sex for 48 hours after installation; use of condom during the 3/6 week installations.  -Cysto next with Dr Diaz    The visit today is associated with current or anticipated ongoing medical care related to this patient's single serious condition/complex condition.                  [1]   Social History  Socioeconomic History    Marital status: Other   Tobacco Use    Smoking status: Never    Smokeless tobacco: Never   Substance and Sexual Activity    Alcohol use: Never    Drug use: Never    Sexual activity: Not Currently     Social Drivers of Health     Financial Resource Strain: Low Risk  (7/1/2024)    Overall Financial Resource  Strain (CARDIA)     Difficulty of Paying Living Expenses: Not hard at all   Food Insecurity: No Food Insecurity (7/1/2024)    Hunger Vital Sign     Worried About Running Out of Food in the Last Year: Never true     Ran Out of Food in the Last Year: Never true   Physical Activity: Insufficiently Active (7/1/2024)    Exercise Vital Sign     Days of Exercise per Week: 3 days     Minutes of Exercise per Session: 20 min   Stress: No Stress Concern Present (7/1/2024)    [employer] - Occupational Stress Questionnaire     Feeling of Stress : Not at all   Housing Stability: Unknown (7/1/2024)    Housing Stability Vital Sign     Unable to Pay for Housing in the Last Year: No   [2]   Current Outpatient Medications:     acetaminophen (TYLENOL) 500 MG tablet, Take 500 mg by mouth every 6 (six) hours as needed for Pain., Disp: , Rfl:     ALPRAZolam (XANAX) 0.25 MG tablet, Take by mouth 2 (two) times daily as needed for Anxiety., Disp: , Rfl:     bisoprolol (ZEBETA) 10 MG tablet, Take 10 mg by mouth once daily., Disp: , Rfl:     EScitalopram oxalate (LEXAPRO) 10 MG tablet, Take 10 mg by mouth once daily., Disp: , Rfl:     hydroCHLOROthiazide (HYDRODIURIL) 12.5 MG Tab, Take 25 mg by mouth once daily., Disp: , Rfl:     ketorolac (TORADOL) 10 mg tablet, Take 1 tablet (10 mg total) by mouth every 6 (six) hours as needed for Pain., Disp: 12 tablet, Rfl: 0    mv-min/FA/vit K/lycop/lut/zeax (OCUVITE EYE PLUS MULTI ORAL), Take 1 tablet by mouth once daily. , Disp: , Rfl:     olmesartan (BENICAR) 40 MG tablet, Take 40 mg by mouth once daily., Disp: , Rfl:     ondansetron (ZOFRAN-ODT) 4 MG TbDL, DISSOLVE 1 tablet (4 mg total) by mouth every 8 (eight) hours as needed., Disp: 10 tablet, Rfl: 0    oxybutynin (DITROPAN) 5 MG Tab, Take 1 tablet (5 mg total) by mouth 3 (three) times daily as needed (bladder spasms)., Disp: 30 tablet, Rfl: 0    pantoprazole (PROTONIX) 40 MG tablet, Take 40 mg by mouth once daily.,  Disp: , Rfl:     potassium chloride SA (K-DUR,KLOR-CON) 20 MEQ tablet, Take 20 mEq by mouth once daily., Disp: , Rfl:     raloxifene (EVISTA) 60 mg tablet, Take 60 mg by mouth every evening., Disp: , Rfl:     rosuvastatin (CRESTOR) 5 MG tablet, Take 5 mg by mouth every evening., Disp: , Rfl:     sucralfate (CARAFATE) 1 gram tablet, Take 1 g by mouth 2 (two) times daily as needed., Disp: , Rfl:     Current Facility-Administered Medications:     BCG live (HAYDEE) 50 mg in 0.9% NaCl 50 mL bladder instillation, 50 mg, Bladder Instillation, Weekly,     BCG live (HAYDEE) 50 mg in 0.9% NaCl 50 mL bladder instillation, 50 mg, Intravesical, Once, Michelle Cristobal, NP    BCG live (HAYDEE) 50 mg in 0.9% NaCl 50 mL bladder instillation, 50 mg, Intravesical, Once, Michelle Cristobal, NP

## 2025-05-05 ENCOUNTER — PATIENT MESSAGE (OUTPATIENT)
Dept: UROLOGY | Facility: CLINIC | Age: 80
End: 2025-05-05
Payer: MEDICARE

## 2025-06-02 ENCOUNTER — TELEPHONE (OUTPATIENT)
Dept: UROLOGY | Facility: CLINIC | Age: 80
End: 2025-06-02
Payer: MEDICARE

## 2025-06-03 ENCOUNTER — HOSPITAL ENCOUNTER (OUTPATIENT)
Dept: RADIOLOGY | Facility: HOSPITAL | Age: 80
Discharge: HOME | End: 2025-06-03
Attending: INTERNAL MEDICINE
Payer: MEDICARE

## 2025-06-03 ENCOUNTER — PROCEDURE VISIT (OUTPATIENT)
Dept: UROLOGY | Facility: CLINIC | Age: 80
End: 2025-06-03
Payer: MEDICARE

## 2025-06-03 VITALS
HEIGHT: 64 IN | TEMPERATURE: 98 F | BODY MASS INDEX: 26.24 KG/M2 | WEIGHT: 153.69 LBS | DIASTOLIC BLOOD PRESSURE: 71 MMHG | HEART RATE: 59 BPM | SYSTOLIC BLOOD PRESSURE: 138 MMHG | RESPIRATION RATE: 18 BRPM

## 2025-06-03 DIAGNOSIS — C64.1 UROTHELIAL CARCINOMA OF KIDNEY, RIGHT: ICD-10-CM

## 2025-06-03 DIAGNOSIS — C67.9 MALIGNANT NEOPLASM OF URINARY BLADDER, UNSPECIFIED SITE: ICD-10-CM

## 2025-06-03 DIAGNOSIS — C67.2 MALIGNANT NEOPLASM OF LATERAL WALL OF URINARY BLADDER: ICD-10-CM

## 2025-06-03 PROCEDURE — 25500020 PHARM REV CODE 255: Performed by: INTERNAL MEDICINE

## 2025-06-03 PROCEDURE — 71250 CT THORAX DX C-: CPT | Mod: TC

## 2025-06-03 PROCEDURE — 74183 MRI ABD W/O CNTR FLWD CNTR: CPT | Mod: 26,,, | Performed by: RADIOLOGY

## 2025-06-03 PROCEDURE — 72197 MRI PELVIS W/O & W/DYE: CPT | Mod: 26,,, | Performed by: RADIOLOGY

## 2025-06-03 PROCEDURE — 52000 CYSTOURETHROSCOPY: CPT | Mod: S$GLB,,, | Performed by: UROLOGY

## 2025-06-03 PROCEDURE — A9585 GADOBUTROL INJECTION: HCPCS | Performed by: INTERNAL MEDICINE

## 2025-06-03 PROCEDURE — 74183 MRI ABD W/O CNTR FLWD CNTR: CPT | Mod: TC

## 2025-06-03 RX ORDER — LIDOCAINE HYDROCHLORIDE 20 MG/ML
JELLY TOPICAL ONCE
Status: COMPLETED | OUTPATIENT
Start: 2025-06-03 | End: 2025-06-03

## 2025-06-03 RX ORDER — GADOBUTROL 604.72 MG/ML
7 INJECTION INTRAVENOUS
Status: COMPLETED | OUTPATIENT
Start: 2025-06-03 | End: 2025-06-03

## 2025-06-03 RX ADMIN — LIDOCAINE HYDROCHLORIDE 5 ML: 20 JELLY TOPICAL at 10:06

## 2025-06-03 RX ADMIN — GADOBUTROL 7 ML: 604.72 INJECTION INTRAVENOUS at 04:06

## 2025-06-05 ENCOUNTER — TELEPHONE (OUTPATIENT)
Dept: HEMATOLOGY/ONCOLOGY | Facility: CLINIC | Age: 80
End: 2025-06-05
Payer: MEDICARE

## 2025-06-06 ENCOUNTER — OFFICE VISIT (OUTPATIENT)
Dept: HEMATOLOGY/ONCOLOGY | Facility: CLINIC | Age: 80
End: 2025-06-06
Payer: MEDICARE

## 2025-06-06 DIAGNOSIS — T45.1X5A CHEMOTHERAPY-INDUCED NEUROPATHY: ICD-10-CM

## 2025-06-06 DIAGNOSIS — G62.0 CHEMOTHERAPY-INDUCED NEUROPATHY: ICD-10-CM

## 2025-06-06 DIAGNOSIS — D50.0 IRON DEFICIENCY ANEMIA DUE TO CHRONIC BLOOD LOSS: ICD-10-CM

## 2025-06-06 DIAGNOSIS — C54.1 ENDOMETRIAL CA: ICD-10-CM

## 2025-06-06 DIAGNOSIS — Z90.5 SOLITARY KIDNEY, ACQUIRED: ICD-10-CM

## 2025-06-06 DIAGNOSIS — C67.9 MALIGNANT NEOPLASM OF URINARY BLADDER, UNSPECIFIED SITE: ICD-10-CM

## 2025-06-06 DIAGNOSIS — C64.1 UROTHELIAL CARCINOMA OF KIDNEY, RIGHT: Primary | ICD-10-CM

## 2025-06-06 DIAGNOSIS — R31.9 HEMATURIA, UNSPECIFIED TYPE: ICD-10-CM

## 2025-06-11 ENCOUNTER — TELEPHONE (OUTPATIENT)
Dept: UROLOGY | Facility: CLINIC | Age: 80
End: 2025-06-11
Payer: MEDICARE

## 2025-06-12 ENCOUNTER — OFFICE VISIT (OUTPATIENT)
Dept: UROLOGY | Facility: CLINIC | Age: 80
End: 2025-06-12
Payer: MEDICARE

## 2025-06-12 VITALS
HEIGHT: 64 IN | WEIGHT: 152 LBS | HEART RATE: 59 BPM | SYSTOLIC BLOOD PRESSURE: 116 MMHG | DIASTOLIC BLOOD PRESSURE: 75 MMHG | BODY MASS INDEX: 25.95 KG/M2

## 2025-06-12 DIAGNOSIS — C64.1 UROTHELIAL CARCINOMA OF KIDNEY, RIGHT: Primary | ICD-10-CM

## 2025-06-12 LAB
BILIRUBIN, UA POC OHS: NEGATIVE
BLOOD, UA POC OHS: NEGATIVE
CLARITY, UA POC OHS: CLEAR
COLOR, UA POC OHS: YELLOW
GLUCOSE, UA POC OHS: NEGATIVE
KETONES, UA POC OHS: NEGATIVE
LEUKOCYTES, UA POC OHS: ABNORMAL
NITRITE, UA POC OHS: NEGATIVE
PH, UA POC OHS: 7
PROTEIN, UA POC OHS: NEGATIVE
SPECIFIC GRAVITY, UA POC OHS: 1.02
UROBILINOGEN, UA POC OHS: 0.2

## 2025-06-12 PROCEDURE — 99999 PR PBB SHADOW E&M-EST. PATIENT-LVL IV: CPT | Mod: PBBFAC,,,

## 2025-06-12 NOTE — PROGRESS NOTES
[facility]  Urology Clinic Note    Date of Service: 06/12/2025     CHIEF COMPLAINT: Bladder Cancer    History of Present Illness:   Gita García is a 80 y.o. female who presents to today for bladder cancer. She is established to our clinic but new to me. She is a patient of Dr. Diaz. She is here today for maintenance BCG half dose 1 of 2.     Urologic History:   She a history of endometrial carcinoma. Recently had hematuria. CT scan 10/18/2021 showed right renal pelvis filling defect.  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 Not a complete ablatation per notes.  10/2019 - 1/2020: 6 rounds of chemo, 5 rounds of radiation   1/3/2022 Had ureteroscopy with ablation of right upper tract tumor. Cytology from barbotage specimen HG.  3/22/2022- Robotic nephroureterectomy with bladder cuff + intravesical mitomycin.  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 [id number].  Cystogram shows no leak.  1/23/2025- TURBT (transurethral resection of bladder tumor) on 1/6/2025. Path- HGTa, no detrusor present.  MRIU-11/29/2024- no recurrence or metastasis.  CT chest-11/29/2024-no metastasis.  3/18/2025- TURBT (transurethral resection of bladder tumor) 2/24/2025-pT0.  3/27/2025- She is here today for her Induction BCG x 5.   4/24/2025-Completed induction BCG   6/12/2025 - Begin Maintenance BCG 1/2 dose x 2    Review of Symptoms:  Review of Systems   Constitutional:  Negative for chills and fever.   Respiratory:  Negative for shortness of breath and wheezing.    Cardiovascular:  Negative for chest pain.   Gastrointestinal:  Negative for abdominal pain, constipation, diarrhea, nausea and vomiting.   Genitourinary:  Negative for dysuria, flank pain, frequency, hematuria and urgency.     Patient History:  Past Medical History:   Diagnosis Date    Acid reflux     Arthritis     Chemotherapy induced nausea  and vomiting 08/09/2019    Endometrial ca 06/26/2019    Essential hypertension 06/27/2019    Estrogen deficiency     Hormone deficiency     Hypertension     Liver mass 07/01/2019    Neuropathy due to chemotherapeutic drug 02/21/2020    Osteopenia     Seizures     post partal. 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     Past Surgical History:   Procedure Laterality Date    ABLATION OF NEOPLASM OF URETER USING LASER Right 1/3/2022    Procedure: ABLATION, NEOPLASM, URETER, USING LASER;  Surgeon: Shane Diaz MD;  Location: [facility] OR 1ST FLR;  Service: Urology;  Laterality: Right;    CYSTOSCOPY N/A 1/3/2022    Procedure: CYSTOSCOPY;  Surgeon: Shane Diaz MD;  Location: [facility] OR 1ST FLR;  Service: Urology;  Laterality: N/A;    CYSTOSCOPY N/A 1/6/2025    Procedure: CYSTOSCOPY;  Surgeon: Shane Diaz MD;  Location: [facility] OR 1ST FLR;  Service: Urology;  Laterality: N/A;    CYSTOSCOPY  2/24/2025    Procedure: CYSTOSCOPY;  Surgeon: Shane Diaz MD;  Location: [facility] OR 1ST FLR;  Service: Urology;;    DILATION AND CURETTAGE OF UTERUS      GI scope  2016    LAPAROTOMY N/A 7/8/2019    Procedure: LAPAROTOMY;  Surgeon: Lokesh Carias MD;  Location: [facility] OR 2ND FLR;  Service: OB/GYN;  Laterality: N/A;  mini    NEPHROSCOPY Left 1/6/2025    Procedure: NEPHROSCOPY;  Surgeon: Shane Diaz MD;  Location: [facility] OR 1ST FLR;  Service: Urology;  Laterality: Left;    OMENTECTOMY N/A 7/8/2019    Procedure: OMENTECTOMY;  Surgeon: Lokesh Carias MD;  Location: [facility] OR 2ND FLR;  Service: OB/GYN;  Laterality: N/A;    RETROGRADE PYELOGRAPHY Left 1/6/2025    Procedure: PYELOGRAM, RETROGRADE;  Surgeon: Shane Diaz MD;  Location: [facility] OR 1ST FLR;  Service: Urology;  Laterality: Left;    ROBOT-ASSISTED LAPAROSCOPIC ABDOMINAL HYSTERECTOMY USING DA SIRISHA XI N/A 7/8/2019    Procedure: XI ROBOTIC HYSTERECTOMY;  Surgeon: Lokesh Carias MD;  Location: [facility] OR 2ND FLR;  Service:  OB/GYN;  Laterality: N/A;    ROBOT-ASSISTED LAPAROSCOPIC PELVIC LYMPHADENECTOMY USING DA SIRISHA XI Bilateral 7/8/2019    Procedure: XI ROBOTIC LYMPHADENECTOMY, PELVIC;  Surgeon: Lokesh Carias MD;  Location: [facility] OR 2ND FLR;  Service: OB/GYN;  Laterality: Bilateral;    ROBOT-ASSISTED LAPAROSCOPIC RETROPERITONEAL LYMPHADENECTOMY USING DA SIRISHA XI N/A 7/8/2019    Procedure: XI ROBOTIC LYMPHADENECTOMY, RETROPERITONEUM;  Surgeon: Lokesh Carias MD;  Location: [facility] OR 2ND FLR;  Service: OB/GYN;  Laterality: N/A;    ROBOT-ASSISTED LAPAROSCOPIC SALPINGO-OOPHORECTOMY USING DA SIRISHA XI Bilateral 7/8/2019    Procedure: XI ROBOTIC SALPINGO-OOPHORECTOMY;  Surgeon: Lokesh Carias MD;  Location: [facility] OR 2ND FLR;  Service: OB/GYN;  Laterality: Bilateral;    ROBOT-ASSISTED LAPAROSCOPIC SURGICAL REMOVAL OF KIDNEY AND URETER USING DA SIRISHA XI Right 3/14/2022    Procedure: XI ROBOTIC NEPHROURETERECTOMY/ WITH BLADDER CUFF;  Surgeon: Shane Diaz MD;  Location: [facility] OR 2ND FLR;  Service: Urology;  Laterality: Right;  4hrs    TURBT (TRANSURETHRAL RESECTION OF BLADDER TUMOR) N/A 1/6/2025    Procedure: TURBT (TRANSURETHRAL RESECTION OF BLADDER TUMOR);  Surgeon: Shane Diaz MD;  Location: [facility] OR [address] [place] [address];  Service: Urology;  Laterality: N/A;    TURBT (TRANSURETHRAL RESECTION OF BLADDER TUMOR) N/A 2/24/2025    Procedure: TURBT (TRANSURETHRAL RESECTION OF BLADDER TUMOR);  Surgeon: Shane Diaz MD;  [facility];  Service: Urology;  Laterality: N/A;  surgery clearance    URETEROSCOPY Right 1/3/2022    Procedure: URETEROSCOPY;  Surgeon: Shane Diaz MD;  Location: [facility] OR 1ST FLR;  Service: Urology;  Laterality: Right;  2hrs    URETEROSCOPY Left 1/6/2025    Procedure: URETEROSCOPY;  Surgeon: Shane Diaz MD;  Location: [facility] OR 1ST FLR;  Service: Urology;  Laterality: Left;     Family History   Problem Relation Name Age of Onset    No Known Problems Mother      No Known Problems Father       "Colon cancer Sister  53    Breast cancer Sister  64    Kidney cancer Sister      Breast cancer Sister          in her 60s    Skin cancer Sister      Prostate cancer Brother      Stroke Brother      Prostate cancer Brother      Heart disease Brother      Prostate cancer Brother      Heart disease Brother      Ovarian cancer Neg Hx      Uterine cancer Neg Hx      Anesthesia problems Neg Hx       Social History[1]  Allergies:  Asa [aspirin], Dilaudid (pf) [hydromorphone (pf)], and Sulfa (sulfonamide antibiotics)  Medications:  Current Medications[2]    OBJECTIVE:     Vitals:    06/12/25 0808   BP: 116/75   Pulse: (!) 59   Weight: 68.9 kg (152 lb)   Height: 5' 4" (1.626 m)      Physical Exam  Constitutional:       Appearance: Normal appearance.   HENT:      Head: Normocephalic.   Pulmonary:      Effort: Pulmonary effort is normal. No respiratory distress.      Breath sounds: No wheezing.   Abdominal:      General: There is no distension.      Tenderness: There is no abdominal tenderness.   Neurological:      Mental Status: She is alert.   Psychiatric:         Mood and Affect: Mood normal.     LAB:      All laboratory values listed below was/were independently reviewed with patient at this clinic visit.    In office UA today was negative nitrites and blood. Trace leukocytes.      Lab Results   Component Value Date    BUN 15 06/03/2025    CREATININE 0.9 06/03/2025    WBC 6.56 06/03/2025    HGB 10.5 (L) 06/03/2025    HCT 33.9 (L) 06/03/2025     06/03/2025    AST 15 06/03/2025    ALT 11 06/03/2025    ALKPHOS 72 06/03/2025    ALBUMIN 4.0 06/03/2025     Lab Results   Component Value Date    CREATININE 0.9 06/03/2025    EGFRNORACEVR >60 06/03/2025     IMPRESSION:  Encounter Diagnoses   Name Primary?    Urothelial carcinoma of kidney, right Yes     ASSESSMENT/PLAN:     Plan: I spent a total of 30 minutes on the day of the visit. This includes face to face time and non-face to face time preparing to see the patient (eg, " review of tests), obtaining and/or reviewing separately obtained history, documenting clinical information in the electronic or other health record, independently interpreting results and communicating results to the patient/family/caregiver, or care coordinator  10 minutes pre counseling including last installation  10 minutes prep, travel to get medication & installation of the BCG  10 minutes post installation instructions and room clean up  We addressed his UA  Education and recommendations of today's plan of care with the BCG Bladder Installations; including home remedies and needed follow up with PCP.   BCG dose 1 was instilled using sterile and BCG precautions.  Tolerated well   We discussed his Bladder Cancer; discussed after TURBT; cancer was removed.  Discussed BCG and the expectations, benefits, risks, as well as importance of post clean up for 6 hours after the 2 hour urination.  They was given detailed written instructions as well.   Diet modifications; no caffeine the morning of installation; increase water intake at the 2 hour void to flush out.  No sex for 48 hours after installation; use of condom during the 2 weeks of installations.  RTC one week for dose 2.     The visit today is associated with current or anticipated ongoing medical care related to this patient's single serious condition/complex condition.      Lucy Granados, MARIA ELENAP-C           [1]   Social History  Socioeconomic History    Marital status: Other   Tobacco Use    Smoking status: Never    Smokeless tobacco: Never   Substance and Sexual Activity    Alcohol use: Never    Drug use: Never    Sexual activity: Not Currently     Social Drivers of Health     Financial Resource Strain: Low Risk  (6/6/2025)    Overall Financial Resource Strain (CARDIA)     Difficulty of Paying Living Expenses: Not hard at all   Food Insecurity: No Food Insecurity (6/6/2025)    Hunger Vital Sign     Worried About Running Out of Food in the Last Year: Never true      Ran Out of Food in the Last Year: Never true   Transportation Needs: No Transportation Needs (6/6/2025)    PRAPARE - Transportation     Lack of Transportation (Medical): No     Lack of Transportation (Non-Medical): No   Physical Activity: Insufficiently Active (6/6/2025)    Exercise Vital Sign     Days of Exercise per Week: 2 days     Minutes of Exercise per Session: 20 min   Stress: No Stress Concern Present (6/6/2025)    [place] [employer] - Occupational Stress Questionnaire     Feeling of Stress : Not at all   Housing Stability: Low Risk  (6/6/2025)    Housing Stability Vital Sign     Unable to Pay for Housing in the Last Year: No     Number of Times Moved in the Last Year: 0     Homeless in the Last Year: No   [2]   Current Outpatient Medications:     acetaminophen (TYLENOL) 500 MG tablet, Take 500 mg by mouth every 6 (six) hours as needed for Pain., Disp: , Rfl:     ALPRAZolam (XANAX) 0.25 MG tablet, Take by mouth 2 (two) times daily as needed for Anxiety., Disp: , Rfl:     bisoprolol (ZEBETA) 10 MG tablet, Take 10 mg by mouth once daily., Disp: , Rfl:     EScitalopram oxalate (LEXAPRO) 10 MG tablet, Take 10 mg by mouth once daily., Disp: , Rfl:     hydroCHLOROthiazide (HYDRODIURIL) 12.5 MG Tab, Take 25 mg by mouth once daily., Disp: , Rfl:     ketorolac (TORADOL) 10 mg tablet, Take 1 tablet (10 mg total) by mouth every 6 (six) hours as needed for Pain., Disp: 12 tablet, Rfl: 0    mv-min/FA/vit K/lycop/lut/zeax (OCUVITE EYE PLUS MULTI ORAL), Take 1 tablet by mouth once daily. , Disp: , Rfl:     olmesartan (BENICAR) 40 MG tablet, Take 40 mg by mouth once daily., Disp: , Rfl:     ondansetron (ZOFRAN-ODT) 4 MG TbDL, DISSOLVE 1 tablet (4 mg total) by mouth every 8 (eight) hours as needed., Disp: 10 tablet, Rfl: 0    oxybutynin (DITROPAN) 5 MG Tab, Take 1 tablet (5 mg total) by mouth 3 (three) times daily as needed (bladder spasms)., Disp: 30 tablet, Rfl: 0    pantoprazole (PROTONIX) 40 MG  tablet, Take 40 mg by mouth once daily., Disp: , Rfl:     potassium chloride SA (K-DUR,KLOR-CON) 20 MEQ tablet, Take 20 mEq by mouth once daily., Disp: , Rfl:     raloxifene (EVISTA) 60 mg tablet, Take 60 mg by mouth every evening., Disp: , Rfl:     rosuvastatin (CRESTOR) 5 MG tablet, Take 5 mg by mouth every evening., Disp: , Rfl:     sucralfate (CARAFATE) 1 gram tablet, Take 1 g by mouth 2 (two) times daily as needed., Disp: , Rfl:     Current Facility-Administered Medications:     BCG live (HAYDEE) 25 mg in 0.9% NaCl 50 mL bladder instillation, 25 mg, Intravesical, Once, Lucy Tanner, BETTINA    BCG live (HAYDEE) 50 mg in 0.9% NaCl 50 mL bladder instillation, 50 mg, Intravesical, Once, Michelle Cristobal NP    [START ON 6/17/2025] BCG live (HAYDEE) 50 mg in 0.9% NaCl 50 mL bladder instillation, 50 mg, Bladder Instillation, Weekly,

## 2025-06-19 ENCOUNTER — OFFICE VISIT (OUTPATIENT)
Dept: UROLOGY | Facility: CLINIC | Age: 80
End: 2025-06-19
Payer: MEDICARE

## 2025-06-19 VITALS
BODY MASS INDEX: 25.9 KG/M2 | SYSTOLIC BLOOD PRESSURE: 145 MMHG | WEIGHT: 151.69 LBS | HEART RATE: 59 BPM | DIASTOLIC BLOOD PRESSURE: 77 MMHG | HEIGHT: 64 IN

## 2025-06-19 DIAGNOSIS — C64.1 UROTHELIAL CARCINOMA OF KIDNEY, RIGHT: ICD-10-CM

## 2025-06-19 DIAGNOSIS — C67.2 MALIGNANT NEOPLASM OF LATERAL WALL OF URINARY BLADDER: Primary | ICD-10-CM

## 2025-06-19 LAB
BILIRUBIN, UA POC OHS: NEGATIVE
BLOOD, UA POC OHS: NEGATIVE
CLARITY, UA POC OHS: CLEAR
COLOR, UA POC OHS: YELLOW
GLUCOSE, UA POC OHS: NEGATIVE
KETONES, UA POC OHS: NEGATIVE
LEUKOCYTES, UA POC OHS: ABNORMAL
NITRITE, UA POC OHS: NEGATIVE
PH, UA POC OHS: 7
PROTEIN, UA POC OHS: NEGATIVE
SPECIFIC GRAVITY, UA POC OHS: 1.01
UROBILINOGEN, UA POC OHS: 0.2

## 2025-06-19 PROCEDURE — 99999 PR PBB SHADOW E&M-EST. PATIENT-LVL IV: CPT | Mod: PBBFAC,,,

## 2025-06-19 RX ORDER — LIDOCAINE HYDROCHLORIDE 20 MG/ML
JELLY TOPICAL ONCE
OUTPATIENT
Start: 2025-06-19 | End: 2025-06-19

## 2025-06-19 RX ORDER — CIPROFLOXACIN 500 MG/1
500 TABLET, FILM COATED ORAL ONCE
OUTPATIENT
Start: 2025-06-19 | End: 2025-06-19

## 2025-06-19 NOTE — PROGRESS NOTES
[facility]  Urology Clinic Note    Date of Service: 06/19/2025     CHIEF COMPLAINT: Bladder Cancer    History of Present Illness:   Gita García is a 80 y.o. female who presents to today for bladder cancer. She is established to our clinic but new to me. She is a patient of Dr. Diaz. She is here today for maintenance BCG 2 of 2. Today a full dose will be given.     Urologic History:   She a history of endometrial carcinoma. Recently had hematuria. CT scan 10/18/2021 showed right renal pelvis filling defect.  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 Not a complete ablatation per notes.  10/2019 - 1/2020: 6 rounds of chemo, 5 rounds of radiation   1/3/2022 Had ureteroscopy with ablation of right upper tract tumor. Cytology from barbotage specimen HG.  3/22/2022- Robotic nephroureterectomy with bladder cuff + intravesical mitomycin.  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 [id number].  Cystogram shows no leak.  1/23/2025- TURBT (transurethral resection of bladder tumor) on 1/6/2025. Path- HGTa, no detrusor present.  MRIU-11/29/2024- no recurrence or metastasis.  CT chest-11/29/2024-no metastasis.  3/18/2025- TURBT (transurethral resection of bladder tumor) 2/24/2025-pT0.  3/27/2025- She is here today for her Induction BCG x 5.   4/24/2025-Completed induction BCG   6/3/2025 - Cystoscopy   6/12/2025 - Begin Maintenance BCG x 2  6/19/2025 - Completed Maintenance BCG x 2    Review of Symptoms:  Review of Systems   Constitutional:  Negative for chills and fever.   Respiratory:  Negative for shortness of breath and wheezing.    Cardiovascular:  Negative for chest pain.   Gastrointestinal:  Negative for abdominal pain, constipation, diarrhea, nausea and vomiting.   Genitourinary:  Negative for dysuria, flank pain, frequency, hematuria and urgency.     Patient History:  Past Medical  History:   Diagnosis Date    Acid reflux     Arthritis     Chemotherapy induced nausea and vomiting 08/09/2019    Endometrial ca 06/26/2019    Essential hypertension 06/27/2019    Estrogen deficiency     Hormone deficiency     Hypertension     Liver mass 07/01/2019    Neuropathy due to chemotherapeutic drug 02/21/2020    Osteopenia     Seizures     post partal. 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     Past Surgical History:   Procedure Laterality Date    ABLATION OF NEOPLASM OF URETER USING LASER Right 1/3/2022    Procedure: ABLATION, NEOPLASM, URETER, USING LASER;  Surgeon: Shane Diaz MD;  Location: [facility] 1ST FLR;  Service: Urology;  Laterality: Right;    CYSTOSCOPY N/A 1/3/2022    Procedure: CYSTOSCOPY;  Surgeon: Shane Diaz MD;  Location: [facility];  Service: Urology;  Laterality: N/A;    CYSTOSCOPY N/A 1/6/2025    Procedure: CYSTOSCOPY;  Surgeon: Shane Diaz MD;  Location: [facility]ST FLR;  Service: Urology;  Laterality: N/A;    CYSTOSCOPY  2/24/2025    Procedure: CYSTOSCOPY;  Surgeon: Shane Diaz MD;  Location: [facility]ST FLR;  Service: Urology;;    DILATION AND CURETTAGE OF UTERUS      GI scope  2016    LAPAROTOMY N/A 7/8/2019    Procedure: LAPAROTOMY;  Surgeon: Lokesh Carias MD;  Location: [facility] 2ND FLR;  Service: OB/GYN;  Laterality: N/A;  mini    NEPHROSCOPY Left 1/6/2025    Procedure: NEPHROSCOPY;  Surgeon: Shane Diaz MD;  Location: [facility]ST FLR;  Service: Urology;  Laterality: Left;    OMENTECTOMY N/A 7/8/2019    Procedure: OMENTECTOMY;  Surgeon: Lokesh Carias MD;  Location: [facility] 2ND FLR;  Service: OB/GYN;  Laterality: N/A;    RETROGRADE PYELOGRAPHY Left 1/6/2025    Procedure: PYELOGRAM, RETROGRADE;  Surgeon: Shane Diaz MD;  Location: [facility]LR;  Service: Urology;  Laterality: Left;    ROBOT-ASSISTED LAPAROSCOPIC ABDOMINAL HYSTERECTOMY USING DA SIRISHA XI N/A 7/8/2019    Procedure: XI  ROBOTIC HYSTERECTOMY;  Surgeon: Lokesh Carias MD;  Location: [facility] OR 2ND FLR;  Service: OB/GYN;  Laterality: N/A;    ROBOT-ASSISTED LAPAROSCOPIC PELVIC LYMPHADENECTOMY USING DA SIRISHA XI Bilateral 7/8/2019    Procedure: XI ROBOTIC LYMPHADENECTOMY, PELVIC;  Surgeon: Lokesh Carias MD;  Location: [facility] OR 2ND FLR;  Service: OB/GYN;  Laterality: Bilateral;    ROBOT-ASSISTED LAPAROSCOPIC RETROPERITONEAL LYMPHADENECTOMY USING DA SIRISHA XI N/A 7/8/2019    Procedure: XI ROBOTIC LYMPHADENECTOMY, RETROPERITONEUM;  Surgeon: Lokesh Carias MD;  Location: [facility] OR 2ND FLR;  Service: OB/GYN;  Laterality: N/A;    ROBOT-ASSISTED LAPAROSCOPIC SALPINGO-OOPHORECTOMY USING DA SIRISHA XI Bilateral 7/8/2019    Procedure: XI ROBOTIC SALPINGO-OOPHORECTOMY;  Surgeon: Lokesh Carias MD;  Location: [facility] OR 2ND FLR;  Service: OB/GYN;  Laterality: Bilateral;    ROBOT-ASSISTED LAPAROSCOPIC SURGICAL REMOVAL OF KIDNEY AND URETER USING DA SIRISHA XI Right 3/14/2022    Procedure: XI ROBOTIC NEPHROURETERECTOMY/ WITH BLADDER CUFF;  Surgeon: Shane Diaz MD;  Location: [facility] OR 2ND FLR;  Service: Urology;  Laterality: Right;  4hrs    TURBT (TRANSURETHRAL RESECTION OF BLADDER TUMOR) N/A 1/6/2025    Procedure: TURBT (TRANSURETHRAL RESECTION OF BLADDER TUMOR);  Surgeon: Shane Diaz MD;  Location: [facility] OR [address] [place] [address];  Service: Urology;  Laterality: N/A;    TURBT (TRANSURETHRAL RESECTION OF BLADDER TUMOR) N/A 2/24/2025    Procedure: TURBT (TRANSURETHRAL RESECTION OF BLADDER TUMOR);  Surgeon: Shane Diaz MD;  [facility];  Service: Urology;  Laterality: N/A;  surgery clearance    URETEROSCOPY Right 1/3/2022    Procedure: URETEROSCOPY;  Surgeon: Shane Diaz MD;  [facility];  Service: Urology;  Laterality: Right;  2hrs    URETEROSCOPY Left 1/6/2025    Procedure: URETEROSCOPY;  Surgeon: Shane Diaz MD;  [facility];  Service: Urology;  Laterality: Left;     Family History   Problem  "Relation Name Age of Onset    No Known Problems Mother      No Known Problems Father      Colon cancer Sister  53    Breast cancer Sister  64    Kidney cancer Sister      Breast cancer Sister          in her 60s    Skin cancer Sister      Prostate cancer Brother      Stroke Brother      Prostate cancer Brother      Heart disease Brother      Prostate cancer Brother      Heart disease Brother      Ovarian cancer Neg Hx      Uterine cancer Neg Hx      Anesthesia problems Neg Hx       Social History[1]  Allergies:  Asa [aspirin], Dilaudid (pf) [hydromorphone (pf)], and Sulfa (sulfonamide antibiotics)  Medications:  Current Medications[2]    OBJECTIVE:     Vitals:    06/19/25 0753   BP: (!) 145/77   Pulse: (!) 59   Weight: 68.8 kg (151 lb 10.8 oz)   Height: 5' 4" (1.626 m)      Physical Exam  Constitutional:       Appearance: Normal appearance.   HENT:      Head: Normocephalic.   Pulmonary:      Effort: Pulmonary effort is normal. No respiratory distress.      Breath sounds: No wheezing.   Abdominal:      General: There is no distension.      Tenderness: There is no abdominal tenderness.   Neurological:      Mental Status: She is alert.   Psychiatric:         Mood and Affect: Mood normal.       LAB:      All laboratory values listed below was/were independently reviewed with patient at this clinic visit.    In office UA today was negative nitrites and blood. Trace leukocytes.      Lab Results   Component Value Date    BUN 15 06/03/2025    CREATININE 0.9 06/03/2025    WBC 6.56 06/03/2025    HGB 10.5 (L) 06/03/2025    HCT 33.9 (L) 06/03/2025     06/03/2025    AST 15 06/03/2025    ALT 11 06/03/2025    ALKPHOS 72 06/03/2025    ALBUMIN 4.0 06/03/2025     Lab Results   Component Value Date    CREATININE 0.9 06/03/2025    EGFRNORACEVR >60 06/03/2025     IMPRESSION:  Encounter Diagnoses   Name Primary?    Malignant neoplasm of lateral wall of urinary bladder Yes    Urothelial carcinoma of kidney, right  "     ASSESSMENT/PLAN:     Plan: I spent a total of 30 minutes on the day of the visit. This includes face to face time and non-face to face time preparing to see the patient (eg, review of tests), obtaining and/or reviewing separately obtained history, documenting clinical information in the electronic or other health record, independently interpreting results and communicating results to the patient/family/caregiver, or care coordinator  10 minutes pre counseling including last installation  10 minutes prep, travel to get medication & installation of the BCG  10 minutes post installation instructions and room clean up  We addressed his UA  Education and recommendations of today's plan of care with the BCG Bladder Installations; including home remedies and needed follow up with PCP.   BCG dose 2 was instilled using sterile and BCG precautions.  Tolerated well   We discussed his Bladder Cancer; discussed after TURBT; cancer was removed.  Discussed BCG and the expectations, benefits, risks, as well as importance of post clean up for 6 hours after the 2 hour urination.  They was given detailed written instructions as well.   Diet modifications; no caffeine the morning of installation; increase water intake at the 2 hour void to flush out.  No sex for 48 hours after installation; use of condom during the 2 weeks of installations.    Cystoscopy ordered and will be scheduled with Dr. Diaz.     The visit today is associated with current or anticipated ongoing medical care related to this patient's single serious condition/complex condition.      BETTINA Landers-C         [1]   Social History  Socioeconomic History    Marital status: Other   Tobacco Use    Smoking status: Never    Smokeless tobacco: Never   Substance and Sexual Activity    Alcohol use: Never    Drug use: Never    Sexual activity: Not Currently     Social Drivers of Health     Financial Resource Strain: Low Risk  (6/6/2025)    Overall Financial Resource  Strain (CARDIA)     Difficulty of Paying Living Expenses: Not hard at all   Food Insecurity: No Food Insecurity (6/6/2025)    Hunger Vital Sign     Worried About Running Out of Food in the Last Year: Never true     Ran Out of Food in the Last Year: Never true   Transportation Needs: No Transportation Needs (6/6/2025)    PRAPARE - Transportation     Lack of Transportation (Medical): No     Lack of Transportation (Non-Medical): No   Physical Activity: Insufficiently Active (6/6/2025)    Exercise Vital Sign     Days of Exercise per Week: 2 days     Minutes of Exercise per Session: 20 min   Stress: No Stress Concern Present (6/6/2025)    [employer] [place] [employer] - Occupational Stress Questionnaire     Feeling of Stress : Not at all   Housing Stability: Low Risk  (6/6/2025)    Housing Stability Vital Sign     Unable to Pay for Housing in the Last Year: No     Number of Times Moved in the Last Year: 0     Homeless in the Last Year: No   [2]   Current Outpatient Medications:     acetaminophen (TYLENOL) 500 MG tablet, Take 500 mg by mouth every 6 (six) hours as needed for Pain., Disp: , Rfl:     ALPRAZolam (XANAX) 0.25 MG tablet, Take by mouth 2 (two) times daily as needed for Anxiety., Disp: , Rfl:     bisoprolol (ZEBETA) 10 MG tablet, Take 10 mg by mouth once daily., Disp: , Rfl:     EScitalopram oxalate (LEXAPRO) 10 MG tablet, Take 10 mg by mouth once daily., Disp: , Rfl:     hydroCHLOROthiazide (HYDRODIURIL) 12.5 MG Tab, Take 25 mg by mouth once daily., Disp: , Rfl:     ketorolac (TORADOL) 10 mg tablet, Take 1 tablet (10 mg total) by mouth every 6 (six) hours as needed for Pain., Disp: 12 tablet, Rfl: 0    mv-min/FA/vit K/lycop/lut/zeax (OCUVITE EYE PLUS MULTI ORAL), Take 1 tablet by mouth once daily. , Disp: , Rfl:     olmesartan (BENICAR) 40 MG tablet, Take 40 mg by mouth once daily., Disp: , Rfl:     ondansetron (ZOFRAN-ODT) 4 MG TbDL, DISSOLVE 1 tablet (4 mg total) by mouth every 8 (eight) hours as  needed., Disp: 10 tablet, Rfl: 0    oxybutynin (DITROPAN) 5 MG Tab, Take 1 tablet (5 mg total) by mouth 3 (three) times daily as needed (bladder spasms)., Disp: 30 tablet, Rfl: 0    pantoprazole (PROTONIX) 40 MG tablet, Take 40 mg by mouth once daily., Disp: , Rfl:     potassium chloride SA (K-DUR,KLOR-CON) 20 MEQ tablet, Take 20 mEq by mouth once daily., Disp: , Rfl:     raloxifene (EVISTA) 60 mg tablet, Take 60 mg by mouth every evening., Disp: , Rfl:     rosuvastatin (CRESTOR) 5 MG tablet, Take 5 mg by mouth every evening., Disp: , Rfl:     sucralfate (CARAFATE) 1 gram tablet, Take 1 g by mouth 2 (two) times daily as needed., Disp: , Rfl:     Current Facility-Administered Medications:     BCG live (HAYDEE) 50 mg in 0.9% NaCl 50 mL bladder instillation, 50 mg, Intravesical, Once, Michelle Cristobal NP    BCG live (HAYDEE) 50 mg in 0.9% NaCl 50 mL bladder instillation, 50 mg, Bladder Instillation, Weekly,     BCG live (HAYDEE) 50 mg in 0.9% NaCl 50 mL bladder instillation, 50 mg, Intravesical, Once, Neelima Finley, NP

## 2025-06-20 ENCOUNTER — TELEPHONE (OUTPATIENT)
Dept: UROLOGY | Facility: CLINIC | Age: 80
End: 2025-06-20
Payer: MEDICARE

## (undated) DEVICE — SOL WATER STRL IRR 1000ML

## (undated) DEVICE — SUT 0 VICRYL / UR6 (J603)

## (undated) DEVICE — DEVICE ENSEAL X1 LARGE JAW

## (undated) DEVICE — WIRE GUIDE 0.038OLD

## (undated) DEVICE — SUT 2/0 54IN COATED VICRYL

## (undated) DEVICE — PACK CYSTOSCOPY III SIRUS

## (undated) DEVICE — URETEROSCOPE LITHOVUE STANDARD

## (undated) DEVICE — SYR ONLY LUER LOCK 20CC

## (undated) DEVICE — IRRIGATOR ENDOSCOPY DISP.

## (undated) DEVICE — PORT AIRSEAL 12/120MM LPI

## (undated) DEVICE — DRAPE SCOPE PILLOW WARMER

## (undated) DEVICE — POWDER ARISTA AH 1GM

## (undated) DEVICE — SOL ELECTROLUBE ANTI-STIC

## (undated) DEVICE — DRAPE ABDOMINAL TIBURON 14X11

## (undated) DEVICE — SUT MCRYL PLUS 4-0 PS2 27IN

## (undated) DEVICE — ADAPTER HOSE 10FT 8MM

## (undated) DEVICE — SUT CTD VICRYL 0 VIL BR/CT

## (undated) DEVICE — TRAY CYSTO BASIN OMC

## (undated) DEVICE — SYR 50ML CATH TIP

## (undated) DEVICE — SPONGE LAP 18X18 PREWASHED

## (undated) DEVICE — UNDERGLOVES BIOGEL PI SIZE 7.5

## (undated) DEVICE — GOWN SURGICAL X-LARGE

## (undated) DEVICE — GAUZE SPONGE XRAY 4X4

## (undated) DEVICE — ELECTRODE REM PLYHSV RETURN 9

## (undated) DEVICE — TRAY FOLEY 16FR INFECTION CONT

## (undated) DEVICE — SEAL UNIVERSAL 5MM-8MM XI

## (undated) DEVICE — CART STAPLE FLEX ETX 3.5MM BLU

## (undated) DEVICE — NDL INSUF ULTRA VERESS 120MM

## (undated) DEVICE — TAPE SILK 3IN

## (undated) DEVICE — SOL NS 1000CC

## (undated) DEVICE — SUT 0 54IN COATED VICRYL U

## (undated) DEVICE — DRAPE STERI INSTRUMENT 1018

## (undated) DEVICE — SEE MEDLINE ITEM 157117

## (undated) DEVICE — BAG URINARY DRAINAGE 2000ML

## (undated) DEVICE — GUIDE WIRE MOTION .035 X 150CM

## (undated) DEVICE — SET TRI-LUMEN FILTERED TUBE

## (undated) DEVICE — SHEATH FLEXOR URET 10.7FRX35CM

## (undated) DEVICE — SET IRR URLGY 2LINE UNIV SPIKE

## (undated) DEVICE — SOL NACL IRR 3000ML

## (undated) DEVICE — SOL IRR NACL .9% 3000ML

## (undated) DEVICE — KIT ANTIFOG W/SPONG & FLUID

## (undated) DEVICE — SYR 50CC LL

## (undated) DEVICE — LOOP VESSEL YELLOW MAXI

## (undated) DEVICE — DRAPE ARM DAVINCI XI

## (undated) DEVICE — MANIPULATOR VCARE PLUS 37MM LG

## (undated) DEVICE — CONTAINER SPECIMEN OR STER 4OZ

## (undated) DEVICE — DRESSING ABSRBNT ISLAND 3.6X8

## (undated) DEVICE — SUT CTD VICRYL VIL BR SH 27

## (undated) DEVICE — SEE MEDLINE ITEM 157148

## (undated) DEVICE — CLOSURE SKIN STERI STRIP 1/2X4

## (undated) DEVICE — CLIPPER BLADE MOD 4406 (CAREF)

## (undated) DEVICE — SET DECANTER MEDICHOICE

## (undated) DEVICE — COVER TIP CURVED SCISSORS XI

## (undated) DEVICE — SUT CTD VICRYL VIL BR CR/SH

## (undated) DEVICE — CATH POLLACK OPEN-END FLEXI-TI

## (undated) DEVICE — LEGGINGS 48X31 INCH

## (undated) DEVICE — GOWN SMARTGOWN LVL4 X-LONG XL

## (undated) DEVICE — SEE MEDLINE ITEM 157181

## (undated) DEVICE — SEE MEDLINE ITEM 152622

## (undated) DEVICE — SYR 10CC LUER LOCK

## (undated) DEVICE — DRAPE COLUMN DAVINCI XI

## (undated) DEVICE — BLADE SURG CARBON STEEL SZ11

## (undated) DEVICE — OBTURATOR BLADELESS 8MM XI

## (undated) DEVICE — Device

## (undated) DEVICE — TRAY MINOR GEN SURG

## (undated) DEVICE — FIBER LASER HOLMIUM 200MH

## (undated) DEVICE — TRAY CYSTO BASIN

## (undated) DEVICE — ELECTRODE LOOP CUTTING BIPOLAR

## (undated) DEVICE — EXTRACTOR TIPLESS 2.4FRX1115CM

## (undated) DEVICE — COVER LIGHT HANDLE 80/CA

## (undated) DEVICE — UNDERGLOVES BIOGEL PI SZ 7 LF

## (undated) DEVICE — COVER LIGHT HANDLE

## (undated) DEVICE — PAD ABD 8X10 STERILE

## (undated) DEVICE — FIBER LASER HOLMIUM 273 MICRON

## (undated) DEVICE — APPLICATOR ARISTA FLEX XL

## (undated) DEVICE — PACK CYSTOSCOPY II ECLIPSE

## (undated) DEVICE — GOWN SURG 2XL DISP TIE BACK

## (undated) DEVICE — LUBRICANT SURGILUBE 2 OZ

## (undated) DEVICE — SEE MEDLINE ITEM 154981

## (undated) DEVICE — BAG INZII TISS RETRV 12/15MM

## (undated) DEVICE — NDL 20GX1-1/2IN IB

## (undated) DEVICE — ADHESIVE DERMABOND ADVANCED

## (undated) DEVICE — SEE MEDLINE ITEM 146417

## (undated) DEVICE — PACK CYSTO

## (undated) DEVICE — SUT CTD VICRYL 0 UND BR

## (undated) DEVICE — APPLICATOR CHLORAPREP ORN 26ML

## (undated) DEVICE — SOL STRL WATER INJ 1000ML BG

## (undated) DEVICE — SOL CLEARIFY VISUALIZATION LAP

## (undated) DEVICE — SYR 30CC LUER LOCK

## (undated) DEVICE — CART STAPLE RELD 45MM WHT

## (undated) DEVICE — SEE MEDLINE ITEM 156902

## (undated) DEVICE — STAPLER INT LINEAR ARTC 3.5-45

## (undated) DEVICE — SUT 1 48IN PDS II VIO MONO

## (undated) DEVICE — LOOP CUTTING BPLR 24/26F .30MM